# Patient Record
Sex: FEMALE | Race: BLACK OR AFRICAN AMERICAN | Employment: OTHER | URBAN - METROPOLITAN AREA
[De-identification: names, ages, dates, MRNs, and addresses within clinical notes are randomized per-mention and may not be internally consistent; named-entity substitution may affect disease eponyms.]

---

## 2017-01-23 ENCOUNTER — HOSPITAL ENCOUNTER (EMERGENCY)
Age: 82
Discharge: HOME | End: 2017-01-23
Attending: EMERGENCY MEDICINE
Payer: MEDICARE

## 2017-01-23 ENCOUNTER — APPOINTMENT (OUTPATIENT)
Dept: GENERAL RADIOLOGY | Age: 82
End: 2017-01-23
Attending: EMERGENCY MEDICINE
Payer: MEDICARE

## 2017-01-23 VITALS
SYSTOLIC BLOOD PRESSURE: 210 MMHG | WEIGHT: 146 LBS | HEART RATE: 92 BPM | TEMPERATURE: 98.1 F | HEIGHT: 71 IN | RESPIRATION RATE: 16 BRPM | OXYGEN SATURATION: 97 % | DIASTOLIC BLOOD PRESSURE: 93 MMHG | BODY MASS INDEX: 20.44 KG/M2

## 2017-01-23 DIAGNOSIS — I50.9 ACUTE ON CHRONIC CONGESTIVE HEART FAILURE, UNSPECIFIED CONGESTIVE HEART FAILURE TYPE: Primary | ICD-10-CM

## 2017-01-23 LAB
ALBUMIN SERPL BCP-MCNC: 3.3 G/DL (ref 3.4–5)
ALBUMIN/GLOB SERPL: 1 {RATIO} (ref 0.8–1.7)
ALP SERPL-CCNC: 87 U/L (ref 45–117)
ALT SERPL-CCNC: 13 U/L (ref 13–56)
ANION GAP BLD CALC-SCNC: 8 MMOL/L (ref 3–18)
AST SERPL W P-5'-P-CCNC: 9 U/L (ref 15–37)
ATRIAL RATE: 80 BPM
BASOPHILS # BLD AUTO: 0 K/UL (ref 0–0.1)
BASOPHILS # BLD: 1 % (ref 0–2)
BILIRUB SERPL-MCNC: 0.1 MG/DL (ref 0.2–1)
BNP SERPL-MCNC: ABNORMAL PG/ML (ref 0–1800)
BUN SERPL-MCNC: 44 MG/DL (ref 7–18)
BUN/CREAT SERPL: 16 (ref 12–20)
CALCIUM SERPL-MCNC: 9 MG/DL (ref 8.5–10.1)
CALCULATED P AXIS, ECG09: 65 DEGREES
CALCULATED R AXIS, ECG10: -42 DEGREES
CALCULATED T AXIS, ECG11: 116 DEGREES
CHLORIDE SERPL-SCNC: 109 MMOL/L (ref 100–108)
CK MB CFR SERPL CALC: 1.8 % (ref 0–4)
CK MB CFR SERPL CALC: 1.9 % (ref 0–4)
CK MB SERPL-MCNC: 0.8 NG/ML (ref 0.5–3.6)
CK MB SERPL-MCNC: 1 NG/ML (ref 0.5–3.6)
CK SERPL-CCNC: 44 U/L (ref 26–192)
CK SERPL-CCNC: 53 U/L (ref 26–192)
CO2 SERPL-SCNC: 27 MMOL/L (ref 21–32)
CREAT SERPL-MCNC: 2.8 MG/DL (ref 0.6–1.3)
DIAGNOSIS, 93000: NORMAL
DIFFERENTIAL METHOD BLD: ABNORMAL
EOSINOPHIL # BLD: 0.2 K/UL (ref 0–0.4)
EOSINOPHIL NFR BLD: 3 % (ref 0–5)
ERYTHROCYTE [DISTWIDTH] IN BLOOD BY AUTOMATED COUNT: 13.8 % (ref 11.6–14.5)
GLOBULIN SER CALC-MCNC: 3.2 G/DL (ref 2–4)
GLUCOSE SERPL-MCNC: 99 MG/DL (ref 74–99)
HCT VFR BLD AUTO: 31.3 % (ref 35–45)
HGB BLD-MCNC: 10.3 G/DL (ref 12–16)
LYMPHOCYTES # BLD AUTO: 43 % (ref 21–52)
LYMPHOCYTES # BLD: 2.1 K/UL (ref 0.9–3.6)
MCH RBC QN AUTO: 30.4 PG (ref 24–34)
MCHC RBC AUTO-ENTMCNC: 32.9 G/DL (ref 31–37)
MCV RBC AUTO: 92.3 FL (ref 74–97)
MONOCYTES # BLD: 0.4 K/UL (ref 0.05–1.2)
MONOCYTES NFR BLD AUTO: 9 % (ref 3–10)
NEUTS SEG # BLD: 2.2 K/UL (ref 1.8–8)
NEUTS SEG NFR BLD AUTO: 44 % (ref 40–73)
P-R INTERVAL, ECG05: 148 MS
PLATELET # BLD AUTO: 212 K/UL (ref 135–420)
PMV BLD AUTO: 10.1 FL (ref 9.2–11.8)
POTASSIUM SERPL-SCNC: 4.6 MMOL/L (ref 3.5–5.5)
PROT SERPL-MCNC: 6.5 G/DL (ref 6.4–8.2)
Q-T INTERVAL, ECG07: 386 MS
QRS DURATION, ECG06: 112 MS
QTC CALCULATION (BEZET), ECG08: 445 MS
RBC # BLD AUTO: 3.39 M/UL (ref 4.2–5.3)
SODIUM SERPL-SCNC: 144 MMOL/L (ref 136–145)
TROPONIN I SERPL-MCNC: 0.09 NG/ML (ref 0–0.04)
TROPONIN I SERPL-MCNC: 0.1 NG/ML (ref 0–0.04)
VENTRICULAR RATE, ECG03: 80 BPM
WBC # BLD AUTO: 5 K/UL (ref 4.6–13.2)

## 2017-01-23 PROCEDURE — 93005 ELECTROCARDIOGRAM TRACING: CPT

## 2017-01-23 PROCEDURE — 82550 ASSAY OF CK (CPK): CPT | Performed by: EMERGENCY MEDICINE

## 2017-01-23 PROCEDURE — 83880 ASSAY OF NATRIURETIC PEPTIDE: CPT | Performed by: EMERGENCY MEDICINE

## 2017-01-23 PROCEDURE — 99283 EMERGENCY DEPT VISIT LOW MDM: CPT

## 2017-01-23 PROCEDURE — 96374 THER/PROPH/DIAG INJ IV PUSH: CPT

## 2017-01-23 PROCEDURE — 80053 COMPREHEN METABOLIC PANEL: CPT | Performed by: EMERGENCY MEDICINE

## 2017-01-23 PROCEDURE — 85025 COMPLETE CBC W/AUTO DIFF WBC: CPT | Performed by: EMERGENCY MEDICINE

## 2017-01-23 PROCEDURE — 74011250636 HC RX REV CODE- 250/636: Performed by: EMERGENCY MEDICINE

## 2017-01-23 PROCEDURE — 71020 XR CHEST PA LAT: CPT

## 2017-01-23 RX ORDER — FUROSEMIDE 10 MG/ML
40 INJECTION INTRAMUSCULAR; INTRAVENOUS
Status: COMPLETED | OUTPATIENT
Start: 2017-01-23 | End: 2017-01-23

## 2017-01-23 RX ORDER — FUROSEMIDE 40 MG/1
40 TABLET ORAL DAILY
Qty: 20 TAB | Refills: 0 | Status: SHIPPED | OUTPATIENT
Start: 2017-01-23 | End: 2017-02-12

## 2017-01-23 RX ADMIN — FUROSEMIDE 40 MG: 10 INJECTION, SOLUTION INTRAVENOUS at 17:07

## 2017-01-23 NOTE — ED PROVIDER NOTES
HPI Comments: 2:55 PM Irene Winters is a 80 y.o. female who presents to the ED c/o bilateral lower extremity swelling that was noticed yesterday. Other sx include chest pain, SOB, generalized body aches and diarrhea. Patient describes her chest pain as a \"numb pain\". States \"I don't feel sick, just pain\". Denies cough, N/V, and HA. Mentions that she was seen in the ED 6 months ago for similar sx, and at that time states \"they couldn't find out what was wrong with me\". No further complaints at this time. The history is provided by the patient and a relative. Past Medical History:   Diagnosis Date    Breast cancer ([facility] 1/17/14     right breast    Carotid duplex 11/19/2014     Severe 70% or greater bilateral ICA stenosis. LICA dissection suggested.  Chronic renal insufficiency     CVA (cerebral vascular accident) ([facility] 2003     with slight Right sided weakness    Echocardiogram 02/22/2016     EF 55-60%. No WMA. Mild-mod LVH. Gr 2 DDfx. No significant valvular heart disease.  Edema     Glaucoma     Gout flare     Hyperlipidemia     Hypertension     Lower extremity arterial testing 12/19/2014     Mod tibio-peroneal arterial disease bilaterally. R YANCY 1.24.  L YANCY 0.74. Bilateral sm vessel disease.  Lump of right breast     URI (upper respiratory infection)        Past Surgical History:   Procedure Laterality Date    Hx gyn       JUSTIN/BSO    Hx appendectomy      Hx cyst incision and drainage       PT DOES NOT REMEMBER EXACT DATES, TUCKER BREAST DONE MORE THAN 20 YEARS AGO. BENIGN FINDINGS PER PATIENT.     Hx mastectomy  1/17/2014     RIGHT PARTIAL MASTECTOMY performed by Sarah Armendariz MD at [address] Hx hysterectomy           Family History:   Problem Relation Age of Onset    Hypertension Mother     Hypertension Brother     Diabetes Brother        Social History     Social History    Marital status:      Spouse name: N/A    Number of children: N/A    Years of education: N/A     Occupational History    Not on file. Social History Main Topics    Smoking status: Never Smoker    Smokeless tobacco: Never Used    Alcohol use No    Drug use: No    Sexual activity: Not Currently     Other Topics Concern    Not on file     Social History Narrative         ALLERGIES: Review of patient's allergies indicates no known allergies. Review of Systems   Constitutional: Negative for chills and fever. HENT: Negative for congestion and sneezing. Eyes: Negative for visual disturbance. Respiratory: Positive for shortness of breath. Negative for cough. Cardiovascular: Positive for chest pain and leg swelling. Gastrointestinal: Positive for diarrhea. Negative for abdominal pain, nausea and vomiting. Genitourinary: Negative for difficulty urinating and dysuria. Musculoskeletal: Positive for myalgias. Negative for back pain. Skin: Negative for rash. Neurological: Negative for weakness and headaches. Vitals:    01/23/17 1301   BP: (!) 207/91   Pulse: 92   Resp: 16   Temp: 98.1 °F (36.7 °C)   SpO2: 97%   Weight: 66.2 kg (146 lb)   Height: 5' 11\" (1.803 m)            Physical Exam   Constitutional: She is oriented to person, place, and time. She appears well-developed and well-nourished. HENT:   Head: Normocephalic and atraumatic. Neck: Neck supple. No JVD present. Cardiovascular: Normal rate and regular rhythm. Pulmonary/Chest: Effort normal and breath sounds normal. No respiratory distress. Abdominal: Soft. She exhibits no distension. There is no tenderness. There is no rebound and no guarding. Musculoskeletal: She exhibits edema. 2+ pedal edema, no calf tenderness   Neurological: She is alert and oriented to person, place, and time. Skin: Skin is warm and dry. No erythema.    Psychiatric: Judgment normal.        MDM  Number of Diagnoses or Management Options  Acute on chronic congestive heart failure, unspecified congestive heart failure type [facility]):   Elevated BP:   Diagnosis management comments: 81 y/o female presents with chest pressure and LE swelling  Check ekg, trop x2  Suspect chf, will give dose of lasix  Vitals stable, no hypoxia         Amount and/or Complexity of Data Reviewed  Clinical lab tests: ordered and reviewed  Tests in the radiology section of CPT®: ordered and reviewed  Tests in the medicine section of CPT®: reviewed and ordered      ED Course       Procedures    Vitals:  Patient Vitals for the past 12 hrs:   Temp Pulse Resp BP SpO2   01/23/17 1301 98.1 °F (36.7 °C) 92 16 (!) 207/91 97 %     97% on RA, indicating adequate oxygenation.       Medications ordered:   Medications   furosemide (LASIX) injection 40 mg (40 mg IntraVENous Given 1/23/17 1707)         Lab findings:  Recent Results (from the past 12 hour(s))   EKG, 12 LEAD, INITIAL    Collection Time: 01/23/17  1:10 PM   Result Value Ref Range    Ventricular Rate 80 BPM    Atrial Rate 80 BPM    P-R Interval 148 ms    QRS Duration 112 ms    Q-T Interval 386 ms    QTC Calculation (Bezet) 445 ms    Calculated P Axis 65 degrees    Calculated R Axis -42 degrees    Calculated T Axis 116 degrees    Diagnosis       Normal sinus rhythm  Left axis deviation  Incomplete left bundle branch block  Left ventricular hypertrophy with repolarization abnormality  Abnormal ECG  When compared with ECG of 26-OCT-2016 10:05,  T wave inversion less evident in Lateral leads  Confirmed by Juan Caban ([id number]) on 1/23/2017 1:21:22 PM     CBC WITH AUTOMATED DIFF    Collection Time: 01/23/17  1:15 PM   Result Value Ref Range    WBC 5.0 4.6 - 13.2 K/uL    RBC 3.39 (L) 4.20 - 5.30 M/uL    HGB 10.3 (L) 12.0 - 16.0 g/dL    HCT 31.3 (L) 35.0 - 45.0 %    MCV 92.3 74.0 - 97.0 FL    MCH 30.4 24.0 - 34.0 PG    MCHC 32.9 31.0 - 37.0 g/dL    RDW 13.8 11.6 - 14.5 %    PLATELET 286 505 - 502 K/uL    MPV 10.1 9.2 - 11.8 FL    NEUTROPHILS 44 40 - 73 %    LYMPHOCYTES 43 21 - 52 %    MONOCYTES 9 3 - 10 %    EOSINOPHILS 3 0 - 5 %    BASOPHILS 1 0 - 2 %    ABS. NEUTROPHILS 2.2 1.8 - 8.0 K/UL    ABS. LYMPHOCYTES 2.1 0.9 - 3.6 K/UL    ABS. MONOCYTES 0.4 0.05 - 1.2 K/UL    ABS. EOSINOPHILS 0.2 0.0 - 0.4 K/UL    ABS. BASOPHILS 0.0 0.0 - 0.1 K/UL    DF AUTOMATED     METABOLIC PANEL, COMPREHENSIVE    Collection Time: 01/23/17  1:15 PM   Result Value Ref Range    Sodium 144 136 - 145 mmol/L    Potassium 4.6 3.5 - 5.5 mmol/L    Chloride 109 (H) 100 - 108 mmol/L    CO2 27 21 - 32 mmol/L    Anion gap 8 3.0 - 18 mmol/L    Glucose 99 74 - 99 mg/dL    BUN 44 (H) 7.0 - 18 MG/DL    Creatinine 2.80 (H) 0.6 - 1.3 MG/DL    BUN/Creatinine ratio 16 12 - 20      GFR est AA 19 (L) >60 ml/min/1.73m2    GFR est non-AA 16 (L) >60 ml/min/1.73m2    Calcium 9.0 8.5 - 10.1 MG/DL    Bilirubin, total 0.1 (L) 0.2 - 1.0 MG/DL    ALT 13 13 - 56 U/L    AST 9 (L) 15 - 37 U/L    Alk.  phosphatase 87 45 - 117 U/L    Protein, total 6.5 6.4 - 8.2 g/dL    Albumin 3.3 (L) 3.4 - 5.0 g/dL    Globulin 3.2 2.0 - 4.0 g/dL    A-G Ratio 1.0 0.8 - 1.7     CARDIAC PANEL,(CK, CKMB & TROPONIN)    Collection Time: 01/23/17  1:15 PM   Result Value Ref Range    CK 44 26 - 192 U/L    CK - MB 0.8 0.5 - 3.6 ng/ml    CK-MB Index 1.8 0.0 - 4.0 %    Troponin-I, Qt. 0.09 (H) 0.0 - 0.045 NG/ML   PRO-BNP    Collection Time: 01/23/17  1:15 PM   Result Value Ref Range    NT pro-BNP 14368 (H) 0 - 1800 PG/ML   EKG, 12 LEAD, SUBSEQUENT    Collection Time: 01/23/17  4:40 PM   Result Value Ref Range    Ventricular Rate 90 BPM    Atrial Rate 90 BPM    P-R Interval 128 ms    QRS Duration 114 ms    Q-T Interval 384 ms    QTC Calculation (Bezet) 469 ms    Calculated P Axis 46 degrees    Calculated R Axis -46 degrees    Calculated T Axis 114 degrees    Diagnosis       Normal sinus rhythm  Left anterior fascicular block  Left ventricular hypertrophy with repolarization abnormality  Abnormal ECG  When compared with ECG of 23-JAN-2017 13:10,  No significant change was found     CARDIAC PANEL,(CK, CKMB & TROPONIN) Collection Time: 01/23/17  4:50 PM   Result Value Ref Range    CK 53 26 - 192 U/L    CK - MB 1.0 0.5 - 3.6 ng/ml    CK-MB Index 1.9 0.0 - 4.0 %    Troponin-I, Qt. 0.10 (H) 0.0 - 0.045 NG/ML       EKG interpretation by ED Physician:  1310, rate 80, left axis, NSR, LBBB, no scarbossa criteria, no ST changes  1640, rate 90, left axis, NSR, LBBB, no scarbossa criteria, no ST changes    X-Ray, CT or other radiology findings or impressions:  XR CHEST PA LAT   IMPRESSION:     Persistent opacification at the left base consistent with small to moderate left  pleural effusion and likely underlying left basal infiltrate or atelectasis. Stable mild cardiomegaly. Atherosclerosis. Intraossific body left shoulder          Progress notes, Consult notes or additional Procedure notes:   Noted elevated BP. Repeat improved without intervention. Pt feeling better. Stable for dc home,will increase lasix and have pt follow up with PCP and cardiology. Reevaluation of patient:   5:48 PM  I have reassessed the patient. Patient was discharged in stable condition. Patient is to return to emergency department if any new or worsening condition. Disposition:  Diagnosis:   1. Acute on chronic congestive heart failure, unspecified congestive heart failure type ([address]    2. Elevated BP        Disposition: Discharge    Follow-up Information     Follow up With Details Comments [address] Person MD Go in 3 days For ED visit follow up [place] [address]  [phone]      [facility] EMERGENCY DEPT  As needed, If symptoms worsen [address] [facility]  [phone]           Patient's Medications   Start Taking    FUROSEMIDE (LASIX) 40 MG TABLET    Take 1 Tab by mouth daily for 20 days. Continue Taking    ACETAMINOPHEN (TYLENOL) 325 MG TABLET    Take 2 Tabs by mouth every four (4) hours as needed. ALLOPURINOL (ZYLOPRIM) 100 MG TABLET    Take 2 Tabs by mouth daily. ANASTROZOLE (ARIMIDEX) 1 MG TABLET    Take 1 Tab by mouth daily. ASPIRIN DELAYED-RELEASE 81 MG TABLET    Take 1 Tab by mouth daily. CARVEDILOL (COREG) 6.25 MG TABLET    Take 1 Tab by mouth two (2) times daily (with meals). CHLORTHALIDONE (HYGROTEN) 25 MG TABLET    Take 1 Tab by mouth daily. CHOLECALCIFEROL, VITAMIN D3, (VITAMIN D3) 5,000 UNIT TAB TABLET    Take 1 Tab by mouth daily. CYANOCOBALAMIN (VITAMIN B-12) 1,000 MCG TABLET    Take 1,000 mcg by mouth daily. FERROUS SULFATE 325 MG (65 MG IRON) TABLET    Take 325 mg by mouth Daily (before breakfast). FUROSEMIDE (LASIX) 20 MG TABLET    One tablet by mouth each morning    HYDRALAZINE (APRESOLINE) 100 MG TABLET    Take 100 mg by mouth two (2) times a day. These Medications have changed    No medications on file   Stop Taking    No medications on file       SCRIBE ATTESTATION STATEMENT  Documented by: Kamini martining for, and in the presence of, Yuliya Pinedo DO 3:00 PM     Signed by: Rashad Kimball, 1/23/17, 3:00 PM    PROVIDER ATTESTATION STATEMENT  I personally performed the services described in the documentation, reviewed the documentation, as recorded by the scribe in my presence, and it accurately and completely records my words and actions.   Yuliya Pinedo DO

## 2017-01-23 NOTE — DISCHARGE INSTRUCTIONS

## 2017-01-23 NOTE — ED NOTES
I performed a brief evaluation, including history and physical, of the patient here in triage and I have determined that pt will need further treatment and evaluation from the main side ER physician. I have placed initial orders to help in expediting patients care.      January 23, 2017 at 1:27 PM - Andres Condon DO        Visit Vitals    BP (!) 207/91 (BP 1 Location: Left arm, BP Patient Position: At rest)    Pulse 92    Temp 98.1 °F (36.7 °C)    Resp 16    Ht 5' 11\" (1.803 m)    Wt 66.2 kg (146 lb)    SpO2 97%    BMI 20.36 kg/m2

## 2017-01-24 ENCOUNTER — PATIENT OUTREACH (OUTPATIENT)
Dept: INTERNAL MEDICINE CLINIC | Age: 82
End: 2017-01-24

## 2017-01-24 LAB
ATRIAL RATE: 90 BPM
CALCULATED P AXIS, ECG09: 46 DEGREES
CALCULATED R AXIS, ECG10: -46 DEGREES
CALCULATED T AXIS, ECG11: 114 DEGREES
DIAGNOSIS, 93000: NORMAL
P-R INTERVAL, ECG05: 128 MS
Q-T INTERVAL, ECG07: 384 MS
QRS DURATION, ECG06: 114 MS
QTC CALCULATION (BEZET), ECG08: 469 MS
VENTRICULAR RATE, ECG03: 90 BPM

## 2017-01-24 NOTE — PROGRESS NOTES
NNTOCED  Patient admitted to [facility], 1/23/17 to 1/23/17 for elevated BP/acute on chronic CHF. Patient presented to ED with c/o swelling to bilateral legs. As per ED report, patient described chest pain as \"numb\". Other complaints were SOB, chest pain, generalized body aches and diarrhea. No other complaints or s/s noted. New medications/changes to current medications:  Lasix 40 mg daily    ED admissions in the past 6 months: 1    Contacted patient for ED follow up. Home number bust. Called mobile number. No answer. left message introducing self, role and reason for call. Requested return call;contact information provided. Will attempt to contact at a later time.

## 2017-01-25 ENCOUNTER — PATIENT OUTREACH (OUTPATIENT)
Dept: INTERNAL MEDICINE CLINIC | Age: 82
End: 2017-01-25

## 2017-01-25 NOTE — PROGRESS NOTES
NNTOCED  Patient admitted to [facility], 1/23/17 to 1/23/17 for elevated BP/acute on chronic CHF. Hx of CHF, HTN, ACS, HLD, NSTEMI, CVA, breast cancer gout. Patient presented to ED with c/o swelling to bilateral legs. As per ED report, patient described chest pain as \"numb\". Other complaints were SOB, chest pain, generalized body aches and diarrhea. No other complaints or s/s noted.      New medications/changes to current medications:  Lasix 40 mg daily     ED admissions in the past 6 months: 1    Contacted patient for ED follow up. Verified 2 patient identifiers. Introduced self, role and reason for call. Patient reports:  Few episodes of chest pain which have resolved  SOB with minimal exertion  Bilateral foot pain  Right foot swollen  Non productive cough  Nausea X 1    Patient denies:  Dizziness  Abdominal fullness/bloating   Sweating    ADL's:  Feeds self: yes  Ambulates: with assist  Self grooming: yes  Toileting: incontinent; wears incontinent brief    DME:   [employer]    Support:   Gadiel Cam, son  Renato Pennington, daughter-in-law    Has SOB when walking from bedroom to kitchen. Patient reported cough but stated \"nothing came up; feels like it's something in there\". Does not weigh at home. Patient communicated home health came and got the scale after they discharged her and there is no scale in the home. Attempted to reconcile medications however patient stated \"I'm confused\" after reading several bottle of medications. Asked if there was someone else in the home writer could speak with. Spoke with Renato Pennington (listed on [facility]). Reconciled medications with Ms. Sahu. Upon discharge from hospital on 10/28/16; nifedipine and chlorthalidone was d/c'd. Patient no longer taking hydralazine or allopurinol. Instructed to bring all medications or list of medications with them to next appointment. Confirmed appointment with Ms. Sahu for 1/27/17 at 2 PM with Samir Parada PA-C.  Ms. Sherryle Dance with provide transportation. Educated patient and Ms. Sahu to monitor and report the following Red flags: worsening of SOB, worsening of swelling to legs/feet, return of chest pain, sweating, increased N/V, feeling of fullness/bloating or any new or concerning symptoms. Patient and Ms. Sahu verbalized understanding of information discussed and is aware of  when to seek medical attention from PCP, urgent care or ED. Opportunity to ask questions was provided. Contact information was provided for future reference or further questions. Discussed patient with Evangelista Meadows. Contacted Ms. Sahu to ask if she could bring patient in earlier. Ms. Agnes Pascual communicated she could; appt scheduled for 1:30 PM on 1/27/17. Discussed the importance of patient weighing daily to assess for weight gain due to fluid overload. Ms. Agnes Pascual communicated she is a CNA with [employer] and is aware. Asked if a scale can be purchased for patient to monitor and record daily weights. Ms. Agnes Pascual communicated the scale that was provided by [facility] was connected to a system that notified them of when to weigh patient and take her BP and that information was sent to [facility]. She stated was patient was discharged from home health they picked up scale. Ms. Agnes Pascual verbalized patient has memory loss, lies on couch all day long and lacks motivation. Ms. Agnes Pascual indicated she is taking patient to  before appointment to be screened for home health care.

## 2017-01-27 ENCOUNTER — OFFICE VISIT (OUTPATIENT)
Dept: INTERNAL MEDICINE CLINIC | Age: 82
End: 2017-01-27

## 2017-01-27 ENCOUNTER — HOSPITAL ENCOUNTER (OUTPATIENT)
Dept: LAB | Age: 82
Discharge: HOME | End: 2017-01-27
Payer: MEDICARE

## 2017-01-27 VITALS
OXYGEN SATURATION: 98 % | WEIGHT: 146 LBS | BODY MASS INDEX: 20.44 KG/M2 | HEIGHT: 71 IN | SYSTOLIC BLOOD PRESSURE: 160 MMHG | TEMPERATURE: 97.3 F | HEART RATE: 61 BPM | DIASTOLIC BLOOD PRESSURE: 68 MMHG

## 2017-01-27 DIAGNOSIS — D64.9 ANEMIA, UNSPECIFIED TYPE: ICD-10-CM

## 2017-01-27 DIAGNOSIS — N18.9 CKD (CHRONIC KIDNEY DISEASE), UNSPECIFIED STAGE: ICD-10-CM

## 2017-01-27 DIAGNOSIS — J90 PLEURAL EFFUSION, LEFT: ICD-10-CM

## 2017-01-27 DIAGNOSIS — R60.9 PERIPHERAL EDEMA: ICD-10-CM

## 2017-01-27 DIAGNOSIS — I50.9 CONGESTIVE HEART FAILURE, UNSPECIFIED CONGESTIVE HEART FAILURE CHRONICITY, UNSPECIFIED CONGESTIVE HEART FAILURE TYPE: ICD-10-CM

## 2017-01-27 DIAGNOSIS — I10 ESSENTIAL HYPERTENSION: ICD-10-CM

## 2017-01-27 DIAGNOSIS — I50.9 CONGESTIVE HEART FAILURE, UNSPECIFIED CONGESTIVE HEART FAILURE CHRONICITY, UNSPECIFIED CONGESTIVE HEART FAILURE TYPE: Primary | ICD-10-CM

## 2017-01-27 LAB
ANION GAP BLD CALC-SCNC: 11 MMOL/L (ref 3–18)
BASOPHILS # BLD AUTO: 0 K/UL (ref 0–0.06)
BASOPHILS # BLD: 1 % (ref 0–2)
BUN SERPL-MCNC: 53 MG/DL (ref 7–18)
BUN/CREAT SERPL: 18 (ref 12–20)
CALCIUM SERPL-MCNC: 8.5 MG/DL (ref 8.5–10.1)
CHLORIDE SERPL-SCNC: 106 MMOL/L (ref 100–108)
CO2 SERPL-SCNC: 25 MMOL/L (ref 21–32)
CREAT SERPL-MCNC: 3.02 MG/DL (ref 0.6–1.3)
DIFFERENTIAL METHOD BLD: ABNORMAL
EOSINOPHIL # BLD: 0.3 K/UL (ref 0–0.4)
EOSINOPHIL NFR BLD: 5 % (ref 0–5)
ERYTHROCYTE [DISTWIDTH] IN BLOOD BY AUTOMATED COUNT: 14 % (ref 11.6–14.5)
GLUCOSE SERPL-MCNC: 97 MG/DL (ref 74–99)
HCT VFR BLD AUTO: 30.5 % (ref 35–45)
HGB BLD-MCNC: 9.8 G/DL (ref 12–16)
LYMPHOCYTES # BLD AUTO: 30 % (ref 21–52)
LYMPHOCYTES # BLD: 1.6 K/UL (ref 0.9–3.6)
MCH RBC QN AUTO: 30.5 PG (ref 24–34)
MCHC RBC AUTO-ENTMCNC: 32.1 G/DL (ref 31–37)
MCV RBC AUTO: 95 FL (ref 74–97)
MONOCYTES # BLD: 0.2 K/UL (ref 0.05–1.2)
MONOCYTES NFR BLD AUTO: 4 % (ref 3–10)
NEUTS SEG # BLD: 3.3 K/UL (ref 1.8–8)
NEUTS SEG NFR BLD AUTO: 60 % (ref 40–73)
PLATELET # BLD AUTO: 217 K/UL (ref 135–420)
PMV BLD AUTO: 11.3 FL (ref 9.2–11.8)
POTASSIUM SERPL-SCNC: 4.6 MMOL/L (ref 3.5–5.5)
RBC # BLD AUTO: 3.21 M/UL (ref 4.2–5.3)
SODIUM SERPL-SCNC: 142 MMOL/L (ref 136–145)
WBC # BLD AUTO: 5.4 K/UL (ref 4.6–13.2)

## 2017-01-27 PROCEDURE — 36415 COLL VENOUS BLD VENIPUNCTURE: CPT | Performed by: PHYSICIAN ASSISTANT

## 2017-01-27 PROCEDURE — 85025 COMPLETE CBC W/AUTO DIFF WBC: CPT | Performed by: PHYSICIAN ASSISTANT

## 2017-01-27 PROCEDURE — 80048 BASIC METABOLIC PNL TOTAL CA: CPT | Performed by: PHYSICIAN ASSISTANT

## 2017-01-27 RX ORDER — ALBUTEROL SULFATE 90 UG/1
1-2 AEROSOL, METERED RESPIRATORY (INHALATION)
Qty: 1 INHALER | Refills: 0 | Status: ON HOLD | OUTPATIENT
Start: 2017-01-27 | End: 2018-01-01

## 2017-01-27 NOTE — MR AVS SNAPSHOT
Visit Information Date & Time Provider Department Dept. Phone Encounter #  
 1/27/2017  1:30 PM Debo Tejeda [place] [facility] [address] [id number] Your Appointments 2/10/2017  3:30 PM  
Office Visit with Sandria Curling, MD  
Internist of [place] [address]) Appt Note: ov 2wks per bm/rm [address], [address] [place] [address] [address] [place]  
  
   
 [address], [address] Upcoming Health Maintenance Date Due  
 GLAUCOMA SCREENING Q2Y 7/17/1993 MEDICARE YEARLY EXAM 7/17/1993 DTaP/Tdap/Td series (1 - Tdap) 11/9/2002 Allergies as of 1/27/2017  Review Complete On: 1/27/2017 By: TRACIE Pickens No Known Allergies Current Immunizations  Reviewed on 10/28/2016 Name Date Influenza High Dose Vaccine PF 10/28/2016 Influenza Vaccine 10/12/2015, 11/5/2014, 10/22/2013  2:54 PM  
 Influenza Vaccine Whole 11/8/2010 Pneumococcal Vaccine (Unspecified Type) 3/5/2012, 5/10/2007 TD Vaccine 11/8/2002 Not reviewed this visit You Were Diagnosed With   
  
 Codes Comments Congestive heart failure, unspecified congestive heart failure chronicity, unspecified congestive heart failure type ([facility]    -  Primary ICD-10-CM: I50.9 ICD-9-CM: 428.0 Vitals BP Pulse Temp Height(growth percentile) Weight(growth percentile) SpO2  
 160/68 (BP 1 Location: Left arm, BP Patient Position: Sitting) 61 97.3 °F (36.3 °C) (Oral) 5' 11\" (1.803 m) 146 lb (66.2 kg) 98% BMI OB Status Smoking Status 20.36 kg/m2 Hysterectomy Never Smoker BMI and BSA Data Body Mass Index Body Surface Area  
 20.36 kg/m 2 1.82 m 2 Preferred Pharmacy Pharmacy Name Phone [id number] [address] [phone] Your Updated Medication List  
  
   
This list is accurate as of: 1/27/17  2:14 PM.  Always use your most recent med list.  
  
  
  
  
 acetaminophen 325 mg tablet Commonly known as:  TYLENOL Take 2 Tabs by mouth every four (4) hours as needed. albuterol 90 mcg/actuation inhaler Commonly known as:  PROVENTIL HFA, VENTOLIN HFA, PROAIR HFA Take 1-2 Puffs by inhalation every four (4) hours as needed for Wheezing. allopurinol 100 mg tablet Commonly known as:  Luther Needle Take 2 Tabs by mouth daily. anastrozole 1 mg tablet Commonly known as:  ARIMIDEX Take 1 Tab by mouth daily. aspirin delayed-release 81 mg tablet Take 1 Tab by mouth daily. carvedilol 6.25 mg tablet Commonly known as:  Layman Peña Take 1 Tab by mouth two (2) times daily (with meals). chlorthalidone 25 mg tablet Commonly known as:  Johana Stakes Take 1 Tab by mouth daily. cholecalciferol (VITAMIN D3) 5,000 unit Tab tablet Commonly known as:  VITAMIN D3 Take 1 Tab by mouth daily. ferrous sulfate 325 mg (65 mg iron) tablet Take 325 mg by mouth Daily (before breakfast). * furosemide 20 mg tablet Commonly known as:  LASIX One tablet by mouth each morning * furosemide 40 mg tablet Commonly known as:  LASIX Take 1 Tab by mouth daily for 20 days. hydrALAZINE 100 mg tablet Commonly known as:  APRESOLINE Take 100 mg by mouth two (2) times a day. inhalational spacing device 1 Each by Does Not Apply route as needed. VITAMIN B-12 1,000 mcg tablet Generic drug:  cyanocobalamin Take 1,000 mcg by mouth daily. * Notice: This list has 2 medication(s) that are the same as other medications prescribed for you. Read the directions carefully, and ask your doctor or other care provider to review them with you. Prescriptions Sent to Pharmacy Refills  
 albuterol (PROVENTIL HFA, VENTOLIN HFA, PROAIR HFA) 90 mcg/actuation inhaler 0 Sig: Take 1-2 Puffs by inhalation every four (4) hours as needed for Wheezing.   
 Class: Normal  
 Pharmacy: [facility] [address], [address] Ph #: [phone] Route: Inhalation  
 inhalational spacing device 0 Si Each by Does Not Apply route as needed. Class: Normal  
 Pharmacy: [facility] [address], [address] Ph #: [phone] Route: Does Not Apply We Performed the Following REFERRAL TO CARDIOLOGY [OAF06 Custom] Comments:  
 Looks that pt has seen Dr. Bert Vásquez in past. 
CHF. Referral Information Referral ID Referred By Referred To  
  
 [id number] MELANIE, [address] [place] [address], MD   
   [address] [place], [address] Phone: [phone] Fax: [phone] Visits Status Start Date End Date 1 New Request 17 If your referral has a status of pending review or denied, additional information will be sent to support the outcome of this decision. Introducing [facility]! Marcos Bledsoe introduces Not iT patient portal. Now you can access parts of your medical record, email your doctor's office, and request medication refills online. 1. In your internet browser, go to [email or web address] 2. Click on the First Time User? Click Here link in the Sign In box. You will see the New Member Sign Up page. 3. Enter your Not iT Access Code exactly as it appears below. You will not need to use this code after youve completed the sign-up process. If you do not sign up before the expiration date, you must request a new code. · Not iT Access Code: [email or web address] Expires: 2017  2:51 PM 
 
4. Enter the last four digits of your Social Security Number (xxxx) and Date of Birth (mm/dd/yyyy) as indicated and click Submit. You will be taken to the next sign-up page. 5. Create a Not iT ID. This will be your Not iT login ID and cannot be changed, so think of one that is secure and easy to remember. 6. Create a [employer] password. You can change your password at any time. 7. Enter your Password Reset Question and Answer. This can be used at a later time if you forget your password. 8. Enter your e-mail address. You will receive e-mail notification when new information is available in [address] 9. Click Sign Up. You can now view and download portions of your medical record. 10. Click the Download Summary menu link to download a portable copy of your medical information. If you have questions, please visit the Frequently Asked Questions section of the [employer] website. Remember, [employer] is NOT to be used for urgent needs. For medical emergencies, dial 911. Now available from your iPhone and Android! Please provide this summary of care documentation to your next provider. Your primary care clinician is listed as James Muñoz. If you have any questions after today's visit, please call [phone].

## 2017-01-27 NOTE — PROGRESS NOTES
HPI/History  Crissy Warren is a 80 y.o.  female accompanied by health aide and male guest who presents for ED f/u. Pt with hx noted below including extensive CV hx. Reports she is not followed by cardio but looks to have seen Dr. Noemy Saucedo in the past and other cardiologists during hospital visits. She experienced some peripheral edema and SOB and visited ED. Dx with acute on chronic CHF with elevated BP. ProBNP [id number]. Troponin slightly elevated. CXR showed left pleural effusion. Chronic anemia which appears stable; WBC wnl. CRF somewhat stable as well. She was prescribed 40mg lasix to replace 20mg. Currently, pt seems to be doing fairly well with some improvement. LE edema improving but slightly remains, right > left. She has some SOB with walking short distances and other activities; this is staying the same to minimally improved but withOUT any worsening. Admits to mild nonproductive cough at times. She sleeps on 2 pillows at baseline with no changes in this; no PND. Denies any chest pain. No signs of thrombosis. No other cardiopulmonary sxs. Denies any fevers or other cold/flu-like or constitutional sxs. No other complaints. BP elevated today, as somewhat expected, although improved from ED. Reports she is taking coreg, chlorthalidone, hydralazine, and lasix 40mg as below.      Patient Active Problem List   Diagnosis Code    Hyperlipidemia E78.5    Glaucoma H40.9    CVA (cerebral vascular accident) ([facility] I63.9    Cerebral thrombosis with cerebral infarction ([facility] I63.30    Long term (current) use of anticoagulants Z79.01    Gout attack M10.9    Chronic renal failure N18.9    Leg pain M79.606    Lump of right breast N63    Breast mass in female N58    Breast cancer ([facility] C50.919    Cellulitis L03.90    Psoriasis L40.9    CRF (chronic renal failure) N18.9    Carotid stenosis I65.29    Fatigue R53.83    Hypertensive renal sclerosis with hypertension I12.9    Primary osteoarthritis involving multiple joints M15.0    ACS (acute coronary syndrome) ([facility]) I24.9    NSTEMI (non-ST elevated myocardial infarction) ([facility] A81.1    Diastolic CHF, acute on chronic (HCC) I50.33     Past Medical History   Diagnosis Date    [facility]/17[facility]14     right breast    Carotid duplex 11/19/2014     Severe 70% or greater bilateral ICA stenosis. LICA dissection suggested.  Chronic renal insufficiency     CVA (cerebral vascular accident) ([facility] 2003     with slight Right sided weakness    Echocardiogram 02/22/2016     EF 55-60%. No WMA. Mild-mod LVH. Gr 2 DDfx. No significant valvular heart disease.  Edema     Glaucoma     Gout flare     Hyperlipidemia     Hypertension     Lower extremity arterial testing 12/19/2014     Mod tibio-peroneal arterial disease bilaterally. R YANCY 1.24.  L YANCY 0.74. Bilateral sm vessel disease.  Lump of right breast     URI (upper respiratory infection)      Past Surgical History   Procedure Laterality Date    Hx gyn       JUSTIN/BSO    Hx appendectomy      Hx cyst incision and drainage       PT DOES NOT REMEMBER EXACT DATES, TUCKER BREAST DONE MORE THAN 20 YEARS AGO. BENIGN FINDINGS PER PATIENT.  Hx mastectomy  1/17/2014     RIGHT PARTIAL MASTECTOMY performed by Tod Guerrero MD at [address] [place] [address] Hx hysterectomy       Social History     Social History    Marital status:      Spouse name: N/A    Number of children: N/A    Years of education: N/A     Occupational History    Not on file.      Social History Main Topics    Smoking status: Never Smoker    Smokeless tobacco: Never Used    Alcohol use No    Drug use: No    Sexual activity: Not Currently     Other Topics Concern    Not on file     Social History Narrative     Family History   Problem Relation Age of Onset    Hypertension Mother     Hypertension Brother     Diabetes Brother      Current Outpatient Prescriptions   Medication Sig    albuterol (PROVENTIL HFA, VENTOLIN HFA, PROAIR HFA) 90 mcg/actuation inhaler Take 1-2 Puffs by inhalation every four (4) hours as needed for Wheezing.  inhalational spacing device 1 Each by Does Not Apply route as needed.  furosemide (LASIX) 40 mg tablet Take 1 Tab by mouth daily for 20 days.  carvedilol (COREG) 6.25 mg tablet Take 1 Tab by mouth two (2) times daily (with meals).  aspirin delayed-release 81 mg tablet Take 1 Tab by mouth daily.  furosemide (LASIX) 20 mg tablet One tablet by mouth each morning    chlorthalidone (HYGROTEN) 25 mg tablet Take 1 Tab by mouth daily.  ferrous sulfate 325 mg (65 mg iron) tablet Take 325 mg by mouth Daily (before breakfast).  hydrALAZINE (APRESOLINE) 100 mg tablet Take 100 mg by mouth two (2) times a day.  cyanocobalamin (VITAMIN B-12) 1,000 mcg tablet Take 1,000 mcg by mouth daily.  allopurinol (ZYLOPRIM) 100 mg tablet Take 2 Tabs by mouth daily.  acetaminophen (TYLENOL) 325 mg tablet Take 2 Tabs by mouth every four (4) hours as needed.  cholecalciferol, VITAMIN D3, (VITAMIN D3) 5,000 unit tab tablet Take 1 Tab by mouth daily.  anastrozole (ARIMIDEX) 1 mg tablet Take 1 Tab by mouth daily. No current facility-administered medications for this visit. No Known Allergies    Review of Systems  Rest of ROS negative. Significant findings included in HPI.     Physical Examination  Visit Vitals    /68 (BP 1 Location: Left arm, BP Patient Position: Sitting)    Pulse 61    Temp 97.3 °F (36.3 °C) (Oral)    Ht 5' 11\" (1.803 m)    Wt 146 lb (66.2 kg)    SpO2 98%    BMI 20.36 kg/m2       Results for orders placed or performed during the hospital encounter of 01/23/17   CBC WITH AUTOMATED DIFF   Result Value Ref Range    WBC 5.0 4.6 - 13.2 K/uL    RBC 3.39 (L) 4.20 - 5.30 M/uL    HGB 10.3 (L) 12.0 - 16.0 g/dL    HCT 31.3 (L) 35.0 - 45.0 %    MCV 92.3 74.0 - 97.0 FL    MCH 30.4 24.0 - 34.0 PG    MCHC 32.9 31.0 - 37.0 g/dL    RDW 13.8 11.6 - 14.5 %    PLATELET 212 135 - 420 K/uL    MPV 10.1 9.2 - 11.8 FL    NEUTROPHILS 44 40 - 73 %    LYMPHOCYTES 43 21 - 52 %    MONOCYTES 9 3 - 10 %    EOSINOPHILS 3 0 - 5 %    BASOPHILS 1 0 - 2 %    ABS. NEUTROPHILS 2.2 1.8 - 8.0 K/UL    ABS. LYMPHOCYTES 2.1 0.9 - 3.6 K/UL    ABS. MONOCYTES 0.4 0.05 - 1.2 K/UL    ABS. EOSINOPHILS 0.2 0.0 - 0.4 K/UL    ABS. BASOPHILS 0.0 0.0 - 0.1 K/UL    DF AUTOMATED     METABOLIC PANEL, COMPREHENSIVE   Result Value Ref Range    Sodium 144 136 - 145 mmol/L    Potassium 4.6 3.5 - 5.5 mmol/L    Chloride 109 (H) 100 - 108 mmol/L    CO2 27 21 - 32 mmol/L    Anion gap 8 3.0 - 18 mmol/L    Glucose 99 74 - 99 mg/dL    BUN 44 (H) 7.0 - 18 MG/DL    Creatinine 2.80 (H) 0.6 - 1.3 MG/DL    BUN/Creatinine ratio 16 12 - 20      GFR est AA 19 (L) >60 ml/min/1.73m2    GFR est non-AA 16 (L) >60 ml/min/1.73m2    Calcium 9.0 8.5 - 10.1 MG/DL    Bilirubin, total 0.1 (L) 0.2 - 1.0 MG/DL    ALT 13 13 - 56 U/L    AST 9 (L) 15 - 37 U/L    Alk.  phosphatase 87 45 - 117 U/L    Protein, total 6.5 6.4 - 8.2 g/dL    Albumin 3.3 (L) 3.4 - 5.0 g/dL    Globulin 3.2 2.0 - 4.0 g/dL    A-G Ratio 1.0 0.8 - 1.7     CARDIAC PANEL,(CK, CKMB & TROPONIN)   Result Value Ref Range    CK 44 26 - 192 U/L    CK - MB 0.8 0.5 - 3.6 ng/ml    CK-MB Index 1.8 0.0 - 4.0 %    Troponin-I, Qt. 0.09 (H) 0.0 - 0.045 NG/ML   PRO-BNP   Result Value Ref Range    NT pro-BNP 28758 (H) 0 - 1800 PG/ML   CARDIAC PANEL,(CK, CKMB & TROPONIN)   Result Value Ref Range    CK 53 26 - 192 U/L    CK - MB 1.0 0.5 - 3.6 ng/ml    CK-MB Index 1.9 0.0 - 4.0 %    Troponin-I, Qt. 0.10 (H) 0.0 - 0.045 NG/ML   EKG, 12 LEAD, INITIAL   Result Value Ref Range    Ventricular Rate 80 BPM    Atrial Rate 80 BPM    P-R Interval 148 ms    QRS Duration 112 ms    Q-T Interval 386 ms    QTC Calculation (Bezet) 445 ms    Calculated P Axis 65 degrees    Calculated R Axis -42 degrees    Calculated T Axis 116 degrees    Diagnosis       Normal sinus rhythm  Left axis deviation  Incomplete left bundle branch block  Left ventricular hypertrophy with repolarization abnormality  Abnormal ECG  When compared with ECG of 26-OCT-2016 10:05,  T wave inversion less evident in Lateral leads  Confirmed by Juan Garcia ([id number]) on 1/23/2017 1:21:22 PM     EKG, 12 LEAD, SUBSEQUENT   Result Value Ref Range    Ventricular Rate 90 BPM    Atrial Rate 90 BPM    P-R Interval 128 ms    QRS Duration 114 ms    Q-T Interval 384 ms    QTC Calculation (Bezet) 469 ms    Calculated P Axis 46 degrees    Calculated R Axis -46 degrees    Calculated T Axis 114 degrees    Diagnosis       Normal sinus rhythm  Left anterior fascicular block  Left ventricular hypertrophy with repolarization abnormality  Abnormal ECG  When compared with ECG of 23-JAN-2017 13:10,  No significant change was found  Confirmed by Ale Hagan MD, Tere Winkler ([id number]) on 1/24/2017 8:07:36 AM       Notes, labs, EKG, CXR reviewed. General - Alert and in no acute distress. Elderly pt who appears well, comfortable, and in good spirits. Very pleasant and engaging. Nontoxic. Not anxious, non-diaphoretic. Mental status - Appropriate mood, behavior, speech content, dress, and thought processes. Pulm - No cough during visit. Complete sentences. No tachypnea, retractions, or cyanosis. Good respiratory effort. Slightly diminished bilat but mostly equal, however, noticeably diminished at left base. No appreciable wheeze or rhonchi. Possible mild rales at left base. Cardiovascular - Normal rate, regular rhythm. No appreciable murmurs or gallops. Abdomen - Nondistended. Active bowel sounds. Soft, nontender. No guarding, rigidity, or rebound. No appreciable masses. Extremities - Very minimal edema at ankles bilat but more so on right, rest of LE without edema, cyanosis, erythema, warmth, or tenderness. Appears perfused with intact distal pulses. Negative Homans bilat. Assessment and Plan  1. Pt with extensive CV as noted above with recent acute on chronic CHF.  Appears she is on coreg, chlorthalidone, lasix, and hydralazine. LE edema is improving. SOB with activities is the same or slightly improved but not worsening per report. Reports not followed by cardio but looks to have seen Dr. Elvis Morse in past and others at hospital. I will refer to Dr. Elvis Morse to re-establish care and f/u for recent events. 2. Left pleural effusion - probably secondary to above. Will treat as noted but will also add albuterol with spacer. Reference material provided. 3. HTN - Elevated today. Somewhat expected and likely due to overload. Seems to be diuresing and likely to have subsequent decrease in pressure. Continue current for now and monitor. 4. CRF - somewhat stable. Will treat as above but check labs today and follow closely given her regimen. 5. Chronic anemia - appears stable. Will check labs today and follow. Will check CBC and BMP today. Will schedule f/u with Dr. Subha Cole, pt requested 2 wks instead of 1wk. Will return sooner if needed or visit ED with worsening or ominous developments. Further planning as needed. Pt and guests happily agree with plan. More than 40 minutes spent during visit with at least half discussing above, eval/tx, results, plan, med reconciliation/considerations, and questions. PLEASE NOTE:   This document has been produced using voice recognition software. Unrecognized errors in transcription may be present.     Freddie Guerra [employer] of [address] [place] [address]  [phone]  1/27/2017

## 2017-01-30 ENCOUNTER — TELEPHONE (OUTPATIENT)
Dept: INTERNAL MEDICINE CLINIC | Age: 82
End: 2017-01-30

## 2017-01-30 DIAGNOSIS — D64.9 ANEMIA, UNSPECIFIED TYPE: ICD-10-CM

## 2017-01-30 DIAGNOSIS — N18.9 CRF (CHRONIC RENAL FAILURE), UNSPECIFIED STAGE: Primary | ICD-10-CM

## 2017-01-30 DIAGNOSIS — I50.9 CONGESTIVE HEART FAILURE, UNSPECIFIED CONGESTIVE HEART FAILURE CHRONICITY, UNSPECIFIED CONGESTIVE HEART FAILURE TYPE: ICD-10-CM

## 2017-01-30 NOTE — TELEPHONE ENCOUNTER
Daughter in law answered phone. She said they will take her to \[facility]\"" for the labs on 2/8.

## 2017-01-30 NOTE — TELEPHONE ENCOUNTER
Pt with recent CHF and hx of CRF. WBC wnl. Anemia fairly stable. Renal function slightly decreased from a week ago but somewhat stable. I spoke with Dr. Mehnaz Ibarra, who recommends continuing current tx plan, including lasix at 40mg. Keep appt with him on 2/10 but repeat CBC and BMP on 2/8. [facility] office: Please schedule labs on 2/8.

## 2017-01-30 NOTE — PROGRESS NOTES
Patient presented to office for ED follow up. Was admitted to [facility], 1/23/17 to 1/23/17 for elevated BP/acute on chronic CHF.

## 2017-02-08 ENCOUNTER — HOSPITAL ENCOUNTER (OUTPATIENT)
Dept: LAB | Age: 82
Discharge: HOME | End: 2017-02-08
Payer: MEDICARE

## 2017-02-08 DIAGNOSIS — N18.9 CRF (CHRONIC RENAL FAILURE), UNSPECIFIED STAGE: ICD-10-CM

## 2017-02-08 DIAGNOSIS — I50.9 CONGESTIVE HEART FAILURE, UNSPECIFIED CONGESTIVE HEART FAILURE CHRONICITY, UNSPECIFIED CONGESTIVE HEART FAILURE TYPE: ICD-10-CM

## 2017-02-08 DIAGNOSIS — D64.9 ANEMIA, UNSPECIFIED TYPE: ICD-10-CM

## 2017-02-08 LAB
ANION GAP BLD CALC-SCNC: 9 MMOL/L (ref 3–18)
BASOPHILS # BLD AUTO: 0 K/UL (ref 0–0.1)
BASOPHILS # BLD: 0 % (ref 0–2)
BUN SERPL-MCNC: 53 MG/DL (ref 7–18)
BUN/CREAT SERPL: 17 (ref 12–20)
CALCIUM SERPL-MCNC: 9.2 MG/DL (ref 8.5–10.1)
CHLORIDE SERPL-SCNC: 111 MMOL/L (ref 100–108)
CO2 SERPL-SCNC: 24 MMOL/L (ref 21–32)
CREAT SERPL-MCNC: 3.07 MG/DL (ref 0.6–1.3)
DIFFERENTIAL METHOD BLD: ABNORMAL
EOSINOPHIL # BLD: 0.2 K/UL (ref 0–0.4)
EOSINOPHIL NFR BLD: 3 % (ref 0–5)
ERYTHROCYTE [DISTWIDTH] IN BLOOD BY AUTOMATED COUNT: 13.7 % (ref 11.6–14.5)
GLUCOSE SERPL-MCNC: 96 MG/DL (ref 74–99)
HCT VFR BLD AUTO: 30.4 % (ref 35–45)
HGB BLD-MCNC: 9.7 G/DL (ref 12–16)
LYMPHOCYTES # BLD AUTO: 32 % (ref 21–52)
LYMPHOCYTES # BLD: 1.7 K/UL (ref 0.9–3.6)
MCH RBC QN AUTO: 29.8 PG (ref 24–34)
MCHC RBC AUTO-ENTMCNC: 31.9 G/DL (ref 31–37)
MCV RBC AUTO: 93.3 FL (ref 74–97)
MONOCYTES # BLD: 0.6 K/UL (ref 0.05–1.2)
MONOCYTES NFR BLD AUTO: 11 % (ref 3–10)
NEUTS SEG # BLD: 3 K/UL (ref 1.8–8)
NEUTS SEG NFR BLD AUTO: 54 % (ref 40–73)
PLATELET # BLD AUTO: 250 K/UL (ref 135–420)
PMV BLD AUTO: 11.2 FL (ref 9.2–11.8)
POTASSIUM SERPL-SCNC: 5 MMOL/L (ref 3.5–5.5)
RBC # BLD AUTO: 3.26 M/UL (ref 4.2–5.3)
SODIUM SERPL-SCNC: 144 MMOL/L (ref 136–145)
WBC # BLD AUTO: 5.4 K/UL (ref 4.6–13.2)

## 2017-02-08 PROCEDURE — 80048 BASIC METABOLIC PNL TOTAL CA: CPT | Performed by: PHYSICIAN ASSISTANT

## 2017-02-08 PROCEDURE — 85025 COMPLETE CBC W/AUTO DIFF WBC: CPT | Performed by: PHYSICIAN ASSISTANT

## 2017-02-08 PROCEDURE — 36415 COLL VENOUS BLD VENIPUNCTURE: CPT | Performed by: PHYSICIAN ASSISTANT

## 2017-02-09 ENCOUNTER — TELEPHONE (OUTPATIENT)
Dept: INTERNAL MEDICINE CLINIC | Age: 82
End: 2017-02-09

## 2017-02-09 NOTE — TELEPHONE ENCOUNTER
Pt with recent acute on chronic CHF, chronic anemia, and CRF. Had recent increase in lasix for CHF. Pt has been referred to cardio and has appt with Dr. Rich Cat for f/u on 2/10. I have routed labs from 2/8 to Dr. Rich Cat to discuss/address at tomorrow's visit. Renal function somewhat worse than baseline but fairly stable since 1/27. Anemia stable since 1/27. I will leave further planning to Dr. Rich Cat.

## 2017-02-15 ENCOUNTER — APPOINTMENT (OUTPATIENT)
Dept: GENERAL RADIOLOGY | Age: 82
DRG: 291 | End: 2017-02-15
Attending: EMERGENCY MEDICINE
Payer: MEDICARE

## 2017-02-15 ENCOUNTER — HOSPITAL ENCOUNTER (INPATIENT)
Age: 82
LOS: 5 days | Discharge: NURSING, REHAB OR LONG-TERM CARE | DRG: 291 | End: 2017-02-20
Attending: EMERGENCY MEDICINE | Admitting: INTERNAL MEDICINE
Payer: MEDICARE

## 2017-02-15 DIAGNOSIS — I50.33 ACUTE ON CHRONIC DIASTOLIC CONGESTIVE HEART FAILURE (HCC): ICD-10-CM

## 2017-02-15 DIAGNOSIS — R07.9 ACUTE CHEST PAIN: Primary | ICD-10-CM

## 2017-02-15 LAB
ALBUMIN SERPL BCP-MCNC: 3.2 G/DL (ref 3.4–5)
ALBUMIN/GLOB SERPL: 0.9 {RATIO} (ref 0.8–1.7)
ALP SERPL-CCNC: 79 U/L (ref 45–117)
ALT SERPL-CCNC: 14 U/L (ref 13–56)
ANION GAP BLD CALC-SCNC: 9 MMOL/L (ref 3–18)
APTT PPP: 25.2 SEC (ref 23–36.4)
AST SERPL W P-5'-P-CCNC: 15 U/L (ref 15–37)
ATRIAL RATE: 75 BPM
ATRIAL RATE: 75 BPM
ATRIAL RATE: 84 BPM
BASOPHILS # BLD AUTO: 0 K/UL (ref 0–0.06)
BASOPHILS # BLD: 0 % (ref 0–2)
BILIRUB SERPL-MCNC: 0.2 MG/DL (ref 0.2–1)
BNP SERPL-MCNC: ABNORMAL PG/ML (ref 0–1800)
BUN SERPL-MCNC: 45 MG/DL (ref 7–18)
BUN/CREAT SERPL: 16 (ref 12–20)
CALCIUM SERPL-MCNC: 9.3 MG/DL (ref 8.5–10.1)
CALCULATED P AXIS, ECG09: 44 DEGREES
CALCULATED P AXIS, ECG09: 69 DEGREES
CALCULATED P AXIS, ECG09: 76 DEGREES
CALCULATED R AXIS, ECG10: -43 DEGREES
CALCULATED R AXIS, ECG10: -43 DEGREES
CALCULATED R AXIS, ECG10: -46 DEGREES
CALCULATED T AXIS, ECG11: 126 DEGREES
CALCULATED T AXIS, ECG11: 126 DEGREES
CALCULATED T AXIS, ECG11: 177 DEGREES
CHLORIDE SERPL-SCNC: 109 MMOL/L (ref 100–108)
CK MB CFR SERPL CALC: 2.4 % (ref 0–4)
CK MB CFR SERPL CALC: 2.7 % (ref 0–4)
CK MB SERPL-MCNC: 1.3 NG/ML (ref 0.5–3.6)
CK MB SERPL-MCNC: 1.4 NG/ML (ref 0.5–3.6)
CK SERPL-CCNC: 52 U/L (ref 26–192)
CK SERPL-CCNC: 55 U/L (ref 26–192)
CO2 SERPL-SCNC: 24 MMOL/L (ref 21–32)
CREAT SERPL-MCNC: 2.74 MG/DL (ref 0.6–1.3)
DIAGNOSIS, 93000: NORMAL
DIFFERENTIAL METHOD BLD: ABNORMAL
EOSINOPHIL # BLD: 0.1 K/UL (ref 0–0.4)
EOSINOPHIL NFR BLD: 2 % (ref 0–5)
ERYTHROCYTE [DISTWIDTH] IN BLOOD BY AUTOMATED COUNT: 13.5 % (ref 11.6–14.5)
GLOBULIN SER CALC-MCNC: 3.5 G/DL (ref 2–4)
GLUCOSE SERPL-MCNC: 114 MG/DL (ref 74–99)
HCT VFR BLD AUTO: 32.8 % (ref 35–45)
HGB BLD-MCNC: 10.7 G/DL (ref 12–16)
INR PPP: 1 (ref 0.8–1.2)
LYMPHOCYTES # BLD AUTO: 33 % (ref 21–52)
LYMPHOCYTES # BLD: 1.5 K/UL (ref 0.9–3.6)
MCH RBC QN AUTO: 30.1 PG (ref 24–34)
MCHC RBC AUTO-ENTMCNC: 32.6 G/DL (ref 31–37)
MCV RBC AUTO: 92.4 FL (ref 74–97)
MONOCYTES # BLD: 0.3 K/UL (ref 0.05–1.2)
MONOCYTES NFR BLD AUTO: 7 % (ref 3–10)
NEUTS SEG # BLD: 2.5 K/UL (ref 1.8–8)
NEUTS SEG NFR BLD AUTO: 58 % (ref 40–73)
P-R INTERVAL, ECG05: 132 MS
P-R INTERVAL, ECG05: 142 MS
P-R INTERVAL, ECG05: 158 MS
PLATELET # BLD AUTO: 238 K/UL (ref 135–420)
PMV BLD AUTO: 10.5 FL (ref 9.2–11.8)
POTASSIUM SERPL-SCNC: 4.2 MMOL/L (ref 3.5–5.5)
PROT SERPL-MCNC: 6.7 G/DL (ref 6.4–8.2)
PROTHROMBIN TIME: 13 SEC (ref 11.5–15.2)
Q-T INTERVAL, ECG07: 384 MS
Q-T INTERVAL, ECG07: 406 MS
Q-T INTERVAL, ECG07: 428 MS
QRS DURATION, ECG06: 110 MS
QRS DURATION, ECG06: 116 MS
QRS DURATION, ECG06: 118 MS
QTC CALCULATION (BEZET), ECG08: 453 MS
QTC CALCULATION (BEZET), ECG08: 453 MS
QTC CALCULATION (BEZET), ECG08: 477 MS
RBC # BLD AUTO: 3.55 M/UL (ref 4.2–5.3)
SODIUM SERPL-SCNC: 142 MMOL/L (ref 136–145)
TROPONIN I SERPL-MCNC: 0.07 NG/ML (ref 0–0.04)
TROPONIN I SERPL-MCNC: 0.08 NG/ML (ref 0–0.04)
VENTRICULAR RATE, ECG03: 75 BPM
VENTRICULAR RATE, ECG03: 75 BPM
VENTRICULAR RATE, ECG03: 84 BPM
WBC # BLD AUTO: 4.5 K/UL (ref 4.6–13.2)

## 2017-02-15 PROCEDURE — 82550 ASSAY OF CK (CPK): CPT | Performed by: EMERGENCY MEDICINE

## 2017-02-15 PROCEDURE — 96374 THER/PROPH/DIAG INJ IV PUSH: CPT

## 2017-02-15 PROCEDURE — 74011250637 HC RX REV CODE- 250/637: Performed by: PHYSICIAN ASSISTANT

## 2017-02-15 PROCEDURE — 74011250636 HC RX REV CODE- 250/636: Performed by: INTERNAL MEDICINE

## 2017-02-15 PROCEDURE — 65660000000 HC RM CCU STEPDOWN

## 2017-02-15 PROCEDURE — 71010 XR CHEST PORT: CPT

## 2017-02-15 PROCEDURE — 85025 COMPLETE CBC W/AUTO DIFF WBC: CPT | Performed by: EMERGENCY MEDICINE

## 2017-02-15 PROCEDURE — 93005 ELECTROCARDIOGRAM TRACING: CPT

## 2017-02-15 PROCEDURE — 74011250637 HC RX REV CODE- 250/637: Performed by: INTERNAL MEDICINE

## 2017-02-15 PROCEDURE — 85610 PROTHROMBIN TIME: CPT | Performed by: EMERGENCY MEDICINE

## 2017-02-15 PROCEDURE — 74011250636 HC RX REV CODE- 250/636: Performed by: EMERGENCY MEDICINE

## 2017-02-15 PROCEDURE — 96375 TX/PRO/DX INJ NEW DRUG ADDON: CPT

## 2017-02-15 PROCEDURE — 74011250636 HC RX REV CODE- 250/636: Performed by: PHYSICIAN ASSISTANT

## 2017-02-15 PROCEDURE — 85730 THROMBOPLASTIN TIME PARTIAL: CPT | Performed by: EMERGENCY MEDICINE

## 2017-02-15 PROCEDURE — 80053 COMPREHEN METABOLIC PANEL: CPT | Performed by: EMERGENCY MEDICINE

## 2017-02-15 PROCEDURE — 99285 EMERGENCY DEPT VISIT HI MDM: CPT

## 2017-02-15 PROCEDURE — 74011250637 HC RX REV CODE- 250/637: Performed by: EMERGENCY MEDICINE

## 2017-02-15 PROCEDURE — 83880 ASSAY OF NATRIURETIC PEPTIDE: CPT | Performed by: EMERGENCY MEDICINE

## 2017-02-15 PROCEDURE — 74011000250 HC RX REV CODE- 250: Performed by: EMERGENCY MEDICINE

## 2017-02-15 PROCEDURE — 94640 AIRWAY INHALATION TREATMENT: CPT

## 2017-02-15 PROCEDURE — 77030013140 HC MSK NEB VYRM -A

## 2017-02-15 RX ORDER — ASPIRIN 81 MG/1
81 TABLET ORAL DAILY
Status: DISCONTINUED | OUTPATIENT
Start: 2017-02-16 | End: 2017-02-20 | Stop reason: HOSPADM

## 2017-02-15 RX ORDER — FUROSEMIDE 10 MG/ML
40 INJECTION INTRAMUSCULAR; INTRAVENOUS EVERY 12 HOURS
Status: DISCONTINUED | OUTPATIENT
Start: 2017-02-15 | End: 2017-02-16

## 2017-02-15 RX ORDER — HYDRALAZINE HYDROCHLORIDE 50 MG/1
100 TABLET, FILM COATED ORAL 2 TIMES DAILY
Status: DISCONTINUED | OUTPATIENT
Start: 2017-02-15 | End: 2017-02-17

## 2017-02-15 RX ORDER — NITROGLYCERIN 0.4 MG/1
0.4 TABLET SUBLINGUAL
Status: DISCONTINUED | OUTPATIENT
Start: 2017-02-15 | End: 2017-02-15

## 2017-02-15 RX ORDER — FUROSEMIDE 10 MG/ML
40 INJECTION INTRAMUSCULAR; INTRAVENOUS
Status: COMPLETED | OUTPATIENT
Start: 2017-02-15 | End: 2017-02-15

## 2017-02-15 RX ORDER — LANOLIN ALCOHOL/MO/W.PET/CERES
1000 CREAM (GRAM) TOPICAL DAILY
Status: DISCONTINUED | OUTPATIENT
Start: 2017-02-16 | End: 2017-02-20 | Stop reason: HOSPADM

## 2017-02-15 RX ORDER — HYDRALAZINE HYDROCHLORIDE 50 MG/1
100 TABLET, FILM COATED ORAL ONCE
Status: COMPLETED | OUTPATIENT
Start: 2017-02-15 | End: 2017-02-15

## 2017-02-15 RX ORDER — CARVEDILOL 6.25 MG/1
6.25 TABLET ORAL
Status: COMPLETED | OUTPATIENT
Start: 2017-02-15 | End: 2017-02-15

## 2017-02-15 RX ORDER — ALBUTEROL SULFATE 0.83 MG/ML
2.5 SOLUTION RESPIRATORY (INHALATION)
Status: DISCONTINUED | OUTPATIENT
Start: 2017-02-15 | End: 2017-02-20 | Stop reason: HOSPADM

## 2017-02-15 RX ORDER — ALLOPURINOL 100 MG/1
200 TABLET ORAL DAILY
Status: DISCONTINUED | OUTPATIENT
Start: 2017-02-16 | End: 2017-02-20 | Stop reason: HOSPADM

## 2017-02-15 RX ORDER — HEPARIN SODIUM 5000 [USP'U]/ML
5000 INJECTION, SOLUTION INTRAVENOUS; SUBCUTANEOUS EVERY 8 HOURS
Status: DISCONTINUED | OUTPATIENT
Start: 2017-02-16 | End: 2017-02-20 | Stop reason: HOSPADM

## 2017-02-15 RX ORDER — ALBUTEROL SULFATE 90 UG/1
1-2 AEROSOL, METERED RESPIRATORY (INHALATION)
Status: DISCONTINUED | OUTPATIENT
Start: 2017-02-15 | End: 2017-02-15 | Stop reason: CLARIF

## 2017-02-15 RX ORDER — HEPARIN SODIUM 1000 [USP'U]/ML
5000 INJECTION, SOLUTION INTRAVENOUS; SUBCUTANEOUS EVERY 8 HOURS
Status: DISCONTINUED | OUTPATIENT
Start: 2017-02-15 | End: 2017-02-15

## 2017-02-15 RX ORDER — LANOLIN ALCOHOL/MO/W.PET/CERES
325 CREAM (GRAM) TOPICAL
Status: DISCONTINUED | OUTPATIENT
Start: 2017-02-16 | End: 2017-02-20 | Stop reason: HOSPADM

## 2017-02-15 RX ORDER — CARVEDILOL 6.25 MG/1
6.25 TABLET ORAL 2 TIMES DAILY WITH MEALS
Status: DISCONTINUED | OUTPATIENT
Start: 2017-02-15 | End: 2017-02-15

## 2017-02-15 RX ORDER — ONDANSETRON 2 MG/ML
4 INJECTION INTRAMUSCULAR; INTRAVENOUS
Status: COMPLETED | OUTPATIENT
Start: 2017-02-15 | End: 2017-02-15

## 2017-02-15 RX ORDER — IPRATROPIUM BROMIDE AND ALBUTEROL SULFATE 2.5; .5 MG/3ML; MG/3ML
3 SOLUTION RESPIRATORY (INHALATION)
Status: COMPLETED | OUTPATIENT
Start: 2017-02-15 | End: 2017-02-15

## 2017-02-15 RX ORDER — CARVEDILOL 3.12 MG/1
3.12 TABLET ORAL 2 TIMES DAILY WITH MEALS
Status: DISCONTINUED | OUTPATIENT
Start: 2017-02-15 | End: 2017-02-16

## 2017-02-15 RX ORDER — ACETAMINOPHEN 325 MG/1
650 TABLET ORAL
Status: COMPLETED | OUTPATIENT
Start: 2017-02-15 | End: 2017-02-15

## 2017-02-15 RX ORDER — MORPHINE SULFATE 2 MG/ML
2 INJECTION, SOLUTION INTRAMUSCULAR; INTRAVENOUS
Status: COMPLETED | OUTPATIENT
Start: 2017-02-15 | End: 2017-02-15

## 2017-02-15 RX ADMIN — CARVEDILOL 3.12 MG: 6.25 TABLET, FILM COATED ORAL at 17:03

## 2017-02-15 RX ADMIN — CARVEDILOL 6.25 MG: 6.25 TABLET, FILM COATED ORAL at 09:15

## 2017-02-15 RX ADMIN — HYDRALAZINE HYDROCHLORIDE 100 MG: 50 TABLET, FILM COATED ORAL at 09:15

## 2017-02-15 RX ADMIN — ACETAMINOPHEN 650 MG: 325 TABLET, FILM COATED ORAL at 16:59

## 2017-02-15 RX ADMIN — IPRATROPIUM BROMIDE AND ALBUTEROL SULFATE 3 ML: .5; 3 SOLUTION RESPIRATORY (INHALATION) at 10:58

## 2017-02-15 RX ADMIN — FUROSEMIDE 40 MG: 10 INJECTION, SOLUTION INTRAVENOUS at 09:47

## 2017-02-15 RX ADMIN — HYDRALAZINE HYDROCHLORIDE 100 MG: 50 TABLET, FILM COATED ORAL at 17:05

## 2017-02-15 RX ADMIN — Medication 2 MG: at 09:51

## 2017-02-15 RX ADMIN — FUROSEMIDE 40 MG: 10 INJECTION, SOLUTION INTRAVENOUS at 18:29

## 2017-02-15 RX ADMIN — ONDANSETRON HYDROCHLORIDE 4 MG: 2 SOLUTION INTRAMUSCULAR; INTRAVENOUS at 09:51

## 2017-02-15 RX ADMIN — HEPARIN SODIUM 5000 UNITS: 1000 INJECTION, SOLUTION INTRAVENOUS; SUBCUTANEOUS at 18:40

## 2017-02-15 RX ADMIN — METHYLPREDNISOLONE SODIUM SUCCINATE 125 MG: 125 INJECTION, POWDER, FOR SOLUTION INTRAMUSCULAR; INTRAVENOUS at 10:58

## 2017-02-15 NOTE — ED NOTES
Family at [facility], states pt has c/o \"pain and has had pain all day.  And hasnt gotten anything\"   Explained to family that pt received morphine before noon, and that pthas been sleeping most of day

## 2017-02-15 NOTE — CONSULTS
[facility] - Consult Note    Consultation request by No admitting provider for patient encounter. for advice/opinion related to evaluating There are no admission diagnoses documented for this encounter. Date of  Admission: 2/15/2017  7:59 AM   Primary Care Physician:  Modesto Gregg MD     Assessment:     Patient Active Problem List   Diagnosis Code    Hyperlipidemia E78.5    Glaucoma H40.9    CVA (cerebral vascular accident) ([facility] I63.9    Cerebral thrombosis with cerebral infarction ([facility] I63.30    Long term (current) use of anticoagulants Z79.01    Gout attack M10.9    Chronic renal failure N18.9    Leg pain M79.606    Lump of right breast N63    Breast mass in female N58    Breast cancer ([facility] C50.919    Cellulitis L03.90    Psoriasis L40.9    CRF (chronic renal failure) N18.9    Carotid stenosis I65.29    Fatigue R53.83    Hypertensive renal sclerosis with hypertension I12.9    Primary osteoarthritis involving multiple joints M15.0    ACS (acute coronary syndrome) ([facility] I24.9    NSTEMI (non-ST elevated myocardial infarction) ([facility] F01.1    Diastolic CHF, acute on chronic (HCC) I50.33       -Acute on chronic diastolic heart failure in setting on noncompliance. Normal LV function EF 55-60% on echo 2/2016.  -Hypertensive crisis in setting of noncompliance.  -Indeterminate troponin not consistent with ACS, suspect related to heart failure and HTN, chest pain atypical and likely related to heart failure, ECG with lateral T wave changes likely HTN related. -Chronic left pleural effusion of unclear etiology, possibly from heart failure.  -Chronic kidney disease, stable. -Chronic anemia, stable.  -Noncompliance. Plan:     Would diurese with IV lasix as renal function allows watching I/Os and daily weights closely. BP improved with coreg/hydral/lasix given earlier, will resume home antihypertensives.   Will review echocardiogram.  Discussed importance of compliance with medical regimen and follow up. May need to consider evaluation for thoracentesis. Needs continued heart failure teaching. History of Present Illness: This is a 80 y.o. female admitted for There are no admission diagnoses documented for this encounter. .      Patient complains of:  SOB. Patient is an 80year old female who admits to noncompliance with medical regimen for unclear reasons. She said she did not feel well for a few days with SOB and cough. She was developing edema in RLE>DINA. She was orthopneic. She felt her chest was tight and it was hae\rd to take a deep breath. Symptoms significantly worsened last night. She was brought to ER. She was hypertensive with SBP >200. She was treated with IV lasix, coreg hydralazine. Her BP improved, she diuresed and breathing improved. She was also treated with steroids. Cardiac risk factors:  HTN  Diastolic heart failure    Echo 2/2016  IMPRESSIONS:  There was no significant valvular heart disease. SUMMARY:  Procedure information: Image quality was adequate. Left ventricle: Size was normal. Systolic function was normal by EF  (biplane method of disks). Ejection fraction was estimated in the range of  55 % to 60 %. No obvious wall motion abnormalities identified in the views  obtained. Wall thickness was mildly to moderately increased. Features were  consistent with a pseudonormal left ventricular filling pattern, with  concomitant abnormal relaxation and increased filling pressure (grade 2  diastolic dysfunction). INDICATIONS: Congestive heart failure.     Review of Symptoms:   Constitutional: negative for fevers and chills  Eyes: negative for visual disturbance  Ears, nose, mouth, throat, and face: positive for nasal congestion  Respiratory: positive for cough  Cardiovascular: positive for chest pain  Gastrointestinal: negative for vomiting and diarrhea  Genitourinary:negative for dysuria  Hematologic/lymphatic: negative for bleeding  Musculoskeletal:negative for muscle weakness  Neurological: negative for dizziness     Past Medical History:     Past Medical History   Diagnosis Date    Breast cancer ([facility] 1/17/14     right breast    Carotid duplex 11/19/2014     Severe 70% or greater bilateral ICA stenosis. LICA dissection suggested.  Chronic renal insufficiency     CVA (cerebral vascular accident) ([facility] 2003     with slight Right sided weakness    Echocardiogram 02/22/2016     EF 55-60%. No WMA. Mild-mod LVH. Gr 2 DDfx. No significant valvular heart disease.  Edema     Glaucoma     Gout flare     Hyperlipidemia     Hypertension     Lower extremity arterial testing 12/19/2014     Mod tibio-peroneal arterial disease bilaterally. R YANCY 1.24.  L YANCY 0.74. Bilateral sm vessel disease.  Lump of right breast     URI (upper respiratory infection)          Social History:     Social History     Social History    Marital status:      Spouse name: N/A    Number of children: N/A    Years of education: N/A     Social History Main Topics    Smoking status: Never Smoker    Smokeless tobacco: Never Used    Alcohol use No    Drug use: No    Sexual activity: Not Currently     Other Topics Concern    Not on file     Social History Narrative        Family History:     Family History   Problem Relation Age of Onset    Hypertension Mother     Hypertension Brother     Diabetes Brother         Medications:   No Known Allergies     No current facility-administered medications for this encounter. Current Outpatient Prescriptions   Medication Sig    albuterol (PROVENTIL HFA, VENTOLIN HFA, PROAIR HFA) 90 mcg/actuation inhaler Take 1-2 Puffs by inhalation every four (4) hours as needed for Wheezing.  inhalational spacing device 1 Each by Does Not Apply route as needed.  carvedilol (COREG) 6.25 mg tablet Take 1 Tab by mouth two (2) times daily (with meals).     aspirin delayed-release 81 mg tablet Take 1 Tab by mouth daily.  furosemide (LASIX) 20 mg tablet One tablet by mouth each morning    chlorthalidone (HYGROTEN) 25 mg tablet Take 1 Tab by mouth daily.  ferrous sulfate 325 mg (65 mg iron) tablet Take 325 mg by mouth Daily (before breakfast).  hydrALAZINE (APRESOLINE) 100 mg tablet Take 100 mg by mouth two (2) times a day.  cyanocobalamin (VITAMIN B-12) 1,000 mcg tablet Take 1,000 mcg by mouth daily.  allopurinol (ZYLOPRIM) 100 mg tablet Take 2 Tabs by mouth daily.  acetaminophen (TYLENOL) 325 mg tablet Take 2 Tabs by mouth every four (4) hours as needed.  cholecalciferol, VITAMIN D3, (VITAMIN D3) 5,000 unit tab tablet Take 1 Tab by mouth daily.  anastrozole (ARIMIDEX) 1 mg tablet Take 1 Tab by mouth daily. Physical Exam:     Visit Vitals    /42    Pulse 81    Temp 98 °F (36.7 °C)    Resp (!) 42    Wt 67.7 kg (149 lb 4.8 oz)    SpO2 97%    BMI 20.82 kg/m2     BP Readings from Last 3 Encounters:   02/15/17 122/42   01/27/17 160/68   01/23/17 (!) 210/93     Pulse Readings from Last 3 Encounters:   02/15/17 81   01/27/17 61   01/23/17 92     Wt Readings from Last 3 Encounters:   02/15/17 67.7 kg (149 lb 4.8 oz)   01/27/17 66.2 kg (146 lb)   01/23/17 66.2 kg (146 lb)       General:  alert, cooperative, no distress, appears stated age  Neck:  no JVD  Lungs:  diminished breath sounds   Heart:  regular rate and rhythm, systolic murmur LSB Abdomen:  abdomen is soft without significant tenderness, masses, organomegaly or guarding  Extremities:  extremities normal, atraumatic, no cyanosis or edema  Skin: Warm and dry.  no hyperpigmentation, vitiligo, or suspicious lesions  Neuro: alert, oriented x3, affect appropriate  Psych: non focal     Data Review:     Recent Labs      02/15/17   0837   WBC  4.5*   HGB  10.7*   HCT  32.8*   PLT  238     Recent Labs      02/15/17   0837   NA  142   K  4.2   CL  109*   CO2  24   GLU  114*   BUN  45*   CREA  2.74*   CA  9.3   ALB  3.2*   SGOT  15 ALT  14   INR  1.0       Results for orders placed or performed during the hospital encounter of 02/15/17   EKG, 12 LEAD, INITIAL   Result Value Ref Range    Ventricular Rate 84 BPM    Atrial Rate 84 BPM    P-R Interval 142 ms    QRS Duration 118 ms    Q-T Interval 384 ms    QTC Calculation (Bezet) 453 ms    Calculated P Axis 69 degrees    Calculated R Axis -43 degrees    Calculated T Axis 126 degrees    Diagnosis       Sinus rhythm with premature atrial complexes  Possible Left atrial enlargement  Left axis deviation  Left ventricular hypertrophy with QRS widening and repolarization abnormality  Abnormal ECG  When compared with ECG of 23-JAN-2017 16:40,  premature atrial complexes are now present  T wave inversion more evident in Lateral leads         All Cardiac Markers in the last 24 hours:    Lab Results   Component Value Date/Time    CPK 52 02/15/2017 11:45 AM    CPK 55 02/15/2017 08:37 AM    CKMB 1.4 02/15/2017 11:45 AM    CKMB 1.3 02/15/2017 08:37 AM    CKND1 2.7 02/15/2017 11:45 AM    CKND1 2.4 02/15/2017 08:37 AM    TROIQ 0.08 (H) 02/15/2017 11:45 AM    TROIQ 0.07 (H) 02/15/2017 08:37 AM       Last Lipid:    Lab Results   Component Value Date/Time    Cholesterol, total 245 10/23/2016 05:10 AM    HDL Cholesterol 47 10/23/2016 05:10 AM    LDL, calculated 180 10/23/2016 05:10 AM    Triglyceride 90 10/23/2016 05:10 AM    CHOL/HDL Ratio 5.2 10/23/2016 05:10 AM       Signed By: TRACIE Pop     February 15, 2017

## 2017-02-15 NOTE — ED NOTES
Hourly Rounding   Patient is in bed resting with eyes open , patient is alert and oriented. Patient states that she is not experiencing chest pain at this time. Will continue to monitor at this time.

## 2017-02-15 NOTE — ED NOTES
Discussed plan of care at this time. Questions encouraged and addressed. Verbalized understanding. Call light within reach.

## 2017-02-15 NOTE — ED PROVIDER NOTES
HPI Comments: 8:18 AM Samir Looney is a 80 y.o. female with a history of HTN, glaucoma, CVA, breast cancer (2014), and gout who presents to ED via EMS c/o CP onset several hours ago. Pt reports associated symptom of SOB. Pt explains she has had a productive cough and congestion for the past 2-3 weeks. Pt denies hemoptysis, leg swelling, and fever. States pain is central sharp pain and states she feels as if she can not catch her breath when it occurs. Denies any worsening with movement. Does endorse chronic edema and orthopnea, maybe worse over the past few days. Endorses improvement in pain since medications given en route. PCP: Kacy Chu MD    Patient is a 80 y.o. female presenting with chest pain. The history is provided by the patient. Chest Pain (Angina)    Associated symptoms include cough and shortness of breath. Pertinent negatives include no abdominal pain, no fever, no headaches, no nausea and no vomiting. Past Medical History:   Diagnosis Date    Breast cancer ([facility] [address] 1/17/14     right breast    Carotid duplex 11/19/2014     Severe 70% or greater bilateral ICA stenosis. LICA dissection suggested.  Chronic renal insufficiency     CVA (cerebral vascular accident) ([facility] [address] 2003     with slight Right sided weakness    Echocardiogram 02/22/2016     EF 55-60%. No WMA. Mild-mod LVH. Gr 2 DDfx. No significant valvular heart disease.  Edema     Glaucoma     Gout flare     Hyperlipidemia     Hypertension     Lower extremity arterial testing 12/19/2014     Mod tibio-peroneal arterial disease bilaterally. R YANCY 1.24.  L YANCY 0.74. Bilateral sm vessel disease.  Lump of right breast     URI (upper respiratory infection)        Past Surgical History:   Procedure Laterality Date    Hx gyn       JUSTIN/BSO    Hx appendectomy      Hx cyst incision and drainage       PT DOES NOT REMEMBER EXACT DATES, TUCKER BREAST DONE MORE THAN 20 YEARS AGO. BENIGN FINDINGS PER PATIENT.     Hx mastectomy  1/17/2014     RIGHT PARTIAL MASTECTOMY performed by Miri Massey MD at [address] [place] [address] Hx hysterectomy           Family History:   Problem Relation Age of Onset    Hypertension Mother     Hypertension Brother     Diabetes Brother        Social History     Social History    Marital status:      Spouse name: N/A    Number of children: N/A    Years of education: N/A     Occupational History    Not on file. Social History Main Topics    Smoking status: Never Smoker    Smokeless tobacco: Never Used    Alcohol use No    Drug use: No    Sexual activity: Not Currently     Other Topics Concern    Not on file     Social History Narrative         ALLERGIES: Review of patient's allergies indicates no known allergies. Review of Systems   Constitutional: Negative for fever. HENT: Positive for congestion. Eyes: Negative for visual disturbance. Respiratory: Positive for cough and shortness of breath. Cardiovascular: Positive for chest pain and leg swelling. Gastrointestinal: Negative for abdominal pain, nausea and vomiting. Genitourinary: Negative for dysuria. Musculoskeletal: Negative. Neurological: Negative for speech difficulty and headaches. All other systems reviewed and are negative. Vitals:    02/15/17 0830 02/15/17 0900 02/15/17 0930 02/15/17 0959   BP: (!) 217/82 (!) 218/83 122/42    Pulse: 85 81 81    Resp: (!) 34 29 (!) 42    Temp:       SpO2: (!) 85% (!) 84% 97%    Weight:    67.7 kg (149 lb 4.8 oz)            Physical Exam   Constitutional: She is oriented to person, place, and time. HENT:   Head: Atraumatic. Mouth/Throat: Oropharynx is clear and moist.   Eyes: Conjunctivae and EOM are normal. Pupils are equal, round, and reactive to light. Neck: Normal range of motion. Neck supple. JVD present. Cardiovascular: Normal rate, regular rhythm and normal heart sounds. Pulmonary/Chest: She has no wheezes. She has no rales.  She exhibits no tenderness. Diminished in LLL   Abdominal: Soft. Bowel sounds are normal. She exhibits no distension. There is no tenderness. There is no rebound and no guarding. Musculoskeletal: She exhibits edema ( BLE, R>L). She exhibits no tenderness. Neurological: She is alert and oriented to person, place, and time. She has normal strength. No cranial nerve deficit or sensory deficit. Skin: Skin is warm and dry. Psychiatric: She has a normal mood and affect. Nursing note and vitals reviewed. MDM  Number of Diagnoses or Management Options  Acute chest pain:   Acute on chronic diastolic congestive heart failure [facility]):   Elevated blood pressure:   Diagnosis management comments: Duwaine Saint is a 80 y.o. female presenting with cp, sob and recent URI symptoms. Concern for ACS, hypertensive emergency, CHF exacerbation, bronchitis, pna, has chronic LLL effusion. ekg reassuring. Pt with return of pain shortly after arrival and pt sitting up in bed, tripod with increased RR and sats in 80s. Pt nauseated and had episode of vomiting as well. Pt placed on nasal cannula, morphine and zofran given as well as lasix and nebs. Pt moved up front and diuresed and pain resolved. ekg not significantly changed. Will continue to monitor closely and repeat enzymes ordered. ED Course       Procedures    Vitals:  Patient Vitals for the past 12 hrs:   Temp Pulse Resp BP SpO2   02/15/17 0930 - 81 (!) 42 122/42 97 %   02/15/17 0900 - 81 29 (!) 218/83 (!) 84 %   02/15/17 0830 - 85 (!) 34 (!) 217/82 (!) 85 %   02/15/17 0820 98 °F (36.7 °C) 87 23 (!) 223/84 94 %   02/15/17 0800 - 88 (!) 33 (!) 223/84 92 %           EKG interpretation by ED Physician:  8:15AM SR at a rate of 84 bpm. PAC's. LVH no STEMI.    0944 - NSR, rate of 75, incomplete RBBB, LVH, no STEMI    1145 - NSR, rate of 75, LVH, lateral TWI, no STEMI - pt without pain, cardiology updated, second set pending.            X-Ray, CT or other radiology findings or impressions:  XR CHEST PORT   Final Result   IMPRESSION:     Persistent opacification at the left base consistent with small to moderate left  pleural effusion and likely underlying left basal infiltrate or atelectasis. Stable mild cardiomegaly. Atherosclerosis. Intraossific body left shoulder           Progress notes, Consult notes or additional Procedure notes:   I have spoken with TRACIE Guzman for [facility] about patient, agrees to follow along in consult. I have spoken with Dr Mala Bedolla , hospitalist, who agrees with admission. Disposition:  Diagnosis:   1. Acute chest pain    2. Elevated blood pressure    3. Acute on chronic diastolic congestive heart failure ([address]        Disposition: Admitted       Scribe Attestation:     Ryan Sahni, scribing for and in the presence of  Macy Pang MD February 15, 2017 at 1:22 PM     Physician Attestation:   I personally performed the services described in this documentation, reviewed and edited the documentation which was dictated to the scribe in my presence, and it accurately records my words and actions.  John Gross MD  February 15, 2017     Signed by: Rashad Licona, 02/15/17, 8:18 AM

## 2017-02-15 NOTE — ED TRIAGE NOTES
Per EMS , patient complains of chest pain , patient states it started several hours ago.    Patient was given 324 mg of aspirin , 1 nitro sublingual.

## 2017-02-16 LAB
ANION GAP BLD CALC-SCNC: 11 MMOL/L (ref 3–18)
ATRIAL RATE: 85 BPM
BUN SERPL-MCNC: 56 MG/DL (ref 7–18)
BUN/CREAT SERPL: 16 (ref 12–20)
CALCIUM SERPL-MCNC: 8.8 MG/DL (ref 8.5–10.1)
CALCULATED P AXIS, ECG09: 35 DEGREES
CALCULATED R AXIS, ECG10: -50 DEGREES
CALCULATED T AXIS, ECG11: 116 DEGREES
CHLORIDE SERPL-SCNC: 106 MMOL/L (ref 100–108)
CO2 SERPL-SCNC: 23 MMOL/L (ref 21–32)
CREAT SERPL-MCNC: 3.49 MG/DL (ref 0.6–1.3)
DIAGNOSIS, 93000: NORMAL
GLUCOSE SERPL-MCNC: 115 MG/DL (ref 74–99)
P-R INTERVAL, ECG05: 124 MS
POTASSIUM SERPL-SCNC: 4.4 MMOL/L (ref 3.5–5.5)
Q-T INTERVAL, ECG07: 398 MS
QRS DURATION, ECG06: 116 MS
QTC CALCULATION (BEZET), ECG08: 473 MS
SODIUM SERPL-SCNC: 140 MMOL/L (ref 136–145)
VENTRICULAR RATE, ECG03: 85 BPM

## 2017-02-16 PROCEDURE — 74011250636 HC RX REV CODE- 250/636: Performed by: PHYSICIAN ASSISTANT

## 2017-02-16 PROCEDURE — 74011250637 HC RX REV CODE- 250/637: Performed by: INTERNAL MEDICINE

## 2017-02-16 PROCEDURE — 36415 COLL VENOUS BLD VENIPUNCTURE: CPT | Performed by: HOSPITALIST

## 2017-02-16 PROCEDURE — 74011250636 HC RX REV CODE- 250/636: Performed by: INTERNAL MEDICINE

## 2017-02-16 PROCEDURE — 74011250637 HC RX REV CODE- 250/637: Performed by: PHYSICIAN ASSISTANT

## 2017-02-16 PROCEDURE — 92610 EVALUATE SWALLOWING FUNCTION: CPT

## 2017-02-16 PROCEDURE — 74011250637 HC RX REV CODE- 250/637: Performed by: HOSPITALIST

## 2017-02-16 PROCEDURE — 65660000000 HC RM CCU STEPDOWN

## 2017-02-16 PROCEDURE — 80048 BASIC METABOLIC PNL TOTAL CA: CPT | Performed by: HOSPITALIST

## 2017-02-16 RX ORDER — FUROSEMIDE 40 MG/1
40 TABLET ORAL DAILY
Status: DISCONTINUED | OUTPATIENT
Start: 2017-02-16 | End: 2017-02-20 | Stop reason: HOSPADM

## 2017-02-16 RX ORDER — THERA TABS 400 MCG
1 TAB ORAL DAILY
Status: DISCONTINUED | OUTPATIENT
Start: 2017-02-17 | End: 2017-02-20 | Stop reason: HOSPADM

## 2017-02-16 RX ORDER — ACETAMINOPHEN 325 MG/1
650 TABLET ORAL
Status: DISCONTINUED | OUTPATIENT
Start: 2017-02-16 | End: 2017-02-20 | Stop reason: HOSPADM

## 2017-02-16 RX ORDER — CARVEDILOL 6.25 MG/1
6.25 TABLET ORAL 2 TIMES DAILY WITH MEALS
Status: DISCONTINUED | OUTPATIENT
Start: 2017-02-16 | End: 2017-02-17

## 2017-02-16 RX ADMIN — HEPARIN SODIUM 5000 UNITS: 5000 INJECTION, SOLUTION INTRAVENOUS; SUBCUTANEOUS at 10:06

## 2017-02-16 RX ADMIN — ASPIRIN 81 MG: 81 TABLET, COATED ORAL at 10:05

## 2017-02-16 RX ADMIN — ALLOPURINOL 200 MG: 100 TABLET ORAL at 10:04

## 2017-02-16 RX ADMIN — CARVEDILOL 3.12 MG: 6.25 TABLET, FILM COATED ORAL at 10:06

## 2017-02-16 RX ADMIN — Medication 325 MG: at 10:04

## 2017-02-16 RX ADMIN — FUROSEMIDE 40 MG: 40 TABLET ORAL at 17:56

## 2017-02-16 RX ADMIN — HEPARIN SODIUM 5000 UNITS: 5000 INJECTION, SOLUTION INTRAVENOUS; SUBCUTANEOUS at 17:55

## 2017-02-16 RX ADMIN — HYDRALAZINE HYDROCHLORIDE 100 MG: 50 TABLET, FILM COATED ORAL at 10:05

## 2017-02-16 RX ADMIN — HYDRALAZINE HYDROCHLORIDE 100 MG: 50 TABLET, FILM COATED ORAL at 17:55

## 2017-02-16 RX ADMIN — CARVEDILOL 6.25 MG: 6.25 TABLET, FILM COATED ORAL at 17:56

## 2017-02-16 RX ADMIN — CYANOCOBALAMIN TAB 1000 MCG 1000 MCG: 1000 TAB at 10:05

## 2017-02-16 RX ADMIN — FUROSEMIDE 40 MG: 10 INJECTION, SOLUTION INTRAVENOUS at 10:06

## 2017-02-16 NOTE — PROGRESS NOTES
Attempted to get patient for echocardiogram, patient and family wanted patient to eat first. Nurse informed.

## 2017-02-16 NOTE — ED NOTES
TRANSFER - OUT REPORT:    Verbal report given to [address] (name) on Jacob Mack  being transferred to 3N room 360(unit) for routine progression of care       Report consisted of patients Situation, Background, Assessment and   Recommendations(SBAR). Information from the following report(s) SBAR, Kardex, ED Summary, Procedure Summary, Intake/Output, MAR, Med Rec Status and Cardiac Rhythm NSR was reviewed with the receiving nurse. Lines:   Peripheral IV 02/15/17 Right Antecubital (Active)   Site Assessment Clean, dry, & intact 2/15/2017  8:40 AM   Phlebitis Assessment 0 2/15/2017  8:40 AM   Infiltration Assessment 0 2/15/2017  8:40 AM   Dressing Status Clean, dry, & intact 2/15/2017  8:40 AM   Dressing Type Transparent 2/15/2017  8:40 AM   Hub Color/Line Status Patent; Flushed 2/15/2017  8:40 AM       Peripheral IV 02/15/17 Right Hand (Active)   Site Assessment Clean, dry, & intact 2/15/2017  9:55 AM   Phlebitis Assessment 0 2/15/2017  9:55 AM   Infiltration Assessment 0 2/15/2017  9:55 AM   Dressing Status Clean, dry, & intact 2/15/2017  9:55 AM        Opportunity for questions and clarification was provided.       Patient transported with:   Monitor  O2 @ 2 liters

## 2017-02-16 NOTE — H&P
[address] [place] [address]    Name:  Karol Suarez  MR#:  [id number]  :  1928  Account #:  [de-identified]  Date of Adm:  02/15/2017      PRIMARY CARE PHYSICIAN: Dr. Nishi Monsivais: Chest pain. HISTORY OF PRESENT ILLNESS: Please note that this patient is a  very poor historian and it is difficult to get an overall impression of what  specifically brought her in. She does mention chest pain which is  epigastric in nature and seems to have started today and that is why  her son called the ambulance, she said. She also complains of having  pain in her legs and denies having swelling. She does report having  some shortness of breath as well. She reports that the epigastric pain  is sharp and is associated with shortness of breath. She does report  that she has not been taking her medications in the last 2 days, then  started to take them today. She reports she did not take her  medications as she felt they were not effective. In the ER, she was  noted to have very elevated blood pressures, initially and has been  seen by cardiology team with recommendations made for IV diuresis. REVIEW OF SYSTEMS: Please see above, otherwise 10-point review  of systems checked and negative. PAST MEDICAL HISTORY: The patient reports to only have  hypertension; however, according to her electronic chart, she has:    1. A history of diastolic heart failure. Her last echo was from 2016, showed an ejection fraction between 55% and 60% and grade 2  diastolic dysfunction. 2. History of breast cancer. 3. Gout. 4. Chronic kidney disease, stage 4. ALLERGIES: NO KNOWN DRUG ALLERGIES. SOCIAL HISTORY: She denies ever smoking cigarettes. Denies  drinking alcohol. She reports she lives with her son. FAMILY HISTORY: Mother , history of hypertension. Brother  , history of hypertension and diabetes.     MEDICATIONS: She has a bag of pill bottles present at bedside and  reports that those are her medications she is supposed to be on. In the  bag are the followin. Coreg 6.25 mg twice a day. 2. Aspirin 81 mg daily. 3. Lasix 40 mg daily. 4. Allopurinol 100 mg twice a day. 5. Vitamin B12 1000 mcg daily. 6. Iron supplements. PHYSICAL EXAMINATION  VITAL SIGNS: She is afebrile. Initially her blood pressures were high,  highest was 223/84 when she came, has since trended down. Her last  one is 194/63. She was tachypneic and hypoxic initially when she  came in with a respiratory rate as high as 42, pulse oximetry as low as  84%. Currently, her last respiratory rate is 21 with a pulse oximetry of  97%. GENERAL: She is an obese, pleasant female who appears stated age,  resting comfortably, in no acute distress. PSYCHIATRIC: She is alert, awake, oriented. She has appropriate  affect. Recent and remote memory appear intact. HEENT: Head is atraumatic, normocephalic. Sclerae anicteric. Oropharynx without lesions. She has moist mucous membranes. NECK: Supple. LYMPHATICS: No neck or axillary lymphadenopathy. CARDIOVASCULAR: Regular rate and rhythm. No murmurs. No  jugular venous distention. No peripheral edema. PULMONARY: Clear to auscultation bilaterally. GASTROINTESTINAL: Abdomen is soft, nontender, nondistended. SKIN: Warm, dry, without lesions. NEUROLOGICAL: Nonfocal.  MUSCULOSKELETAL: Unremarkable. LABORATORY DATA: Shows white count that is slightly low at 4.5,  her hemoglobin is low but stable at 10.7 today, MCV is 92.4, platelet  count is 564,722. Her creatinine today is 2.74, slightly lower from  previous last readings of around 3.07 and 3.02; normal potassium;  normal bicarbonate. Liver testing shows an albumin of 3.2, otherwise  unremarkable. She has 2 sets of troponin, initially 0.07, repeat 1.08. The rest of her cardiac enzyme sets are within normal limits. Her BNP  is 39,282. Coagulation studies are unremarkable.     IMAGING STUDIES: Include chest x-ray which showed moderate to  large left pleural effusion with underlying atelectasis or infiltrate,  unchanged from  possible tiny right pleural effusion and  cardiomegaly. I have also seen this study independently and agree  with the official read. Her EKG showed T-wave inversions in the lateral leads, which are not  new compared to previous studies. This is per my read. IMPRESSION  1. Hypertensive crisis in this patient with what appears to be  noncompliance with taking her medications. 2. Acute on chronic diastolic heart failure. 3. Some elevation in troponin, and atypical chest pain. Has been seen  by Cardiology with the overall likely not related to acute coronary  syndrome. She has a left pleural effusion which appears to be chronic  and of uncertain cause. 4. Chronic kidney disease, unchanged. 5. History of noncompliance. PLAN  1. At this time is to admit the patient for IV diuresis per Cardiology  recommendations, while closely monitoring her renal function and  electrolytes. Her blood pressure medications have been managed by  the cardiology team and she has been started on a lower dose of  Coreg, per Cardiology notes, given bradycardia. Her blood pressure  seems to have improved now. Echo ordered by cardiology team.  Should above measures not improve her symptoms, she will likely  benefit from diagnostic and therapeutic tap. 2. Deep venous thrombosis prophylaxis, heparin. 3. Advanced directives discussed with the patient. She would like to  have a FULL CODE STATUS.         MD Brit Rivas / Jose Alejandro Wing  D:  02/15/2017   17:56  T:  02/15/2017   19:06  Job #:  [id number]

## 2017-02-16 NOTE — PROGRESS NOTES
Visited with Mrs Tiki Gonzalez who was sitting up in bed. Visited earlier to introduce self and role to her and her niece. HF folder shown and left for family. Discussed importance of med compliance, daily weights, low Na diet, FR. She stated she takes meds when she remembers, and son assists with meals and she doesn't eat salt. She stated she had a scale \"awhile ago but the health dept people took it back. \" She stated she came to hospital because she couldn't walk well. Discussed HF, SOB, \"fluid building up. \" Opportunity given for questions. Encouraged C&DB and sitting up in chair for meals and as tolerated. Will follow.

## 2017-02-16 NOTE — PROGRESS NOTES
[facility]  Progress Note    Patient: Saint Moos Age: 80 y.o. : 1928 MR#: [id number] SSN: [id number]  Date/Time: 2017     Subjective: pt feels ok, no CP or SOB. Says she doesn't take med's everyday   Pt says she lives alone but son comes and help her and stays overnight. Niece says she needs help at home since son cant take care of her. Assessment/Plan:   1. Hypertensive crisis: will increase BB, cont hydralazine. Adjust med's acoording to BP. 2. Acute on chronic diastolic heart failure: cont IV lasix, cardio in put pending, get echo. 3. Some elevation in troponin, and atypical chest pain. ? Demand ischemia, plan per cardio. 4. Left pleural effusion which appears to be chronic, will repeat CXR post diuresis. 5. Chronic kidney disease, stage 4: will monitor crt.  6. History of noncompliance  7. Mild cognitive impairment: supportive care  will get PT/OT, SLP and nutrition eval  D/w son over phone [facility] [phone] and niece at bedside in detail.      I spent 30 minutes with the patient in face-to-face consultation, of which greater than 50% was spent in counseling and coordination of care as described above    Case discussed with:  [x]Patient  [x]Family  [x]Nursing  []Case Management  DVT Prophylaxis:  []Lovenox  [x]Hep SQ  []SCDs  []Coumadin   []On Heparin gtt    Objective:   VS:   Visit Vitals    /67 (BP 1 Location: Left arm, BP Patient Position: At rest)    Pulse 82    Temp 98.3 °F (36.8 °C)    Resp 18    Wt 63.8 kg (140 lb 11.2 oz)    SpO2 99%    BMI 19.62 kg/m2      Tmax/24hrs: Temp (24hrs), Av.5 °F (36.4 °C), Min:96.6 °F (35.9 °C), Max:98.3 °F (36.8 °C)  IOBRIEF  Intake/Output Summary (Last 24 hours) at 17 1146  Last data filed at 17 2170   Gross per 24 hour   Intake              120 ml   Output              200 ml   Net              -80 ml       General:  Alert, cooperative, no acute distress    Pulmonary: decrease BS left base, no Rales. Cardiovascular: Regular rate and Rhythm. GI:  Soft, Non distended, Non tender. + Bowel sounds. Extremities:  No edema, cyanosis, clubbing. Neurologic: Alert and oriented X 2.  Moves all ext  Additional:    Medications:   Current Facility-Administered Medications   Medication Dose Route Frequency    carvedilol (COREG) tablet 6.25 mg  6.25 mg Oral BID WITH MEALS    acetaminophen (TYLENOL) tablet 650 mg  650 mg Oral Q6H PRN    hydrALAZINE (APRESOLINE) tablet 100 mg  100 mg Oral BID    furosemide (LASIX) injection 40 mg  40 mg IntraVENous Q12H    allopurinol (ZYLOPRIM) tablet 200 mg  200 mg Oral DAILY    aspirin delayed-release tablet 81 mg  81 mg Oral DAILY    cyanocobalamin tablet 1,000 mcg  1,000 mcg Oral DAILY    ferrous sulfate tablet 325 mg  325 mg Oral ACB    albuterol (PROVENTIL VENTOLIN) nebulizer solution 2.5 mg  2.5 mg Nebulization Q4H PRN    heparin (porcine) injection 5,000 Units  5,000 Units SubCUTAneous Q8H       Labs:    Recent Results (from the past 24 hour(s))   EKG, 12 LEAD, SUBSEQUENT    Collection Time: 02/15/17  4:20 PM   Result Value Ref Range    Ventricular Rate 85 BPM    Atrial Rate 85 BPM    P-R Interval 124 ms    QRS Duration 116 ms    Q-T Interval 398 ms    QTC Calculation (Bezet) 473 ms    Calculated P Axis 35 degrees    Calculated R Axis -50 degrees    Calculated T Axis 116 degrees    Diagnosis       Sinus rhythm with premature atrial complexes  Possible Left atrial enlargement  Left anterior fascicular block  Left ventricular hypertrophy with QRS widening and repolarization abnormality  Cannot rule out Septal infarct , age undetermined  Abnormal ECG  When compared with ECG of 15-FEB-2017 11:40,  premature atrial complexes are now present  Minimal criteria for Septal infarct are now present         Signed By: Varinder Stiles MD     February 16, 2017

## 2017-02-16 NOTE — ROUTINE PROCESS
Bedside shift change report given to Madina Fowler RN (oncoming nurse) by [facility], RN (offgoing nurse). Report included the following information SBAR, Kardex and MAR.     5334 - Patient refusing to go to test.  Dr. Angus Walton aware, patient would like the test completed tomorrow. 2000 - Bedside shift change report given to Fredy Freed RN (oncoming nurse) by Renny Montes RN (offgoing nurse). Report included the following information SBAR, Kardex and MAR.

## 2017-02-16 NOTE — PROGRESS NOTES
NUTRITION    BPA/MST/Pressure Ulcer Referral    Nutrition Consult: General Nutrition Management & Supplements     RECOMMENDATIONS / PLAN:     - Add supplements: Ensure Enlive TID and Uli BID. - Add daily MVI.  - Change to soft solid diet with chopped meats.   - Continue RD inpatient monitoring and evaluation. NUTRITION INTERVENTIONS & DIAGNOSIS:     [x] Meals/Snacks: modify  [x] Medical food supplementation: add    [x] Vitamin/mineral supplementation: cyanocobalamin, ferrous sulfate, add     Nutrition Diagnosis: Unintentional weight loss related to inadequate energy intake as evidenced by 70 lb, 33% weight loss x several years. ASSESSMENT:     Subjective/Objective:  Agreeable to supplements. Current Appetite:   [] Good     [] Fair     [x] Poor     [] Other:  Appetite/meal intake prior to admission:   [] Good     [] Fair     [x] Poor     [] Other:    Diet: DIET CARDIAC Regular      Food Allergies: NKFA   Feeding Limitations:  [] Swallowing difficulty    [x] Chewing difficulty: missing teeth    [] Other:  Current Meal Intake: No data found. Average po intake adequate to meet patients estimated nutritional needs:   [] Yes     [x] No   [] Unable to determine at this time    BM: PTA   Skin Integrity: sacral stage II pressure ulcer   Edema: none   Pertinent Medications: Reviewed    Recent Labs      02/15/17   0837   NA  142   K  4.2   CL  109*   CO2  24   GLU  114*   BUN  45*   CREA  2.74*   CA  9.3   ALB  3.2*   SGOT  15   ALT  14       Intake/Output Summary (Last 24 hours) at 02/16/17 1307  Last data filed at 02/16/17 5310   Gross per 24 hour   Intake              120 ml   Output              200 ml   Net              -80 ml       Anthropometrics:  Ht Readings from Last 1 Encounters:   01/27/17 5' 11\" (1.803 m)     Last 3 Recorded Weights in this Encounter    02/15/17 0959 02/16/17 0452   Weight: 67.7 kg (149 lb 4.8 oz) 63.8 kg (140 lb 11.2 oz)     Body mass index is 19.62 kg/(m^2).       Borderline Underweight     Weight History: previous weight of 210 lb several years ago and gradual weight loss down to 140 lb (70 lb, 33% weight loss x several years)     Weight Metrics 2/16/2017 2/15/2017 1/27/2017 1/23/2017 11/25/2016 11/1/2016 10/28/2016   Weight 140 lb 11.2 oz - 146 lb 146 lb 139 lb 147 lb 152 lb 3.2 oz   BMI - 19.62 kg/m2 20.36 kg/m2 20.36 kg/m2 18.85 kg/m2 19.94 kg/m2 20.64 kg/m2        Admitting Diagnosis: Chest pain  Past Medical History   Diagnosis Date    Breast cancer ([facility] [address] 1/17/14     right breast    Carotid duplex 11/19/2014     Severe 70% or greater bilateral ICA stenosis. LICA dissection suggested.  Chronic renal insufficiency     CVA (cerebral vascular accident) ([facility] [address] 2003     with slight Right sided weakness    Echocardiogram 02/22/2016     EF 55-60%. No WMA. Mild-mod LVH. Gr 2 DDfx. No significant valvular heart disease.  Edema     Glaucoma     Gout flare     Hyperlipidemia     Hypertension     Lower extremity arterial testing 12/19/2014     Mod tibio-peroneal arterial disease bilaterally. R YANCY 1.24.  L YANCY 0.74. Bilateral sm vessel disease.  Lump of right breast     URI (upper respiratory infection)        Education Needs:        [x] None identified  [] Identified - Not appropriate at this time  []  Identified and addressed - refer to education log  Learning Limitations:   [x] None identified  [] Identified    Cultural, Voodoo & ethnic food preferences:  [x] None identified    [] Identified and addressed     ESTIMATED NUTRITION NEEDS:     Calories: 3449-8774 kcal (MSJx1.2-1.3) based on  [x] Actual BW: 81 kg     [] IBW:   Protein: 65-81 gm (0.8-1 gm/kg) based on  [x] Actual BW: 81 kg     [] IBW:   Fluid: 1 mL/kcal     MONITORING & EVALUATION:     Nutrition Goal(s):   1. Po intake of meals will meet >75% of patient estimated nutritional needs within the next 7 days.   Outcome:  [] Met/Ongoing    []  Not Met    [x] New/Initial Goal     Monitoring:   [x] Diet tolerance   [x] Meal intake   [] Supplement intake   [] GI symptoms/ability to tolerate po diet   [] Respiratory status   [] Plan of care      Previous Recommendations (for follow-up assessments only):     []   Implemented       []   Not Implemented (RD to address)     [] No Recommendation Made     Discharge Planning: cardiac diet, consistency as tolerated   [x] Participated in care planning, discharge planning, & interdisciplinary rounds as appropriate      Joya Weeks, [address] [place] [address]    Pager: [phone]

## 2017-02-16 NOTE — PROGRESS NOTES
[facility] - Progress Note  Admit Date: 2/15/2017    Assessment:     Hospital Problems  Date Reviewed: 2/15/2017          Codes Class Noted POA    Chest pain ICD-10-CM: R07.9  ICD-9-CM: 786.50  2/15/2017 Unknown              -Acute on chronic diastolic heart failure in setting on noncompliance. Normal LV function EF 55-60% on echo 2/2016.  -Hypertensive crisis in setting of noncompliance.  -Indeterminate troponin not consistent with ACS, suspect related to heart failure and HTN, chest pain atypical and likely related to heart failure, ECG with lateral T wave changes likely HTN related. -Chronic left pleural effusion of unclear etiology, possibly from heart failure.  -Chronic kidney disease, stable. -Chronic anemia, stable.  -Noncompliance.       Plan:     Hemodynamics improved and Breathing easier with IV lasix and resumption of antihypertensives. Reports good diurese but unfortunately I/Os do not appear reliable. Volume status appear stable, follow up renal function pending, transitioning to PO later today. Will review echocardiogram when complete. Continue coreg and hydralazine. Needs PT/OT and dispo evaluation. Subjective:     Feeling better today.     Objective:      Patient Vitals for the past 8 hrs:   Temp Pulse Resp BP SpO2   02/16/17 1146 97.7 °F (36.5 °C) 65 18 146/55 99 %   02/16/17 0748 98.3 °F (36.8 °C) 82 18 176/67 99 %         Patient Vitals for the past 96 hrs:   Weight   02/16/17 0452 63.8 kg (140 lb 11.2 oz)   02/15/17 0959 67.7 kg (149 lb 4.8 oz)                    Intake/Output Summary (Last 24 hours) at 02/16/17 1207  Last data filed at 02/16/17 8378   Gross per 24 hour   Intake              120 ml   Output              200 ml   Net              -80 ml       Physical Exam:  General:  cooperative, no distress, appears stated age  Neck:  no JVD  Lungs:  clear to auscultation bilaterally  Heart:  regular rate and rhythm  Abdomen:  abdomen is soft without significant tenderness, masses, organomegaly or guarding  Extremities:  extremities normal, atraumatic, no cyanosis or edema    Data Review:     Labs: Results:       Chemistry Recent Labs      02/15/17   0837   GLU  114*   NA  142   K  4.2   CL  109*   CO2  24   BUN  45*   CREA  2.74*   CA  9.3   AGAP  9   BUCR  16   AP  79   TP  6.7   ALB  3.2*   GLOB  3.5   AGRAT  0.9      CBC w/Diff Recent Labs      02/15/17   0837   WBC  4.5*   RBC  3.55*   HGB  10.7*   HCT  32.8*   PLT  238   GRANS  58   LYMPH  33   EOS  2      Cardiac Enzymes No results found for: CPK, CKMMB, CKMB, RCK3, CKMBT, CKNDX, CKND1, VIRGILIO, TROPT, TROIQ, ELIZABETH, TROPT, TNIPOC, BNP, BNPP   Coagulation Recent Labs      02/15/17   0837   PTP  13.0   INR  1.0   APTT  25.2       Lipid Panel Lab Results   Component Value Date/Time    Cholesterol, total 245 10/23/2016 05:10 AM    HDL Cholesterol 47 10/23/2016 05:10 AM    LDL, calculated 180 10/23/2016 05:10 AM    VLDL, calculated 18 10/23/2016 05:10 AM    Triglyceride 90 10/23/2016 05:10 AM    CHOL/HDL Ratio 5.2 10/23/2016 05:10 AM      BNP No results found for: BNP, BNPP, XBNPT   Liver Enzymes Recent Labs      02/15/17   0837   TP  6.7   ALB  3.2*   AP  79   SGOT  15      Digoxin    Thyroid Studies Lab Results   Component Value Date/Time    TSH 2.22 10/23/2016 05:10 AM          Signed By: TRACIE Dang     February 16, 2017

## 2017-02-16 NOTE — PROGRESS NOTES
Care Management Interventions  PCP Verified by CM: Yes  Palliative Care Consult (Criteria: CHF and RRAT>21): No  Reason for No Palliative Care Consult: Other (see comment)  Mode of Transport at Discharge: Self  Transition of Care Consult (CM Consult): Discharge Planning  MyChart Signup: No  Discharge Durable Medical Equipment: No  Health Maintenance Reviewed: Yes  Physical Therapy Consult: Yes  Occupational Therapy Consult: Yes  Speech Therapy Consult: No  Current Support Network: Other  Confirm Follow Up Transport: Family  Plan discussed with Pt/Family/Caregiver: Yes  Discharge Location  Discharge Placement:  (tbd)     Patient 80years old female admitted to [facility] with chest pain. Saw the patient and her niece-Anastasiya ([phone]) in room. Patient AAOx 4, open to conversation in presence of her niece. Patient says she lives alone, has at home rw, cane, two wc big and small. Patient says she uses wc when she goes out, in the house she uses rw. Patient says even though she lives alone her son comes to stay with her at night, he \"fixes meals\" for his mother. Patient has good support from her family, children, nieces, nephews. Patient attended [facility] year\", she is open to snf option if such need will arise. Patient was provided with snf list to review. CM will continue to follow.  Wilfred Cedeno rn, cm

## 2017-02-17 ENCOUNTER — APPOINTMENT (OUTPATIENT)
Dept: GENERAL RADIOLOGY | Age: 82
DRG: 291 | End: 2017-02-17
Attending: HOSPITALIST
Payer: MEDICARE

## 2017-02-17 LAB
ANION GAP BLD CALC-SCNC: 10 MMOL/L (ref 3–18)
BASOPHILS # BLD AUTO: 0 K/UL (ref 0–0.06)
BASOPHILS # BLD: 0 % (ref 0–2)
BUN SERPL-MCNC: 57 MG/DL (ref 7–18)
BUN/CREAT SERPL: 17 (ref 12–20)
CALCIUM SERPL-MCNC: 9 MG/DL (ref 8.5–10.1)
CHLORIDE SERPL-SCNC: 102 MMOL/L (ref 100–108)
CHOLEST SERPL-MCNC: 201 MG/DL
CO2 SERPL-SCNC: 25 MMOL/L (ref 21–32)
CREAT SERPL-MCNC: 3.3 MG/DL (ref 0.6–1.3)
DIFFERENTIAL METHOD BLD: ABNORMAL
EOSINOPHIL # BLD: 0 K/UL (ref 0–0.4)
EOSINOPHIL NFR BLD: 0 % (ref 0–5)
ERYTHROCYTE [DISTWIDTH] IN BLOOD BY AUTOMATED COUNT: 14 % (ref 11.6–14.5)
GLUCOSE BLD STRIP.AUTO-MCNC: 106 MG/DL (ref 70–110)
GLUCOSE SERPL-MCNC: 93 MG/DL (ref 74–99)
HCT VFR BLD AUTO: 31.4 % (ref 35–45)
HDLC SERPL-MCNC: 46 MG/DL (ref 40–60)
HDLC SERPL: 4.4 {RATIO} (ref 0–5)
HGB BLD-MCNC: 9.8 G/DL (ref 12–16)
LDLC SERPL CALC-MCNC: 125.4 MG/DL (ref 0–100)
LIPID PROFILE,FLP: ABNORMAL
LYMPHOCYTES # BLD AUTO: 30 % (ref 21–52)
LYMPHOCYTES # BLD: 1.9 K/UL (ref 0.9–3.6)
MCH RBC QN AUTO: 29.7 PG (ref 24–34)
MCHC RBC AUTO-ENTMCNC: 31.2 G/DL (ref 31–37)
MCV RBC AUTO: 95.2 FL (ref 74–97)
MONOCYTES # BLD: 0.5 K/UL (ref 0.05–1.2)
MONOCYTES NFR BLD AUTO: 8 % (ref 3–10)
NEUTS SEG # BLD: 3.9 K/UL (ref 1.8–8)
NEUTS SEG NFR BLD AUTO: 62 % (ref 40–73)
PLATELET # BLD AUTO: 249 K/UL (ref 135–420)
PMV BLD AUTO: 10.8 FL (ref 9.2–11.8)
POTASSIUM SERPL-SCNC: 4.6 MMOL/L (ref 3.5–5.5)
RBC # BLD AUTO: 3.3 M/UL (ref 4.2–5.3)
SODIUM SERPL-SCNC: 137 MMOL/L (ref 136–145)
TRIGL SERPL-MCNC: 148 MG/DL (ref ?–150)
VLDLC SERPL CALC-MCNC: 29.6 MG/DL
WBC # BLD AUTO: 6.3 K/UL (ref 4.6–13.2)

## 2017-02-17 PROCEDURE — 97162 PT EVAL MOD COMPLEX 30 MIN: CPT

## 2017-02-17 PROCEDURE — 74011250637 HC RX REV CODE- 250/637: Performed by: PHYSICIAN ASSISTANT

## 2017-02-17 PROCEDURE — 36415 COLL VENOUS BLD VENIPUNCTURE: CPT | Performed by: HOSPITALIST

## 2017-02-17 PROCEDURE — 85025 COMPLETE CBC W/AUTO DIFF WBC: CPT | Performed by: HOSPITALIST

## 2017-02-17 PROCEDURE — 74011250637 HC RX REV CODE- 250/637: Performed by: INTERNAL MEDICINE

## 2017-02-17 PROCEDURE — 93306 TTE W/DOPPLER COMPLETE: CPT

## 2017-02-17 PROCEDURE — 74230 X-RAY XM SWLNG FUNCJ C+: CPT

## 2017-02-17 PROCEDURE — 92526 ORAL FUNCTION THERAPY: CPT

## 2017-02-17 PROCEDURE — 74011000255 HC RX REV CODE- 255: Performed by: INTERNAL MEDICINE

## 2017-02-17 PROCEDURE — 74011250637 HC RX REV CODE- 250/637: Performed by: HOSPITALIST

## 2017-02-17 PROCEDURE — 97530 THERAPEUTIC ACTIVITIES: CPT

## 2017-02-17 PROCEDURE — 80061 LIPID PANEL: CPT | Performed by: HOSPITALIST

## 2017-02-17 PROCEDURE — 71020 XR CHEST PA LAT: CPT

## 2017-02-17 PROCEDURE — 82962 GLUCOSE BLOOD TEST: CPT

## 2017-02-17 PROCEDURE — 65660000000 HC RM CCU STEPDOWN

## 2017-02-17 PROCEDURE — 80048 BASIC METABOLIC PNL TOTAL CA: CPT | Performed by: HOSPITALIST

## 2017-02-17 PROCEDURE — 92611 MOTION FLUOROSCOPY/SWALLOW: CPT

## 2017-02-17 PROCEDURE — 74011250636 HC RX REV CODE- 250/636: Performed by: INTERNAL MEDICINE

## 2017-02-17 RX ORDER — CARVEDILOL 12.5 MG/1
12.5 TABLET ORAL 2 TIMES DAILY WITH MEALS
Status: DISCONTINUED | OUTPATIENT
Start: 2017-02-17 | End: 2017-02-20 | Stop reason: HOSPADM

## 2017-02-17 RX ORDER — HYDRALAZINE HYDROCHLORIDE 50 MG/1
100 TABLET, FILM COATED ORAL 3 TIMES DAILY
Status: DISCONTINUED | OUTPATIENT
Start: 2017-02-17 | End: 2017-02-17

## 2017-02-17 RX ORDER — ISOSORBIDE DINITRATE 20 MG/1
20 TABLET ORAL 3 TIMES DAILY
Status: DISCONTINUED | OUTPATIENT
Start: 2017-02-17 | End: 2017-02-20 | Stop reason: HOSPADM

## 2017-02-17 RX ORDER — HYDRALAZINE HYDROCHLORIDE 50 MG/1
100 TABLET, FILM COATED ORAL EVERY 8 HOURS
Status: DISCONTINUED | OUTPATIENT
Start: 2017-02-17 | End: 2017-02-20 | Stop reason: HOSPADM

## 2017-02-17 RX ORDER — FACIAL-BODY WIPES
10 EACH TOPICAL DAILY PRN
Status: DISCONTINUED | OUTPATIENT
Start: 2017-02-17 | End: 2017-02-20 | Stop reason: HOSPADM

## 2017-02-17 RX ORDER — POLYETHYLENE GLYCOL 3350 17 G/17G
17 POWDER, FOR SOLUTION ORAL
Status: DISCONTINUED | OUTPATIENT
Start: 2017-02-17 | End: 2017-02-18

## 2017-02-17 RX ADMIN — ASPIRIN 81 MG: 81 TABLET, COATED ORAL at 11:14

## 2017-02-17 RX ADMIN — BARIUM SULFATE 700 MG: 700 TABLET ORAL at 10:00

## 2017-02-17 RX ADMIN — HYDRALAZINE HYDROCHLORIDE 100 MG: 50 TABLET, FILM COATED ORAL at 14:55

## 2017-02-17 RX ADMIN — CYANOCOBALAMIN TAB 1000 MCG 1000 MCG: 1000 TAB at 11:14

## 2017-02-17 RX ADMIN — CARVEDILOL 6.25 MG: 6.25 TABLET, FILM COATED ORAL at 11:14

## 2017-02-17 RX ADMIN — CARVEDILOL 12.5 MG: 12.5 TABLET, FILM COATED ORAL at 17:15

## 2017-02-17 RX ADMIN — HEPARIN SODIUM 5000 UNITS: 5000 INJECTION, SOLUTION INTRAVENOUS; SUBCUTANEOUS at 17:15

## 2017-02-17 RX ADMIN — BARIUM SULFATE 30 ML: 400 SUSPENSION ORAL at 10:00

## 2017-02-17 RX ADMIN — BISACODYL 10 MG: 10 SUPPOSITORY RECTAL at 15:49

## 2017-02-17 RX ADMIN — HYDRALAZINE HYDROCHLORIDE 100 MG: 50 TABLET, FILM COATED ORAL at 22:40

## 2017-02-17 RX ADMIN — BARIUM SULFATE 15 ML: 400 SUSPENSION ORAL at 10:00

## 2017-02-17 RX ADMIN — HEPARIN SODIUM 5000 UNITS: 5000 INJECTION, SOLUTION INTRAVENOUS; SUBCUTANEOUS at 01:41

## 2017-02-17 RX ADMIN — ALLOPURINOL 200 MG: 100 TABLET ORAL at 11:14

## 2017-02-17 RX ADMIN — HYDRALAZINE HYDROCHLORIDE 100 MG: 50 TABLET, FILM COATED ORAL at 11:14

## 2017-02-17 RX ADMIN — Medication 325 MG: at 11:14

## 2017-02-17 RX ADMIN — FUROSEMIDE 40 MG: 40 TABLET ORAL at 11:14

## 2017-02-17 RX ADMIN — BARIUM SULFATE 15 G: 960 POWDER, FOR SUSPENSION ORAL at 10:00

## 2017-02-17 RX ADMIN — ACETAMINOPHEN 650 MG: 325 TABLET, FILM COATED ORAL at 14:55

## 2017-02-17 RX ADMIN — ISOSORBIDE DINITRATE 20 MG: 20 TABLET ORAL at 22:41

## 2017-02-17 RX ADMIN — BARIUM SULFATE 45 ML: 400 PASTE ORAL at 10:00

## 2017-02-17 RX ADMIN — THERA TABS 1 TABLET: TAB at 11:14

## 2017-02-17 RX ADMIN — ISOSORBIDE DINITRATE 20 MG: 20 TABLET ORAL at 15:50

## 2017-02-17 NOTE — PROGRESS NOTES
Problem: Mobility Impaired (Adult and Pediatric)  Goal: *Acute Goals and Plan of Care (Insert Text)  STGs to be addressed within 3 days:  1. Bed mobility: Supine to sit to supine S with HR for meals. 2. Activity tolerance: Tolerate up in chair 1-2 hrs for ADLs. 3. Transfers: Sit to stand to chair S with LRAD for ADLs. LTGs to be addressed within 7 days:  1. Standing/Ambulation Balance: Increase to Good with LRAD for safe transfers and gait. 2. Ambulation: Ambulate > 200 ft. S with LRAD for home mobility. 3. Patient Education: Independent with HEP for home safety. 4. Stairs: Up/Down 2 steps CGA with HR for home entry. Outcome: Progressing Towards Goal  PHYSICAL THERAPY EVALUATION     Patient: Stefany Steen (80 y.o. female)  Date: 2/17/2017  Primary Diagnosis: Chest pain  Precautions:   Fall      ASSESSMENT :  Based on the objective data described below, the patient presents to PT with decreased functional mobility with regard to bed mobility, transfers, gait, and overall tolerance for activity. Patient, upon arrival, in notable discomfort. Patient reports to PT that she is having significant abdominal discomfort secondary to ongoing constipation. Patient required min A for transitioning to sitting EOB, patient unable to tolerate sitting secondary to impacted stool. Patient transitioned into standing min/CGA with HHA, able to transfer to [facility]. Patient attempted BM, but unable to evacuate stool. Patient was assisted back to bed, positioned on left side for comfort, notified nurse, JOAQUIM Rodríguez. Patient would benefit from PT to address above impairments and assist with discharge planning. Patient will benefit from skilled intervention to address the above impairments.   Patients rehabilitation potential is considered to be Good  Factors which may influence rehabilitation potential include:   [X]         None noted  [ ]         Mental ability/status  [ ]         Medical condition  [ ]         Home/family situation and support systems  [ ]         Safety awareness  [ ]         Pain tolerance/management  [ ]         Other:        PLAN :  Recommendations and Planned Interventions:  [X]           Bed Mobility Training             [X]    Neuromuscular Re-Education  [X]           Transfer Training                   [ ]    Orthotic/Prosthetic Training  [X]           Gait Training                          [ ]    Modalities  [X]           Therapeutic Exercises          [ ]    Edema Management/Control  [X]           Therapeutic Activities            [X]    Patient and Family Training/Education  [ ]           Other (comment):     Frequency/Duration: Patient will be followed by physical therapy daily x 4-7 x week to address goals. Discharge Recommendations: Skilled Nursing Facility  Further Equipment Recommendations for Discharge: rolling walker       SUBJECTIVE:   Patient stated I need to go to the bathroom.       OBJECTIVE DATA SUMMARY:       Past Medical History   Diagnosis Date    Breast cancer ([facility] [address] 1/17/14       right breast    Carotid duplex 11/19/2014       Severe 70% or greater bilateral ICA stenosis. LICA dissection suggested.  Chronic renal insufficiency      CVA (cerebral vascular accident) ([facility] [address] 2003       with slight Right sided weakness    Echocardiogram 02/22/2016       EF 55-60%. No WMA. Mild-mod LVH. Gr 2 DDfx. No significant valvular heart disease.  Edema      Glaucoma      Gout flare      Hyperlipidemia      Hypertension      Lower extremity arterial testing 12/19/2014       Mod tibio-peroneal arterial disease bilaterally. R YANCY 1.24.  L YANCY 0.74. Bilateral sm vessel disease.  Lump of right breast      URI (upper respiratory infection)       Past Surgical History   Procedure Laterality Date    Hx gyn           JUSTIN/BSO    Hx appendectomy        Hx cyst incision and drainage           PT DOES NOT REMEMBER EXACT DATES, TUCKER BREAST DONE MORE THAN 20 YEARS AGO.  BENIGN FINDINGS PER PATIENT.  Hx mastectomy   1/17/2014       RIGHT PARTIAL MASTECTOMY performed by Juan Ham MD at [address] [place] [address] Hx hysterectomy         Barriers to Learning/Limitations: None  Compensate with: N/A  Prior Level of Function/Home Situation:   Home Situation  Home Environment: Private residence  One/Two Story Residence: One story  Living Alone: Yes  Support Systems: Family member(s), Friends \ neighbors  Patient Expects to be Discharged to[de-identified] Private residence  Current DME Used/Available at Home: Walker, rolling  Critical Behavior:  Neurologic State: Alert  Psychosocial  Patient Behaviors: Cooperative; Restless  Family  Behaviors: Appropriate for situation;Calm; Cooperative;Supportive  Purposeful Interaction: Yes  Pt Identified Daily Priority: Clinical issues (comment)  Caritas Process: Nurture loving kindness;Establish trust;Teaching/learning; Attend basic human needs; Open life/death unknowns; Supportive expression  Caring Interventions: Reassure; Therapeutic modalities  Reassure: Informing; Sit with patient;Caring rounds  Therapeutic Modalities: Humor;Massage  Strength:    Strength: Generally decreased, functional (B LE 4/5)  Tone & Sensation:   Tone: Normal  Sensation: Intact   Range Of Motion:  AROM: Within functional limits (B LE)  Functional Mobility:  Bed Mobility:  Rolling: Supervision  Supine to Sit: Minimum assistance  Sit to Supine: Supervision  Scooting: Supervision  Transfers:  Sit to Stand: Contact guard assistance (with HHA)  Stand to Sit: Contact guard assistance (with HHA)  Balance:   Sitting: Intact  Standing: Impaired;Pull to stand; With support  Standing - Static: Fair;Constant support  Standing - Dynamic : Fair  Ambulation/Gait Training:  Distance (ft): 3 Feet (ft)  Assistive Device: Other (comment) (HHA)  Ambulation - Level of Assistance: Minimal assistance;Contact guard assistance  Base of Support: Narrowed  Speed/Kait: Pace decreased (<100 feet/min)  Interventions: Visual/Demos; Verbal cues;Tactile cues; Safety awareness training;Manual cues  Pain:  Pain Scale 1: Adult Nonverbal Pain Scale  Pain Intensity 1: 0  Activity Tolerance:   Good  Please refer to the flowsheet for vital signs taken during this treatment. After treatment:   [ ] Patient left in no apparent distress sitting up in chair  [ ] Patient left sitting on EOB  [X] Patient left in no apparent distress in bed  [ ] Patient declined to be OOB at this time due to   [X] Call bell left within reach  [X] Nursing notified(Anne RN)  [X] Caregiver present  [ ] Bed alarm activated      COMMUNICATION/EDUCATION:   [X]         Fall prevention education was provided and the patient/caregiver indicated understanding. [X]         Patient/family have participated as able in goal setting and plan of care. [X]         Patient/family agree to work toward stated goals and plan of care. [ ]         Patient understands intent and goals of therapy, but is neutral about his/her participation. [ ]         Patient is unable to participate in goal setting and plan of care. Thank you for this referral.  Maylin Lauren, PT   Time Calculation: 29 mins      G-codes:  Mobility  Current  CJ= 20-39%   Goal  CI= 1-19%. The severity rating is based on the Level of Assistance required for Functional Mobility and ADLs.      Eval Complexity: History: MEDIUM  Complexity : 1-2 comorbidities / personal factors will impact the outcome/ POC Exam:MEDIUM Complexity : 3 Standardized tests and measures addressing body structure, function, activity limitation and / or participation in recreation  Presentation: MEDIUM Complexity : Evolving with changing characteristics  Overall Complexity:MEDIUM

## 2017-02-17 NOTE — PROGRESS NOTES
[facility]  Progress Note    Patient: Alena Rooney Age: 80 y.o. : 1928 MR#: [id number] SSN: [id number]  Date/Time: 2017     Subjective: pt c/o constipation and pain in belly. Per RN she had small BM. Pt passing flatus. Assessment/Plan:   1. Hypertensive crisis: will increase BB and hydralazine. Adjust med's acoording to BP. 2. Acute on chronic diastolic heart failure: cont lasix, cardio in put and echo noted. 3. Some elevation in troponin, and atypical chest pain. ? Demand ischemia, plan per cardio. 4. Left pleural effusion which appears to be chronic, repeat CXR post diuresis slight better. 5. Chronic kidney disease, stage 4: will monitor crt.  6. History of noncompliance  7. Mild cognitive impairment: supportive care  8. Constipation: miralax, suppository as needed. Digital evacuation by RN  cont PT/OT, SLP and nutrition eval  D/w son Kianna Chun [phone] and niece at bedside in detail. Case discussed with:  [x]Patient  [x]Family  [x]Nursing  []Case Management  DVT Prophylaxis:  []Lovenox  [x]Hep SQ  []SCDs  []Coumadin   []On Heparin gtt    Objective:   VS:   Visit Vitals    /60 (BP 1 Location: Right arm, BP Patient Position: At rest)    Pulse 67    Temp 97.2 °F (36.2 °C)    Resp 18    Wt 65.6 kg (144 lb 10 oz)    SpO2 100%    BMI 20.17 kg/m2      Tmax/24hrs: Temp (24hrs), Av.8 °F (36.6 °C), Min:97.2 °F (36.2 °C), Max:98.2 °F (36.8 °C)  IOBRIEF    Intake/Output Summary (Last 24 hours) at 17 1206  Last data filed at 17 1806   Gross per 24 hour   Intake              440 ml   Output                0 ml   Net              440 ml       General:  Alert, cooperative, no acute distress    Pulmonary: decrease BS left base, no Rales. Cardiovascular: Regular rate and Rhythm. GI:  Soft, Non distended, Non tender. + Bowel sounds. Extremities:  No edema, cyanosis, clubbing. Neurologic: Alert and oriented X 2.  Moves all ext  Additional:    Medications:   Current Facility-Administered Medications   Medication Dose Route Frequency    carvedilol (COREG) tablet 6.25 mg  6.25 mg Oral BID WITH MEALS    acetaminophen (TYLENOL) tablet 650 mg  650 mg Oral Q6H PRN    furosemide (LASIX) tablet 40 mg  40 mg Oral DAILY    therapeutic multivitamin (THERAGRAN) tablet 1 Tab  1 Tab Oral DAILY    hydrALAZINE (APRESOLINE) tablet 100 mg  100 mg Oral BID    allopurinol (ZYLOPRIM) tablet 200 mg  200 mg Oral DAILY    aspirin delayed-release tablet 81 mg  81 mg Oral DAILY    cyanocobalamin tablet 1,000 mcg  1,000 mcg Oral DAILY    ferrous sulfate tablet 325 mg  325 mg Oral ACB    albuterol (PROVENTIL VENTOLIN) nebulizer solution 2.5 mg  2.5 mg Nebulization Q4H PRN    heparin (porcine) injection 5,000 Units  5,000 Units SubCUTAneous Q8H       Labs:    Recent Results (from the past 24 hour(s))   METABOLIC PANEL, BASIC    Collection Time: 02/16/17  1:18 PM   Result Value Ref Range    Sodium 140 136 - 145 mmol/L    Potassium 4.4 3.5 - 5.5 mmol/L    Chloride 106 100 - 108 mmol/L    CO2 23 21 - 32 mmol/L    Anion gap 11 3.0 - 18 mmol/L    Glucose 115 (H) 74 - 99 mg/dL    BUN 56 (H) 7.0 - 18 MG/DL    Creatinine 3.49 (H) 0.6 - 1.3 MG/DL    BUN/Creatinine ratio 16 12 - 20      GFR est AA 15 (L) >60 ml/min/1.73m2    GFR est non-AA 12 (L) >60 ml/min/1.73m2    Calcium 8.8 8.5 - 10.1 MG/DL   GLUCOSE, POC    Collection Time: 02/17/17 12:57 AM   Result Value Ref Range    Glucose (POC) 106 70 - 109 mg/dL   METABOLIC PANEL, BASIC    Collection Time: 02/17/17  5:35 AM   Result Value Ref Range    Sodium 137 136 - 145 mmol/L    Potassium 4.6 3.5 - 5.5 mmol/L    Chloride 102 100 - 108 mmol/L    CO2 25 21 - 32 mmol/L    Anion gap 10 3.0 - 18 mmol/L    Glucose 93 74 - 99 mg/dL    BUN 57 (H) 7.0 - 18 MG/DL    Creatinine 3.30 (H) 0.6 - 1.3 MG/DL    BUN/Creatinine ratio 17 12 - 20      GFR est AA 16 (L) >60 ml/min/1.73m2    GFR est non-AA 13 (L) >60 ml/min/1.73m2 Calcium 9.0 8.5 - 10.1 MG/DL   CBC WITH AUTOMATED DIFF    Collection Time: 02/17/17  5:35 AM   Result Value Ref Range    WBC 6.3 4.6 - 13.2 K/uL    RBC 3.30 (L) 4.20 - 5.30 M/uL    HGB 9.8 (L) 12.0 - 16.0 g/dL    HCT 31.4 (L) 35.0 - 45.0 %    MCV 95.2 74.0 - 97.0 FL    MCH 29.7 24.0 - 34.0 PG    MCHC 31.2 31.0 - 37.0 g/dL    RDW 14.0 11.6 - 14.5 %    PLATELET 746 211 - 184 K/uL    MPV 10.8 9.2 - 11.8 FL    NEUTROPHILS 62 40 - 73 %    LYMPHOCYTES 30 21 - 52 %    MONOCYTES 8 3 - 10 %    EOSINOPHILS 0 0 - 5 %    BASOPHILS 0 0 - 2 %    ABS. NEUTROPHILS 3.9 1.8 - 8.0 K/UL    ABS. LYMPHOCYTES 1.9 0.9 - 3.6 K/UL    ABS. MONOCYTES 0.5 0.05 - 1.2 K/UL    ABS. EOSINOPHILS 0.0 0.0 - 0.4 K/UL    ABS.  BASOPHILS 0.0 0.0 - 0.06 K/UL    DF AUTOMATED     LIPID PANEL    Collection Time: 02/17/17  5:35 AM   Result Value Ref Range    LIPID PROFILE          Cholesterol, total 201 (H) <200 MG/DL    Triglyceride 148 <150 MG/DL    HDL Cholesterol 46 40 - 60 MG/DL    LDL, calculated 125.4 (H) 0 - 100 MG/DL    VLDL, calculated 29.6 MG/DL    CHOL/HDL Ratio 4.4 0 - 5.0         Signed By: Eden Wadsworth MD     February 17, 2017

## 2017-02-17 NOTE — PROGRESS NOTES
MBS completed with recs of soft solid diet and honey-thick liquids, meds as tolerated. Full report to follow.      Thank you for this referral.    Gulshan Giraldo M.S. CCC-SLP/L  Speech-Language Pathologist

## 2017-02-17 NOTE — PROGRESS NOTES
[facility] - Progress Note  Admit Date: 2/15/2017    Assessment:     Hospital Problems  Date Reviewed: 2/15/2017          Codes Class Noted POA    Chest pain ICD-10-CM: R07.9  ICD-9-CM: 786.50  2/15/2017 Unknown              -Acute on chronic diastolic heart failure in setting on noncompliance. Normal LV function EF 55-60% on echo 2/2016.  -Hypertensive crisis in setting of noncompliance.  -Indeterminate troponin not consistent with ACS, suspect related to heart failure and HTN, chest pain atypical and likely related to heart failure, ECG with lateral T wave changes likely HTN related. -Chronic left pleural effusion of unclear etiology, possibly from heart failure.  -Chronic kidney disease, stable. -Chronic anemia, stable.  -Noncompliance. Plan:     Continues to improve. Volume status appears stable on PO lasix. If BP remains elevated after this AMs medications, can consider increasing coreg. Will get Echo this AM.  Hopefully discharge soon, dispo planning per primary team.    Subjective:     No new complaints.      Objective:      Patient Vitals for the past 8 hrs:   Temp Pulse Resp BP SpO2   02/17/17 0359 98.2 °F (36.8 °C) 77 16 153/76 100 %         Patient Vitals for the past 96 hrs:   Weight   02/17/17 0530 65.6 kg (144 lb 10 oz)   02/16/17 0452 63.8 kg (140 lb 11.2 oz)   02/15/17 0959 67.7 kg (149 lb 4.8 oz)                    Intake/Output Summary (Last 24 hours) at 02/17/17 9089  Last data filed at 02/16/17 1806   Gross per 24 hour   Intake              440 ml   Output                0 ml   Net              440 ml       Physical Exam:  General:  alert, cooperative  Neck:  no JVD  Lungs:  clear to auscultation bilaterally  Heart:  regular rate and rhythm  Abdomen:  abdomen is soft   Extremities:  no edema    Data Review:     Labs: Results:       Chemistry Recent Labs      02/17/17   0535  02/16/17   1318  02/15/17   0837   GLU  93  115*  114*   NA  137  140  142   K  4.6  4.4  4.2   CL 102  106  109*   CO2  25  23  24   BUN  57*  56*  45*   CREA  3.30*  3.49*  2.74*   CA  9.0  8.8  9.3   AGAP  10  11  9   BUCR  17  16  16   AP   --    --   79   TP   --    --   6.7   ALB   --    --   3.2*   GLOB   --    --   3.5   AGRAT   --    --   0.9      CBC w/Diff Recent Labs      02/17/17   0535  02/15/17   0837   WBC  6.3  4.5*   RBC  3.30*  3.55*   HGB  9.8*  10.7*   HCT  31.4*  32.8*   PLT  249  238   GRANS  62  58   LYMPH  30  33   EOS  0  2      Cardiac Enzymes No results found for: CPK, CKMMB, CKMB, RCK3, CKMBT, CKNDX, CKND1, VIRGILIO, TROPT, TROIQ, ELIZABETH, TROPT, TNIPOC, BNP, BNPP   Coagulation Recent Labs      02/15/17   0837   PTP  13.0   INR  1.0   APTT  25.2       Lipid Panel Lab Results   Component Value Date/Time    Cholesterol, total PENDING 02/17/2017 05:35 AM    HDL Cholesterol PENDING 02/17/2017 05:35 AM    LDL, calculated PENDING 02/17/2017 05:35 AM    VLDL, calculated PENDING 02/17/2017 05:35 AM    Triglyceride PENDING 02/17/2017 05:35 AM    CHOL/HDL Ratio PENDING 02/17/2017 05:35 AM      BNP No results found for: BNP, BNPP, XBNPT   Liver Enzymes Recent Labs      02/15/17   0837   TP  6.7   ALB  3.2*   AP  79   SGOT  15      Digoxin    Thyroid Studies Lab Results   Component Value Date/Time    TSH 2.22 10/23/2016 05:10 AM          Signed By: TRACIE Pop     February 17, 2017

## 2017-02-17 NOTE — PROGRESS NOTES
Problem: Dysphagia (Adult)  Goal: *Acute Goals and Plan of Care (Insert Text)  Patient will:  1. Tolerate PO trials with 0 s/s overt distress in 4/5 trials  2. Utilize compensatory swallow strategies/maneuvers (decrease bite/sip, size/rate, alt. liq/sol) with min cues in 4/5 trials  3. Perform oral-motor/laryngeal exercises to increase oropharyngeal swallow function with min cues  4. Complete an objective swallow study (i.e., MBSS) to assess swallow integrity, r/o aspiration, and determine of safest LRD, min A -- met 2/17/17    Rec: Soft solid diet with honey-thick liquids  Aspiration precautions  HOB >45 during po intake, remain >30 for 30-45 minutes after po   Small bites/sips; alternate liquid/solid with slow feeding rate   Oral care TID  Meds per pt preference   Outcome: Progressing Towards Goal  SPEECH PATHOLOGY MODIFIED BARIUM SWALLOW STUDY     Patient: Ngoc Cartwright (80 y.o. female)  Date: 2/17/2017  Primary Diagnosis: Chest pain        Precautions: aspiration         ASSESSMENT :  Based on the objective data described below, the patient presents with mod-sev pharyngeal dysphagia c/b silent penetration to laryngeal vestibule during swallow with both thin and nectar-thick liquids. Chin tuck and small sips/bites ineffective at airway protection. Pt tolerating reg solid, puree, honey-thick, and 13 mm Ba pill trial with honey-thick wash without aspiration/penetration events. Pt with mild vallecular residuals post swallow across all consistencies which was cleared by second volitional swallow. Overall deficits include decreased pharyngeal motility/sensation and poor laryngeal elevation. Rec soft solid diet with honey-thick liquids, aspiration precautions, oral care TID, and meds as tolerated. ST will continue to follow. Patient will benefit from skilled intervention to address the above impairments.   Patients rehabilitation potential is considered to be Good  Factors which may influence rehabilitation potential include:   [X]              Mental ability/status       PLAN :  Recommendations and Planned Interventions: See above  Frequency/Duration: Patient will be followed by speech-language pathology 1-2 times per day/4-7 days per week to address goals. Discharge Recommendations: [address], [facility] and To Be Determined       SUBJECTIVE:   Patient stated So that is what is going on. OBJECTIVE:       Past Medical History   Diagnosis Date    [facility]14       right breast    Carotid duplex 11/19/2014       Severe 70% or greater bilateral ICA stenosis. LICA dissection suggested.  Chronic renal insufficiency      CVA [facility]cerebral vascular accident) ([facility] 2003       with slight Right sided weakness    Echocardiogram 02/22/2016       EF 55-60%. No WMA. Mild-mod LVH. Gr 2 DDfx. No significant valvular heart disease.  Edema      Glaucoma      Gout flare      Hyperlipidemia      Hypertension      Lower extremity arterial testing 12/19/2014       Mod tibio-peroneal arterial disease bilaterally. R YANCY 1.24.  L YANCY 0.74. Bilateral sm vessel disease.  Lump of right breast      URI (upper respiratory infection)       Past Surgical History   Procedure Laterality Date    Hx gyn           JUSTIN/BSO    Hx appendectomy        Hx cyst incision and drainage           PT DOES NOT REMEMBER EXACT DATES, TUCKER BREAST DONE MORE THAN 20 YEARS AGO. BENIGN FINDINGS PER PATIENT.     Hx mastectomy   1/17/2014       RIGHT PARTIAL MASTECTOMY performed by Johana Singh MD at [address] Hx hysterectomy         Prior Level of Function/Home Situation: private residence  [address]: Private residence  [address] [place] [address] St: One story  Living Alone: Yes  Support Systems: Family member(s), Friends \ neighbors  Patient Expects to be Discharged to[de-identified] Private residence  Current DME Used/Available at Home: None  Diet prior to admission: reg with thin  Current Diet:  Soft solid with honey-thick   Radiologist:    Film Views: Fluoro;Lateral  Patient Position: 90 in fluoro chair      Trial 1: Trial 2:   Consistency Presented: Thin liquid; Nectar thick liquid Consistency Presented: Honey thick liquid;Puree; Solid;Pill/Tablet   How Presented: Self-fed/presented;Cup/sip How Presented: Self-fed/presented;Cup/sip;Spoon;Straw         Bolus Acceptance: No impairment Bolus Acceptance: No impairment   Bolus Formation/Control: No impairment:   Bolus Formation/Control: No impairment:     Propulsion: No impairment Propulsion: No impairment   Oral Residue: None Oral Residue: None   Initiation of Swallow: Triggered at base of tongue Initiation of Swallow: Triggered at base of tongue   Timing: No impairment Timing: No impairment   Penetration: During swallow; To laryngeal vestibule Penetration: None   Aspiration/Timing: No evidence of aspiration Aspiration/Timing: No evidence of aspiration   Pharyngeal Clearance: Vallecular residue; Less than 10% Pharyngeal Clearance: Vallecular residue; Less than 10%   Attempted Modifications: Double swallow Attempted Modifications: Double swallow   Effective Modifications: Double swallow Effective Modifications: Double swallow   Cues for Modifications: Minimal Cues for Modifications: Minimal         Decreased Tongue Base Retraction?: No  Laryngeal Elevation: Minimal movement of larynx/epiglottis  Aspiration/Penetration Score: 3 (Penetration/Visible residue-Contrast remains above the folds/cords, but is not cleared)  Pharyngeal Symmetry: Not assessed  Pharyngeal-Esophageal Segment: No impairment  Pharyngeal Dysfunction: Decreased strength;Decreased elevation/closure     Oral Phase Severity: No impairment  Pharyngeal Phase Severity: Moderately severe     GCODESwallowing:  Swallow Current Status CL= 60-79%   Swallow Goal Status CK= 40-59%     The severity rating is based on the following outcomes:             8-point Penetration-Aspiration Scale: Score 3  Professional Judgment     PAIN:  Pt reports 0/10 pain or discomfort prior to MBS. Pt reports 0/10 pain or discomfort post MBS. COMMUNICATION/EDUCATION:   [X]  Patient educated regarding MBS results and diet recommendations. [X]  Patient/family have participated as able in goal setting and plan of care.      Thank you for this referral.     Karel Sparrow M.S. CCC-SLP/L  Speech-Language Pathologist

## 2017-02-17 NOTE — ROUTINE PROCESS
Bedside shift change report given to [address] Rodriguez, RN (oncoming nurse) by Elma Cade RN (offgoing nurse). Report included the following information SBAR, Kardex and MAR.     1240 - Bladder scanned patient, 190 total, patient voided and had a large bm after bladder scan. 26 - Paged Dr. Marge Medina, telephoned back, patient's family wants nurse to treat her for constipation, now, threatening if patient has a heart attack over this constipation family will chio. Nurse explained to patient and family, patient has had a large loose bowel movement after bladder scan. Orders given, hold today, if needed tomorrow, can give. 1440 - Orders given from Dr. Marge Medina to help clear bowel impact. Dipika Farley Cera, nurse manager helped current writer with patient and bowel impact. Patient felt a little better afterwards. Relative came back in room screaming and patient could not rest.        1650 - Paged Dr. Marge Medina. Patient nausea. Family screaming in room, screaming at staff, agitated. [phone] - Patient's relative agitated, screaming, cussing at nurse, refusing to let patient have medications, screaming about how family needs a , how the staff and Dr. Marge Medina was not helping the patient. Current writer tried to explain how heart medications work differently on the heart, relative would not listen. At this time, nurse excused herself from room, went to nurses station, and discussed with charge nurse what course should take. Patient has orders from MD and was wanting to take the medication, however, would not because of relative. Security called. 1800 - Family does not want the niece to visit the patient. Family (son) stated:  \"the last time we had this problem with her. \"  They would not give the name. Patient resting quietly at this time. 1950 - Bedside shift change report given to Luanne Cummins RN (oncoming nurse) by Khari Ramirez RN (offgoing nurse).  Report included the following information SBAR, Michelle and MARCharisse

## 2017-02-18 LAB
ANION GAP BLD CALC-SCNC: 9 MMOL/L (ref 3–18)
BASOPHILS # BLD AUTO: 0 K/UL (ref 0–0.06)
BASOPHILS # BLD: 0 % (ref 0–2)
BUN SERPL-MCNC: 59 MG/DL (ref 7–18)
BUN/CREAT SERPL: 18 (ref 12–20)
CALCIUM SERPL-MCNC: 8.4 MG/DL (ref 8.5–10.1)
CHLORIDE SERPL-SCNC: 102 MMOL/L (ref 100–108)
CO2 SERPL-SCNC: 28 MMOL/L (ref 21–32)
CREAT SERPL-MCNC: 3.22 MG/DL (ref 0.6–1.3)
DIFFERENTIAL METHOD BLD: ABNORMAL
EOSINOPHIL # BLD: 0 K/UL (ref 0–0.4)
EOSINOPHIL NFR BLD: 0 % (ref 0–5)
ERYTHROCYTE [DISTWIDTH] IN BLOOD BY AUTOMATED COUNT: 13.8 % (ref 11.6–14.5)
GLUCOSE BLD STRIP.AUTO-MCNC: 125 MG/DL (ref 70–110)
GLUCOSE SERPL-MCNC: 95 MG/DL (ref 74–99)
HCT VFR BLD AUTO: 25.1 % (ref 35–45)
HGB BLD-MCNC: 8 G/DL (ref 12–16)
LYMPHOCYTES # BLD AUTO: 19 % (ref 21–52)
LYMPHOCYTES # BLD: 1.8 K/UL (ref 0.9–3.6)
MCH RBC QN AUTO: 29.9 PG (ref 24–34)
MCHC RBC AUTO-ENTMCNC: 31.9 G/DL (ref 31–37)
MCV RBC AUTO: 93.7 FL (ref 74–97)
MONOCYTES # BLD: 0.8 K/UL (ref 0.05–1.2)
MONOCYTES NFR BLD AUTO: 8 % (ref 3–10)
NEUTS SEG # BLD: 6.8 K/UL (ref 1.8–8)
NEUTS SEG NFR BLD AUTO: 73 % (ref 40–73)
PLATELET # BLD AUTO: 213 K/UL (ref 135–420)
PMV BLD AUTO: 10.4 FL (ref 9.2–11.8)
POTASSIUM SERPL-SCNC: 4.3 MMOL/L (ref 3.5–5.5)
RBC # BLD AUTO: 2.68 M/UL (ref 4.2–5.3)
SODIUM SERPL-SCNC: 139 MMOL/L (ref 136–145)
WBC # BLD AUTO: 9.4 K/UL (ref 4.6–13.2)

## 2017-02-18 PROCEDURE — 36415 COLL VENOUS BLD VENIPUNCTURE: CPT | Performed by: HOSPITALIST

## 2017-02-18 PROCEDURE — 74011250636 HC RX REV CODE- 250/636: Performed by: INTERNAL MEDICINE

## 2017-02-18 PROCEDURE — 74011250637 HC RX REV CODE- 250/637: Performed by: PHYSICIAN ASSISTANT

## 2017-02-18 PROCEDURE — 74011250637 HC RX REV CODE- 250/637: Performed by: INTERNAL MEDICINE

## 2017-02-18 PROCEDURE — 85025 COMPLETE CBC W/AUTO DIFF WBC: CPT | Performed by: HOSPITALIST

## 2017-02-18 PROCEDURE — 74011250637 HC RX REV CODE- 250/637: Performed by: HOSPITALIST

## 2017-02-18 PROCEDURE — 80048 BASIC METABOLIC PNL TOTAL CA: CPT | Performed by: HOSPITALIST

## 2017-02-18 PROCEDURE — 65660000000 HC RM CCU STEPDOWN

## 2017-02-18 PROCEDURE — 82962 GLUCOSE BLOOD TEST: CPT

## 2017-02-18 RX ORDER — MAGNESIUM CITRATE
148 SOLUTION, ORAL ORAL
Status: COMPLETED | OUTPATIENT
Start: 2017-02-18 | End: 2017-02-18

## 2017-02-18 RX ORDER — POLYETHYLENE GLYCOL 3350 17 G/17G
17 POWDER, FOR SOLUTION ORAL DAILY
Status: DISCONTINUED | OUTPATIENT
Start: 2017-02-19 | End: 2017-02-20 | Stop reason: HOSPADM

## 2017-02-18 RX ADMIN — ISOSORBIDE DINITRATE 20 MG: 20 TABLET ORAL at 18:05

## 2017-02-18 RX ADMIN — CARVEDILOL 12.5 MG: 12.5 TABLET, FILM COATED ORAL at 18:05

## 2017-02-18 RX ADMIN — ISOSORBIDE DINITRATE 20 MG: 20 TABLET ORAL at 11:35

## 2017-02-18 RX ADMIN — HYDRALAZINE HYDROCHLORIDE 100 MG: 50 TABLET, FILM COATED ORAL at 13:52

## 2017-02-18 RX ADMIN — ACETAMINOPHEN 650 MG: 325 TABLET, FILM COATED ORAL at 13:51

## 2017-02-18 RX ADMIN — THERA TABS 1 TABLET: TAB at 11:36

## 2017-02-18 RX ADMIN — MAGNESIUM CITRATE 148 ML: 1.75 LIQUID ORAL at 13:53

## 2017-02-18 RX ADMIN — CARVEDILOL 12.5 MG: 12.5 TABLET, FILM COATED ORAL at 11:36

## 2017-02-18 RX ADMIN — ISOSORBIDE DINITRATE 20 MG: 20 TABLET ORAL at 22:12

## 2017-02-18 RX ADMIN — FUROSEMIDE 40 MG: 40 TABLET ORAL at 11:35

## 2017-02-18 RX ADMIN — ALLOPURINOL 200 MG: 100 TABLET ORAL at 11:35

## 2017-02-18 RX ADMIN — HEPARIN SODIUM 5000 UNITS: 5000 INJECTION, SOLUTION INTRAVENOUS; SUBCUTANEOUS at 01:46

## 2017-02-18 RX ADMIN — ASPIRIN 81 MG: 81 TABLET, COATED ORAL at 11:36

## 2017-02-18 RX ADMIN — HYDRALAZINE HYDROCHLORIDE 100 MG: 50 TABLET, FILM COATED ORAL at 22:12

## 2017-02-18 RX ADMIN — Medication 325 MG: at 11:35

## 2017-02-18 RX ADMIN — HYDRALAZINE HYDROCHLORIDE 100 MG: 50 TABLET, FILM COATED ORAL at 06:02

## 2017-02-18 RX ADMIN — CYANOCOBALAMIN TAB 1000 MCG 1000 MCG: 1000 TAB at 11:35

## 2017-02-18 RX ADMIN — HEPARIN SODIUM 5000 UNITS: 5000 INJECTION, SOLUTION INTRAVENOUS; SUBCUTANEOUS at 11:42

## 2017-02-18 RX ADMIN — HEPARIN SODIUM 5000 UNITS: 5000 INJECTION, SOLUTION INTRAVENOUS; SUBCUTANEOUS at 18:07

## 2017-02-18 NOTE — ROUTINE PROCESS
Bedside and Verbal shift change report given to [address] [place] [address] (oncoming nurse) by Sherrie Beard RN (offgoing nurse). Report included the following information SBAR, Kardex and MAR.

## 2017-02-18 NOTE — PROGRESS NOTES
[facility]  -  Progress Note      Patient: Essence Nunez MRN: [id number]  SSN: [id number]    YOB: 1928  Age: 80 y.o. Sex: female      Admit Date: 2/15/2017     The patient was seen, examined, and independently evaluated and I agree with the below assessment and plan by [facility] LUIS Rondon PA-C with the following comments. This patient appears to be compensated on the current medical regimen. She has had Coreg and Lasix dosages increased since admission, but with stage 4 CKD may be difficult to keep from recurrent admissions unless followed very closely. I would recommend daily weights and calling our office if weight goes up or down by 3 pounds from her baseline weight which should be established on her bathroom scale the day after discharge. Hospital Problem List:     Hospital Problems  Date Reviewed: 2/15/2017          Codes Class Noted POA    Chest pain ICD-10-CM: R07.9  ICD-9-CM: 786.50  2/15/2017 Unknown            -Acute on chronic diastolic heart failure in setting on noncompliance. Normal LV function EF 55-60% on echo 2/2016.   -Hypertensive crisis in setting of noncompliance.  -Indeterminate troponin not consistent with ACS, suspect related to heart failure and HTN, chest pain atypical and likely related to heart failure, ECG with lateral T wave changes likely HTN related. -Chronic left pleural effusion of unclear etiology, possibly from heart failure Noncompliance.  -Chronic kidney disease, stable. -Chronic anemia, stable. -.    Plan:     -Hemodynamics stable on current regimen. Continue with hydral/nitrates, BB, and lasix at current doses.  -Dispo planning in progress, most likely to acute rehab. Subjective:     Legs are sore. Skin is dry. No sob or chest pain.     Objective:      Patient Vitals for the past 8 hrs:   Temp Pulse Resp BP SpO2   02/18/17 0736 98.2 °F (36.8 °C) 74 18 138/53 96 %   02/18/17 0400 98 °F (36.7 °C) 73 20 136/50 99 %         Patient Vitals for the past 96 hrs:   Weight   02/18/17 0400 64 kg (141 lb)   02/17/17 0530 65.6 kg (144 lb 10 oz)   02/16/17 0452 63.8 kg (140 lb 11.2 oz)   02/15/17 0959 67.7 kg (149 lb 4.8 oz)         Intake/Output Summary (Last 24 hours) at 02/18/17 1134  Last data filed at 02/18/17 1117   Gross per 24 hour   Intake              410 ml   Output              400 ml   Net               10 ml       Physical Exam:  General:  Awake, alert, oriented x 3  Neck:  Supple, no jvd  Lungs:  Clear to auscultation bilaterally   Heart:  Reg rate and rhythm  Abdomen: non-tender, soft  Extremities:  Trace ankle edema bilat    Data Review:     Labs: Results:       Chemistry Recent Labs      02/18/17   0741  02/17/17   0535  02/16/17   1318   GLU  95  93  115*   NA  139  137  140   K  4.3  4.6  4.4   CL  102  102  106   CO2  28  25  23   BUN  59*  57*  56*   CREA  3.22*  3.30*  3.49*   CA  8.4*  9.0  8.8   AGAP  9  10  11   BUCR  18  17  16      CBC w/Diff Recent Labs      02/18/17   0741  02/17/17   0535   WBC  9.4  6.3   RBC  2.68*  3.30*   HGB  8.0*  9.8*   HCT  25.1*  31.4*   PLT  213  249   GRANS  73  62   LYMPH  19*  30   EOS  0  0      Cardiac Enzymes No results found for: CPK, CKMMB, CKMB, RCK3, CKMBT, CKNDX, CKND1, VIRGILIO, TROPT, TROIQ, ELIZABETH, TROPT, TNIPOC, BNP, BNPP   Coagulation No results for input(s): PTP, INR, APTT in the last 72 hours. No lab exists for component: INREXT    Lipid Panel Lab Results   Component Value Date/Time    Cholesterol, total 201 02/17/2017 05:35 AM    HDL Cholesterol 46 02/17/2017 05:35 AM    LDL, calculated 125.4 02/17/2017 05:35 AM    VLDL, calculated 29.6 02/17/2017 05:35 AM    Triglyceride 148 02/17/2017 05:35 AM    CHOL/HDL Ratio 4.4 02/17/2017 05:35 AM      BNP No results found for: BNP, BNPP, XBNPT   Liver Enzymes No results for input(s): TP, ALB, TBIL, AP, SGOT, GPT in the last 72 hours.     No lab exists for component: DBIL   Digoxin    Thyroid Studies Lab Results   Component Value Date/Time TSH 2.22 10/23/2016 05:10 AM            Signed By: TRACIE Woods     February 18, 2017

## 2017-02-18 NOTE — PROGRESS NOTES
Received shift report from Rad Booker RN. Pt asleep. No signs of distress/discomfort. Call bell within reach.

## 2017-02-18 NOTE — CDMP QUERY
Please clarify if this patient is being treated/managed for:    =>Demand Ischemia  =>Demand Ischemia ruled out  =>Other Explanation of clinical findings  =>Unable to Determine (no explanation of clinical findings)      IM progress notes- Some elevation in troponin, and atypical chest pain. ? Demand ischemia, plan per cardio. Cardiology notes- - Indeterminate troponin not consistent with ACS, suspect related to heart failure and HTN, chest pain atypical and likely related to heart failure, ECG with lateral T wave changes likely HTN related. Tx: IV diuresis, lab,  closely monitoring her renal function and electrolytes. Echo      Please clarify and document your clinical opinion in the progress notes and discharge summary.     Thanks,    Maciel Burrows   RN,  [address] RN  [phone]

## 2017-02-18 NOTE — PROGRESS NOTES
[facility] Hospitalist Group  Progress Note    Patient: Fredy Lopez Age: 80 y.o. : 1928 MR#: [id number] SSN: [id number]  Date/Time: 2017     Subjective: pt feels ok, c/o chronic leg pain, had small BM with hard stool and passing flatus. Per family constipated for few weeks, cant recall last BM at home. Per RN, family is very agitated specifically niece. screams and yells at staff. Son asked RN that they want to transfer pt to different hospital.   Family says pt lot of times doesn't take med.s  Lot of family dynamics between son and niece. Niece claims son doesn't take care of pt well at home and she has called social service. Son says he takes care of her and pt aggress too.  on case. Assessment/Plan:   1. Hypertensive crisis: BP better, will cont BB and hydralazine. 2. Acute on chronic diastolic heart failure: cont lasix, cardio in put and echo noted. 3. Some elevation in troponin, and atypical chest pain. ? Demand ischemia, plan per cardio. 4. Left pleural effusion which appears to be chronic, repeat CXR post diuresis slight better. 5. Chronic kidney disease, stage 4: will monitor crt.  6. History of noncompliance  7. Mild cognitive impairment: supportive care  8. Constipation: miralax, mag citrate and enema as needed. S/p Digital evacuation by RN  Will get podiatry for toe nail trimming. cont PT/OT, SLP and nutrition eval  D/w son Deyanira [facility] [phone] and multiple family at at bedside in detail. Explained in detail work up and management done here. They understood and agree with plan. They decline transfer to other hospital but interested in SNF at d/c.     I spent 30 minutes with the patient in face-to-face consultation, of which greater than 50% was spent in counseling and coordination of care as described above      Case discussed with:  [x]Patient  [x]Family  [x]Nursing  []Case Management  DVT Prophylaxis:  []Lovenox  [x]Hep SQ  []SCDs []Coumadin   []On Heparin gtt    Objective:   VS:   Visit Vitals    /53 (BP 1 Location: Left arm, BP Patient Position: At rest)    Pulse 74    Temp 98.2 °F (36.8 °C)    Resp 18    Wt 64 kg (141 lb)    SpO2 96%    BMI 19.67 kg/m2      Tmax/24hrs: Temp (24hrs), Av.6 °F (36.4 °C), Min:97 °F (36.1 °C), Max:98.2 °F (36.8 °C)  IOBRIEF    Intake/Output Summary (Last 24 hours) at 17 1121  Last data filed at 17 1117   Gross per 24 hour   Intake              410 ml   Output              400 ml   Net               10 ml       General:  Alert, cooperative, no acute distress    Pulmonary: decrease BS left base, no Rales. Cardiovascular: Regular rate and Rhythm. GI:  Soft, Non distended, Non tender. + Bowel sounds. Extremities:  No edema, cyanosis, clubbing. Neurologic: Alert and oriented X 2.  Moves all ext  Toe nail big     Medications:   Current Facility-Administered Medications   Medication Dose Route Frequency    [START ON 2017] polyethylene glycol (MIRALAX) packet 17 g  17 g Oral DAILY    magnesium citrate solution 148 mL  148 mL Oral NOW    carvedilol (COREG) tablet 12.5 mg  12.5 mg Oral BID WITH MEALS    hydrALAZINE (APRESOLINE) tablet 100 mg  100 mg Oral Q8H    isosorbide dinitrate (ISORDIL) tablet 20 mg  20 mg Oral TID    bisacodyl (DULCOLAX) suppository 10 mg  10 mg Rectal DAILY PRN    acetaminophen (TYLENOL) tablet 650 mg  650 mg Oral Q6H PRN    furosemide (LASIX) tablet 40 mg  40 mg Oral DAILY    therapeutic multivitamin (THERAGRAN) tablet 1 Tab  1 Tab Oral DAILY    allopurinol (ZYLOPRIM) tablet 200 mg  200 mg Oral DAILY    aspirin delayed-release tablet 81 mg  81 mg Oral DAILY    cyanocobalamin tablet 1,000 mcg  1,000 mcg Oral DAILY    ferrous sulfate tablet 325 mg  325 mg Oral ACB    albuterol (PROVENTIL VENTOLIN) nebulizer solution 2.5 mg  2.5 mg Nebulization Q4H PRN    heparin (porcine) injection 5,000 Units  5,000 Units SubCUTAneous Q8H       Labs:    Recent Results (from the past 24 hour(s))   METABOLIC PANEL, BASIC    Collection Time: 02/18/17  7:41 AM   Result Value Ref Range    Sodium 139 136 - 145 mmol/L    Potassium 4.3 3.5 - 5.5 mmol/L    Chloride 102 100 - 108 mmol/L    CO2 28 21 - 32 mmol/L    Anion gap 9 3.0 - 18 mmol/L    Glucose 95 74 - 99 mg/dL    BUN 59 (H) 7.0 - 18 MG/DL    Creatinine 3.22 (H) 0.6 - 1.3 MG/DL    BUN/Creatinine ratio 18 12 - 20      GFR est AA 16 (L) >60 ml/min/1.73m2    GFR est non-AA 14 (L) >60 ml/min/1.73m2    Calcium 8.4 (L) 8.5 - 10.1 MG/DL   CBC WITH AUTOMATED DIFF    Collection Time: 02/18/17  7:41 AM   Result Value Ref Range    WBC 9.4 4.6 - 13.2 K/uL    RBC 2.68 (L) 4.20 - 5.30 M/uL    HGB 8.0 (L) 12.0 - 16.0 g/dL    HCT 25.1 (L) 35.0 - 45.0 %    MCV 93.7 74.0 - 97.0 FL    MCH 29.9 24.0 - 34.0 PG    MCHC 31.9 31.0 - 37.0 g/dL    RDW 13.8 11.6 - 14.5 %    PLATELET 959 591 - 018 K/uL    MPV 10.4 9.2 - 11.8 FL    NEUTROPHILS 73 40 - 73 %    LYMPHOCYTES 19 (L) 21 - 52 %    MONOCYTES 8 3 - 10 %    EOSINOPHILS 0 0 - 5 %    BASOPHILS 0 0 - 2 %    ABS. NEUTROPHILS 6.8 1.8 - 8.0 K/UL    ABS. LYMPHOCYTES 1.8 0.9 - 3.6 K/UL    ABS. MONOCYTES 0.8 0.05 - 1.2 K/UL    ABS. EOSINOPHILS 0.0 0.0 - 0.4 K/UL    ABS.  BASOPHILS 0.0 0.0 - 0.06 K/UL    DF AUTOMATED     GLUCOSE, POC    Collection Time: 02/18/17  8:29 AM   Result Value Ref Range    Glucose (POC) 125 (H) 70 - 110 mg/dL       Signed By: Nisha Fowler MD     February 18, 2017

## 2017-02-19 LAB
ANION GAP BLD CALC-SCNC: 6 MMOL/L (ref 3–18)
BASOPHILS # BLD AUTO: 0 K/UL (ref 0–0.06)
BASOPHILS # BLD: 0 % (ref 0–2)
BUN SERPL-MCNC: 64 MG/DL (ref 7–18)
BUN/CREAT SERPL: 18 (ref 12–20)
CALCIUM SERPL-MCNC: 8.3 MG/DL (ref 8.5–10.1)
CHLORIDE SERPL-SCNC: 102 MMOL/L (ref 100–108)
CO2 SERPL-SCNC: 29 MMOL/L (ref 21–32)
CREAT SERPL-MCNC: 3.52 MG/DL (ref 0.6–1.3)
DIFFERENTIAL METHOD BLD: ABNORMAL
EOSINOPHIL # BLD: 0 K/UL (ref 0–0.4)
EOSINOPHIL NFR BLD: 1 % (ref 0–5)
ERYTHROCYTE [DISTWIDTH] IN BLOOD BY AUTOMATED COUNT: 13.8 % (ref 11.6–14.5)
FERRITIN SERPL-MCNC: 62 NG/ML (ref 8–388)
FOLATE SERPL-MCNC: 12.8 NG/ML (ref 3.1–17.5)
GLUCOSE SERPL-MCNC: 99 MG/DL (ref 74–99)
HCT VFR BLD AUTO: 24.2 % (ref 35–45)
HGB BLD-MCNC: 7.8 G/DL (ref 12–16)
IRON SATN MFR SERPL: 11 %
IRON SERPL-MCNC: 24 UG/DL (ref 50–175)
LYMPHOCYTES # BLD AUTO: 26 % (ref 21–52)
LYMPHOCYTES # BLD: 1.7 K/UL (ref 0.9–3.6)
MCH RBC QN AUTO: 30.2 PG (ref 24–34)
MCHC RBC AUTO-ENTMCNC: 32.2 G/DL (ref 31–37)
MCV RBC AUTO: 93.8 FL (ref 74–97)
MONOCYTES # BLD: 0.7 K/UL (ref 0.05–1.2)
MONOCYTES NFR BLD AUTO: 11 % (ref 3–10)
NEUTS SEG # BLD: 4.2 K/UL (ref 1.8–8)
NEUTS SEG NFR BLD AUTO: 62 % (ref 40–73)
PLATELET # BLD AUTO: 194 K/UL (ref 135–420)
PMV BLD AUTO: 11 FL (ref 9.2–11.8)
POTASSIUM SERPL-SCNC: 4.6 MMOL/L (ref 3.5–5.5)
RBC # BLD AUTO: 2.58 M/UL (ref 4.2–5.3)
SODIUM SERPL-SCNC: 137 MMOL/L (ref 136–145)
TIBC SERPL-MCNC: 213 UG/DL (ref 250–450)
VIT B12 SERPL-MCNC: 666 PG/ML (ref 211–911)
WBC # BLD AUTO: 6.7 K/UL (ref 4.6–13.2)

## 2017-02-19 PROCEDURE — 74011250637 HC RX REV CODE- 250/637: Performed by: PHYSICIAN ASSISTANT

## 2017-02-19 PROCEDURE — 82607 VITAMIN B-12: CPT | Performed by: HOSPITALIST

## 2017-02-19 PROCEDURE — 82728 ASSAY OF FERRITIN: CPT | Performed by: HOSPITALIST

## 2017-02-19 PROCEDURE — 83540 ASSAY OF IRON: CPT | Performed by: HOSPITALIST

## 2017-02-19 PROCEDURE — 74011250637 HC RX REV CODE- 250/637: Performed by: INTERNAL MEDICINE

## 2017-02-19 PROCEDURE — 85025 COMPLETE CBC W/AUTO DIFF WBC: CPT | Performed by: HOSPITALIST

## 2017-02-19 PROCEDURE — 80048 BASIC METABOLIC PNL TOTAL CA: CPT | Performed by: HOSPITALIST

## 2017-02-19 PROCEDURE — 74011250637 HC RX REV CODE- 250/637: Performed by: HOSPITALIST

## 2017-02-19 PROCEDURE — 74011250636 HC RX REV CODE- 250/636: Performed by: INTERNAL MEDICINE

## 2017-02-19 PROCEDURE — 36415 COLL VENOUS BLD VENIPUNCTURE: CPT | Performed by: HOSPITALIST

## 2017-02-19 PROCEDURE — 65660000000 HC RM CCU STEPDOWN

## 2017-02-19 PROCEDURE — 77010033678 HC OXYGEN DAILY

## 2017-02-19 RX ORDER — TRAMADOL HYDROCHLORIDE 50 MG/1
50 TABLET ORAL
Status: DISCONTINUED | OUTPATIENT
Start: 2017-02-19 | End: 2017-02-20 | Stop reason: HOSPADM

## 2017-02-19 RX ADMIN — ISOSORBIDE DINITRATE 20 MG: 20 TABLET ORAL at 09:49

## 2017-02-19 RX ADMIN — HEPARIN SODIUM 5000 UNITS: 5000 INJECTION, SOLUTION INTRAVENOUS; SUBCUTANEOUS at 09:58

## 2017-02-19 RX ADMIN — HYDRALAZINE HYDROCHLORIDE 100 MG: 50 TABLET, FILM COATED ORAL at 06:57

## 2017-02-19 RX ADMIN — CARVEDILOL 12.5 MG: 12.5 TABLET, FILM COATED ORAL at 09:49

## 2017-02-19 RX ADMIN — ACETAMINOPHEN 650 MG: 325 TABLET, FILM COATED ORAL at 01:18

## 2017-02-19 RX ADMIN — ISOSORBIDE DINITRATE 20 MG: 20 TABLET ORAL at 22:23

## 2017-02-19 RX ADMIN — HYDRALAZINE HYDROCHLORIDE 100 MG: 50 TABLET, FILM COATED ORAL at 22:23

## 2017-02-19 RX ADMIN — HEPARIN SODIUM 5000 UNITS: 5000 INJECTION, SOLUTION INTRAVENOUS; SUBCUTANEOUS at 01:22

## 2017-02-19 RX ADMIN — TRAMADOL HYDROCHLORIDE 50 MG: 50 TABLET, FILM COATED ORAL at 14:03

## 2017-02-19 NOTE — PROGRESS NOTES
[facility] Hospitalist Group  Progress Note    Patient: Samir Looney Age: 80 y.o. : 1928 MR#: [id number] SSN: [id number]  Date/Time: 2017     Subjective: pt feels ok, cont to c/o pain in legs, per family member she has leg pains at home too. Had good BM yesterday and today. Assessment/Plan:   1. Hypertensive crisis: BP better, will cont BB and hydralazine. 2. Acute on chronic diastolic heart failure: cont lasix, cardio in put and echo noted. 3. Some elevation in troponin, and atypical chest pain. Possible Demand ischemia. 4. Left pleural effusion which appears to be chronic, repeat CXR post diuresis slight better. 5. Chronic kidney disease, stage 4: will monitor crt.  6. History of noncompliance  7. Mild cognitive impairment: supportive care  8. Constipation: better, cont miralax. S/p Digital evacuation by RN  awaiting podiatry for toe nail trimming. cont PT/OT, SLP and nutrition eval  son Natalia Gonzales [phone]  D/w family member at bedside, convinced pt to take her med's. Case discussed with:  [x]Patient  [x]Family  [x]Nursing  []Case Management  DVT Prophylaxis:  []Lovenox  [x]Hep SQ  []SCDs  []Coumadin   []On Heparin gtt    Objective:   VS:   Visit Vitals    /50 (BP 1 Location: Right arm, BP Patient Position: At rest)    Pulse 65    Temp 98.5 °F (36.9 °C)    Resp 18    Ht 5' 11\" (1.803 m)    Wt 64.8 kg (142 lb 13.7 oz)    SpO2 92%    BMI 19.92 kg/m2      Tmax/24hrs: Temp (24hrs), Av °F (36.7 °C), Min:97.1 °F (36.2 °C), Max:98.6 °F (37 °C)  IOBRIEF    Intake/Output Summary (Last 24 hours) at 17 1145  Last data filed at 17 0939   Gross per 24 hour   Intake              600 ml   Output                0 ml   Net              600 ml       General:  Alert, cooperative, no acute distress    Pulmonary: decrease BS left base, no Rales. Cardiovascular: Regular rate and Rhythm.   GI:  Soft, Non distended, Non tender. + Bowel sounds. Extremities:  No edema, cyanosis, clubbing. Neurologic: Alert and oriented X 2.  Moves all ext  Toe nail big     Medications:   Current Facility-Administered Medications   Medication Dose Route Frequency    traMADol (ULTRAM) tablet 50 mg  50 mg Oral Q6H PRN    polyethylene glycol (MIRALAX) packet 17 g  17 g Oral DAILY    carvedilol (COREG) tablet 12.5 mg  12.5 mg Oral BID WITH MEALS    hydrALAZINE (APRESOLINE) tablet 100 mg  100 mg Oral Q8H    isosorbide dinitrate (ISORDIL) tablet 20 mg  20 mg Oral TID    bisacodyl (DULCOLAX) suppository 10 mg  10 mg Rectal DAILY PRN    acetaminophen (TYLENOL) tablet 650 mg  650 mg Oral Q6H PRN    furosemide (LASIX) tablet 40 mg  40 mg Oral DAILY    therapeutic multivitamin (THERAGRAN) tablet 1 Tab  1 Tab Oral DAILY    allopurinol (ZYLOPRIM) tablet 200 mg  200 mg Oral DAILY    aspirin delayed-release tablet 81 mg  81 mg Oral DAILY    cyanocobalamin tablet 1,000 mcg  1,000 mcg Oral DAILY    ferrous sulfate tablet 325 mg  325 mg Oral ACB    albuterol (PROVENTIL VENTOLIN) nebulizer solution 2.5 mg  2.5 mg Nebulization Q4H PRN    heparin (porcine) injection 5,000 Units  5,000 Units SubCUTAneous Q8H       Labs:    Recent Results (from the past 24 hour(s))   FERRITIN    Collection Time: 02/19/17 12:50 AM   Result Value Ref Range    Ferritin 62 8 - 388 NG/ML   VITAMIN B12 & FOLATE    Collection Time: 02/19/17 12:50 AM   Result Value Ref Range    Vitamin B12 666 211 - 911 pg/mL    Folate 12.8 3.10 - 17.50 ng/mL   IRON PROFILE    Collection Time: 02/19/17 12:50 AM   Result Value Ref Range    Iron 24 (L) 50 - 175 ug/dL    TIBC 213 (L) 250 - 450 ug/dL    Iron % saturation 11 %   CBC WITH AUTOMATED DIFF    Collection Time: 02/19/17 12:50 AM   Result Value Ref Range    WBC 6.7 4.6 - 13.2 K/uL    RBC 2.58 (L) 4.20 - 5.30 M/uL    HGB 7.8 (L) 12.0 - 16.0 g/dL    HCT 24.2 (L) 35.0 - 45.0 %    MCV 93.8 74.0 - 97.0 FL    MCH 30.2 24.0 - 34.0 PG    MCHC 32.2 31.0 - 37.0 g/dL RDW 13.8 11.6 - 14.5 %    PLATELET 833 636 - 783 K/uL    MPV 11.0 9.2 - 11.8 FL    NEUTROPHILS 62 40 - 73 %    LYMPHOCYTES 26 21 - 52 %    MONOCYTES 11 (H) 3 - 10 %    EOSINOPHILS 1 0 - 5 %    BASOPHILS 0 0 - 2 %    ABS. NEUTROPHILS 4.2 1.8 - 8.0 K/UL    ABS. LYMPHOCYTES 1.7 0.9 - 3.6 K/UL    ABS. MONOCYTES 0.7 0.05 - 1.2 K/UL    ABS. EOSINOPHILS 0.0 0.0 - 0.4 K/UL    ABS.  BASOPHILS 0.0 0.0 - 0.06 K/UL    DF AUTOMATED     METABOLIC PANEL, BASIC    Collection Time: 02/19/17 12:50 AM   Result Value Ref Range    Sodium 137 136 - 145 mmol/L    Potassium 4.6 3.5 - 5.5 mmol/L    Chloride 102 100 - 108 mmol/L    CO2 29 21 - 32 mmol/L    Anion gap 6 3.0 - 18 mmol/L    Glucose 99 74 - 99 mg/dL    BUN 64 (H) 7.0 - 18 MG/DL    Creatinine 3.52 (H) 0.6 - 1.3 MG/DL    BUN/Creatinine ratio 18 12 - 20      GFR est AA 15 (L) >60 ml/min/1.73m2    GFR est non-AA 12 (L) >60 ml/min/1.73m2    Calcium 8.3 (L) 8.5 - 10.1 MG/DL       Signed By: Katy Yo MD     February 19, 2017

## 2017-02-19 NOTE — ROUTINE PROCESS
Bedside and Verbal shift change report given to [address] [place] [address] (oncoming nurse) by Bear Gutiérrez RN (offgoing nurse). Report included the following information SBAR, Kardex and MAR.

## 2017-02-19 NOTE — PROGRESS NOTES
Received shift report from Candance Hun, RN. Pt awake, resting in bed. No distress/discomfort. Call bell within reach. 0465:  Pt is confused; oriented to self only. Refused most of her morning meds, other than BP meds and SQ Heparin. She repeatedly said, \"I hope my son comes today and gets me out of here. These medicines aren't doing anything for me. You people aren't doing anything for me. \"

## 2017-02-19 NOTE — ROUTINE PROCESS
Bedside and Verbal shift change report given to Paul Pichardo RN (oncoming nurse) by Sofia Leyva RN (offgoing nurse). Report included the following information SBAR, Kardex, MAR and Recent Results. SITUATION:    Code Status: Full Code   Reason for Admission: Chest pain    [facility] day: 3   Problem List:       Hospital Problems  Date Reviewed: 2/15/2017          Codes Class Noted POA    Chest pain ICD-10-CM: R07.9  ICD-9-CM: 786.50  2/15/2017 Unknown              BACKGROUND:    Past Medical History:   Past Medical History   Diagnosis Date    Breast cancer ([facility] [address] 1/17/14     right breast    Carotid duplex 11/19/2014     Severe 70% or greater bilateral ICA stenosis. LICA dissection suggested.  Chronic renal insufficiency     CVA (cerebral vascular accident) ([facility] [address] 2003     with slight Right sided weakness    Echocardiogram 02/22/2016     EF 55-60%. No WMA. Mild-mod LVH. Gr 2 DDfx. No significant valvular heart disease.  Edema     Glaucoma     Gout flare     Hyperlipidemia     Hypertension     Lower extremity arterial testing 12/19/2014     Mod tibio-peroneal arterial disease bilaterally. R YANCY 1.24.  L YANCY 0.74. Bilateral sm vessel disease.     Lump of right breast     URI (upper respiratory infection)          Patient taking anticoagulants yes     ASSESSMENT:    Changes in Assessment Throughout Shift:      Patient has Central Line: no Reasons if yes:    Patient has Jang Cath: no Reasons if yes:       Last Vitals:     Vitals:    02/18/17 0400 02/18/17 0736 02/18/17 1146 02/18/17 1507   BP: 136/50 138/53 159/48 130/53   Pulse: 73 74 78 68   Resp: 20 18 18 18   Temp: 98 °F (36.7 °C) 98.2 °F (36.8 °C) 97.4 °F (36.3 °C) 98.5 °F (36.9 °C)   SpO2: 99% 96% 98% 99%   Weight: 64 kg (141 lb)           IV and DRAINS (will only show if present)   Peripheral IV 02/15/17 Right Antecubital-Site Assessment: Clean, dry, & intact  Peripheral IV 02/15/17 Right Hand-Site Assessment: Clean, dry, & intact     WOUND (if present)   Wound Type:  none   Dressing present Dressing Present : No   Wound Concerns/Notes:  none     PAIN    Pain Assessment    Pain Intensity 1: 0 (02/18/17 1724)              Patient Stated Pain Goal: 0  o Interventions for Pain:  none  o Intervention effective:   o Time of last intervention:    o Reassessment Completed:       Last 3 Weights:  Last 3 Recorded Weights in this Encounter    02/16/17 0452 02/17/17 0530 02/18/17 0400   Weight: 63.8 kg (140 lb 11.2 oz) 65.6 kg (144 lb 10 oz) 64 kg (141 lb)     Weight change: -1.643 kg (-3 lb 10 oz)     INTAKE/OUPUT    Current Shift:      Last three shifts: 02/17 0701 - 02/18 1900  In: 410 [P.O.:410]  Out: 400 [Urine:400]     LAB RESULTS     Recent Labs      02/18/17   0741  02/17/17   0535   WBC  9.4  6.3   HGB  8.0*  9.8*   HCT  25.1*  31.4*   PLT  213  249        Recent Labs      02/18/17   0741  02/17/17   0535  02/16/17   1318   NA  139  137  140   K  4.3  4.6  4.4   GLU  95  93  115*   BUN  59*  57*  56*   CREA  3.22*  3.30*  3.49*   CA  8.4*  9.0  8.8       RECOMMENDATIONS AND DISCHARGE PLANNING     1. Pending tests/procedures/ Plan of Care or Other Needs:      2. Discharge plan for patient and Needs/Barriers:     3. Estimated Discharge Date: 2/20/17 Posted on Whiteboard in [address] [place] [address] Room: yes      4. The patient's care plan was reviewed with the oncoming nurse. \"HEALS\" SAFETY CHECK      Fall Risk    Total Score: 2    Safety Measures: Safety Measures: Bed/Chair alarm on, Bed/Chair-Wheels locked, Bed in low position, Call light within reach, Gripper socks, Side rails X 3    A safety check occurred in the patient's room between off going nurse and oncoming nurse listed above.     The safety check included the below items  Area Items   H  High Alert Medications - Verify all high alert medication drips (heparin, PCA, etc.)   E  Equipment - Suction is set up for ALL patients (with eric)  - Red plugs utilized for all equipment (IV pumps, etc.)  - WOWs wiped down at end of shift.  - Room stocked with oxygen, suction, and other unit-specific supplies   A  Alarms - Bed alarm is set for fall risk patients  - Ensure chair alarm is in place and activated if patient is up in a chair   L  Lines - Check IV for any infiltration  - Jang bag is empty if patient has a Jang   - Tubing and IV bags are labeled   S  Safety   - Room is clean, patient is clean, and equipment is clean. - Hallways are clear from equipment besides carts. - Fall bracelet on for fall risk patients  - Ensure room is clear and free of clutter  - Suction is set up for ALL patients (with yanker)  - Hallways are clear from equipment besides carts.    - Isolation precautions followed, supplies available outside room, sign posted     Isa Shepherd RN

## 2017-02-20 VITALS
HEART RATE: 65 BPM | DIASTOLIC BLOOD PRESSURE: 60 MMHG | OXYGEN SATURATION: 99 % | BODY MASS INDEX: 19.91 KG/M2 | HEIGHT: 71 IN | SYSTOLIC BLOOD PRESSURE: 154 MMHG | TEMPERATURE: 98.3 F | WEIGHT: 142.2 LBS | RESPIRATION RATE: 18 BRPM

## 2017-02-20 PROCEDURE — 74011250637 HC RX REV CODE- 250/637: Performed by: INTERNAL MEDICINE

## 2017-02-20 PROCEDURE — 92526 ORAL FUNCTION THERAPY: CPT

## 2017-02-20 PROCEDURE — 74011250636 HC RX REV CODE- 250/636: Performed by: INTERNAL MEDICINE

## 2017-02-20 PROCEDURE — 74011250637 HC RX REV CODE- 250/637: Performed by: PHYSICIAN ASSISTANT

## 2017-02-20 PROCEDURE — 74011250637 HC RX REV CODE- 250/637: Performed by: HOSPITALIST

## 2017-02-20 RX ORDER — CARVEDILOL 12.5 MG/1
12.5 TABLET ORAL 2 TIMES DAILY WITH MEALS
Qty: 60 TAB | Refills: 0 | Status: SHIPPED
Start: 2017-02-20 | End: 2017-03-22

## 2017-02-20 RX ORDER — TRAMADOL HYDROCHLORIDE 50 MG/1
50 TABLET ORAL
Qty: 20 TAB | Refills: 0 | Status: SHIPPED | OUTPATIENT
Start: 2017-02-20 | End: 2017-05-17

## 2017-02-20 RX ORDER — HYDRALAZINE HYDROCHLORIDE 100 MG/1
100 TABLET, FILM COATED ORAL EVERY 8 HOURS
Qty: 90 TAB | Refills: 0 | Status: SHIPPED | OUTPATIENT
Start: 2017-02-20 | End: 2017-03-22

## 2017-02-20 RX ORDER — FUROSEMIDE 40 MG/1
40 TABLET ORAL DAILY
Qty: 30 TAB | Refills: 0 | Status: SHIPPED
Start: 2017-02-20

## 2017-02-20 RX ORDER — POLYETHYLENE GLYCOL 3350 17 G/17G
17 POWDER, FOR SOLUTION ORAL DAILY
Qty: 30 PACKET | Refills: 0 | Status: ON HOLD
Start: 2017-02-20 | End: 2018-01-01

## 2017-02-20 RX ORDER — ALLOPURINOL 100 MG/1
100 TABLET ORAL DAILY
Qty: 30 TAB | Refills: 0 | Status: ON HOLD
Start: 2017-02-20 | End: 2017-05-17

## 2017-02-20 RX ORDER — FACIAL-BODY WIPES
10 EACH TOPICAL AS NEEDED
Qty: 30 SUPPOSITORY | Refills: 0 | Status: SHIPPED | OUTPATIENT
Start: 2017-02-20

## 2017-02-20 RX ORDER — THERA TABS 400 MCG
1 TAB ORAL DAILY
Qty: 30 TAB | Refills: 0 | Status: ON HOLD
Start: 2017-02-20 | End: 2018-01-01

## 2017-02-20 RX ORDER — SIMVASTATIN 20 MG/1
20 TABLET, FILM COATED ORAL
Qty: 30 TAB | Refills: 0 | Status: SHIPPED
Start: 2017-02-20 | End: 2019-01-01

## 2017-02-20 RX ORDER — ISOSORBIDE DINITRATE 20 MG/1
20 TABLET ORAL 3 TIMES DAILY
Qty: 90 TAB | Refills: 0 | Status: SHIPPED
Start: 2017-02-20 | End: 2017-03-22

## 2017-02-20 RX ADMIN — HEPARIN SODIUM 5000 UNITS: 5000 INJECTION, SOLUTION INTRAVENOUS; SUBCUTANEOUS at 02:19

## 2017-02-20 RX ADMIN — ASPIRIN 81 MG: 81 TABLET, COATED ORAL at 11:18

## 2017-02-20 RX ADMIN — HEPARIN SODIUM 5000 UNITS: 5000 INJECTION, SOLUTION INTRAVENOUS; SUBCUTANEOUS at 11:22

## 2017-02-20 RX ADMIN — HYDRALAZINE HYDROCHLORIDE 100 MG: 50 TABLET, FILM COATED ORAL at 06:14

## 2017-02-20 RX ADMIN — ACETAMINOPHEN 650 MG: 325 TABLET, FILM COATED ORAL at 06:18

## 2017-02-20 RX ADMIN — FUROSEMIDE 40 MG: 40 TABLET ORAL at 11:18

## 2017-02-20 RX ADMIN — ALLOPURINOL 100 MG: 100 TABLET ORAL at 11:18

## 2017-02-20 RX ADMIN — Medication 325 MG: at 11:18

## 2017-02-20 RX ADMIN — HYDRALAZINE HYDROCHLORIDE 100 MG: 50 TABLET, FILM COATED ORAL at 13:50

## 2017-02-20 RX ADMIN — CARVEDILOL 12.5 MG: 12.5 TABLET, FILM COATED ORAL at 11:19

## 2017-02-20 RX ADMIN — CYANOCOBALAMIN TAB 1000 MCG 1000 MCG: 1000 TAB at 11:19

## 2017-02-20 RX ADMIN — ISOSORBIDE DINITRATE 20 MG: 20 TABLET ORAL at 11:18

## 2017-02-20 NOTE — ROUTINE PROCESS
Bedside and Verbal shift change report given to Belkis Mckeon RN (oncoming nurse) by Gomez Agrwaal RN (offgoing nurse). Report included the following information SBAR, Kardex, MAR and Recent Results. SITUATION:    Code Status: Full Code   Reason for Admission: Chest pain    [facility] day: 4   Problem List:       Hospital Problems  Date Reviewed: 2/15/2017          Codes Class Noted POA    Chest pain ICD-10-CM: R07.9  ICD-9-CM: 786.50  2/15/2017 Unknown              BACKGROUND:    Past Medical History:   Past Medical History   Diagnosis Date    Breast cancer ([facility] [address] 1/17/14     right breast    Carotid duplex 11/19/2014     Severe 70% or greater bilateral ICA stenosis. LICA dissection suggested.  Chronic renal insufficiency     CVA (cerebral vascular accident) ([facility] [address] 2003     with slight Right sided weakness    Echocardiogram 02/22/2016     EF 55-60%. No WMA. Mild-mod LVH. Gr 2 DDfx. No significant valvular heart disease.  Edema     Glaucoma     Gout flare     Hyperlipidemia     Hypertension     Lower extremity arterial testing 12/19/2014     Mod tibio-peroneal arterial disease bilaterally. R YANCY 1.24.  L YANCY 0.74. Bilateral sm vessel disease.     Lump of right breast     URI (upper respiratory infection)          Patient taking anticoagulants yes     ASSESSMENT:    Changes in Assessment Throughout Shift:      Patient has Central Line: no Reasons if yes:    Patient has Jang Cath: no Reasons if yes:       Last Vitals:     Vitals:    02/19/17 0737 02/19/17 1109 02/19/17 1600 02/19/17 1605   BP: 138/50 122/49 123/58 123/58   Pulse: 65 67  73   Resp:  18  16   Temp: 98.5 °F (36.9 °C) 97.5 °F (36.4 °C)  97.8 °F (36.6 °C)   SpO2: 92% 97%  98%   Weight:       Height:            IV and DRAINS (will only show if present)   Peripheral IV 02/15/17 Right Antecubital-Site Assessment: Clean, dry, & intact  Peripheral IV 02/15/17 Right Hand-Site Assessment: Clean, dry, & intact     WOUND (if present)   Wound Type:  Stage 2 on sacrum   Dressing present Dressing Present : No   Wound Concerns/Notes:  none     PAIN    Pain Assessment    Pain Intensity 1: 0 (02/19/17 1951)    Pain Location 1: Back    Pain Intervention(s) 1: Medication (see MAR)    Patient Stated Pain Goal: 0  o Interventions for Pain:  none  o Intervention effective:   o Time of last intervention:    o Reassessment Completed:       Last 3 Weights:  Last 3 Recorded Weights in this Encounter    02/17/17 0530 02/18/17 0400 02/19/17 0654   Weight: 65.6 kg (144 lb 10 oz) 64 kg (141 lb) 64.8 kg (142 lb 13.7 oz)     Weight change: 0.843 kg (1 lb 13.7 oz)     INTAKE/OUPUT    Current Shift:      Last three shifts: 02/18 0701 - 02/19 1900  In: 960 [P.O.:960]  Out: -      LAB RESULTS     Recent Labs      02/19/17   0050  02/18/17   0741  02/17/17   0535   WBC  6.7  9.4  6.3   HGB  7.8*  8.0*  9.8*   HCT  24.2*  25.1*  31.4*   PLT  194  213  249        Recent Labs      02/19/17   0050  02/18/17   0741  02/17/17   0535   NA  137  139  137   K  4.6  4.3  4.6   GLU  99  95  93   BUN  64*  59*  57*   CREA  3.52*  3.22*  3.30*   CA  8.3*  8.4*  9.0       RECOMMENDATIONS AND DISCHARGE PLANNING     1. Pending tests/procedures/ Plan of Care or Other Needs:      Discharge plan for patient and Needs/Barriers: Rehab  2. Estimated Discharge Date: 2/20/17 Posted on Whiteboard in [facility] Room: yes      4. The patient's care plan was reviewed with the oncoming nurse. \"HEALS\" SAFETY CHECK      Fall Risk    Total Score: 3    Safety Measures: Safety Measures: Bed/Chair-Wheels locked, Bed in low position, Call light within reach, Gripper socks, Side rails X 3    A safety check occurred in the patient's room between off going nurse and oncoming nurse listed above.     The safety check included the below items  Area Items   H  High Alert Medications - Verify all high alert medication drips (heparin, PCA, etc.)   E  Equipment - Suction is set up for ALL patients (with eric)  - Red plugs utilized for all equipment (IV pumps, etc.)  - WOWs wiped down at end of shift.  - Room stocked with oxygen, suction, and other unit-specific supplies   A  Alarms - Bed alarm is set for fall risk patients  - Ensure chair alarm is in place and activated if patient is up in a chair   L  Lines - Check IV for any infiltration  - Jang bag is empty if patient has a Jang   - Tubing and IV bags are labeled   S  Safety   - Room is clean, patient is clean, and equipment is clean. - Hallways are clear from equipment besides carts. - Fall bracelet on for fall risk patients  - Ensure room is clear and free of clutter  - Suction is set up for ALL patients (with eric)  - Hallways are clear from equipment besides carts.    - Isolation precautions followed, supplies available outside room, sign posted     Gomez Agrawal RN

## 2017-02-20 NOTE — DISCHARGE SUMMARY
[address] SUMMARY    Name:  Cirilo Bynum  MR#:  [id number]  :  1928  Account #:  [de-identified]  Date of Adm:  02/15/2017  Date of Transfer:      PRIMARY CARE PHYSICIAN: Laura Calderon MD    DISPOSITION: Transfer to skilled nursing home. TRANSFER DIAGNOSES  1. Hypertensive crisis, improved now. 2. Acute on chronic diastolic heart failure, improved now. 3. Mild elevation in troponin with some atypical chest pain, possibly  demand ischemia. 4. Left pleural effusion, chronic, improved with diuresis but  needs followup chest x-rays as an outpatient. 5. Chronic kidney disease, stage IV. Needs outpatient followups. 6. Mild cognitive impairment. 7. Severe constipation, improved now. 8. Noncompliance with medication. 9. Anemia of chronic disease, possibly related to chronic kidney  disease, heme-negative stools. 10. Dyslipidemia. DISCHARGE MEDICATIONS  1. Dulcolax suppository as needed. 2. Isordil 20 mg 3 times daily. 3. MiraLax 17 g daily p.r.n.  4. Multivitamin 1 tablet daily. 5. Ultram 50 mg every 6 hours p.r.n.  6. Coreg 12.5 mg b.i.d.  7. Lasix 40 mg daily. 8. Hydralazine 100 mg t.i.d.  9. Tylenol 650 mg every 4 hours p.r.n. 10. Albuterol HFA 2 puffs q.6h. p.r.n. 11. Allopurinol 100 mg daily. 12. Aspirin 81 mg daily. 13. Vitamin D3, 5000 units daily. 14. Ferrous sulfate 325 mg daily. 15. Vitamin B12, 1000 mcg daily. 16. Zocor 20 mg p.o. at bedtime. MEDICATIONS HELD IN THE HOSPITAL: Chlorthalidone has been  held. CONSULTATIONS IN THE HOSPITAL: Dr. Allyson Leon, Cardiology. MAJOR INVESTIGATIONS DURING THE HOSPITAL STAY: The  patient had an echocardiogram, which showed ejection fraction of 50%  with moderate concentric hypertrophy. HOSPITAL COURSE: This is an 49-year-old [facility] American female  who comes to the emergency room complaining of chest pain.  In the  emergency room, the patient was noted to have a hypertensive crisis  with a blood pressure around 200s. The patient was admitted to the  hospital, was started on antihypertensive medications. She  was also noted to have acute on chronic diastolic heart failure, IV Lasix  was given, and was consulted. The patient underwent serial EKGs and  cardiac enzymes. Cardiac enzymes were slightly elevated with a max  of 0.08. She had elevated BNP of 39,000. With IV Lasix, she had a  good diuresis and symptoms improved. Repeat chest x-ray was  obtained, which showed slight improvement in the left pleural effusion,  which was noted to be chronic. The patient's symptoms improved  and blood pressure was also slowly controlled over the hospital. On  discussing with the family the patient, the patient is noted to be  noncompliant with her medications and that probably caused  the hypertensive crisis. The patient was also noted to be severely  constipated with stool impaction. The patient had digital disimpaction  and also had MiraLax and magnesium citrate. After that, she had good  bowel movements. The patient's hemoglobin trended down in the  hospital for which a rectal exam was done, which was negative for  stool occult blood. She had  a brown stool. It is thought that the patient  most likely has chronic kidney disease causing her chronic anemia. The patient had chronic leg pain for which she was started on Ultram  and recommended not to use NSAIDs because of CKD. In view of the  CKD, I have discussed with Dr. Angus Mcgrath, nephrologist, who will be  following the patient as an outpatient. The medication doses have  been adjusted according to her renal function. The patient has been  cleared by Cardiology for discharge. The patient was seen and  evaluated by Physical Therapy with recommended SNF placement. The patient is O x2 to 3. I have discussed with the patient's son and  also with multiple other family members about her going to SNF over  the weekend and they all agreed with the plan of disposition. I tried to  call the patient's son today about the discharge plan for going to SNF  today, but I could not reach him, but  is going to discuss  with the patient's family and if they agree, the patient will be transferred  to skilled nursing home today. The patient and family have already  agreed with me that they are okay with the SNF, but the   will discuss which specific SNF the patient is going to go. For discharge instructions, followup appointments and physical exam,  please refer to the electronic medical records.         Andree Curtis MD BT / LINDSAY  D:  02/20/2017   11:03  T:  02/20/2017   11:50  Job #:  [id number]

## 2017-02-20 NOTE — PROGRESS NOTES
Saw the patient alone in room, she is awake and alert, oriented x 3. Patient aware about upcoming dc today, and stated understanding for skilled care need at SNF. She stated her choice for [facility] ([address]) and signed FOC form, original placed to blue paper chart. Pt's info to e-dc. CM also made attempt to contact pt's son-[place], no answer. 1:37 PM cm met pt's son-[place] where he was with other multiple family members. Mr. Fredy Aezvedo asked or his mother can be placed to ARU. He was informed that ARU was not recommended by pt,ot, slt, but snf services was recommended. He was informed that client has to meet criteria to be able to go to ARU. He agreed with it, however, other family members were demending and asked who made decision for the patient to transition to [facility] ([address]). They were informed the patient in capable to make her own decisions and it was her own decision for PH[facility]. Pt's son and family members asked or it will be possible for the patient to transition to [facility]. Pt's son signed [facility] form-[facility], original placed to blue paper chart. CM saw the patient spoke to her in regards to [facility] as alternative facility that her son chosen for her. She was very hesitant about it, because she wanted to to [facility]. However, she agreed to it because this where her family will be visiting her. CM contacted [facility], spoke to [place], she will review pt's profile and will let me know. CHARLOTTE Vieira, rn,      2:20 cm noted podiatry-Dr. Boyd's note, where he is planning for skin/nail debridement at bedside. CM spoke to Dr. Shakira Tejeda who, after speaking to Dr. Eddy Omalley, stated all planned procedures have been at bedside already. Patient can be dc to snf.      CM arranged med transport with [employer],  time at 1530, 3:30 pm. DC summary uploaded/

## 2017-02-20 NOTE — PROGRESS NOTES
Occupational Therapy Note:  Orders received, chart reviewed and this patient is scheduled to be discharged to SNF setting with transportation at 3:30 this afternoon. Will defer OT eval to the SNF level of care and will sign off of acite care OT.  Kalpesh Downs, OTR/L

## 2017-02-20 NOTE — ROUTINE PROCESS
Received pt AAOx3, NAD, breathing non labored, on room air, HOB up. Bed at the lowest level on lock position, call bell w/i reach. Bedside and Verbal shift change report given to DR JLUIS CIFUENTES [facility] (oncoming nurse) by me (offgoing nurse).  Report included the following information SBAR, Kardex, Intake/Output, MAR, Recent Results and Cardiac Rhythm SR.

## 2017-02-20 NOTE — CONSULTS
Consult    Patient: Fredy Lopez MRN: [id number]  SSN: [id number]    YOB: 1928  Age: 80 y.o. Sex: female      Subjective: Fredy Lopez is a 80 y.o. female who is being seen for asked to evaluate and treat infected toes both feet. .    Past Medical History   Diagnosis Date    Breast cancer ([facility] 1/17/14     right breast    Carotid duplex 11/19/2014     Severe 70% or greater bilateral ICA stenosis. LICA dissection suggested.  Chronic renal insufficiency     CVA (cerebral vascular accident) ([facility] [address] 2003     with slight Right sided weakness    Echocardiogram 02/22/2016     EF 55-60%. No WMA. Mild-mod LVH. Gr 2 DDfx. No significant valvular heart disease.  Edema     Glaucoma     Gout flare     Hyperlipidemia     Hypertension     Lower extremity arterial testing 12/19/2014     Mod tibio-peroneal arterial disease bilaterally. R YANCY 1.24.  L YANCY 0.74. Bilateral sm vessel disease.  Lump of right breast     URI (upper respiratory infection)      Past Surgical History   Procedure Laterality Date    Hx gyn       JUSTIN/BSO    Hx appendectomy      Hx cyst incision and drainage       PT DOES NOT REMEMBER EXACT DATES, TUCKRE BREAST DONE MORE THAN 20 YEARS AGO. BENIGN FINDINGS PER PATIENT.     Hx mastectomy  1/17/2014     RIGHT PARTIAL MASTECTOMY performed by Juan José Washington MD at [address] [place] [address] Hx hysterectomy        Family History   Problem Relation Age of Onset    Hypertension Mother     Hypertension Brother     Diabetes Brother      Social History   Substance Use Topics    Smoking status: Never Smoker    Smokeless tobacco: Never Used    Alcohol use No      Current Facility-Administered Medications   Medication Dose Route Frequency Provider Last Rate Last Dose    traMADol (ULTRAM) tablet 50 mg  50 mg Oral Q6H PRN Varinder Stiles MD   50 mg at 02/19/17 1403    polyethylene glycol (MIRALAX) packet 17 g  17 g Oral DAILY Varinder Stiles MD   17 g at 02/19/17 0954    carvedilol (COREG) tablet 12.5 mg  12.5 mg Oral BID WITH MEALS Brionna Jackson MD   12.5 mg at 02/20/17 1119    hydrALAZINE (APRESOLINE) tablet 100 mg  100 mg Oral Q8H Brionna Jackson MD   100 mg at 02/20/17 9177    isosorbide dinitrate (ISORDIL) tablet 20 mg  20 mg Oral TID Bard Regnia MD   20 mg at 02/20/17 1118    bisacodyl (DULCOLAX) suppository 10 mg  10 mg Rectal DAILY PRN Brionna Jackson MD   10 mg at 02/17/17 1549    acetaminophen (TYLENOL) tablet 650 mg  650 mg Oral Q6H PRN Brionna Jackson MD   650 mg at 02/20/17 0618    furosemide (LASIX) tablet 40 mg  40 mg Oral DAILY TRACIE Santana   40 mg at 02/20/17 1118    therapeutic multivitamin (THERAGRAN) tablet 1 Tab  1 Tab Oral DAILY Brionna Jacskon MD   1 Tab at 02/18/17 1136    allopurinol (ZYLOPRIM) tablet 200 mg  200 mg Oral DAILY Sofía Paez MD   100 mg at 02/20/17 1118    aspirin delayed-release tablet 81 mg  81 mg Oral DAILY Sofía Paez MD   81 mg at 02/20/17 1118    cyanocobalamin tablet 1,000 mcg  1,000 mcg Oral DAILY Sofía Paez MD   1,000 mcg at 02/20/17 1119    ferrous sulfate tablet 325 mg  325 mg Oral ACB Sofía Paez MD   325 mg at 02/20/17 1118    albuterol (PROVENTIL VENTOLIN) nebulizer solution 2.5 mg  2.5 mg Nebulization Q4H PRN Sofía Paez MD        heparin (porcine) injection 5,000 Units  5,000 Units SubCUTAneous Q8H Radha Greene MD   5,000 Units at 02/20/17 1122        No Known Allergies    Review of Systems:  A comprehensive review of systems was negative except for that written in the History of Present Illness.     Objective:     Vitals:    02/20/17 0000 02/20/17 0400 02/20/17 0857 02/20/17 1240   BP: 150/52 137/55 138/54 127/53   Pulse: 63 71 68 (!) 59   Resp: 18 18 18 18   Temp: 98.1 °F (36.7 °C) 98.4 °F (36.9 °C) 97.5 °F (36.4 °C) 98.4 °F (36.9 °C)   SpO2: 99%  96% 96%   Weight:  64.5 kg (142 lb 3.2 oz) Height:            Physical Exam:  Seen at bedside awake and alert. Feet are warm, no necrosis. Absent pedal pulses. Nails are hypertrophic,ingrown,inflamed. Tender. No ulcers. No necrosis or cellulitis. Assessment:     Hospital Problems  Date Reviewed: 2/20/2017          Codes Class Noted POA    Chest pain ICD-10-CM: R07.9  ICD-9-CM: 786.50  2/15/2017 Unknown              Plan:     Infected ingrown nails , excisional debridement of skin/nails at bedside all 10 toes.     Signed By: Jennifer Bourne DPM     February 20, 2017

## 2017-02-20 NOTE — DISCHARGE SUMMARY
Transfer Summary    Patient: Tiffany Olmos MRN: [id number]  CSN: [id number]    YOB: 1928  Age: 80 y.o. Sex: female    DOA: 2/15/2017 LOS:  LOS: 5 days   Discharge Date:      Admission Diagnoses: Chest pain    Discharge Diagnoses:  Please see Dictation. Discharge Condition: Stable    PHYSICAL EXAM  Visit Vitals    /54 (BP 1 Location: Left arm, BP Patient Position: At rest)    Pulse 68    Temp 97.5 °F (36.4 °C)    Resp 18    Ht 5' 11\" (1.803 m)    Wt 64.5 kg (142 lb 3.2 oz)    SpO2 96%    BMI 19.83 kg/m2       General: Alert, cooperative, no acute distress    Pulmonary: decrease BS left base, no Rales. Cardiovascular: Regular rate and Rhythm. GI: Soft, Non distended, Non tender. + Bowel sounds. Extremities: No edema, cyanosis, clubbing. Neurologic: Alert and oriented X 2-3. Moves all ext   Rectal exam: brown stool and guaiac negative                                 Hospital Course: Please see dictation. code # [id number]. Discharge Medications:   Current Discharge Medication List      START taking these medications    Details   isosorbide dinitrate (ISORDIL) 20 mg tablet Take 1 Tab by mouth three (3) times daily for 30 days. Qty: 90 Tab, Refills: 0      polyethylene glycol (MIRALAX) 17 gram packet Take 1 Packet by mouth daily. Qty: 30 Packet, Refills: 0      traMADol (ULTRAM) 50 mg tablet Take 1 Tab by mouth every six (6) hours as needed. Max Daily Amount: 200 mg. Qty: 20 Tab, Refills: 0      therapeutic multivitamin (THERAGRAN) tablet Take 1 Tab by mouth daily. Qty: 30 Tab, Refills: 0      bisacodyl (DULCOLAX) 10 mg suppository Insert 10 mg into rectum as needed. Qty: 30 Suppository, Refills: 0      simvastatin (ZOCOR) 20 mg tablet Take 1 Tab by mouth nightly. Qty: 30 Tab, Refills: 0         CONTINUE these medications which have CHANGED    Details   furosemide (LASIX) 40 mg tablet Take 1 Tab by mouth daily.   Qty: 30 Tab, Refills: 0      hydrALAZINE (APRESOLINE) 100 mg tablet Take 1 Tab by mouth every eight (8) hours for 30 days. Qty: 90 Tab, Refills: 0      carvedilol (COREG) 12.5 mg tablet Take 1 Tab by mouth two (2) times daily (with meals) for 30 days. Qty: 60 Tab, Refills: 0      allopurinol (ZYLOPRIM) 100 mg tablet Take 1 Tab by mouth daily. Qty: 30 Tab, Refills: 0         CONTINUE these medications which have NOT CHANGED    Details   albuterol (PROVENTIL HFA, VENTOLIN HFA, PROAIR HFA) 90 mcg/actuation inhaler Take 1-2 Puffs by inhalation every four (4) hours as needed for Wheezing. Qty: 1 Inhaler, Refills: 0      inhalational spacing device 1 Each by Does Not Apply route as needed. Qty: 1 Device, Refills: 0      aspirin delayed-release 81 mg tablet Take 1 Tab by mouth daily. Qty: 100 Tab, Refills: prn      ferrous sulfate 325 mg (65 mg iron) tablet Take 325 mg by mouth Daily (before breakfast). cyanocobalamin (VITAMIN B-12) 1,000 mcg tablet Take 1,000 mcg by mouth daily. acetaminophen (TYLENOL) 325 mg tablet Take 2 Tabs by mouth every four (4) hours as needed. Qty: 30 Tab, Refills: 0      cholecalciferol, VITAMIN D3, (VITAMIN D3) 5,000 unit tab tablet Take 1 Tab by mouth daily. Qty: 100 Tab, Refills: 0         STOP taking these medications       chlorthalidone (HYGROTEN) 25 mg tablet Comments:   Reason for Stopping:         anastrozole (ARIMIDEX) 1 mg tablet Comments:   Reason for Stopping:               DIET:  Cardiac Diet    ACTIVITY: Activity as tolerated  Patient needs to be on Fall, aspiration, decubitus precaution. ·    PT/OT consult    ADDITIONAL INFORMATION: If you experience any of the following symptoms but not limited to Fever, chills, nausea, vomiting, diarrhea, change in mentation, falling, bleeding, shortness of breath, chest pain, please call your primary care physician or return to the emergency room if you cannot get hold of your doctor:     FOLLOW UP CARE:  Follow-up with 1.  Physician at [facility] in 1-2 days with Cbc with diff, bmp, mg. 2.  Dr. Meagan Kim, nephrology in 2 week                           3. Dr. Oscar Vieira, cardiology in 2 week    Pt's PCP: Ambrosio Brooke MD.    Minutes spent on discharge: >40 minutes spent coordinating this discharge (review instructions/follow-up, prescriptions, preparing report for sign off)    Richi Huerta MD  2/20/2017 10:52 AM

## 2017-02-20 NOTE — ROUTINE PROCESS
Patient discharged to [facility] rehab report given to Sim Dennis verbalized understanding.

## 2017-02-20 NOTE — PROGRESS NOTES
Problem: Dysphagia (Adult)  Goal: *Acute Goals and Plan of Care (Insert Text)  Patient will:  1. Tolerate PO trials with 0 s/s overt distress in 4/5 trials  2. Utilize compensatory swallow strategies/maneuvers (decrease bite/sip, size/rate, alt. liq/sol) with min cues in 4/5 trials  3. Perform oral-motor/laryngeal exercises to increase oropharyngeal swallow function with min cues  4. Complete an objective swallow study (i.e., MBSS) to assess swallow integrity, r/o aspiration, and determine of safest LRD, min A -- met 2/17/17    Rec: Soft solid diet with honey-thick liquids  Aspiration precautions  HOB >45 during PO intake, remain >30 for 30-45 minutes after PO   Small bites/sips; alternate liquid/solid; eat slowly  Meds per pt preference   Outcome: Progressing Towards Goal  SPEECH LANGUAGE PATHOLOGY DYSPHAGIA TREATMENT     Patient: Madeline Cushing (80 y.o. female)  Date: 2/20/2017  Diagnosis: Chest pain <principal problem not specified>       Precautions: aspiration Fall      ASSESSMENT:  Moderate oropharyngeal dysphagia     Dysphagia f/u completed this PM with pt A&Ox3. Preparing to discharge to SNF this PM. Educated pt re: MBS results/recommendations including \"silent penetration to laryngeal vestibule during swallow with both thin and nectar-thick liquids\" placing pt at risk for aspiration, recommendation for honey-thick liquids. Pt demo tolerance of honey-thick liquids x2oz via self-fed single cups sips without overt s/s distress noted. Further note fully-consumed lunch meal tray at bedside, pt reports no difficulty with solid intake. Attempted to reach multiple family members via phone to educate re: MBS results/recommendations however no answer. Recommend continue honey-thick liquids with ST to f/u for laryngeal/pharyngeal ex and ongoing education while in house, as well as continued dysphagia tx upon discharge. Recs posted at bedside. Addendum: After note placed pt's son arrived at bedside.  Educated re: MBS results/recommendations, thickened liquids and aspiration risk. Educated re: recommendation for dysphagia tx upon discharge (planned to SNF this date) to continue to address airway safety and pharyngeal strength; agreeable to POC and voiced understanding. Progression toward goals:  [ ]         Improving appropriately and progressing toward goals  [X]         Improving slowly and progressing toward goals  [ ]         Not making progress toward goals and plan of care will be adjusted       PLAN: honey-thick liquids  Recommendations and Planned Interventions:  ST to f/u for laryngeal/pharyngeal ex, education  Patient continues to benefit from skilled intervention to address the above impairments. Continue treatment per established plan of care. Discharge Recommendations:  Skilled Nursing Facility       SUBJECTIVE:   Patient stated Cozette Brito are really slimy (re: thick liquids). OBJECTIVE:   Cognitive and Communication Status:  Neurologic State: Alert  Orientation Level: Oriented to person, Oriented to place, Oriented to situation  Cognition: Follows commands, Appropriate for age attention/concentration  Perception: Appears intact  Perseveration: No perseveration noted  Safety/Judgement: Fall prevention, Decreased insight into deficits, Decreased awareness of need for assistance  Dysphagia Treatment:  Oral Assessment:  Oral Assessment  Labial: No impairment  Dentition: Intact  Oral Hygiene: Adequate  Lingual: No impairment  Velum: No impairment  Mandible: No impairment  P.O.  Trials:              Patient Position: HOB 45              Vocal quality prior to P.O.: No impairment              Consistency Presented: Honey thick liquid              How Presented: Self-fed/presented, Cup/sip              How Much:  (x2oz)              Bolus Acceptance: No impairment              Bolus Formation/Control: No impairment                             Propulsion: No impairment              Oral Residue: None Initiation of Swallow: No impairment              Laryngeal Elevation: Decreased              Aspiration Signs/Symptoms: None              Pharyngeal Phase Characteristics: No impairment, issues, or problems               Effective Modifications: Small sips and bites, Cup/sip              Cues for Modifications: None                                Oral Phase Severity: No impairment              Pharyngeal Phase Severity : Moderate                                                                                                                                                                                                                                                                                                                                                                                                                                                                                                                                                                                                                                                                                                                                                                                                                                                                                                                                                               PAIN:  Start of Tx: 0  End of Tx: 0      After treatment:   [ ]              Patient left in no apparent distress sitting up in chair  [X]              Patient left in no apparent distress in bed  [X]              Call bell left within reach  [ ]              Nursing notified  [ ]              Family present  [ ]              Caregiver present  [ ]              Bed alarm activated         COMMUNICATION/EDUCATION:   [X]        Aspiration precautions; swallow safety; compensatory techniques  [ ]        Patient unable to participate in education; education ongoing with staff  [X] Posted safety precautions in patient's room.   [ ]         Oral-motor/laryngeal strengthening exercises        Amado Sierra, SLP  Time Calculation: 8 mins

## 2017-02-21 ENCOUNTER — PATIENT OUTREACH (OUTPATIENT)
Dept: INTERNAL MEDICINE CLINIC | Age: 82
End: 2017-02-21

## 2017-02-21 NOTE — PROGRESS NOTES
Hospital to SNF:  Patient admitted to [facility] on 2/15/17 to 2/20/17 for chest pain. Course of hospitalization:  Admitted in hypertensive crisis; SBP in 200's  Acute on chronic HF; diuresed with IV Lasix   BP stabilized and started on hypertensive meds  Cardiac enzymes slightly elevated  Chest x-ray indicated left pleural effusion  Severe constipation; digitally disimpacted  Had Miralax and mag citrate; resolved  HBG trended down; negative for occult blood         Pertinent labs/imaging:   Component      Latest Ref Rng & Units 2/19/2017          12:50 AM   RBC      4.20 - 5.30 M/uL 2.58 (L)   HGB      12.0 - 16.0 g/dL 7.8 (L)   HCT      35.0 - 45.0 % 24.2 (L)     Component      Latest Ref Rng & Units 2/19/2017          12:50 AM   BUN      7.0 - 18 MG/DL 64 (H)   Creatinine      0.6 - 1.3 MG/DL 3.52 (H)   BUN/Creatinine ratio      12 - 20   18   GFR est AA      >60 ml/min/1.73m2 15 (L)   GFR est non-AA      >60 ml/min/1.73m2 12 (L)   Calcium      8.5 - 10.1 MG/DL 8.3 (L)     Inpatient Consults:  Dr. Rajiv Tapia, cardiology  Dr. Carey Allen, podiatry      Transfer diagnoses:   1. Hypertensive crisis, improved now. 2. Acute on chronic diastolic heart failure, improved now. 3. Mild elevation in troponin with some atypical chest pain, possibly  demand ischemia. 4. Left pleural effusion, chronic, improved with diuresis but  needs followup chest x-rays as an outpatient. 5. Chronic kidney disease, stage IV. Needs outpatient followups. 6. Mild cognitive impairment. 7. Severe constipation, improved now. 8. Noncompliance with medication. 9. Anemia of chronic disease, possibly related to chronic kidney  disease, heme-negative stools. 10. Dyslipidemia. Contacted [address] to verify admission, review discharge instructions and reconcile medications. Spoke to [place], LPN. Introduced self and reason for call. Reconciled transfer medications. Reviewed discharge instructions and speciality follow up appointments. Patient diet has been changed to include honey thickened liquids; ordered by speech therapist. Nurse Shahla Gallegos aware of CBC with diff, BMP and Mag; scheduled to be drawn 2/22/17. Notified of cardiology appt with Dr. Marisela Botello on 3/6/17 at 11 AM.  Appointment with Dr. Navneet Betancur (pulmonology) needs to be scheduled. Nurse Shahla Gallegos made note of appts. Opportunity to ask questions was provided. Contact information was provided for future reference or further questions. Will continue to monitor.

## 2017-02-22 ENCOUNTER — HOSPITAL ENCOUNTER (OUTPATIENT)
Dept: LAB | Age: 82
Discharge: HOME | End: 2017-02-22

## 2017-02-22 LAB
ANION GAP BLD CALC-SCNC: 8 MMOL/L (ref 3–18)
BASOPHILS # BLD AUTO: 0 K/UL (ref 0–0.06)
BASOPHILS # BLD: 0 % (ref 0–2)
BUN SERPL-MCNC: 74 MG/DL (ref 7–18)
BUN/CREAT SERPL: 20 (ref 12–20)
CALCIUM SERPL-MCNC: 8.7 MG/DL (ref 8.5–10.1)
CHLORIDE SERPL-SCNC: 100 MMOL/L (ref 100–108)
CO2 SERPL-SCNC: 29 MMOL/L (ref 21–32)
CREAT SERPL-MCNC: 3.66 MG/DL (ref 0.6–1.3)
DIFFERENTIAL METHOD BLD: ABNORMAL
EOSINOPHIL # BLD: 0.1 K/UL (ref 0–0.4)
EOSINOPHIL NFR BLD: 2 % (ref 0–5)
ERYTHROCYTE [DISTWIDTH] IN BLOOD BY AUTOMATED COUNT: 13.4 % (ref 11.6–14.5)
GLUCOSE SERPL-MCNC: 98 MG/DL (ref 74–99)
HCT VFR BLD AUTO: 24.3 % (ref 35–45)
HGB BLD-MCNC: 7.6 G/DL (ref 12–16)
LYMPHOCYTES # BLD AUTO: 28 % (ref 21–52)
LYMPHOCYTES # BLD: 1.3 K/UL (ref 0.9–3.6)
MAGNESIUM SERPL-MCNC: 2.9 MG/DL (ref 1.8–2.4)
MCH RBC QN AUTO: 29.1 PG (ref 24–34)
MCHC RBC AUTO-ENTMCNC: 31.3 G/DL (ref 31–37)
MCV RBC AUTO: 93.1 FL (ref 74–97)
MONOCYTES # BLD: 0.7 K/UL (ref 0.05–1.2)
MONOCYTES NFR BLD AUTO: 14 % (ref 3–10)
NEUTS SEG # BLD: 2.6 K/UL (ref 1.8–8)
NEUTS SEG NFR BLD AUTO: 56 % (ref 40–73)
PLATELET # BLD AUTO: 228 K/UL (ref 135–420)
PMV BLD AUTO: 10.8 FL (ref 9.2–11.8)
POTASSIUM SERPL-SCNC: 4.3 MMOL/L (ref 3.5–5.5)
RBC # BLD AUTO: 2.61 M/UL (ref 4.2–5.3)
SODIUM SERPL-SCNC: 137 MMOL/L (ref 136–145)
WBC # BLD AUTO: 4.6 K/UL (ref 4.6–13.2)

## 2017-02-22 PROCEDURE — 85025 COMPLETE CBC W/AUTO DIFF WBC: CPT | Performed by: FAMILY MEDICINE

## 2017-02-22 PROCEDURE — 83735 ASSAY OF MAGNESIUM: CPT | Performed by: FAMILY MEDICINE

## 2017-02-22 PROCEDURE — 80048 BASIC METABOLIC PNL TOTAL CA: CPT | Performed by: FAMILY MEDICINE

## 2017-02-23 ENCOUNTER — APPOINTMENT (OUTPATIENT)
Dept: CT IMAGING | Age: 82
DRG: 871 | End: 2017-02-23
Attending: EMERGENCY MEDICINE
Payer: MEDICARE

## 2017-02-23 ENCOUNTER — APPOINTMENT (OUTPATIENT)
Dept: GENERAL RADIOLOGY | Age: 82
DRG: 871 | End: 2017-02-23
Attending: EMERGENCY MEDICINE
Payer: MEDICARE

## 2017-02-23 ENCOUNTER — HOSPITAL ENCOUNTER (INPATIENT)
Age: 82
LOS: 5 days | Discharge: NURSING, REHAB OR LONG-TERM CARE | DRG: 871 | End: 2017-02-28
Attending: EMERGENCY MEDICINE | Admitting: HOSPITALIST
Payer: MEDICARE

## 2017-02-23 DIAGNOSIS — I24.9 ACS (ACUTE CORONARY SYNDROME) (HCC): ICD-10-CM

## 2017-02-23 DIAGNOSIS — I50.33 DIASTOLIC CHF, ACUTE ON CHRONIC (HCC): ICD-10-CM

## 2017-02-23 DIAGNOSIS — N18.30 CRF (CHRONIC RENAL FAILURE), STAGE 3 (MODERATE) (HCC): ICD-10-CM

## 2017-02-23 DIAGNOSIS — R06.02 SOB (SHORTNESS OF BREATH): Primary | ICD-10-CM

## 2017-02-23 DIAGNOSIS — I21.4 NSTEMI (NON-ST ELEVATED MYOCARDIAL INFARCTION) (HCC): ICD-10-CM

## 2017-02-23 PROBLEM — R41.82 ALTERED MENTAL STATE: Status: ACTIVE | Noted: 2017-02-23

## 2017-02-23 LAB
ALBUMIN SERPL BCP-MCNC: 3 G/DL (ref 3.4–5)
ALBUMIN/GLOB SERPL: 0.9 {RATIO} (ref 0.8–1.7)
ALP SERPL-CCNC: 64 U/L (ref 45–117)
ALT SERPL-CCNC: 20 U/L (ref 13–56)
ANION GAP BLD CALC-SCNC: 10 MMOL/L (ref 3–18)
APPEARANCE UR: CLEAR
APTT PPP: 27.1 SEC (ref 23–36.4)
ARTERIAL PATENCY WRIST A: YES
AST SERPL W P-5'-P-CCNC: 20 U/L (ref 15–37)
BASE EXCESS BLD CALC-SCNC: 2 MMOL/L
BASOPHILS # BLD AUTO: 0 K/UL (ref 0–0.06)
BASOPHILS # BLD: 0 % (ref 0–2)
BDY SITE: ABNORMAL
BILIRUB SERPL-MCNC: 0.3 MG/DL (ref 0.2–1)
BILIRUB UR QL: NEGATIVE
BNP SERPL-MCNC: ABNORMAL PG/ML (ref 0–1800)
BODY TEMPERATURE: 100.1
BUN SERPL-MCNC: 76 MG/DL (ref 7–18)
BUN/CREAT SERPL: 19 (ref 12–20)
CALCIUM SERPL-MCNC: 8.7 MG/DL (ref 8.5–10.1)
CHLORIDE SERPL-SCNC: 103 MMOL/L (ref 100–108)
CK MB CFR SERPL CALC: ABNORMAL % (ref 0–4)
CK MB SERPL-MCNC: <1 NG/ML (ref 5–25)
CK SERPL-CCNC: 39 U/L (ref 26–192)
CO2 SERPL-SCNC: 27 MMOL/L (ref 21–32)
COLOR UR: YELLOW
CREAT SERPL-MCNC: 3.91 MG/DL (ref 0.6–1.3)
DIFFERENTIAL METHOD BLD: ABNORMAL
EOSINOPHIL # BLD: 0 K/UL (ref 0–0.4)
EOSINOPHIL NFR BLD: 0 % (ref 0–5)
EPITH CASTS URNS QL MICRO: ABNORMAL /LPF (ref 0–5)
ERYTHROCYTE [DISTWIDTH] IN BLOOD BY AUTOMATED COUNT: 13.4 % (ref 11.6–14.5)
FLUAV AG NPH QL IA: POSITIVE
FLUBV AG NOSE QL IA: NEGATIVE
GAS FLOW.O2 O2 DELIVERY SYS: ABNORMAL L/MIN
GAS FLOW.O2 SETTING OXYMISER: 2 L/M
GLOBULIN SER CALC-MCNC: 3.2 G/DL (ref 2–4)
GLUCOSE SERPL-MCNC: 157 MG/DL (ref 74–99)
GLUCOSE UR STRIP.AUTO-MCNC: NEGATIVE MG/DL
HCO3 BLD-SCNC: 25.2 MMOL/L (ref 22–26)
HCT VFR BLD AUTO: 26.1 % (ref 35–45)
HEMOCCULT STL QL: NEGATIVE
HGB BLD-MCNC: 8 G/DL (ref 12–16)
HGB UR QL STRIP: NEGATIVE
INR PPP: 1.1 (ref 0.8–1.2)
KETONES UR QL STRIP.AUTO: NEGATIVE MG/DL
LACTATE SERPL-SCNC: 1.1 MMOL/L (ref 0.4–2)
LEUKOCYTE ESTERASE UR QL STRIP.AUTO: NEGATIVE
LYMPHOCYTES # BLD AUTO: 14 % (ref 21–52)
LYMPHOCYTES # BLD: 1.2 K/UL (ref 0.9–3.6)
MCH RBC QN AUTO: 29.1 PG (ref 24–34)
MCHC RBC AUTO-ENTMCNC: 30.7 G/DL (ref 31–37)
MCV RBC AUTO: 94.9 FL (ref 74–97)
MONOCYTES # BLD: 1.2 K/UL (ref 0.05–1.2)
MONOCYTES NFR BLD AUTO: 13 % (ref 3–10)
MUCOUS THREADS URNS QL MICRO: ABNORMAL /LPF
NEUTS SEG # BLD: 6.4 K/UL (ref 1.8–8)
NEUTS SEG NFR BLD AUTO: 73 % (ref 40–73)
NITRITE UR QL STRIP.AUTO: NEGATIVE
O2/TOTAL GAS SETTING VFR VENT: 24 %
PCO2 BLD: 34.9 MMHG (ref 35–45)
PH BLD: 7.47 [PH] (ref 7.35–7.45)
PH UR STRIP: 7.5 [PH] (ref 5–8)
PLATELET # BLD AUTO: 257 K/UL (ref 135–420)
PMV BLD AUTO: 10.7 FL (ref 9.2–11.8)
PO2 BLD: 117 MMHG (ref 80–100)
POTASSIUM SERPL-SCNC: 4.6 MMOL/L (ref 3.5–5.5)
PROT SERPL-MCNC: 6.2 G/DL (ref 6.4–8.2)
PROT UR STRIP-MCNC: 300 MG/DL
PROTHROMBIN TIME: 14 SEC (ref 11.5–15.2)
RBC # BLD AUTO: 2.75 M/UL (ref 4.2–5.3)
SAO2 % BLD: 99 % (ref 92–97)
SERVICE CMNT-IMP: ABNORMAL
SODIUM SERPL-SCNC: 140 MMOL/L (ref 136–145)
SP GR UR REFRACTOMETRY: 1.01 (ref 1–1.03)
SPECIMEN TYPE: ABNORMAL
TOTAL RESP. RATE, ITRR: 28
TROPONIN I SERPL-MCNC: 0.42 NG/ML (ref 0–0.06)
UROBILINOGEN UR QL STRIP.AUTO: 1 EU/DL (ref 0.2–1)
WBC # BLD AUTO: 8.8 K/UL (ref 4.6–13.2)
WBC URNS QL MICRO: ABNORMAL /HPF (ref 0–4)

## 2017-02-23 PROCEDURE — 74011250636 HC RX REV CODE- 250/636: Performed by: EMERGENCY MEDICINE

## 2017-02-23 PROCEDURE — 71010 XR CHEST SNGL V: CPT

## 2017-02-23 PROCEDURE — 96367 TX/PROPH/DG ADDL SEQ IV INF: CPT

## 2017-02-23 PROCEDURE — 99285 EMERGENCY DEPT VISIT HI MDM: CPT

## 2017-02-23 PROCEDURE — 96366 THER/PROPH/DIAG IV INF ADDON: CPT

## 2017-02-23 PROCEDURE — 85730 THROMBOPLASTIN TIME PARTIAL: CPT | Performed by: EMERGENCY MEDICINE

## 2017-02-23 PROCEDURE — 82550 ASSAY OF CK (CPK): CPT | Performed by: EMERGENCY MEDICINE

## 2017-02-23 PROCEDURE — 36600 WITHDRAWAL OF ARTERIAL BLOOD: CPT

## 2017-02-23 PROCEDURE — 77030005563 HC CATH URETH INT MMGH -A

## 2017-02-23 PROCEDURE — 87086 URINE CULTURE/COLONY COUNT: CPT | Performed by: EMERGENCY MEDICINE

## 2017-02-23 PROCEDURE — 74176 CT ABD & PELVIS W/O CONTRAST: CPT

## 2017-02-23 PROCEDURE — 51701 INSERT BLADDER CATHETER: CPT

## 2017-02-23 PROCEDURE — 83605 ASSAY OF LACTIC ACID: CPT | Performed by: EMERGENCY MEDICINE

## 2017-02-23 PROCEDURE — 70450 CT HEAD/BRAIN W/O DYE: CPT

## 2017-02-23 PROCEDURE — 85610 PROTHROMBIN TIME: CPT | Performed by: EMERGENCY MEDICINE

## 2017-02-23 PROCEDURE — 65660000000 HC RM CCU STEPDOWN

## 2017-02-23 PROCEDURE — 96361 HYDRATE IV INFUSION ADD-ON: CPT

## 2017-02-23 PROCEDURE — 96365 THER/PROPH/DIAG IV INF INIT: CPT

## 2017-02-23 PROCEDURE — 82270 OCCULT BLOOD FECES: CPT

## 2017-02-23 PROCEDURE — 87040 BLOOD CULTURE FOR BACTERIA: CPT | Performed by: EMERGENCY MEDICINE

## 2017-02-23 PROCEDURE — 83880 ASSAY OF NATRIURETIC PEPTIDE: CPT | Performed by: EMERGENCY MEDICINE

## 2017-02-23 PROCEDURE — 87804 INFLUENZA ASSAY W/OPTIC: CPT | Performed by: EMERGENCY MEDICINE

## 2017-02-23 PROCEDURE — 85025 COMPLETE CBC W/AUTO DIFF WBC: CPT | Performed by: EMERGENCY MEDICINE

## 2017-02-23 PROCEDURE — 93005 ELECTROCARDIOGRAM TRACING: CPT

## 2017-02-23 PROCEDURE — 82803 BLOOD GASES ANY COMBINATION: CPT

## 2017-02-23 PROCEDURE — 81001 URINALYSIS AUTO W/SCOPE: CPT | Performed by: EMERGENCY MEDICINE

## 2017-02-23 PROCEDURE — 74011250637 HC RX REV CODE- 250/637: Performed by: EMERGENCY MEDICINE

## 2017-02-23 PROCEDURE — 80053 COMPREHEN METABOLIC PANEL: CPT | Performed by: EMERGENCY MEDICINE

## 2017-02-23 RX ORDER — FUROSEMIDE 10 MG/ML
40 INJECTION INTRAMUSCULAR; INTRAVENOUS
Status: COMPLETED | OUTPATIENT
Start: 2017-02-23 | End: 2017-02-23

## 2017-02-23 RX ORDER — GUAIFENESIN 100 MG/5ML
324 LIQUID (ML) ORAL
Status: DISCONTINUED | OUTPATIENT
Start: 2017-02-23 | End: 2017-02-23

## 2017-02-23 RX ORDER — OSELTAMIVIR PHOSPHATE 75 MG/1
75 CAPSULE ORAL 2 TIMES DAILY
Status: DISCONTINUED | OUTPATIENT
Start: 2017-02-23 | End: 2017-02-23 | Stop reason: SDUPTHER

## 2017-02-23 RX ORDER — ASPIRIN 325 MG
325 TABLET, DELAYED RELEASE (ENTERIC COATED) ORAL DAILY
Status: DISCONTINUED | OUTPATIENT
Start: 2017-02-24 | End: 2017-02-23

## 2017-02-23 RX ORDER — ASPIRIN 600 MG/1
300 SUPPOSITORY RECTAL
Status: COMPLETED | OUTPATIENT
Start: 2017-02-23 | End: 2017-02-23

## 2017-02-23 RX ORDER — SODIUM CHLORIDE 0.9 % (FLUSH) 0.9 %
5-10 SYRINGE (ML) INJECTION AS NEEDED
Status: DISCONTINUED | OUTPATIENT
Start: 2017-02-23 | End: 2017-02-24

## 2017-02-23 RX ORDER — OSELTAMIVIR PHOSPHATE 30 MG/1
30 CAPSULE ORAL DAILY
Status: DISCONTINUED | OUTPATIENT
Start: 2017-02-24 | End: 2017-02-27

## 2017-02-23 RX ORDER — HEPARIN SODIUM 10000 [USP'U]/100ML
12-25 INJECTION, SOLUTION INTRAVENOUS
Status: DISCONTINUED | OUTPATIENT
Start: 2017-02-23 | End: 2017-02-25

## 2017-02-23 RX ORDER — LEVOFLOXACIN 5 MG/ML
750 INJECTION, SOLUTION INTRAVENOUS EVERY 24 HOURS
Status: DISCONTINUED | OUTPATIENT
Start: 2017-02-23 | End: 2017-02-24

## 2017-02-23 RX ORDER — HEPARIN SODIUM 1000 [USP'U]/ML
60 INJECTION, SOLUTION INTRAVENOUS; SUBCUTANEOUS ONCE
Status: COMPLETED | OUTPATIENT
Start: 2017-02-23 | End: 2017-02-24

## 2017-02-23 RX ADMIN — ACETAMINOPHEN 975 MG: 650 SUPPOSITORY RECTAL at 18:15

## 2017-02-23 RX ADMIN — ASPIRIN 300 MG: 600 SUPPOSITORY RECTAL at 20:01

## 2017-02-23 RX ADMIN — SODIUM CHLORIDE 1000 ML: 900 INJECTION, SOLUTION INTRAVENOUS at 18:55

## 2017-02-23 RX ADMIN — FUROSEMIDE 40 MG: 10 INJECTION, SOLUTION INTRAVENOUS at 20:20

## 2017-02-23 RX ADMIN — LEVOFLOXACIN 750 MG: 5 INJECTION, SOLUTION INTRAVENOUS at 23:03

## 2017-02-23 RX ADMIN — OSELTAMIVIR PHOSPHATE 75 MG: 75 CAPSULE ORAL at 20:20

## 2017-02-23 RX ADMIN — NITROGLYCERIN 1 INCH: 20 OINTMENT TOPICAL at 20:21

## 2017-02-23 RX ADMIN — SODIUM CHLORIDE 1000 MG: 900 INJECTION, SOLUTION INTRAVENOUS at 20:01

## 2017-02-23 NOTE — ED TRIAGE NOTES
EMS states that they were called to nursing home for pt c/o abdominal pain and CP. Pt denies CP or abdominal pain at this time however does c/o back pain. Pt is confused. Unsure of baseline mental status. Pt coughing and has fever. EMS states that pt was recently hospitalized for pneumonia.

## 2017-02-23 NOTE — ED PROVIDER NOTES
HPI Comments: 6:04 PM. Jacob Mack is a 80 y.o. female with a history of HTN, chronic renal insufficiency, CVA, and right breast cancer who presents to the ED from [address] of breath, which worsened today. She notes that she has been seen for these symptoms before. She also reports SOB and cough. Per EMS, the patient is in [facility] for pneumonia. The patient is not oriented to time; she stated that she was 45years old. She denies chest pain, abdominal pain, headache, fever, or vomiting. The patient is a full code. There are no other concerns at this time. PCP: Yuniel Wu MD    From chart review, the patient was seen on 2/15/17 with persistent opacification at the left base with pleural effusion that appears chronic. The patient was admitted for HTN and heart failure at that time. The history is provided by the patient, the EMS personnel and medical records. Past Medical History:   Diagnosis Date    Breast cancer ([facility] [address] 1/17/14    right breast    Carotid duplex 11/19/2014    Severe 70% or greater bilateral ICA stenosis. LICA dissection suggested.  Chronic renal insufficiency     CVA (cerebral vascular accident) ([facility] [address] 2003    with slight Right sided weakness    Echocardiogram 02/22/2016    EF 55-60%. No WMA. Mild-mod LVH. Gr 2 DDfx. No significant valvular heart disease.  Edema     Glaucoma     Gout flare     Hyperlipidemia     Hypertension     Lower extremity arterial testing 12/19/2014    Mod tibio-peroneal arterial disease bilaterally. R YANCY 1.24.  L YANCY 0.74. Bilateral sm vessel disease.  Lump of right breast     URI (upper respiratory infection)        Past Surgical History:   Procedure Laterality Date    HX APPENDECTOMY      HX CYST INCISION AND DRAINAGE      PT DOES NOT REMEMBER EXACT DATES, TUCKER BREAST DONE MORE THAN 20 YEARS AGO. BENIGN FINDINGS PER PATIENT.     HX GYN      JUSTIN/BSO    HX HYSTERECTOMY      HX MASTECTOMY 1/17/2014    RIGHT PARTIAL MASTECTOMY performed by Kiko Luis MD at [facility] MAIN OR         Family History:   Problem Relation Age of Onset    Hypertension Mother     Hypertension Brother     Diabetes Brother        Social History     Social History    Marital status:      Spouse name: N/A    Number of children: N/A    Years of education: N/A     Occupational History    Not on file. Social History Main Topics    Smoking status: Never Smoker    Smokeless tobacco: Never Used    Alcohol use No    Drug use: No    Sexual activity: Not Currently     Other Topics Concern    Not on file     Social History Narrative         ALLERGIES: Review of patient's allergies indicates no known allergies. Review of Systems   Unable to perform ROS: Acuity of condition       Vitals:    02/23/17 1816 02/23/17 1818 02/23/17 1827 02/23/17 1930   BP: (!) 170/97  (!) 170/97 153/79   Pulse: 88 88 88 88   Resp: 29 (!) 36 (!) 36 24   Temp:   (!) 101.5 °F (38.6 °C) 100 °F (37.8 °C)   SpO2: 98% 98% 98% 98%            Physical Exam   Constitutional: She appears well-developed and well-nourished. No distress. HENT:   Head: Normocephalic and atraumatic. Right Ear: External ear normal.   Left Ear: External ear normal.   Nose: Nose normal.   Mouth/Throat: Oropharynx is clear and moist.   Eyes: Conjunctivae and EOM are normal. Pupils are equal, round, and reactive to light. No scleral icterus. Neck: Normal range of motion. Neck supple. No JVD present. No tracheal deviation present. No thyromegaly present. Cardiovascular: Normal rate, regular rhythm, normal heart sounds and intact distal pulses. Exam reveals no gallop and no friction rub. No murmur heard. Pulmonary/Chest: Effort normal. She exhibits no tenderness. Lungs congested bilaterally with crackles   Abdominal: Soft. Bowel sounds are normal. She exhibits no distension. There is no tenderness. There is no rebound and no guarding.    Musculoskeletal: Normal range of motion. She exhibits no edema or tenderness. No midline back tenderness, no rash  Stage 1 decubitus sacrum   Lymphadenopathy:     She has no cervical adenopathy. Neurological: She is alert. No cranial nerve deficit. Coordination normal.   No sensory loss, Gait normal, Motor 5/5  Moves all extremities  No focal or sensory deficit  Awake and alert, but not oriented to time  Pt non meningismus  Very alert when asked granddaughter's name and where she is staying. Skin: Skin is warm and dry. Warm to touch   Psychiatric: She has a normal mood and affect. Her behavior is normal. Judgment and thought content normal.   Nursing note and vitals reviewed. MDM  Number of Diagnoses or Management Options  ACS (acute coronary syndrome) ([facility] [address]):   CRF (chronic renal failure), stage 3 (moderate):   Diastolic CHF, acute on chronic [facility]):   NSTEMI (non-ST elevated myocardial infarction) [facility]):   SOB (shortness of breath):   Diagnosis management comments: Unable to do CT Chest concerning SOB and hypoxia due to Creatinine of 3.66. Pt with fever recent hospitalization. Check chest xray for infection/check urine for UTI  Hydrate patient  Treat temp  No decubitus that appears infected. Per daughter, shortness of breath that started and worsening, pt was given O2 at [facility]. ED Course       Procedures      Vitals:  Patient Vitals for the past 12 hrs:   Temp Pulse Resp BP SpO2   02/23/17 1930 100 °F (37.8 °C) 88 24 153/79 98 %   02/23/17 1827 (!) 101.5 °F (38.6 °C) 88 (!) 36 (!) 170/97 98 %   02/23/17 1818 - 88 (!) 36 - 98 %   02/23/17 1816 - 88 29 (!) 170/97 98 %     98% on RA, indicating adequate oxygenation.     Medications ordered:   Medications   sodium chloride (NS) flush 5-10 mL (not administered)   vancomycin (VANCOCIN) 1,000 mg in 0.9% sodium chloride (MBP/ADV) 250 mL adv (1,000 mg IntraVENous New Bag 2/23/17 2001)   piperacillin-tazobactam (ZOSYN) 4.5 g in 0.9% sodium chloride (MBP/ADV) 100 mL MBP (not administered)   levoFLOXacin (LEVAQUIN) 750 mg in D5W IVPB (not administered)   oseltamivir (TAMIFLU) capsule 75 mg (not administered)   furosemide (LASIX) injection 40 mg (not administered)   nitroglycerin (NITROBID) 2 % ointment 1 Inch (not administered)   sodium chloride 0.9 % bolus infusion 1,000 mL (0 mL IntraVENous IV Completed 2/23/17 1932)   acetaminophen (TYLENOL) suppository 975 mg (975 mg Rectal Given 2/23/17 1815)   aspirin (ASA) 600 mg suppository 300 mg (300 mg Rectal Given 2/23/17 2001)         Lab findings:  Recent Results (from the past 12 hour(s))   EKG, 12 LEAD, INITIAL    Collection Time: 02/23/17  6:02 PM   Result Value Ref Range    Ventricular Rate 87 BPM    Atrial Rate 87 BPM    P-R Interval 150 ms    QRS Duration 114 ms    Q-T Interval 372 ms    QTC Calculation (Bezet) 447 ms    Calculated P Axis 72 degrees    Calculated R Axis -43 degrees    Calculated T Axis 122 degrees    Diagnosis       Normal sinus rhythm  Possible Left atrial enlargement  Left axis deviation  Left ventricular hypertrophy with repolarization abnormality  Abnormal ECG  When compared with ECG of 15-FEB-2017 16:20,  premature atrial complexes are no longer present  T wave inversion no longer evident in Anterior leads     CBC WITH AUTOMATED DIFF    Collection Time: 02/23/17  6:30 PM   Result Value Ref Range    WBC 8.8 4.6 - 13.2 K/uL    RBC 2.75 (L) 4.20 - 5.30 M/uL    HGB 8.0 (L) 12.0 - 16.0 g/dL    HCT 26.1 (L) 35.0 - 45.0 %    MCV 94.9 74.0 - 97.0 FL    MCH 29.1 24.0 - 34.0 PG    MCHC 30.7 (L) 31.0 - 37.0 g/dL    RDW 13.4 11.6 - 14.5 %    PLATELET 375 344 - 996 K/uL    MPV 10.7 9.2 - 11.8 FL    NEUTROPHILS 73 40 - 73 %    LYMPHOCYTES 14 (L) 21 - 52 %    MONOCYTES 13 (H) 3 - 10 %    EOSINOPHILS 0 0 - 5 %    BASOPHILS 0 0 - 2 %    ABS. NEUTROPHILS 6.4 1.8 - 8.0 K/UL    ABS. LYMPHOCYTES 1.2 0.9 - 3.6 K/UL    ABS. MONOCYTES 1.2 0.05 - 1.2 K/UL    ABS. EOSINOPHILS 0.0 0.0 - 0.4 K/UL    ABS.  BASOPHILS 0.0 0.0 - 0.06 K/UL    DF AUTOMATED     METABOLIC PANEL, COMPREHENSIVE    Collection Time: 02/23/17  6:30 PM   Result Value Ref Range    Sodium 140 136 - 145 mmol/L    Potassium 4.6 3.5 - 5.5 mmol/L    Chloride 103 100 - 108 mmol/L    CO2 27 21 - 32 mmol/L    Anion gap 10 3.0 - 18 mmol/L    Glucose 157 (H) 74 - 99 mg/dL    BUN 76 (H) 7.0 - 18 MG/DL    Creatinine 3.91 (H) 0.6 - 1.3 MG/DL    BUN/Creatinine ratio 19 12 - 20      GFR est AA 13 (L) >60 ml/min/1.73m2    GFR est non-AA 11 (L) >60 ml/min/1.73m2    Calcium 8.7 8.5 - 10.1 MG/DL    Bilirubin, total 0.3 0.2 - 1.0 MG/DL    ALT (SGPT) 20 13 - 56 U/L    AST (SGOT) 20 15 - 37 U/L    Alk.  phosphatase 64 45 - 117 U/L    Protein, total 6.2 (L) 6.4 - 8.2 g/dL    Albumin 3.0 (L) 3.4 - 5.0 g/dL    Globulin 3.2 2.0 - 4.0 g/dL    A-G Ratio 0.9 0.8 - 1.7     CARDIAC PANEL,(CK, CKMB & TROPONIN)    Collection Time: 02/23/17  6:30 PM   Result Value Ref Range    CK 39 26 - 192 U/L    CK - MB <1.0 <3.6 ng/ml    CK-MB Index Cannot be calulated 0.0 - 4.0 %    Troponin-I, Qt. 0.42 (H) 0.00 - 0.06 NG/ML   PRO-BNP    Collection Time: 02/23/17  6:30 PM   Result Value Ref Range    NT pro-BNP 08930 (H) 0 - 1800 PG/ML   PTT    Collection Time: 02/23/17  6:30 PM   Result Value Ref Range    aPTT 27.1 23.0 - 36.4 SEC   PROTHROMBIN TIME + INR    Collection Time: 02/23/17  6:30 PM   Result Value Ref Range    Prothrombin time 14.0 11.5 - 15.2 sec    INR 1.1 0.8 - 1.2     LACTIC ACID, PLASMA    Collection Time: 02/23/17  6:30 PM   Result Value Ref Range    Lactic acid 1.1 0.4 - 2.0 MMOL/L   URINALYSIS W/ RFLX MICROSCOPIC    Collection Time: 02/23/17  6:40 PM   Result Value Ref Range    Color YELLOW      Appearance CLEAR      Specific gravity 1.012 1.005 - 1.030      pH (UA) 7.5 5.0 - 8.0      Protein 300 (A) NEG mg/dL    Glucose NEGATIVE  NEG mg/dL    Ketone NEGATIVE  NEG mg/dL    Bilirubin NEGATIVE  NEG      Blood NEGATIVE  NEG      Urobilinogen 1.0 0.2 - 1.0 EU/dL    Nitrites NEGATIVE  NEG      Leukocyte Esterase NEGATIVE  NEG     URINE MICROSCOPIC ONLY    Collection Time: 02/23/17  6:40 PM   Result Value Ref Range    WBC 1 to 3 0 - 4 /hpf    Epithelial cells 2+ 0 - 5 /lpf    Mucus 1+ (A) NEG /lpf   POC G3    Collection Time: 02/23/17  7:45 PM   Result Value Ref Range    Device: NASAL CANNULA      Flow rate (POC) 2 L/M    FIO2 (POC) 24 %    pH (POC) 7.470 (H) 7.35 - 7.45      pCO2 (POC) 34.9 (L) 35.0 - 45.0 MMHG    pO2 (POC) 117 (H) 80 - 100 MMHG    HCO3 (POC) 25.2 22 - 26 MMOL/L    sO2 (POC) 99 (H) 92 - 97 %    Base excess (POC) 2 mmol/L    Allens test (POC) YES      Total resp. rate 28      Site RIGHT RADIAL      Patient temp. 100.1      Specimen type (POC) ARTERIAL      Performed by Dale Keller        EKG interpretation by ED Physician:  6:15 PM. Sinus rhythm with a rate of 85 bpm. LVH. Unchanged in morphology from 2/15/17 EKG. X-Ray, CT or other radiology findings or impressions:  XR CHEST SNGL V   Final Result   IMPRESSION:       1. Stable to slightly decreased size of left pleural effusion. Trace right  pleural effusion.  -Overlying atelectasis present, but superimposed infiltrate not excluded     2. Stable mild enlargement of the cardiac silhouette   CT HEAD WO CONT   Final Result   IMPRESSION:    No definite evidence for acute intracranial process. -CT is known to be relatively insensitive for acute ischemia and first 24-48  hours, and MRI may be helpful in clinically indicated.     2. Moderate white matter disease, presumed chronic ischemic. Chronic lacunar  infarcts at bilateral basal ganglia, interval new on the right since 2/2016. NM LUNG PERFUSION W VENT    (Results Pending)       Progress notes, Consult notes or additional Procedure notes:   6:50 PM. The patient's granddaughter arrived and provided a different history from the patient. She states that the patient became SOB in front of her with O2 of 92%. She also reports that the patient's confusion started today.  Per the granddaughter, the nurse heard crackles in her lungs and a doctor told her to get lab work next week. The granddaughter states that the patient's O2 dropped to 84% when she was being taken to the bathroom. She mentions that the patient went to [facility] on 2/15 originally for SOB. She denies a history of back pain. 7:36 PM. The patient is in the room with family. Patient with dyspnea. BNP and Troponin are elevated. EKG remains the same as documented earlier with no ST elevations. Patient considered sepsis as she is febrile with tachypnea. Patient's source is unclear as her Chest X-ray remains the same and urine is not with infection. Patient's lactic is pending. Patient is not given 30 ml/kg bolus as she is in moderate respiratory distress and has a history of significant heart failure. BNP and Chest X-ray show that she is fluid overloaded. Patient started on antiviral as she has a high risk of exposure to influenza. The patient's lung exam is improved and her BP is maintaining. Discussed the case in detail with the granddaughter, who is the closest next of kin, concerning the patient's condition. She is aware of the elevated Troponin and concern for pneumonia and possible viral infection. Disposition:  Diagnosis:   1. SOB (shortness of breath)    2. Diastolic CHF, acute on chronic (HCC)    3. NSTEMI (non-ST elevated myocardial infarction) ([facility] [address]    4. ACS (acute coronary syndrome) ([facility] [address]    5. CRF (chronic renal failure), stage 3 (moderate)        Disposition: AdM    Follow-up Information     None            Patient's Medications   Start Taking    No medications on file   Continue Taking    ACETAMINOPHEN (TYLENOL) 325 MG TABLET    Take 2 Tabs by mouth every four (4) hours as needed. ALBUTEROL (PROVENTIL HFA, VENTOLIN HFA, PROAIR HFA) 90 MCG/ACTUATION INHALER    Take 1-2 Puffs by inhalation every four (4) hours as needed for Wheezing. ALLOPURINOL (ZYLOPRIM) 100 MG TABLET    Take 1 Tab by mouth daily.     ASPIRIN DELAYED-RELEASE 81 MG TABLET    Take 1 Tab by mouth daily. BISACODYL (DULCOLAX) 10 MG SUPPOSITORY    Insert 10 mg into rectum as needed. CARVEDILOL (COREG) 12.5 MG TABLET    Take 1 Tab by mouth two (2) times daily (with meals) for 30 days. CHOLECALCIFEROL, VITAMIN D3, (VITAMIN D3) 5,000 UNIT TAB TABLET    Take 1 Tab by mouth daily. CYANOCOBALAMIN (VITAMIN B-12) 1,000 MCG TABLET    Take 1,000 mcg by mouth daily. FERROUS SULFATE 325 MG (65 MG IRON) TABLET    Take 325 mg by mouth Daily (before breakfast). FUROSEMIDE (LASIX) 40 MG TABLET    Take 1 Tab by mouth daily. HYDRALAZINE (APRESOLINE) 100 MG TABLET    Take 1 Tab by mouth every eight (8) hours for 30 days. INHALATIONAL SPACING DEVICE    1 Each by Does Not Apply route as needed. ISOSORBIDE DINITRATE (ISORDIL) 20 MG TABLET    Take 1 Tab by mouth three (3) times daily for 30 days. POLYETHYLENE GLYCOL (MIRALAX) 17 GRAM PACKET    Take 1 Packet by mouth daily. SIMVASTATIN (ZOCOR) 20 MG TABLET    Take 1 Tab by mouth nightly. THERAPEUTIC MULTIVITAMIN (THERAGRAN) TABLET    Take 1 Tab by mouth daily. TRAMADOL (ULTRAM) 50 MG TABLET    Take 1 Tab by mouth every six (6) hours as needed. Max Daily Amount: 200 mg. These Medications have changed    No medications on file   Stop Taking    No medications on file       Scribe Attestation:   I, Silvia Hanson, am scribing for and in the presence of Crosby Gowers, MD February 23, 2017 at 6:13 PM     Signed by: Rashad Dunne, 02/23/17, 6:13 PM     Physician Attestation:   I personally performed the services described in this documentation, reviewed and edited the documentation which was dictated to the scribe in my presence, and it accurately records my words and actions. Crosby Gowers, MD    Patient's care is discussed and questions answered. Oncoming physician introduced to the patient.   Patient signed off to the oncoming ED physicianKaron 7:57 PM  Patient is awaiting Labs/Influenza test/Coagulation profile/and Admission discussion with hospitalist.  Please reevaluate prior to disposition.

## 2017-02-24 PROBLEM — I50.31 ACUTE DIASTOLIC HEART FAILURE (HCC): Status: ACTIVE | Noted: 2017-02-24

## 2017-02-24 LAB
APTT PPP: 116.2 SEC (ref 23–36.4)
APTT PPP: 78.5 SEC (ref 23–36.4)
ATRIAL RATE: 87 BPM
BASOPHILS # BLD AUTO: 0 K/UL (ref 0–0.06)
BASOPHILS # BLD: 1 % (ref 0–2)
CALCULATED P AXIS, ECG09: 72 DEGREES
CALCULATED R AXIS, ECG10: -43 DEGREES
CALCULATED T AXIS, ECG11: 122 DEGREES
CK MB CFR SERPL CALC: ABNORMAL % (ref 0–4)
CK MB SERPL-MCNC: <1 NG/ML (ref 5–25)
CK SERPL-CCNC: 41 U/L (ref 26–192)
DIAGNOSIS, 93000: NORMAL
DIFFERENTIAL METHOD BLD: ABNORMAL
EOSINOPHIL # BLD: 0 K/UL (ref 0–0.4)
EOSINOPHIL NFR BLD: 0 % (ref 0–5)
ERYTHROCYTE [DISTWIDTH] IN BLOOD BY AUTOMATED COUNT: 13.6 % (ref 11.6–14.5)
HCT VFR BLD AUTO: 24.4 % (ref 35–45)
HGB BLD-MCNC: 7.4 G/DL (ref 12–16)
LYMPHOCYTES # BLD AUTO: 22 % (ref 21–52)
LYMPHOCYTES # BLD: 0.9 K/UL (ref 0.9–3.6)
MCH RBC QN AUTO: 29.2 PG (ref 24–34)
MCHC RBC AUTO-ENTMCNC: 30.3 G/DL (ref 31–37)
MCV RBC AUTO: 96.4 FL (ref 74–97)
MONOCYTES # BLD: 0.8 K/UL (ref 0.05–1.2)
MONOCYTES NFR BLD AUTO: 18 % (ref 3–10)
NEUTS SEG # BLD: 2.6 K/UL (ref 1.8–8)
NEUTS SEG NFR BLD AUTO: 59 % (ref 40–73)
P-R INTERVAL, ECG05: 150 MS
PLATELET # BLD AUTO: 205 K/UL (ref 135–420)
PMV BLD AUTO: 10 FL (ref 9.2–11.8)
Q-T INTERVAL, ECG07: 372 MS
QRS DURATION, ECG06: 114 MS
QTC CALCULATION (BEZET), ECG08: 447 MS
RBC # BLD AUTO: 2.53 M/UL (ref 4.2–5.3)
TROPONIN I SERPL-MCNC: 0.37 NG/ML (ref 0–0.06)
VENTRICULAR RATE, ECG03: 87 BPM
WBC # BLD AUTO: 4.3 K/UL (ref 4.6–13.2)

## 2017-02-24 PROCEDURE — 80048 BASIC METABOLIC PNL TOTAL CA: CPT | Performed by: HOSPITALIST

## 2017-02-24 PROCEDURE — 85610 PROTHROMBIN TIME: CPT | Performed by: HOSPITALIST

## 2017-02-24 PROCEDURE — 85730 THROMBOPLASTIN TIME PARTIAL: CPT | Performed by: EMERGENCY MEDICINE

## 2017-02-24 PROCEDURE — 80076 HEPATIC FUNCTION PANEL: CPT | Performed by: HOSPITALIST

## 2017-02-24 PROCEDURE — 74011250637 HC RX REV CODE- 250/637: Performed by: EMERGENCY MEDICINE

## 2017-02-24 PROCEDURE — 85025 COMPLETE CBC W/AUTO DIFF WBC: CPT | Performed by: HOSPITALIST

## 2017-02-24 PROCEDURE — 74011250636 HC RX REV CODE- 250/636: Performed by: HOSPITALIST

## 2017-02-24 PROCEDURE — 74011000258 HC RX REV CODE- 258: Performed by: HOSPITALIST

## 2017-02-24 PROCEDURE — 36415 COLL VENOUS BLD VENIPUNCTURE: CPT | Performed by: HOSPITALIST

## 2017-02-24 PROCEDURE — 84484 ASSAY OF TROPONIN QUANT: CPT | Performed by: HOSPITALIST

## 2017-02-24 PROCEDURE — 74011000258 HC RX REV CODE- 258: Performed by: EMERGENCY MEDICINE

## 2017-02-24 PROCEDURE — 84443 ASSAY THYROID STIM HORMONE: CPT | Performed by: HOSPITALIST

## 2017-02-24 PROCEDURE — 65660000000 HC RM CCU STEPDOWN

## 2017-02-24 PROCEDURE — 74011250636 HC RX REV CODE- 250/636: Performed by: EMERGENCY MEDICINE

## 2017-02-24 PROCEDURE — 74011250637 HC RX REV CODE- 250/637: Performed by: HOSPITALIST

## 2017-02-24 PROCEDURE — 85025 COMPLETE CBC W/AUTO DIFF WBC: CPT | Performed by: EMERGENCY MEDICINE

## 2017-02-24 PROCEDURE — 85730 THROMBOPLASTIN TIME PARTIAL: CPT | Performed by: HOSPITALIST

## 2017-02-24 RX ORDER — HEPARIN SODIUM 5000 [USP'U]/ML
5000 INJECTION, SOLUTION INTRAVENOUS; SUBCUTANEOUS EVERY 12 HOURS
Status: DISCONTINUED | OUTPATIENT
Start: 2017-02-24 | End: 2017-02-24

## 2017-02-24 RX ORDER — SODIUM CHLORIDE 0.9 % (FLUSH) 0.9 %
5-10 SYRINGE (ML) INJECTION AS NEEDED
Status: DISCONTINUED | OUTPATIENT
Start: 2017-02-24 | End: 2017-02-28 | Stop reason: HOSPADM

## 2017-02-24 RX ORDER — POLYETHYLENE GLYCOL 3350 17 G/17G
17 POWDER, FOR SOLUTION ORAL
Status: DISCONTINUED | OUTPATIENT
Start: 2017-02-24 | End: 2017-02-25

## 2017-02-24 RX ORDER — LANOLIN ALCOHOL/MO/W.PET/CERES
325 CREAM (GRAM) TOPICAL
Status: DISCONTINUED | OUTPATIENT
Start: 2017-02-25 | End: 2017-02-28 | Stop reason: HOSPADM

## 2017-02-24 RX ORDER — FUROSEMIDE 10 MG/ML
80 INJECTION INTRAMUSCULAR; INTRAVENOUS EVERY 12 HOURS
Status: DISCONTINUED | OUTPATIENT
Start: 2017-02-24 | End: 2017-02-25

## 2017-02-24 RX ORDER — IPRATROPIUM BROMIDE AND ALBUTEROL SULFATE 2.5; .5 MG/3ML; MG/3ML
3 SOLUTION RESPIRATORY (INHALATION)
Status: DISCONTINUED | OUTPATIENT
Start: 2017-02-24 | End: 2017-02-28 | Stop reason: HOSPADM

## 2017-02-24 RX ORDER — SODIUM CHLORIDE 900 MG/100ML
INJECTION INTRAVENOUS
Status: DISPENSED
Start: 2017-02-24 | End: 2017-02-24

## 2017-02-24 RX ORDER — OSELTAMIVIR PHOSPHATE 30 MG/1
30 CAPSULE ORAL DAILY
Status: DISCONTINUED | OUTPATIENT
Start: 2017-02-25 | End: 2017-02-24

## 2017-02-24 RX ORDER — SIMVASTATIN 20 MG/1
20 TABLET, FILM COATED ORAL
Status: DISCONTINUED | OUTPATIENT
Start: 2017-02-24 | End: 2017-02-28 | Stop reason: HOSPADM

## 2017-02-24 RX ORDER — MELATONIN
5000 DAILY
Status: DISCONTINUED | OUTPATIENT
Start: 2017-02-25 | End: 2017-02-28 | Stop reason: HOSPADM

## 2017-02-24 RX ORDER — SODIUM CHLORIDE 0.9 % (FLUSH) 0.9 %
5-10 SYRINGE (ML) INJECTION AS NEEDED
Status: DISCONTINUED | OUTPATIENT
Start: 2017-02-24 | End: 2017-02-24

## 2017-02-24 RX ORDER — LISINOPRIL 5 MG/1
5 TABLET ORAL DAILY
Status: DISCONTINUED | OUTPATIENT
Start: 2017-02-25 | End: 2017-02-25

## 2017-02-24 RX ORDER — TRAMADOL HYDROCHLORIDE 50 MG/1
50 TABLET ORAL
Status: DISCONTINUED | OUTPATIENT
Start: 2017-02-24 | End: 2017-02-28 | Stop reason: HOSPADM

## 2017-02-24 RX ORDER — CARVEDILOL 12.5 MG/1
12.5 TABLET ORAL 2 TIMES DAILY WITH MEALS
Status: DISCONTINUED | OUTPATIENT
Start: 2017-02-25 | End: 2017-02-28 | Stop reason: HOSPADM

## 2017-02-24 RX ORDER — SODIUM CHLORIDE 0.9 % (FLUSH) 0.9 %
5-10 SYRINGE (ML) INJECTION EVERY 8 HOURS
Status: DISCONTINUED | OUTPATIENT
Start: 2017-02-24 | End: 2017-02-24

## 2017-02-24 RX ORDER — ASPIRIN 81 MG/1
81 TABLET ORAL DAILY
Status: DISCONTINUED | OUTPATIENT
Start: 2017-02-25 | End: 2017-02-28 | Stop reason: HOSPADM

## 2017-02-24 RX ORDER — SIMVASTATIN 20 MG/1
20 TABLET, FILM COATED ORAL
Status: DISCONTINUED | OUTPATIENT
Start: 2017-02-24 | End: 2017-02-24

## 2017-02-24 RX ORDER — ONDANSETRON 2 MG/ML
4 INJECTION INTRAMUSCULAR; INTRAVENOUS
Status: DISCONTINUED | OUTPATIENT
Start: 2017-02-24 | End: 2017-02-28 | Stop reason: HOSPADM

## 2017-02-24 RX ORDER — CLOPIDOGREL BISULFATE 75 MG/1
75 TABLET ORAL DAILY
Status: DISCONTINUED | OUTPATIENT
Start: 2017-02-25 | End: 2017-02-25

## 2017-02-24 RX ORDER — ACETAMINOPHEN 325 MG/1
650 TABLET ORAL
Status: DISCONTINUED | OUTPATIENT
Start: 2017-02-24 | End: 2017-02-25

## 2017-02-24 RX ORDER — DOCUSATE SODIUM 100 MG/1
100 CAPSULE, LIQUID FILLED ORAL 2 TIMES DAILY
Status: DISCONTINUED | OUTPATIENT
Start: 2017-02-24 | End: 2017-02-28 | Stop reason: HOSPADM

## 2017-02-24 RX ORDER — NALOXONE HYDROCHLORIDE 0.4 MG/ML
0.4 INJECTION, SOLUTION INTRAMUSCULAR; INTRAVENOUS; SUBCUTANEOUS AS NEEDED
Status: DISCONTINUED | OUTPATIENT
Start: 2017-02-24 | End: 2017-02-28 | Stop reason: HOSPADM

## 2017-02-24 RX ORDER — ALLOPURINOL 100 MG/1
100 TABLET ORAL DAILY
Status: DISCONTINUED | OUTPATIENT
Start: 2017-02-25 | End: 2017-02-25

## 2017-02-24 RX ORDER — FACIAL-BODY WIPES
10 EACH TOPICAL DAILY PRN
Status: DISCONTINUED | OUTPATIENT
Start: 2017-02-24 | End: 2017-02-28 | Stop reason: HOSPADM

## 2017-02-24 RX ORDER — LANOLIN ALCOHOL/MO/W.PET/CERES
1000 CREAM (GRAM) TOPICAL DAILY
Status: DISCONTINUED | OUTPATIENT
Start: 2017-02-25 | End: 2017-02-28 | Stop reason: HOSPADM

## 2017-02-24 RX ORDER — THERA TABS 400 MCG
1 TAB ORAL DAILY
Status: DISCONTINUED | OUTPATIENT
Start: 2017-02-25 | End: 2017-02-28 | Stop reason: HOSPADM

## 2017-02-24 RX ORDER — ADHESIVE BANDAGE
30 BANDAGE TOPICAL DAILY PRN
Status: DISCONTINUED | OUTPATIENT
Start: 2017-02-24 | End: 2017-02-25

## 2017-02-24 RX ORDER — DIPHENHYDRAMINE HYDROCHLORIDE 50 MG/ML
12.5 INJECTION, SOLUTION INTRAMUSCULAR; INTRAVENOUS
Status: DISCONTINUED | OUTPATIENT
Start: 2017-02-24 | End: 2017-02-28 | Stop reason: HOSPADM

## 2017-02-24 RX ORDER — ISOSORBIDE DINITRATE 20 MG/1
20 TABLET ORAL 3 TIMES DAILY
Status: DISCONTINUED | OUTPATIENT
Start: 2017-02-24 | End: 2017-02-28 | Stop reason: HOSPADM

## 2017-02-24 RX ORDER — NITROGLYCERIN 0.4 MG/1
0.4 TABLET SUBLINGUAL
Status: DISCONTINUED | OUTPATIENT
Start: 2017-02-24 | End: 2017-02-28 | Stop reason: HOSPADM

## 2017-02-24 RX ORDER — HYDRALAZINE HYDROCHLORIDE 50 MG/1
100 TABLET, FILM COATED ORAL EVERY 8 HOURS
Status: DISCONTINUED | OUTPATIENT
Start: 2017-02-24 | End: 2017-02-28 | Stop reason: HOSPADM

## 2017-02-24 RX ORDER — MORPHINE SULFATE 2 MG/ML
2 INJECTION, SOLUTION INTRAMUSCULAR; INTRAVENOUS
Status: DISCONTINUED | OUTPATIENT
Start: 2017-02-24 | End: 2017-02-28 | Stop reason: HOSPADM

## 2017-02-24 RX ORDER — LEVOFLOXACIN 5 MG/ML
500 INJECTION, SOLUTION INTRAVENOUS
Status: DISCONTINUED | OUTPATIENT
Start: 2017-02-25 | End: 2017-02-28 | Stop reason: HOSPADM

## 2017-02-24 RX ORDER — SODIUM CHLORIDE 0.9 % (FLUSH) 0.9 %
5-10 SYRINGE (ML) INJECTION EVERY 8 HOURS
Status: DISCONTINUED | OUTPATIENT
Start: 2017-02-24 | End: 2017-02-28 | Stop reason: HOSPADM

## 2017-02-24 RX ADMIN — FUROSEMIDE 80 MG: 10 INJECTION, SOLUTION INTRAVENOUS at 23:38

## 2017-02-24 RX ADMIN — CEFTRIAXONE 1 G: 1 INJECTION, POWDER, FOR SOLUTION INTRAMUSCULAR; INTRAVENOUS at 23:23

## 2017-02-24 RX ADMIN — SIMVASTATIN 20 MG: 20 TABLET, FILM COATED ORAL at 23:27

## 2017-02-24 RX ADMIN — PIPERACILLIN SODIUM,TAZOBACTAM SODIUM 4.5 G: 4; .5 INJECTION, POWDER, FOR SOLUTION INTRAVENOUS at 00:45

## 2017-02-24 RX ADMIN — PIPERACILLIN SODIUM,TAZOBACTAM SODIUM 4.5 G: 4; .5 INJECTION, POWDER, FOR SOLUTION INTRAVENOUS at 09:47

## 2017-02-24 RX ADMIN — HEPARIN SODIUM 3890 UNITS: 1000 INJECTION, SOLUTION INTRAVENOUS; SUBCUTANEOUS at 00:51

## 2017-02-24 RX ADMIN — HYDRALAZINE HYDROCHLORIDE 100 MG: 50 TABLET, FILM COATED ORAL at 23:23

## 2017-02-24 RX ADMIN — OSELTAMIVIR PHOSPHATE 30 MG: 30 CAPSULE ORAL at 15:04

## 2017-02-24 RX ADMIN — ISOSORBIDE DINITRATE 20 MG: 20 TABLET ORAL at 23:24

## 2017-02-24 RX ADMIN — Medication 10 ML: at 23:27

## 2017-02-24 RX ADMIN — TRAMADOL HYDROCHLORIDE 50 MG: 50 TABLET, FILM COATED ORAL at 23:33

## 2017-02-24 RX ADMIN — HEPARIN SODIUM AND DEXTROSE 12.02 UNITS/KG/HR: 10000; 5 INJECTION INTRAVENOUS at 00:52

## 2017-02-24 NOTE — CONSULTS
[facility] - Consult Note    Consultation request by Nadya Bunn MD for advice/opinion related to evaluating abnormal troponin    Date of  Admission: 2/23/2017  5:55 PM   Primary Care Physician:  Melba Collins MD     Assessment:     Patient Active Problem List   Diagnosis Code    Hyperlipidemia E78.5    Glaucoma H40.9    CVA (cerebral vascular accident) ([facility] I63.9    Cerebral thrombosis with cerebral infarction ([facility] I63.30    Long term (current) use of anticoagulants Z79.01    Gout attack M10.9    Chronic renal failure N18.9    Leg pain M79.606    Lump of right breast N63    Breast mass in female N58    Breast cancer ([facility] C50.919    Cellulitis L03.90    Psoriasis L40.9    CRF (chronic renal failure) N18.9    Carotid stenosis I65.29    Fatigue R53.83    Hypertensive renal sclerosis with hypertension I12.9    Primary osteoarthritis involving multiple joints M15.0    ACS (acute coronary syndrome) (HCC) I24.9    NSTEMI (non-ST elevated myocardial infarction) ([facility] U95.3    Diastolic CHF, acute on chronic (HCC) I50.33    Chest pain R07.9    Altered mental state R41.82    Acute coronary syndromes (HCC) I24.9           -Indeterminate troponin in setting of acute infectious process and renal failure, suspect demand ischemia, not primary ACS. Normal CKMB. EKG w/LVH. - Influenza A confirmed rapid antigen swab  -Acute on chronic respiratory failure  -Acute on chronic renal failure, stage IV  -Acute on chronic encephalopathy  -Fever with acute infectious process/SIRS with recent toe infection  -Chronic diastolic heart failure, Echo 2/17/17 with EF 50%  -h/o remote stroke  -h/o right sided breast cancer  -HTN  -Poor functional status, SNF resident     Plan:     - Indeterminate Troponin trending in a manner inconsistent with ACS. Will stop Heparin drip   - Would avoid aggressive diuresis with Stage IV renal failure.  Does not appear acutely decompensated on exam. Will adjust Lasix dosing. No indication to repeat echo this admission.   - Suspect dyspnea is multifactorial with influenza, diastolic CHF and deconditioning.   - Continue with present cardiac Rx: Coreg, Simvastatin, Isordil and Hydralazine       History of Present Illness: This is a 80 y.o. female admitted for Altered mental state  Acute coronary syndromes ([facility] Patient complains of:    Dyspnea and confusion yesterday. Residing in SNF. Recent discharge from [facility] from 2/15-2/20 2017 for heart failure. Poor historian but denies chest pain. HPI is taken from medical record as patient is a poor informant. 80year old with a history of diastolic CHF, hypertension, history of CVA and chronic renal failure presented to [facility] from nursing home with concerns for dyspnea and hypoxia. She had a recent admission here for CHF. On current admission she was found to have mildly elevated Troponin and BNP, which is why cardiology is participating in her care. At present she denies any chest pain. She has chronic dyspnea. Primary cardiologist Dr. Gillian Bravo. Review of Symptoms:  Except as stated above include:  Constitutional:  negative  Respiratory:  dyspnea  Cardiovascular:  negative  Gastrointestinal: negative  Genitourinary:  negative  Musculoskeletal:  Negative  Neurological:  Confused  Dermatological:  Negative  Endocrinological: Negative  Psychological:  Negative     Past Medical History:     Past Medical History:   Diagnosis Date    Breast cancer ([facility] 1/17/14    right breast    Carotid duplex 11/19/2014    Severe 70% or greater bilateral ICA stenosis. LICA dissection suggested.  Chronic renal insufficiency     CVA (cerebral vascular accident) ([facility] 2003    with slight Right sided weakness    Echocardiogram 02/22/2016    EF 55-60%. No WMA. Mild-mod LVH. Gr 2 DDfx. No significant valvular heart disease.     Edema     Glaucoma     Gout flare     Hyperlipidemia     Hypertension     Lower extremity arterial testing 12/19/2014    Mod tibio-peroneal arterial disease bilaterally. R YANCY 1.24.  L YANCY 0.74. Bilateral sm vessel disease.  Lump of right breast     URI (upper respiratory infection)          Social History:     Social History     Social History    Marital status:      Spouse name: N/A    Number of children: N/A    Years of education: N/A     Social History Main Topics    Smoking status: Never Smoker    Smokeless tobacco: Never Used    Alcohol use No    Drug use: No    Sexual activity: Not Currently     Other Topics Concern    None     Social History Narrative        Family History:     Family History   Problem Relation Age of Onset    Hypertension Mother     Hypertension Brother     Diabetes Brother         Medications:   No Known Allergies     Current Facility-Administered Medications   Medication Dose Route Frequency    0.9% sodium chloride (MBP/ADV) 0.9 % infusion        piperacillin-tazobactam (ZOSYN) 2.25 g in 0.9% sodium chloride (MBP/ADV) 50 mL MBP  2.25 g IntraVENous Q8H    [START ON 2/25/2017] levoFLOXacin (LEVAQUIN) 500 mg in D5W IVPB  500 mg IntraVENous Q48H    VANCOMYCIN INFORMATION NOTE   Other Rx Dosing/Monitoring    sodium chloride (NS) flush 5-10 mL  5-10 mL IntraVENous PRN    oseltamivir (TAMIFLU) capsule 30 mg  30 mg Oral DAILY    heparin 25,000 units in D5W 250 ml infusion  12-25 Units/kg/hr IntraVENous TITRATE     Current Outpatient Prescriptions   Medication Sig    furosemide (LASIX) 40 mg tablet Take 1 Tab by mouth daily.  hydrALAZINE (APRESOLINE) 100 mg tablet Take 1 Tab by mouth every eight (8) hours for 30 days.  isosorbide dinitrate (ISORDIL) 20 mg tablet Take 1 Tab by mouth three (3) times daily for 30 days.  polyethylene glycol (MIRALAX) 17 gram packet Take 1 Packet by mouth daily.  traMADol (ULTRAM) 50 mg tablet Take 1 Tab by mouth every six (6) hours as needed. Max Daily Amount: 200 mg.     therapeutic multivitamin (THERAGRAN) tablet Take 1 Tab by mouth daily.  carvedilol (COREG) 12.5 mg tablet Take 1 Tab by mouth two (2) times daily (with meals) for 30 days.  bisacodyl (DULCOLAX) 10 mg suppository Insert 10 mg into rectum as needed.  allopurinol (ZYLOPRIM) 100 mg tablet Take 1 Tab by mouth daily.  simvastatin (ZOCOR) 20 mg tablet Take 1 Tab by mouth nightly.  albuterol (PROVENTIL HFA, VENTOLIN HFA, PROAIR HFA) 90 mcg/actuation inhaler Take 1-2 Puffs by inhalation every four (4) hours as needed for Wheezing.  inhalational spacing device 1 Each by Does Not Apply route as needed.  aspirin delayed-release 81 mg tablet Take 1 Tab by mouth daily.  ferrous sulfate 325 mg (65 mg iron) tablet Take 325 mg by mouth Daily (before breakfast).  cyanocobalamin (VITAMIN B-12) 1,000 mcg tablet Take 1,000 mcg by mouth daily.  acetaminophen (TYLENOL) 325 mg tablet Take 2 Tabs by mouth every four (4) hours as needed.  cholecalciferol, VITAMIN D3, (VITAMIN D3) 5,000 unit tab tablet Take 1 Tab by mouth daily. Physical Exam:     Visit Vitals    /78    Pulse 75    Temp 99.2 °F (37.3 °C)    Resp 29    Wt 64.9 kg (143 lb)    SpO2 100%    BMI 19.94 kg/m2     BP Readings from Last 3 Encounters:   02/24/17 167/78   02/20/17 154/60   01/27/17 160/68     Pulse Readings from Last 3 Encounters:   02/24/17 75   02/20/17 65   01/27/17 61     Wt Readings from Last 3 Encounters:   02/23/17 64.9 kg (143 lb)   02/20/17 64.5 kg (142 lb 3.2 oz)   01/27/17 66.2 kg (146 lb)       General:  Confused but cooperative  Neck:  nontender  Lungs:  Decreased bases  Heart:  regular rate and rhythm, S1, S2 normal, no murmur, click, rub or gallop  Abdomen:  abdomen is soft without significant tenderness, masses, organomegaly or guarding  Extremities:  extremities normal, atraumatic, no cyanosis or edema  Skin: Warm and dry.  no hyperpigmentation, vitiligo, or suspicious lesions  Neuro: confused but follows commands  Psych: non focal     Data Review: Recent Labs      02/24/17   0650  02/23/17   1830  02/22/17   0600   WBC  4.3*  8.8  4.6   HGB  7.4*  8.0*  7.6*   HCT  24.4*  26.1*  24.3*   PLT  205  257  228     Recent Labs      02/23/17   1830  02/22/17   0600   NA  140  137   K  4.6  4.3   CL  103  100   CO2  27  29   GLU  157*  98   BUN  76*  74*   CREA  3.91*  3.66*   CA  8.7  8.7   MG   --   2.9*   ALB  3.0*   --    SGOT  20   --    ALT  20   --    INR  1.1   --        Results for orders placed or performed during the hospital encounter of 02/23/17   EKG, 12 LEAD, INITIAL   Result Value Ref Range    Ventricular Rate 87 BPM    Atrial Rate 87 BPM    P-R Interval 150 ms    QRS Duration 114 ms    Q-T Interval 372 ms    QTC Calculation (Bezet) 447 ms    Calculated P Axis 72 degrees    Calculated R Axis -43 degrees    Calculated T Axis 122 degrees    Diagnosis       Normal sinus rhythm  Possible Left atrial enlargement  Left axis deviation  Left ventricular hypertrophy with repolarization abnormality  Abnormal ECG  When compared with ECG of 15-FEB-2017 16:20,  premature atrial complexes are no longer present  T wave inversion no longer evident in Anterior leads         All Cardiac Markers in the last 24 hours:    Lab Results   Component Value Date/Time    CPK 41 02/23/2017 11:55 PM    CPK 39 02/23/2017 06:30 PM    CKMB <1.0 02/23/2017 11:55 PM    CKMB <1.0 02/23/2017 06:30 PM    CKND1 Cannot be calulated 02/23/2017 11:55 PM    CKND1 Cannot be calulated 02/23/2017 06:30 PM    TROIQ 0.37 (H) 02/23/2017 11:55 PM    TROIQ 0.42 (H) 02/23/2017 06:30 PM       Last Lipid:    Lab Results   Component Value Date/Time    Cholesterol, total 201 02/17/2017 05:35 AM    HDL Cholesterol 46 02/17/2017 05:35 AM    LDL, calculated 125.4 02/17/2017 05:35 AM    Triglyceride 148 02/17/2017 05:35 AM    CHOL/HDL Ratio 4.4 02/17/2017 05:35 AM       Signed By: Yosi Pino MD     February 24, 2017

## 2017-02-24 NOTE — ED NOTES
Pt resting in bed, escalante catheter to gravity drainage. IV heparin infusing per pump. Pt on cardiac monitor.

## 2017-02-24 NOTE — ED NOTES
7:00 PM. Care of patient transferred to Dr. Hermann Cortez. 7:46 PM. Consult:  Discussed care with Mary Pereira, cardiology PA. Standard discussion; including history of patients chief complaint, available diagnostic results, and treatment course. 11:23 PM. Consult:  Discussed care with Dr. Michele Fay, hospitalist. Standard discussion; including history of patients chief complaint, available diagnostic results, and treatment course. He accepts the patient for admission. Scribe Attestation:   I, Laury Barron, am scribing for and in the presence of Nicolas Rico MD February 23, 2017 at 8:23 PM     Signed by: Rashad Lopez, 02/23/17, 8:23 PM     Physician Attestation:   I personally performed the services described in this documentation, reviewed and edited the documentation which was dictated to the scribe in my presence, and it accurately records my words and actions.    Nicolas Rico MD

## 2017-02-25 ENCOUNTER — APPOINTMENT (OUTPATIENT)
Dept: GENERAL RADIOLOGY | Age: 82
DRG: 871 | End: 2017-02-25
Attending: HOSPITALIST
Payer: MEDICARE

## 2017-02-25 ENCOUNTER — APPOINTMENT (OUTPATIENT)
Dept: NUCLEAR MEDICINE | Age: 82
DRG: 871 | End: 2017-02-25
Attending: EMERGENCY MEDICINE
Payer: MEDICARE

## 2017-02-25 LAB
ALBUMIN SERPL BCP-MCNC: 2.5 G/DL (ref 3.4–5)
ALBUMIN/GLOB SERPL: 0.8 {RATIO} (ref 0.8–1.7)
ALP SERPL-CCNC: 68 U/L (ref 45–117)
ALT SERPL-CCNC: 55 U/L (ref 13–56)
ANION GAP BLD CALC-SCNC: 11 MMOL/L (ref 3–18)
APTT PPP: 122.4 SEC (ref 23–36.4)
APTT PPP: 61.5 SEC (ref 23–36.4)
AST SERPL W P-5'-P-CCNC: 54 U/L (ref 15–37)
BACTERIA SPEC CULT: NORMAL
BASOPHILS # BLD AUTO: 0 K/UL (ref 0–0.1)
BASOPHILS # BLD AUTO: 0 K/UL (ref 0–0.1)
BASOPHILS # BLD: 0 % (ref 0–2)
BASOPHILS # BLD: 0 % (ref 0–2)
BILIRUB DIRECT SERPL-MCNC: 0.1 MG/DL (ref 0–0.2)
BILIRUB SERPL-MCNC: 0.2 MG/DL (ref 0.2–1)
BUN SERPL-MCNC: 76 MG/DL (ref 7–18)
BUN/CREAT SERPL: 18 (ref 12–20)
CALCIUM SERPL-MCNC: 8.2 MG/DL (ref 8.5–10.1)
CALCIUM SERPL-MCNC: 8.4 MG/DL (ref 8.5–10.1)
CHLORIDE SERPL-SCNC: 104 MMOL/L (ref 100–108)
CHOLEST SERPL-MCNC: 148 MG/DL
CO2 SERPL-SCNC: 27 MMOL/L (ref 21–32)
CREAT SERPL-MCNC: 4.23 MG/DL (ref 0.6–1.3)
DIFFERENTIAL METHOD BLD: ABNORMAL
DIFFERENTIAL METHOD BLD: ABNORMAL
EOSINOPHIL # BLD: 0 K/UL (ref 0–0.4)
EOSINOPHIL # BLD: 0 K/UL (ref 0–0.4)
EOSINOPHIL NFR BLD: 0 % (ref 0–5)
EOSINOPHIL NFR BLD: 0 % (ref 0–5)
ERYTHROCYTE [DISTWIDTH] IN BLOOD BY AUTOMATED COUNT: 13.7 % (ref 11.6–14.5)
ERYTHROCYTE [DISTWIDTH] IN BLOOD BY AUTOMATED COUNT: 13.8 % (ref 11.6–14.5)
FERRITIN SERPL-MCNC: 220 NG/ML (ref 8–388)
GLOBULIN SER CALC-MCNC: 3.2 G/DL (ref 2–4)
GLUCOSE BLD STRIP.AUTO-MCNC: 104 MG/DL (ref 70–110)
GLUCOSE BLD STRIP.AUTO-MCNC: 120 MG/DL (ref 70–110)
GLUCOSE BLD STRIP.AUTO-MCNC: 193 MG/DL (ref 70–110)
GLUCOSE SERPL-MCNC: 121 MG/DL (ref 74–99)
HCT VFR BLD AUTO: 23.4 % (ref 35–45)
HCT VFR BLD AUTO: 24.9 % (ref 35–45)
HDLC SERPL-MCNC: 52 MG/DL (ref 40–60)
HDLC SERPL: 2.8 {RATIO} (ref 0–5)
HGB BLD-MCNC: 7.3 G/DL (ref 12–16)
HGB BLD-MCNC: 7.7 G/DL (ref 12–16)
INR PPP: 1.3 (ref 0.8–1.2)
IRON SATN MFR SERPL: 11 %
IRON SERPL-MCNC: 25 UG/DL (ref 50–175)
LDLC SERPL CALC-MCNC: 82.2 MG/DL (ref 0–100)
LIPID PROFILE,FLP: NORMAL
LYMPHOCYTES # BLD AUTO: 21 % (ref 21–52)
LYMPHOCYTES # BLD AUTO: 27 % (ref 21–52)
LYMPHOCYTES # BLD: 1.1 K/UL (ref 0.9–3.6)
LYMPHOCYTES # BLD: 1.2 K/UL (ref 0.9–3.6)
MCH RBC QN AUTO: 29.6 PG (ref 24–34)
MCH RBC QN AUTO: 29.7 PG (ref 24–34)
MCHC RBC AUTO-ENTMCNC: 30.9 G/DL (ref 31–37)
MCHC RBC AUTO-ENTMCNC: 31.2 G/DL (ref 31–37)
MCV RBC AUTO: 95.1 FL (ref 74–97)
MCV RBC AUTO: 95.8 FL (ref 74–97)
MONOCYTES # BLD: 0.4 K/UL (ref 0.05–1.2)
MONOCYTES # BLD: 0.7 K/UL (ref 0.05–1.2)
MONOCYTES NFR BLD AUTO: 15 % (ref 3–10)
MONOCYTES NFR BLD AUTO: 9 % (ref 3–10)
NEUTS SEG # BLD: 2.9 K/UL (ref 1.8–8)
NEUTS SEG # BLD: 3.3 K/UL (ref 1.8–8)
NEUTS SEG NFR BLD AUTO: 64 % (ref 40–73)
NEUTS SEG NFR BLD AUTO: 64 % (ref 40–73)
PHOSPHATE SERPL-MCNC: 4.8 MG/DL (ref 2.5–4.9)
PLATELET # BLD AUTO: 203 K/UL (ref 135–420)
PLATELET # BLD AUTO: 203 K/UL (ref 135–420)
PMV BLD AUTO: 10.5 FL (ref 9.2–11.8)
PMV BLD AUTO: 10.5 FL (ref 9.2–11.8)
POTASSIUM SERPL-SCNC: 3.9 MMOL/L (ref 3.5–5.5)
PROT SERPL-MCNC: 5.7 G/DL (ref 6.4–8.2)
PROTHROMBIN TIME: 15.8 SEC (ref 11.5–15.2)
PTH-INTACT SERPL-MCNC: 183.7 PG/ML (ref 14–72)
RBC # BLD AUTO: 2.46 M/UL (ref 4.2–5.3)
RBC # BLD AUTO: 2.6 M/UL (ref 4.2–5.3)
SERVICE CMNT-IMP: NORMAL
SODIUM SERPL-SCNC: 142 MMOL/L (ref 136–145)
TIBC SERPL-MCNC: 228 UG/DL (ref 250–450)
TRIGL SERPL-MCNC: 69 MG/DL (ref ?–150)
TROPONIN I SERPL-MCNC: 0.22 NG/ML (ref 0–0.04)
TROPONIN I SERPL-MCNC: 0.29 NG/ML (ref 0–0.04)
TSH SERPL DL<=0.05 MIU/L-ACNC: 3.09 UIU/ML (ref 0.36–3.74)
VLDLC SERPL CALC-MCNC: 13.8 MG/DL
WBC # BLD AUTO: 4.6 K/UL (ref 4.6–13.2)
WBC # BLD AUTO: 5.1 K/UL (ref 4.6–13.2)

## 2017-02-25 PROCEDURE — 82728 ASSAY OF FERRITIN: CPT | Performed by: INTERNAL MEDICINE

## 2017-02-25 PROCEDURE — 74011250636 HC RX REV CODE- 250/636: Performed by: EMERGENCY MEDICINE

## 2017-02-25 PROCEDURE — 74011250636 HC RX REV CODE- 250/636: Performed by: HOSPITALIST

## 2017-02-25 PROCEDURE — 65660000000 HC RM CCU STEPDOWN

## 2017-02-25 PROCEDURE — 82784 ASSAY IGA/IGD/IGG/IGM EACH: CPT | Performed by: INTERNAL MEDICINE

## 2017-02-25 PROCEDURE — A9567 TECHNETIUM TC-99M AEROSOL: HCPCS

## 2017-02-25 PROCEDURE — 86335 IMMUNFIX E-PHORSIS/URINE/CSF: CPT | Performed by: INTERNAL MEDICINE

## 2017-02-25 PROCEDURE — 74011250637 HC RX REV CODE- 250/637: Performed by: HOSPITALIST

## 2017-02-25 PROCEDURE — 71010 XR CHEST PORT: CPT

## 2017-02-25 PROCEDURE — 83540 ASSAY OF IRON: CPT | Performed by: INTERNAL MEDICINE

## 2017-02-25 PROCEDURE — 76450000000

## 2017-02-25 PROCEDURE — 83970 ASSAY OF PARATHORMONE: CPT | Performed by: INTERNAL MEDICINE

## 2017-02-25 PROCEDURE — 74011250637 HC RX REV CODE- 250/637: Performed by: EMERGENCY MEDICINE

## 2017-02-25 PROCEDURE — 85730 THROMBOPLASTIN TIME PARTIAL: CPT | Performed by: EMERGENCY MEDICINE

## 2017-02-25 PROCEDURE — 74011250636 HC RX REV CODE- 250/636: Performed by: INTERNAL MEDICINE

## 2017-02-25 PROCEDURE — 80061 LIPID PANEL: CPT | Performed by: EMERGENCY MEDICINE

## 2017-02-25 PROCEDURE — 84100 ASSAY OF PHOSPHORUS: CPT | Performed by: INTERNAL MEDICINE

## 2017-02-25 PROCEDURE — 83883 ASSAY NEPHELOMETRY NOT SPEC: CPT | Performed by: INTERNAL MEDICINE

## 2017-02-25 PROCEDURE — 82962 GLUCOSE BLOOD TEST: CPT

## 2017-02-25 PROCEDURE — 74011000258 HC RX REV CODE- 258: Performed by: HOSPITALIST

## 2017-02-25 RX ORDER — POLYETHYLENE GLYCOL 3350 17 G/17G
17 POWDER, FOR SOLUTION ORAL DAILY
Status: DISCONTINUED | OUTPATIENT
Start: 2017-02-26 | End: 2017-02-28 | Stop reason: HOSPADM

## 2017-02-25 RX ORDER — HEPARIN SODIUM 1000 [USP'U]/ML
INJECTION, SOLUTION INTRAVENOUS; SUBCUTANEOUS
Status: DISPENSED
Start: 2017-02-25 | End: 2017-02-25

## 2017-02-25 RX ORDER — ACETAMINOPHEN 500 MG
500 TABLET ORAL
Status: DISCONTINUED | OUTPATIENT
Start: 2017-02-25 | End: 2017-02-28 | Stop reason: HOSPADM

## 2017-02-25 RX ORDER — INSULIN LISPRO 100 [IU]/ML
INJECTION, SOLUTION INTRAVENOUS; SUBCUTANEOUS
Status: DISCONTINUED | OUTPATIENT
Start: 2017-02-25 | End: 2017-02-28 | Stop reason: HOSPADM

## 2017-02-25 RX ORDER — FUROSEMIDE 40 MG/1
40 TABLET ORAL DAILY
Status: DISCONTINUED | OUTPATIENT
Start: 2017-02-26 | End: 2017-02-26

## 2017-02-25 RX ORDER — ALLOPURINOL 100 MG/1
100 TABLET ORAL
Status: DISCONTINUED | OUTPATIENT
Start: 2017-02-27 | End: 2017-02-28 | Stop reason: HOSPADM

## 2017-02-25 RX ORDER — DEXTROSE 50 % IN WATER (D50W) INTRAVENOUS SYRINGE
25-50 AS NEEDED
Status: DISCONTINUED | OUTPATIENT
Start: 2017-02-25 | End: 2017-02-28 | Stop reason: HOSPADM

## 2017-02-25 RX ORDER — MAGNESIUM SULFATE 100 %
16 CRYSTALS MISCELLANEOUS AS NEEDED
Status: DISCONTINUED | OUTPATIENT
Start: 2017-02-25 | End: 2017-02-28 | Stop reason: HOSPADM

## 2017-02-25 RX ORDER — HEPARIN SODIUM 5000 [USP'U]/ML
5000 INJECTION, SOLUTION INTRAVENOUS; SUBCUTANEOUS EVERY 8 HOURS
Status: DISCONTINUED | OUTPATIENT
Start: 2017-02-25 | End: 2017-02-28 | Stop reason: HOSPADM

## 2017-02-25 RX ORDER — HEPARIN SODIUM 1000 [USP'U]/ML
3000 INJECTION, SOLUTION INTRAVENOUS; SUBCUTANEOUS ONCE
Status: COMPLETED | OUTPATIENT
Start: 2017-02-25 | End: 2017-02-25

## 2017-02-25 RX ADMIN — Medication 2 MG: at 05:11

## 2017-02-25 RX ADMIN — HEPARIN SODIUM 5000 UNITS: 5000 INJECTION, SOLUTION INTRAVENOUS; SUBCUTANEOUS at 18:21

## 2017-02-25 RX ADMIN — FUROSEMIDE 80 MG: 10 INJECTION, SOLUTION INTRAVENOUS at 09:05

## 2017-02-25 RX ADMIN — CLOPIDOGREL BISULFATE 75 MG: 75 TABLET ORAL at 09:03

## 2017-02-25 RX ADMIN — HYDRALAZINE HYDROCHLORIDE 100 MG: 50 TABLET, FILM COATED ORAL at 18:21

## 2017-02-25 RX ADMIN — TRAMADOL HYDROCHLORIDE 50 MG: 50 TABLET, FILM COATED ORAL at 09:08

## 2017-02-25 RX ADMIN — Medication 10 ML: at 23:07

## 2017-02-25 RX ADMIN — ISOSORBIDE DINITRATE 20 MG: 20 TABLET ORAL at 18:19

## 2017-02-25 RX ADMIN — HEPARIN SODIUM AND DEXTROSE 880 UNITS/HR: 10000; 5 INJECTION INTRAVENOUS at 04:36

## 2017-02-25 RX ADMIN — SODIUM CHLORIDE 500 MG: 900 INJECTION, SOLUTION INTRAVENOUS at 01:22

## 2017-02-25 RX ADMIN — ALLOPURINOL 100 MG: 100 TABLET ORAL at 09:01

## 2017-02-25 RX ADMIN — VITAMIN D, TAB 1000IU (100/BT) 5000 UNITS: 25 TAB at 09:00

## 2017-02-25 RX ADMIN — SODIUM CHLORIDE 500 MG: 900 INJECTION, SOLUTION INTRAVENOUS at 23:24

## 2017-02-25 RX ADMIN — LISINOPRIL 5 MG: 5 TABLET ORAL at 09:01

## 2017-02-25 RX ADMIN — Medication 10 ML: at 05:10

## 2017-02-25 RX ADMIN — HYDRALAZINE HYDROCHLORIDE 100 MG: 50 TABLET, FILM COATED ORAL at 05:10

## 2017-02-25 RX ADMIN — THERA TABS 1 TABLET: TAB at 09:03

## 2017-02-25 RX ADMIN — HYDRALAZINE HYDROCHLORIDE 100 MG: 50 TABLET, FILM COATED ORAL at 22:57

## 2017-02-25 RX ADMIN — ISOSORBIDE DINITRATE 20 MG: 20 TABLET ORAL at 23:06

## 2017-02-25 RX ADMIN — ASPIRIN 81 MG: 81 TABLET, COATED ORAL at 09:04

## 2017-02-25 RX ADMIN — CARVEDILOL 12.5 MG: 12.5 TABLET, FILM COATED ORAL at 09:01

## 2017-02-25 RX ADMIN — CARVEDILOL 12.5 MG: 12.5 TABLET, FILM COATED ORAL at 18:19

## 2017-02-25 RX ADMIN — DOCUSATE SODIUM 100 MG: 100 CAPSULE, LIQUID FILLED ORAL at 09:00

## 2017-02-25 RX ADMIN — ISOSORBIDE DINITRATE 20 MG: 20 TABLET ORAL at 09:03

## 2017-02-25 RX ADMIN — OSELTAMIVIR PHOSPHATE 30 MG: 30 CAPSULE ORAL at 09:08

## 2017-02-25 RX ADMIN — FERROUS SULFATE TAB 325 MG (65 MG ELEMENTAL FE) 325 MG: 325 (65 FE) TAB at 09:01

## 2017-02-25 RX ADMIN — CEFTRIAXONE 1 G: 1 INJECTION, POWDER, FOR SOLUTION INTRAMUSCULAR; INTRAVENOUS at 22:57

## 2017-02-25 RX ADMIN — SIMVASTATIN 20 MG: 20 TABLET, FILM COATED ORAL at 23:07

## 2017-02-25 RX ADMIN — LEVOFLOXACIN 500 MG: 5 INJECTION, SOLUTION INTRAVENOUS at 23:25

## 2017-02-25 RX ADMIN — CYANOCOBALAMIN TAB 1000 MCG 1000 MCG: 1000 TAB at 09:04

## 2017-02-25 RX ADMIN — HEPARIN SODIUM 3000 UNITS/HR: 1000 INJECTION, SOLUTION INTRAVENOUS; SUBCUTANEOUS at 01:30

## 2017-02-25 RX ADMIN — DOCUSATE SODIUM 100 MG: 100 CAPSULE, LIQUID FILLED ORAL at 18:19

## 2017-02-25 NOTE — PROGRESS NOTES
Hospitalist Progress Note    Patient: Essence Nunez MRN: [id number]  CSN: [id number]    YOB: 1928  Age: 80 y.o. Sex: female    DOA: 2/23/2017 LOS:  LOS: 2 days          Per csi, lasix changed to maintenance due to rising Cr. Heparin/plavix stopped per cardiology. Patient is unable to provide meaningful history. children at bedside report she is doing better today. Minimal cough. They also report she has chronic constipation. Assessment/Plan     1. Worsening shortness of breath with left lower lobe pneumonia and acute on chronic diastolic chf. Follow VQ scan. 2. Acute on chronic diastolic heart failure. Resolving s/p lasix, now on home dose. 3. Influenza A - droplet isolation. tamiflu  4. 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 5. History of breast cancer, status post partial right mastectomy, no clinical evidence of any recurrence of cancer at this time. 6. Hypertensive urgency - improving control. 7. Chronic lacunar infarcts at bilateral basal ganglia, interval new on the right since 2/2016. On asa, statin  8. Echo from a couple weeks ago with EF 50%  9. Advanced age - ST consult. 10. LLL pna - blood cx (2/25) ngtd. On ABX. ST consult  11. Constipation - bowel regimen. 12. Hypertension on home meds. 13. Sepsis poa (fever, tachypnea) - sources include influenza, pna. Urine cx negative. 14. ckd 4 - does not seem to have nephrologist in the community - Dr. Shelby Carolina to consult   13. Elevated blood glucose - check A1c. ssi ADA diet. 16. dvt prophylaxis  17. Full code per documentation from [facility]. Palliative care consult. pcp at [facility] is [employer]. Additional Notes:      Case discussed with:  [x]Patient  [x]Family  [x]Nursing  []Case Management  DVT Prophylaxis:  []Lovenox  [x]Hep SQ  []SCDs  []Coumadin   []On Heparin gtt    Vital signs/Intake and Output:  Visit Vitals    /61 (BP 1 Location: Right arm, BP Patient Position:  At rest)    Pulse 71    Temp 98.1 °F (36.7 °C)    Resp 18    Wt 63.6 kg (140 lb 3.4 oz)    SpO2 97%    Breastfeeding No    BMI 19.56 kg/m2     Current Shift:  02/25 0701 - 02/25 1900  In: 240 [P.O.:240]  Out: -   Last three shifts:  02/23 1901 - 02/25 0700  In: 600 [P.O.:600]  Out: 2200 [Urine:2200]    Awake alert. 2 family members at bedside. Ncat. Perrl. RRR  Decreased bs left base  Soft nt nd  No edema  Moving all 4s. No rash    Medications Reviewed      Labs: Results:       Chemistry Recent Labs      02/24/17   2354 02/23/17   1830   GLU  121*  157*   NA  142  140   K  3.9  4.6   CL  104  103   CO2  27  27   BUN  76*  76*   CREA  4.23*  3.91*   CA  8.2*  8.7   AGAP  11  10   BUCR  18  19   AP  68  64   TP  5.7*  6.2*   ALB  2.5*  3.0*   GLOB  3.2  3.2   AGRAT  0.8  0.9      CBC w/Diff Recent Labs      02/25/17   0940  02/24/17   2354 02/24/17   0650   WBC  4.6  5.1  4.3*   RBC  2.60*  2.46*  2.53*   HGB  7.7*  7.3*  7.4*   HCT  24.9*  23.4*  24.4*   PLT  203  203  205   GRANS  64  64  59   LYMPH  27  21  22   EOS  0  0  0      Cardiac Enzymes Recent Labs      02/23/17   2355  02/23/17   1830   CPK  41  39   CKND1  Cannot be calulated  Cannot be calulated      Coagulation Recent Labs      02/25/17   0940  02/24/17   2354 02/23/17   1830   PTP   --   15.8*   --   14.0   INR   --   1.3*   --   1.1   APTT  122.4*  61.5*   < >  27.1    < > = values in this interval not displayed. Lipid Panel Lab Results   Component Value Date/Time    Cholesterol, total 148 02/25/2017 09:40 AM    HDL Cholesterol 52 02/25/2017 09:40 AM    LDL, calculated 82.2 02/25/2017 09:40 AM    VLDL, calculated 13.8 02/25/2017 09:40 AM    Triglyceride 69 02/25/2017 09:40 AM    CHOL/HDL Ratio 2.8 02/25/2017 09:40 AM      BNP No results for input(s): BNPP in the last 72 hours.    Liver Enzymes Recent Labs      02/24/17   2354   TP  5.7*   ALB  2.5*   AP  68   SGOT  54*      Thyroid Studies Lab Results   Component Value Date/Time TSH 3.09 02/24/2017 11:54 PM        Procedures/imaging: see electronic medical records for all procedures/Xrays and details which were not copied into this note but were reviewed prior to creation of Plan.

## 2017-02-25 NOTE — PROGRESS NOTES
Patient is not available at this time. Chaplain April BUSTILLO  [address] [facility]  [phone]

## 2017-02-25 NOTE — H&P
[address] [place] [address] PS    Name:  Norman Olivo  MR#:  [id number]  :  1928  Account #:  [de-identified]  Date of Adm:  2017      CHIEF COMPLAINT: Shortness of breath. HISTORY OF PRESENT ILLNESS: The patient is an 19-year-old black  female, who has a history of diastolic heart failure, history of breast  cancer and stage IV chronic kidney disease, was directly admitted from  [facility] after she presented there with worsening shortness of  breath. She is a poor historian; however, she states that she has had  shortness of breath for several days and [facility] She was noted with an elevated  troponin at [facility], and had symptoms of influenza. She also  had an infiltrate in the left lower lung, concerning for pneumonia. She  was subsequently directly admitted to [facility] for  further care. PAST MEDICAL HISTORY: Significant for chronic diastolic congestive  heart failure. She has had some elevated troponins in the past, felt  secondary to demand ischemia. She has previously had a left pleural  effusion and she has stage IV chronic kidney disease, and some mild  cognitive impairment. She has had problems with constipation. She  has been noncompliant with medications in the past. She has anemia  of chronic disease, secondary to chronic kidney disease, and she has  dyslipidemia. She has a history of breast cancer. She is status post  partial right mastectomy. She is status post appendectomy. She has  had a cyst incision and drainage procedure in the past, total abdominal  hysterectomy/bilateral salpingo-oophorectomy. ALLERGIES: HAS NO KNOWN DRUG ALLERGIES. MEDICATIONS: She was taking prior to admission include:  1. Lasix 40 mg daily. 2. Hydralazine 1 tab by mouth every 8 hours. 3. Isordil 20 mg by mouth 3 times daily. 4. MiraLax 17 g by mouth daily.   5. Tramadol 50 mg by mouth every 6 hours as needed. 6. Multivitamin p.o. daily. 7. Coreg 12.5 mg p.o. twice daily. 8. Dulcolax 10 mg suppository p.r.n. constipation. 9. Allopurinol 100 mg by mouth daily. 10. Simvastatin (Zocor) 20 mg by mouth nightly. 11. Albuterol 90 mcg 1 to 2 puffs inhaled every 4 hours as needed for  wheezing. 12. Aspirin 81 mg p.o. daily. 13. Ferrous sulfate 325 mg by mouth daily before breakfast.  14. Vitamin B12 1000 mcg by mouth daily. 15. Tylenol 325 mg 2 tabs by mouth every 4 hours as needed for pain. 16. Vitamin D3 5000 unit tablet daily. FAMILY HISTORY: Significant for mother had hypertension. Brother  had hypertension, diabetes mellitus. SOCIAL HISTORY: She has no history of tobacco abuse. No alcohol  abuse. She lives with her son, who is present during evaluation. REVIEW OF SYSTEMS: She is a poor historian. She provides very  little review of systems, but she admits to having some shortness of  breath. Occasional cough. She has had some subjective fevers. She  has had some chest pain, but she gives vague description of the pain. When I asked her why she presented to [facility], she stated that  she did not know and she was overall a poor historian. Her remaining  12-point review of systems was limited secondary to her poor ability to  provide a history, and her family was not very helpful as well, except  they did agree that she appeared short of breath. PHYSICAL EXAMINATION  GENERAL: She is an elderly black female. She appears her stated  age. She is in no distress. VITAL SIGNS: Her maximum temperature at [facility] was 101.5  degrees Fahrenheit, her pulse was 88, respirations ranged from 24-36  and her blood pressure was 170/97. Her O2 saturations were 98%. SKIN: Without jaundice or pallor. HEENT: She is normocephalic, atraumatic. Pupils equal, round, react  to light and accommodation. Extraocular movements intact. Hearing  was stable.  Her oral cavity was without any gross oropharyngeal  abnormalities. NECK: Supple. No JVD, carotid bruits, or goiter. No cervical  lymphadenopathy. Her trachea was midline. LUNGS: Some diminished breath sounds in the left lung greater than  right. There were some scattered rhonchi in left lung greater than right,  and no wheezing. She did have some basilar crackles. HEART: Regular rate and rhythm with an S3 gallop present. ABDOMEN: Soft, nondistended, had normoactive bowel sounds. There  were no palpable masses. No hepatosplenomegaly. No ascites. BACK: Midline spine. No CVA or point tenderness. EXTREMITIES: No clubbing, cyanosis, or edema. No palpable cords. She moved all extremities. She had dorsalis pedis pulses palpable  bilaterally. Capillary refill time was within normal limits. NEUROLOGIC: She was alert. She answered some questions, but  was a poor historian. She did cooperate with exam, followed my  commands. Her speech had a regular rate and rhythm. Her mood and  affect were unremarkable. LABORATORY DATA: Her white count was 4.3, hemoglobin was 7.4,  hematocrit was 24.4, MCV was 96.4, her platelets were 831. She had  59 segs, 22 lymphs, 18 monocytes. had a total CK of 41, MB was less  than 1. The troponin was 0.37. Her EKG showed no acute ST elevations or depressions of  significance, was in sinus rhythm. There was some LVH with  repolarization abnormality. IMPRESSION AND PLAN  1. Worsening shortness of breath with left lower lobe pneumonia by  chest x-ray and acute on chronic diastolic heart failure, clinically. The  patient will be treated for pneumonia with Rocephin and Zithromax IV  antibiotics. Will try to collect sputum for Gram stain, C and S..  2. Acute on chronic diastolic heart failure. Will diurese the patient with  Lasix. Will give 80 mg IV every 8 hours x3 doses, given her stage IV  chronic kidney disease. Cardiology has been consulted.  Dr. Soren Craven has  seen the patient in the past. Will get an echocardiogram to see if there  has been any change in her systolic function. 3. Influenza symptoms. The patient will be given Tamiflu renally  dosed, and will place the patient on appropriate isolation. 4. Late effects of cerebrovascular accident with weakness and some  cognitive impairment. Will continue with supportive care. 5. History of breast cancer, status post partial right mastectomy, no  clinical evidence of any recurrence of cancer at this time. 6. Uncontrolled hypertension, will resume all of her antihypertensive  medicines and will watch her blood pressure trends and make  adjustments in medication doses as needed.         MD Sofy Caba / Deepthi Wu  D:  02/24/2017   22:26  T:  02/24/2017   23:13  Job #:  [id number]

## 2017-02-25 NOTE — PROGRESS NOTES
0111 Per tele, patient had a burst of PAT heart rate 150, pt resting in bed, in no acute distress, will continue to monitor patient    Primary Nurse Mara Siegel RN and Soto Hogan RN performed a dual skin assessment on this patient No impairment noted  King score is 17

## 2017-02-25 NOTE — ROUTINE PROCESS
Patient: Helen Campbell Age: 80 y.o. Sex: female     Bedside and Verbal shift change report given to Dontae RN (oncoming nurse) by Ruy Morse RN (offgoing nurse). Report included the following information SBAR, Kardex, MAR and Recent Results. PATIENT GOALS FOR TODAY PATIENT PRIORITIES FOR TODAY   Goals are: diurese, heparin gtt  Updated on Whiteboard in Patients Room: no   Patient states his/her priorities are: no pain in legs  Updated on Whiteboard in Patients Room: no     SITUATION:   Code Status: Full Code Reason for Admission: Altered mental state  Acute coronary syndromes ([facility]  Acute diastolic heart failure [facility])   Hospital day: 2 Problem List: Active Problems:    Altered mental state (2/23/2017)      Acute coronary syndromes ([facility] (2/23/2017)      Acute diastolic heart failure ([facility] (2/24/2017)       Attending Provider:   Keyanna Collazo MD Allergies: No Known Allergies   BACKGROUND:   Past Medical History:   Past Medical History:   Diagnosis Date    Breast cancer ([facility] 1/17/14    right breast    Carotid duplex 11/19/2014    Severe 70% or greater bilateral ICA stenosis. LICA dissection suggested.  Chronic renal insufficiency     CVA (cerebral vascular accident) ([facility] 2003    with slight Right sided weakness    Echocardiogram 02/22/2016    EF 55-60%. No WMA. Mild-mod LVH. Gr 2 DDfx. No significant valvular heart disease.  Edema     Glaucoma     Gout flare     Hyperlipidemia     Hypertension     Lower extremity arterial testing 12/19/2014    Mod tibio-peroneal arterial disease bilaterally. R YANCY 1.24.  L YANCY 0.74. Bilateral sm vessel disease.     Lump of right breast     URI (upper respiratory infection)      ASSESSMENT:   Neuro:  Neurologic State: Alert, Confused, Orientation Level: Oriented to person, Oriented to place, Oriented to situation CV:  Patient on Telemetry: Cardiac/Telemetry Monitor On: Yes    Box Number: [id number]   Cardiac Rhythm: Normal sinus rhythm  Patient has a pace maker:   Endocrine   Recent Glucose Results:   Lab Results   Component Value Date/Time     (H) 02/24/2017 11:54 PM        Respiratory:  O2 Device: Nasal cannula       Supplemental O2 O2 Flow Rate (L/min): 2 l/min       Incentive Spirometery          GI  Current diet: DIET CARDIAC Regular                                Urinary Catheter 02/24/17 [place]-Criteria for Appropriate Use: Medically/surgically unstable, Strict I/Os       Reasons if patient has a escalante: strict I&O   Patient Safety  Restraints:    Family notified:    Other Alternatives:     Fall Risk   Total Score: 3   Safety Measures: Bed/Chair-Wheels locked, Bed in low position, Caregiver at bedside, Emergency bedside equipment, Fall prevention (comment), Side rails X 3, Gripper socks VTE Prophylaxis     Sequential Compression Device: Bilateral     Patient Refused VTE Prophylaxis: Yes    WOUND (if present)  Wound Type:  none  Dressing present Dressing Present : No  Wound Concerns/Notes: none IV ACCESS     Reasons if patient has a central line: none     PAIN  Pain Assessment  Pain Intensity 1: 0 (02/25/17 0626)  Pain Location 1: Leg, Back  Pain Intervention(s) 1: Medication (see MAR)  Patient Stated Pain Goal: 0  Time of last intervention: please see MAR   Reassessment Completed: no Last Vitals:  Vitals:    02/24/17 2109 02/25/17 0059 02/25/17 0115 02/25/17 0442   BP: 181/73 152/58  164/61   Pulse: 79 68  71   Resp: 25 20  18   Temp: 99.3 °F (37.4 °C) 98.6 °F (37 °C)  98.1 °F (36.7 °C)   SpO2: 97% 94%  97%   Weight:   63.6 kg (140 lb 3.4 oz)       Last 3 Weights:  Last 3 Recorded Weights in this Encounter    02/23/17 2331 02/25/17 0115   Weight: 64.9 kg (143 lb) 63.6 kg (140 lb 3.4 oz)     Weight change: -1.264 kg (-2 lb 12.6 oz)  LAB RESULTS  Recent Labs      02/24/17   2354  02/24/17   0650  02/23/17   1830   WBC  5.1  4.3*  8.8   HGB  7.3*  7.4*  8.0*   HCT  23.4*  24.4*  26.1*   PLT  203  205  257     Recent Labs      02/24/17   2354 02/23/17   1830   NA  142  140   K  3.9  4.6   GLU  121*  157*   BUN  76*  76*   CREA  4.23*  3.91*   CA  8.2*  8.7   INR  1.3*  1.1      RECOMMENDATIONS AND DISCHARGE PLANNING   1. Discharge plan for patient // Needs or barriers to disharge: antibiotics, heparin gtt . 2. Estimated Discharge Date: unknown Posted on Whiteboard in Patients Room: no    \"HEALS\" SAFETY CHECK   A safety check occurred in the patient's room between off going nurse and oncoming nurse listed above. The safety check included the below items  Area Items   H  High Alert Meds  - Verify all high alert medication drips (heparin, PCA, etc.)   E  Equipment - Suction is set up for ALL patients (with eric)  - Red plugs utilized for all equipment (IV pumps, etc.)  - WOWs wiped down at end of shift.  - Room stocked with oxygen, suction, and other unit-specific supplies   A  Alarms - Bed alarm is set for fall risk patients  - Ensure chair alarm is in place and activated if patient is up in a chair   L  Lines - Check IV for any infiltration  - Jang bag is empty if patient has a Jang   - Tubing and IV bags are labeled   S  Safety   - Room is clean, patient is clean, and equipment is clean. - Ensure room is clear and free of clutter  - Hallways are clear from equipment besides carts.    - Fall bracelet on for fall risk patients  - Suction is set up for ALL patients (with eric)  - Isolation precautions followed, supplies available outside room, sign posted   Sukh Archer RN

## 2017-02-25 NOTE — CONSULTS
Consult Note  Consult requested by: dr Gonzalez Tom is a 80 y.o. female [address] who is being seen on consult for renal failure  Chief Complaint   Patient presents with    Back Pain    Cough    Fever     Admission diagnosis: <principal problem not specified>     HPI: 80 y o  [place] female with hx of crf stage 4,admitted with flu,pneumonia. she was treated with antibiotics and diuretics for diastolic heart failure. was asked to see today as renal function worsen. pt has hx of breast cancer,cva,dementia  Past Medical History:   Diagnosis Date    Breast cancer ([facility] [address] 1/17/14    right breast    Carotid duplex 11/19/2014    Severe 70% or greater bilateral ICA stenosis. LICA dissection suggested.  Chronic renal insufficiency     CVA (cerebral vascular accident) ([facility] [address] 2003    with slight Right sided weakness    Echocardiogram 02/22/2016    EF 55-60%. No WMA. Mild-mod LVH. Gr 2 DDfx. No significant valvular heart disease.  Edema     Glaucoma     Gout flare     Hyperlipidemia     Hypertension     Lower extremity arterial testing 12/19/2014    Mod tibio-peroneal arterial disease bilaterally. R YANCY 1.24.  L YANCY 0.74. Bilateral sm vessel disease.  Lump of right breast     URI (upper respiratory infection)       Past Surgical History:   Procedure Laterality Date    HX APPENDECTOMY      HX CYST INCISION AND DRAINAGE      PT DOES NOT REMEMBER EXACT DATES, TUCKER BREAST DONE MORE THAN 20 YEARS AGO. BENIGN FINDINGS PER PATIENT.  HX GYN      JUSTIN/BSO    HX HYSTERECTOMY      HX MASTECTOMY  1/17/2014    RIGHT PARTIAL MASTECTOMY performed by Cristofer Sherman MD at [facility] Marital status:      Spouse name: N/A    Number of children: N/A    Years of education: N/A     Occupational History    Not on file.      Social History Main Topics    Smoking status: Never Smoker    Smokeless tobacco: Never Used    Alcohol use No  Drug use: No    Sexual activity: Not Currently     Other Topics Concern    Not on file     Social History Narrative       Family History   Problem Relation Age of Onset    Hypertension Mother     Hypertension Brother     Diabetes Brother      No Known Allergies     Home Medications:     Prior to Admission Medications   Prescriptions Last Dose Informant Patient Reported? Taking?   acetaminophen (TYLENOL) 325 mg tablet Unknown at Unknown time  No No   Sig: Take 2 Tabs by mouth every four (4) hours as needed. albuterol (PROVENTIL HFA, VENTOLIN HFA, PROAIR HFA) 90 mcg/actuation inhaler Unknown at Unknown time  No No   Sig: Take 1-2 Puffs by inhalation every four (4) hours as needed for Wheezing. allopurinol (ZYLOPRIM) 100 mg tablet Unknown at Unknown time  No No   Sig: Take 1 Tab by mouth daily. aspirin delayed-release 81 mg tablet Unknown at Unknown time  No No   Sig: Take 1 Tab by mouth daily. bisacodyl (DULCOLAX) 10 mg suppository Unknown at Unknown time  No No   Sig: Insert 10 mg into rectum as needed. carvedilol (COREG) 12.5 mg tablet Unknown at Unknown time  No No   Sig: Take 1 Tab by mouth two (2) times daily (with meals) for 30 days. cholecalciferol, VITAMIN D3, (VITAMIN D3) 5,000 unit tab tablet Unknown at Unknown time  No No   Sig: Take 1 Tab by mouth daily. cyanocobalamin (VITAMIN B-12) 1,000 mcg tablet Unknown at Unknown time  Yes No   Sig: Take 1,000 mcg by mouth daily. ferrous sulfate 325 mg (65 mg iron) tablet Unknown at Unknown time  Yes No   Sig: Take 325 mg by mouth Daily (before breakfast). furosemide (LASIX) 40 mg tablet Unknown at Unknown time  No No   Sig: Take 1 Tab by mouth daily. hydrALAZINE (APRESOLINE) 100 mg tablet Unknown at Unknown time  No No   Sig: Take 1 Tab by mouth every eight (8) hours for 30 days. inhalational spacing device Unknown at Unknown time  No No   Si Each by Does Not Apply route as needed.    isosorbide dinitrate (ISORDIL) 20 mg tablet Unknown at Unknown time  No No   Sig: Take 1 Tab by mouth three (3) times daily for 30 days. polyethylene glycol (MIRALAX) 17 gram packet Unknown at Unknown time  No No   Sig: Take 1 Packet by mouth daily. simvastatin (ZOCOR) 20 mg tablet Unknown at Unknown time  No No   Sig: Take 1 Tab by mouth nightly. therapeutic multivitamin (THERAGRAN) tablet Unknown at Unknown time  No No   Sig: Take 1 Tab by mouth daily. traMADol (ULTRAM) 50 mg tablet Unknown at Unknown time  No No   Sig: Take 1 Tab by mouth every six (6) hours as needed. Max Daily Amount: 200 mg.       Facility-Administered Medications: None       Current Facility-Administered Medications   Medication Dose Route Frequency    [START ON 2/26/2017] furosemide (LASIX) tablet 40 mg  40 mg Oral DAILY    [START ON 2/26/2017] polyethylene glycol (MIRALAX) packet 17 g  17 g Oral DAILY    heparin (porcine) injection 5,000 Units  5,000 Units SubCUTAneous Q8H    insulin lispro (HUMALOG) injection   SubCUTAneous AC&HS    glucose chewable tablet 16 g  16 g Oral PRN    glucagon (GLUCAGEN) injection 1 mg  1 mg IntraMUSCular PRN    dextrose (D50W) injection syrg 12.5-25 g  25-50 mL IntraVENous PRN    acetaminophen (TYLENOL) tablet 500 mg  500 mg Oral Q6H PRN    [START ON 2/27/2017] allopurinol (ZYLOPRIM) tablet 100 mg  100 mg Oral Q MON, WED & FRI    levoFLOXacin (LEVAQUIN) 500 mg in D5W IVPB  500 mg IntraVENous Q48H    aspirin delayed-release tablet 81 mg  81 mg Oral DAILY    bisacodyl (DULCOLAX) suppository 10 mg  10 mg Rectal DAILY PRN    carvedilol (COREG) tablet 12.5 mg  12.5 mg Oral BID WITH MEALS    cholecalciferol (VITAMIN D3) tablet 5,000 Units  5,000 Units Oral DAILY    cyanocobalamin tablet 1,000 mcg  1,000 mcg Oral DAILY    ferrous sulfate tablet 325 mg  325 mg Oral ACB    hydrALAZINE (APRESOLINE) tablet 100 mg  100 mg Oral Q8H    isosorbide dinitrate (ISORDIL) tablet 20 mg  20 mg Oral TID    simvastatin (ZOCOR) tablet 20 mg  20 mg Oral QHS    therapeutic multivitamin (THERAGRAN) tablet 1 Tab  1 Tab Oral DAILY    traMADol (ULTRAM) tablet 50 mg  50 mg Oral Q8H PRN    docusate sodium (COLACE) capsule 100 mg  100 mg Oral BID    morphine injection 2 mg  2 mg IntraVENous Q4H PRN    nitroglycerin (NITROSTAT) tablet 0.4 mg  0.4 mg SubLINGual Q5MIN PRN    naloxone (NARCAN) injection 0.4 mg  0.4 mg IntraVENous PRN    diphenhydrAMINE (BENADRYL) injection 12.5 mg  12.5 mg IntraVENous Q4H PRN    sodium chloride (NS) flush 5-10 mL  5-10 mL IntraVENous Q8H    sodium chloride (NS) flush 5-10 mL  5-10 mL IntraVENous PRN    ondansetron (ZOFRAN) injection 4 mg  4 mg IntraVENous Q6H PRN    albuterol-ipratropium (DUO-NEB) 2.5 MG-0.5 MG/3 ML  3 mL Nebulization Q4H PRN    azithromycin (ZITHROMAX) 500 mg in 0.9% sodium chloride 250 mL IVPB  500 mg IntraVENous Q24H    cefTRIAXone (ROCEPHIN) 1 g in 0.9% sodium chloride (MBP/ADV) 50 mL MBP  1 g IntraVENous Q24H    oseltamivir (TAMIFLU) capsule 30 mg  30 mg Oral DAILY       Review of Systems:   Review of systems not obtained due to patient factors.   Data Review:    Labs: Results:       Chemistry Recent Labs      02/24/17   2354  02/23/17   1830   GLU  121*  157*   NA  142  140   K  3.9  4.6   CL  104  103   CO2  27  27   BUN  76*  76*   CREA  4.23*  3.91*   CA  8.2*  8.7   AGAP  11  10   BUCR  18  19   AP  68  64   TP  5.7*  6.2*   ALB  2.5*  3.0*   GLOB  3.2  3.2   AGRAT  0.8  0.9      PTH  Lab Results   Component Value Date/Time    Calcium 8.2 02/24/2017 11:54 PM      CBC w/Diff Recent Labs      02/25/17   0940  02/24/17   2354  02/24/17   0650   WBC  4.6  5.1  4.3*   RBC  2.60*  2.46*  2.53*   HGB  7.7*  7.3*  7.4*   HCT  24.9*  23.4*  24.4*   PLT  203  203  205   GRANS  64  64  59   LYMPH  27  21  22   EOS  0  0  0      Coagulation Recent Labs      02/25/17   0940  02/24/17   2354   02/23/17   1830   PTP   --   15.8*   --   14.0   INR   --   1.3*   --   1.1   APTT  122.4*  61.5*   < >  27.1    < > = values in this interval not displayed. Iron/Ferritin No results for input(s): IRON in the last 72 hours. No lab exists for component: TIBCCALC   BNP No results for input(s): BNPP in the last 72 hours. Cardiac Enzymes Recent Labs      02/23/17   2355  02/23/17   1830   CPK  41  39   CKND1  Cannot be calulated  Cannot be calulated      Liver Enzymes Recent Labs      02/24/17   2354   TP  5.7*   ALB  2.5*   AP  68   SGOT  54*      Thyroid Studies Lab Results   Component Value Date/Time    TSH 3.09 02/24/2017 11:54 PM        Urinalysis Lab Results   Component Value Date/Time    Color YELLOW 02/23/2017 06:40 PM    Appearance CLEAR 02/23/2017 06:40 PM    Specific gravity 1.012 02/23/2017 06:40 PM    pH (UA) 7.5 02/23/2017 06:40 PM    Protein 300 02/23/2017 06:40 PM    Glucose NEGATIVE  02/23/2017 06:40 PM    Ketone NEGATIVE  02/23/2017 06:40 PM    Bilirubin NEGATIVE  02/23/2017 06:40 PM    Urobilinogen 1.0 02/23/2017 06:40 PM    Nitrites NEGATIVE  02/23/2017 06:40 PM    Leukocyte Esterase NEGATIVE  02/23/2017 06:40 PM    Epithelial cells 2+ 02/23/2017 06:40 PM    Bacteria See additional order 04/27/2015 08:51 AM    WBC 1 to 3 02/23/2017 06:40 PM    RBC 0 to 2 10/26/2016 10:59 AM         IMAGES:   XR Results (maximum last 3): Results from [place] encounter on 02/23/17   XR CHEST SNGL V   Narrative AP CHEST, PORTABLE    INDICATION: Sepsis    COMPARISON: Prior chest x-rays, most recent 2/17/2017    FINDINGS:  EKG leads overlie the patient. Cardiac silhouette is stable, mildly enlarged. Atherosclerosis noted. The  pulmonary vasculature is unremarkable. Slight interval decrease in size of a  small left pleural effusion. Probable trace right pleural effusion. Persistent  haziness at the retrocardiac left lung base. No convincing evidence for  pneumothorax. Skinfold artifact noted at the peripheral left hemithorax. No  acute osseous abnormalities are identified.  Spondylosis and degenerative changes  of the shoulders are again noted. Impression Impression:     1. Stable to slightly decreased size of left pleural effusion. Trace right  pleural effusion.  -Overlying atelectasis present, but superimposed infiltrate not excluded    2. Stable mild enlargement of the cardiac silhouette                  Results from Hospital Encounter encounter on 02/15/17   XR SWALLOW [facility] VIDEO   Narrative Modified barium swallow. CPT CODE: 59016    HISTORY: Dysphagia, left lower lobe infiltrate, dry cough intermittently  throughout oral trials, mild pharyngeal dysphagia. COMPARISON: None. FLUORO TIME: 1 minutes 6 seconds    NUMBER OF FLUOROSCOPIC IMAGES: 0    FINDINGS:    Study was performed in conjunction with the speech therapist.  The video is  available in the department for review. Patient swallowed multiple consistencies  of barium mixtures including thin liquids, nectar, honey, puree, solids. There is moderate penetration with thin liquids and nectar consistency only. There is residual in the valleculae with all consistencies. There is no aspiration. Impression IMPRESSION:    Moderate penetration with thin liquids and nectar consistencies. XR CHEST PA LAT   Narrative CHEST PA AND LATERAL     CPT CODE: 85103    HISTORY: Left pleural effusion, Chest pain, associated with shortness of breath,  productive cough and congestion x2-3 weeks,   previous history of right breast  carcinoma. .    COMPARISON: Chest x-ray February 15, January 23, 2017, October 26, 2016. FINDINGS:    PA and lateral views obtained. The cardiac silhouette is mildly enlarged. There  is tortuosity of the aorta with calcified plaque. Pulmonary vascularity is  normal.  There is persistent opacification at the left base with obscuration of  the lower left cardiac border and the left hemidiaphragm. There is left greater  than right blunting of the costophrenic angles.  There is bilateral glenohumeral  and AC joint space narrowing with spurring. 1.3 cm ossific density at the left  medial humeral neck consistent with a intra-articular body or periarticular  ossific density. Impression IMPRESSION:    Left greater than right pleural effusions. Right is considered quite small. Left  is small to moderate. Possible mild underlying left basal atelectasis or infiltrate. Mild stable cardiomegaly. Bilateral shoulder arthritis. CT Results (maximum last 3): Results from [place] encounter on 02/23/17   CT ABD PELV WO CONT   Narrative CT Of The Abdomen And Pelvis Without Contrast    CPT CODE 32526,19146    CLINICAL HISTORY: Chronic renal insufficiency, CVA and hypertension. Breast  cancer. Presenting with shortness of breath, and left flank pain. TECHNIQUE: 5 mm helical MDCT scan was obtained of the abdomen and pelvis. Sagittal and coronal images created from original data set. All CT scans at  this facility are performed using dose optimization techniques as appropriate to  a performed exam, to include automated exposure control, adjustment of the mA  and/or kV according to patient's size (including appropriate matching for site  specific examinations), or use of iterative reconstruction technique. COMPARISON: Portable chest x-ray today. FINDINGS:  view demonstrates normal bowel gas pattern. Dense barium in the  rectum. CT Of The Abdomen: Lung bases: Dense opacity basilar segments left lower lobe  medially. Hazy densities right lung base posteriorly. Marked calcifications of  the tracheobronchial tree. Small effusions layering posteriorly. Larger  subpulmonic component on the left than the right. Heart and pericardium: Cardiomegaly. Left ventricular dilatation. Mild coronary  artery calcifications. CHEST WALL: Coarse calcifications inferiorly at the right breast. No mass is  identified. Liver: No focal lesions.  Artifact traversing the liver because the patient was  scanned with her arms at her sides.    Spleen: Normal.    Gallbladder: Nondistended. No calcified stones. Pancreas: Normal.    Adrenal glands: Mild thickening bilaterally. Kidneys: No hydronephrosis or nephrolithiasis. There is a hypodense lesion at  the interpolar region right kidney anteriorly measuring 22 mm and water  attenuation (39). Retroperitoneum: Moderate burden calcified plaque at aortoiliac vessels. No  lymphadenopathy. The bowel: Stomach and duodenum and small bowel are normal. Appendix not  identified but there is no inflammatory stranding around the base of the cecum. .    CT Of The Pelvis: Peritoneal space: No free fluid. GYN: Prior hysterectomy. No adnexal masses. Urinary bladder: Normal.    Bony skeleton: Multiple levels severe disc space narrowing at the lumbar spine. Multiple levels marked spinal stenosis due to ligamentous and facet hypertrophy,  disc disease. No acute bony abnormalities. .         Impression IMPRESSION:    No kidney stones or secondary signs of recently passed stone. Small effusions. Dependent densities at the lung bases, likely passive  atelectasis. Cannot exclude infection. Right renal cyst.    Significant degenerative changes of the lumbar spine. CT HEAD WO CONT   Narrative CT HEAD WITHOUT IV CONTRAST    INDICATION: Confusion. COMPARISON: CT head 2/21/2016. TECHNIQUE: Serial axial CT images were obtained from the skull vertex to foramen  magnum without IV contrast. All CT scans at this facility are performed using  dose optimization technique as appropriate to a performed exam, to include  automated exposure control, adjustment of the mA and/or kV according to  patient's size (including appropriate matching for site-specific examinations),  or use of iterative reconstruction technique. FINDINGS:  Evaluation mildly limited due to motion artifact. Visualized paranasal sinuses  are free from significant mucosal disease. . Moderate periventricular/cerebral  white matter hypoattenuation. Chronic lacunar infarct at the left basal ganglia. Chronic-appearing lacunar infarct in the right basal ganglia, but possibly new  from prior exam.No evidence for acute intracranial hemorrhage, acute infarct, or  mass lesion. No evidence for hydrocephalus. Cerebral atrophy noted. The  calvarium appears intact. Impression IMPRESSION:  No definite evidence for acute intracranial process. -CT is known to be relatively insensitive for acute ischemia and first 24-48  hours, and MRI may be helpful in clinically indicated. 2. Moderate white matter disease, presumed chronic ischemic. Chronic lacunar  infarcts at bilateral basal ganglia, interval new on the right since 2/2016. Results from [place] encounter on 02/21/16   CT HEAD WO CONT   Addendum Addendum: Addendum: Additional images were reformatted from the original  images to see the hyperdensity better. With reformatting, the hyperdensity is  likely the result of streak artifact. Follow up as clinically indicated. Los Verdugo MD 2/21/2016  9:41 AM             Narrative EXAM: CT of the Head without contrast    INDICATION: Increasing unsteadiness and weakness especially on the left. TECHNIQUE: CT of the head from the vertex to the skull base performed. No IV  contrast administered. COMPARISON: None. FINDINGS: There is a rounded hyperdensity in the medulla which is seen on only  one slice. It measures 1.8 cm. Ventricles and sulci are symmetric but moderately  enlarged. There is a left caudate lacune. Mild to moderate periventricular and  subcortical white matter hypodensities are noted. No midline shift, mass effect  or mass lesion appreciated. The gray-white junction is preserved. No evidence  of an acute infarct identified. The mastoid air cells are well aerated. The  paranasal sinuses are unremarkable. The orbits are normal. The scalp and skull  are unremarkable. Impression IMPRESSION:  1.   Rounded hyperdensity in the medulla. Correlation with MRI versus follow up  CT with and without IV contrast may be considered. It may be artifactual.           MRI Results (maximum last 3): Results from [place] encounter on 02/21/16   MRI BRAIN WO CONT   Narrative MR BRAIN WITHOUT CONTRAST    CPT CODE: 91590    HISTORY: Dizziness. COMPARISON: Head CT 2/21/2016. TECHNIQUE: Brain scanned with sagittal T1W scans, axial T2W scans, axial  diffusion weighted images. FINDINGS:      Diffusion-weighted sequences markedly limited by motion artifact. No convincing  areas of restricted diffusion. No foci of susceptibility change to suggest areas  of remote microhemorrhage or pathologic mineralization. Scattered foci of  increased T2/flair signal hyperintensity along the periventricular and  subcortical white matter is a nonspecific finding which is most commonly  encountered in the setting of chronic microvascular disease. Remote infarct left  lentiform nucleus to corona radiata. No mass effect. No evidence of extra axial  fluid collection. Flow voids are maintained in the major vessels at the base of  the skull and dural venous sinuses, suggesting patency. Midline structures to  include the skull base, pituitary, pineal region, corpus callosum and contents  of the posterior fossa are unremarkable. There is diffuse prominence of the  ventricular system, associated with proportional widening of the cortical  cerebral sulci, compatible with mild to moderate generalized volume loss. Impression IMPRESSION:    Within limitations of motion artifact on the diffusion-weighted images, no  evidence of an acute intracranial process. Mild to moderate generalized volume loss and moderate burden chronic  microvascular disease including remote lacunar infarct left basal ganglia to  corona radiata. Nuclear Medicine Results (maximum last 3): No results found for this or any previous visit.     US Results (maximum last 3): Results from [place] encounter on 08/06/12   US BREAST RT   Narrative Bilateral DIAGNOSTIC MAMMOGRAM  Right BREAST ULTRASOUND    HISTORY: Palpable lump right breast. Prior benign breast biopsies. COMPARISON: None. FINDINGS:    Digital mammograms were performed. There are scattered bilateral fibroglandular densities. A marker was placed on  the patient's skin within the medial right breast near the 3:00 position in the  area of the palpable abnormality. Adjacent to the marker there is an  ill-defined 2.1 cm x 1.3 cm x 1.7 cm mass corresponding with the palpable  abnormality. There are bilateral benign-appearing coarse calcifications. No  suspicious masses or calcifications are present in the left breast.    Subsequently sonography was performed of the right breast 3:00 position 4 cm  from the nipple in the area of the mammographic mass. This was performed by  both myself and the ultrasound technologist. This demonstrates a 1.7 x 1.4 x  1.5 cm rounded solid micro lobulated ill-defined mass. Sonography was also  performed by both myself and the technologist of the right axilla demonstrating  no adenopathy. BIRADS: 5: Highly Suspicious Of Malignancy: Appropriate Action Should Be Taken. Impression IMPRESSION:    Recommend referral to surgeon and ultrasound-guided biopsy of the highly  suspicious mass within the medial right breast 3:00 position. This was  discussed with the patient and the patient met with nurse navigator, Desmond Dhaliwal, prior to leaving the department. She will contact the patient's physician  and coordinate followup appointment. DEXA Results (maximum last 3): No results found for this or any previous visit. ENRRIQUE Results (maximum last 3):     Results from [place] encounter on 02/12/15   ENRRIQUE 3D SELAM W MAMMO RT DX DIGTL   Addendum Technique descriptions are in error (I used a template and forgot to edit  out the necessary technique descriptions). The following is the correct  technique descriptions. There are no implants and the images are not \"implant  displaced. \" - Technique:  2D/3D combined mammographic evaluation is performed with spot magnification views. CAD (R2 alia. 9.4) analysis is performed. Any area  marked are correlated with the mammogram.       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 Comparison:  8/6/12, 8/12/14. Technique:  2D digital mammograms are performed with CC and MLO views obtained  of the right breasts along with digital tomosynthesis (with breast implant  displacement only - no native tomosynthesis images with implants) and additional  spot compression views. CAD analysis  performed with the computer-aided  detection system. Any areas marked are correlated with the mammogram.    Breast Composition:  Dense heterogeneously. Findings: The right breast medial aspect focal area of architectural distortion  is again noted, related to prior surgery. No new suspicious focal area of  architectural distortion is appreciated. No dominant mass or suspicious  microcalcification cluster is detected. There are stable coarse calcifications  in the right breast as well. No significant overall interval changes are  appreciated. The digital breast tomosynthesis images demonstrate no suspicious  findings. Impression Impression:    Probably benign mammogram.  Recommend bilateral mammograms in 6 months to return  to screening schedule. BI-RADS Assessment Category 3. Probably benign.         Results from [place] encounter on 08/12/14   ENRRIQUE MAMMO BI DX DIGTL   Narrative DIGITAL BILATERAL DIAGNOSTIC MAMMOGRAM with CAD     CPT CODE:  and 24797    HISTORY: History of right breast carcinoma treated with partial mastectomy  2012., Previous benign left breast biopsy    COMPARISON: Mammogram August 6, 2012. TECHNIQUE: Computer assisted detection, iCAD Second Look 7. 2- H was utilized. CC  and MLO views obtained of both breasts. Additional true lateral views were  obtained of both breasts. Raised skin surface lesions are marked with a  circular marker. Linear marker(s) placed on raised skin surface scar(s). BBs  are placed on the nipples. FINDINGS:     There are scattered areas of fibroglandular density. The right breast is slightly smaller than the left breast. There is mild  anterior skin thickening. There is distortion with mild skin retraction along  the medial inferior border of the nipple at the site of previous partial  mastectomy   No new focal mass is seen. There are no suspicious calcifications. Impression IMPRESSION:    Expected mild postsurgical changes right breast. No suspicious finding. Annual mammography recommended. BIRADS 2: Benign finding(s)        Results from Hospital Encounter encounter on 08/06/12   ENRRIQUE MAMMO BI DX DIGTL   Narrative Bilateral DIAGNOSTIC MAMMOGRAM  Right BREAST ULTRASOUND    HISTORY: Palpable lump right breast. Prior benign breast biopsies. COMPARISON: None. FINDINGS:    Digital mammograms were performed. There are scattered bilateral fibroglandular densities. A marker was placed on  the patient's skin within the medial right breast near the 3:00 position in the  area of the palpable abnormality. Adjacent to the marker there is an  ill-defined 2.1 cm x 1.3 cm x 1.7 cm mass corresponding with the palpable  abnormality. There are bilateral benign-appearing coarse calcifications. No  suspicious masses or calcifications are present in the left breast.    Subsequently sonography was performed of the right breast 3:00 position 4 cm  from the nipple in the area of the mammographic mass.  This was performed by  both myself and the ultrasound technologist. This demonstrates a 1.7 x 1.4 x  1.5 cm rounded solid micro lobulated ill-defined mass. Sonography was also  performed by both myself and the technologist of the right axilla demonstrating  no adenopathy. BIRADS: 5: Highly Suspicious Of Malignancy: Appropriate Action Should Be Taken. Impression IMPRESSION:    Recommend referral to surgeon and ultrasound-guided biopsy of the highly  suspicious mass within the medial right breast 3:00 position. This was  discussed with the patient and the patient met with nurse navigator, Wilmar Wesley, prior to leaving the department. She will contact the patient's physician  and coordinate followup appointment. IR Results (maximum last 3): No results found for this or any previous visit. VAS/US Results (maximum last 3): Results from Hospital Encounter encounter on 12/19/14   LOWER EXT ART PVR MULT LEVEL SEG PRESSURES  Results from Hospital Encounter encounter on 11/19/14   DUPLEX CAROTID BILATERAL    PET Results (maximum last 3): No results found for this or any previous visit. No results found for this or any previous visit.   @[email or web address])    CULTURE:   )  Recent Labs      02/23/17   1840  02/23/17   1830  02/23/17   1810   CULT  NO GROWTH 2 DAYS  NO GROWTH 2 DAYS  NO GROWTH 2 DAYS     Recent Labs      02/23/17   1840  02/23/17   1830  02/23/17   1810   CULT  NO GROWTH 2 DAYS  NO GROWTH 2 DAYS  NO GROWTH 2 DAYS       Physical Assessment:     Visit Vitals    /61 (BP 1 Location: Right arm, BP Patient Position: At rest)    Pulse 71    Temp 98.1 °F (36.7 °C)    Resp 18    Wt 63.6 kg (140 lb 3.4 oz)    SpO2 97%    Breastfeeding No    BMI 19.56 kg/m2     Last 3 Recorded Weights in this Encounter    02/23/17 2331 02/25/17 0115   Weight: 64.9 kg (143 lb) 63.6 kg (140 lb 3.4 oz)       Intake/Output Summary (Last 24 hours) at 02/25/17 1502  Last data filed at 02/25/17 0900   Gross per 24 hour   Intake 840 ml   Output             2200 ml   Net            -1360 ml       Physial Exam:  General appearance: no distress, appears stated age  Skin: normal coloration and turgor, no rashes, no suspicious skin lesions noted. HEENT: Head; normocephalic, atraumatic. ALIDA. ENT- ENT exam normal, no neck nodes or sinus tenderness. Lungs: diminished breath sounds bases  Heart: regular rate and rhythm, S1, S2 normal, no murmur, click, rub or gallop  Abdomen: soft, non-tender. Bowel sounds normal. No masses,  no organomegaly  Extremities: edema +    PLAN / RECOMMENDATION:    Acute/crf stage 4,reviewed medical records and labs. discussed with her niece. worsening renal failure is probably related to infection,diuretics. she is poor candidate for dialysis. I will discuss further with family  crf stage 4,proteinuria,anemia,check for multiple myeloma  Check phos,pth  Adjust meds for renal failure     Thank you for the consultation to participate in patient's care. I have personally discussed my plan with the referring physician.      Dallas Lewis MD  February 25, 2017

## 2017-02-26 LAB
ANION GAP BLD CALC-SCNC: 8 MMOL/L (ref 3–18)
BASOPHILS # BLD AUTO: 0 K/UL (ref 0–0.1)
BASOPHILS # BLD: 0 % (ref 0–2)
BUN SERPL-MCNC: 77 MG/DL (ref 7–18)
BUN/CREAT SERPL: 19 (ref 12–20)
CALCIUM SERPL-MCNC: 8.8 MG/DL (ref 8.5–10.1)
CHLORIDE SERPL-SCNC: 100 MMOL/L (ref 100–108)
CO2 SERPL-SCNC: 29 MMOL/L (ref 21–32)
CREAT SERPL-MCNC: 4.01 MG/DL (ref 0.6–1.3)
DIFFERENTIAL METHOD BLD: ABNORMAL
EOSINOPHIL # BLD: 0 K/UL (ref 0–0.4)
EOSINOPHIL NFR BLD: 1 % (ref 0–5)
ERYTHROCYTE [DISTWIDTH] IN BLOOD BY AUTOMATED COUNT: 13.7 % (ref 11.6–14.5)
EST. AVERAGE GLUCOSE BLD GHB EST-MCNC: 100 MG/DL
GLUCOSE BLD STRIP.AUTO-MCNC: 110 MG/DL (ref 70–110)
GLUCOSE BLD STRIP.AUTO-MCNC: 112 MG/DL (ref 70–110)
GLUCOSE BLD STRIP.AUTO-MCNC: 130 MG/DL (ref 70–110)
GLUCOSE BLD STRIP.AUTO-MCNC: 97 MG/DL (ref 70–110)
GLUCOSE SERPL-MCNC: 95 MG/DL (ref 74–99)
HBA1C MFR BLD: 5.1 % (ref 4.2–5.6)
HCT VFR BLD AUTO: 23.8 % (ref 35–45)
HGB BLD-MCNC: 7.4 G/DL (ref 12–16)
LYMPHOCYTES # BLD AUTO: 31 % (ref 21–52)
LYMPHOCYTES # BLD: 1.3 K/UL (ref 0.9–3.6)
MCH RBC QN AUTO: 29.6 PG (ref 24–34)
MCHC RBC AUTO-ENTMCNC: 31.1 G/DL (ref 31–37)
MCV RBC AUTO: 95.2 FL (ref 74–97)
MONOCYTES # BLD: 0.6 K/UL (ref 0.05–1.2)
MONOCYTES NFR BLD AUTO: 14 % (ref 3–10)
NEUTS SEG # BLD: 2.3 K/UL (ref 1.8–8)
NEUTS SEG NFR BLD AUTO: 54 % (ref 40–73)
PLATELET # BLD AUTO: 204 K/UL (ref 135–420)
PMV BLD AUTO: 10.8 FL (ref 9.2–11.8)
POTASSIUM SERPL-SCNC: 3.7 MMOL/L (ref 3.5–5.5)
RBC # BLD AUTO: 2.5 M/UL (ref 4.2–5.3)
SODIUM SERPL-SCNC: 137 MMOL/L (ref 136–145)
WBC # BLD AUTO: 4.3 K/UL (ref 4.6–13.2)

## 2017-02-26 PROCEDURE — 36415 COLL VENOUS BLD VENIPUNCTURE: CPT | Performed by: HOSPITALIST

## 2017-02-26 PROCEDURE — 74011000258 HC RX REV CODE- 258: Performed by: HOSPITALIST

## 2017-02-26 PROCEDURE — 74011250637 HC RX REV CODE- 250/637: Performed by: PHYSICIAN ASSISTANT

## 2017-02-26 PROCEDURE — 74011250637 HC RX REV CODE- 250/637: Performed by: EMERGENCY MEDICINE

## 2017-02-26 PROCEDURE — 65660000000 HC RM CCU STEPDOWN

## 2017-02-26 PROCEDURE — 74011250637 HC RX REV CODE- 250/637: Performed by: HOSPITALIST

## 2017-02-26 PROCEDURE — 85025 COMPLETE CBC W/AUTO DIFF WBC: CPT | Performed by: HOSPITALIST

## 2017-02-26 PROCEDURE — 74011250636 HC RX REV CODE- 250/636: Performed by: HOSPITALIST

## 2017-02-26 PROCEDURE — 92610 EVALUATE SWALLOWING FUNCTION: CPT

## 2017-02-26 PROCEDURE — 83036 HEMOGLOBIN GLYCOSYLATED A1C: CPT | Performed by: HOSPITALIST

## 2017-02-26 PROCEDURE — 82962 GLUCOSE BLOOD TEST: CPT

## 2017-02-26 PROCEDURE — 80048 BASIC METABOLIC PNL TOTAL CA: CPT | Performed by: HOSPITALIST

## 2017-02-26 RX ADMIN — OSELTAMIVIR PHOSPHATE 30 MG: 30 CAPSULE ORAL at 12:40

## 2017-02-26 RX ADMIN — HYDRALAZINE HYDROCHLORIDE 100 MG: 50 TABLET, FILM COATED ORAL at 06:01

## 2017-02-26 RX ADMIN — HEPARIN SODIUM 5000 UNITS: 5000 INJECTION, SOLUTION INTRAVENOUS; SUBCUTANEOUS at 13:30

## 2017-02-26 RX ADMIN — CEFTRIAXONE 1 G: 1 INJECTION, POWDER, FOR SOLUTION INTRAMUSCULAR; INTRAVENOUS at 22:01

## 2017-02-26 RX ADMIN — VITAMIN D, TAB 1000IU (100/BT) 5000 UNITS: 25 TAB at 08:57

## 2017-02-26 RX ADMIN — CARVEDILOL 12.5 MG: 12.5 TABLET, FILM COATED ORAL at 18:15

## 2017-02-26 RX ADMIN — Medication 10 ML: at 16:49

## 2017-02-26 RX ADMIN — Medication 10 ML: at 22:03

## 2017-02-26 RX ADMIN — FERROUS SULFATE TAB 325 MG (65 MG ELEMENTAL FE) 325 MG: 325 (65 FE) TAB at 08:57

## 2017-02-26 RX ADMIN — GUAIFENESIN AND DEXTROMETHORPHAN HYDROBROMIDE 1 TABLET: 600; 30 TABLET, EXTENDED RELEASE ORAL at 13:30

## 2017-02-26 RX ADMIN — THERA TABS 1 TABLET: TAB at 08:57

## 2017-02-26 RX ADMIN — DOCUSATE SODIUM 100 MG: 100 CAPSULE, LIQUID FILLED ORAL at 08:57

## 2017-02-26 RX ADMIN — GUAIFENESIN AND DEXTROMETHORPHAN HYDROBROMIDE 1 TABLET: 600; 30 TABLET, EXTENDED RELEASE ORAL at 18:15

## 2017-02-26 RX ADMIN — HYDRALAZINE HYDROCHLORIDE 100 MG: 50 TABLET, FILM COATED ORAL at 16:49

## 2017-02-26 RX ADMIN — SIMVASTATIN 20 MG: 20 TABLET, FILM COATED ORAL at 21:45

## 2017-02-26 RX ADMIN — ISOSORBIDE DINITRATE 20 MG: 20 TABLET ORAL at 08:57

## 2017-02-26 RX ADMIN — SODIUM CHLORIDE 500 MG: 900 INJECTION, SOLUTION INTRAVENOUS at 22:02

## 2017-02-26 RX ADMIN — HEPARIN SODIUM 5000 UNITS: 5000 INJECTION, SOLUTION INTRAVENOUS; SUBCUTANEOUS at 05:55

## 2017-02-26 RX ADMIN — CYANOCOBALAMIN TAB 1000 MCG 1000 MCG: 1000 TAB at 08:57

## 2017-02-26 RX ADMIN — FUROSEMIDE 40 MG: 40 TABLET ORAL at 08:57

## 2017-02-26 RX ADMIN — ASPIRIN 81 MG: 81 TABLET, COATED ORAL at 08:57

## 2017-02-26 RX ADMIN — CARVEDILOL 12.5 MG: 12.5 TABLET, FILM COATED ORAL at 08:57

## 2017-02-26 RX ADMIN — ISOSORBIDE DINITRATE 20 MG: 20 TABLET ORAL at 18:16

## 2017-02-26 RX ADMIN — HEPARIN SODIUM 5000 UNITS: 5000 INJECTION, SOLUTION INTRAVENOUS; SUBCUTANEOUS at 21:45

## 2017-02-26 RX ADMIN — Medication 10 ML: at 05:56

## 2017-02-26 RX ADMIN — HYDRALAZINE HYDROCHLORIDE 100 MG: 50 TABLET, FILM COATED ORAL at 21:45

## 2017-02-26 RX ADMIN — ISOSORBIDE DINITRATE 20 MG: 20 TABLET ORAL at 21:45

## 2017-02-26 NOTE — PROGRESS NOTES
Problem: Dysphagia (Adult)  Goal: *Acute Goals and Plan of Care (Insert Text)  Dysphagia Present: Yes     Ratings of Dysphagia: Moderate - Severe    Aspiration: Yes At HIGH Risk     Recommendations:  Diet: soft solids, honey thick liquids  Meds: in puree or with honey thick liquid wash  Aspiration Precautions  Oral Care TID    Goals: Patient will:  1. Tolerate PO trials with 0 s/s overt distress in 4/5 trials  2. Utilize compensatory swallow strategies/maneuvers (decrease bite/sip, size/rate, alt. liq/sol) with min cues in 4/5 trials  3. Perform oral-motor/laryngeal exercises to increase oropharyngeal swallow function with min cues  4. Complete an objective swallow study (i.e., MBSS) to assess swallow integrity, r/o aspiration, and determine of safest LRD, min A  Outcome: Progressing Towards Goal  SPEECH LANGUAGE PATHOLOGY BEDSIDE SWALLOW EVALUATION     Patient: Shanel Urban (80 y.o. female)  Date: 2/26/2017  Primary Diagnosis: Altered mental state  Acute coronary syndromes (HCC)  Acute diastolic heart failure (HCC)        Precautions: aspiration         ASSESSMENT :  Based on the objective data described below, the patient presents with moderate-severe pharyngeal dysphagia. Patient completed MBS 2/17/17 with silent penetration to the laryngeal vestibule with thin and nectar thick liquids; no asipration/penetration events with honey thick liquids. This day patietn assessed with thin, puree, and solids with inconsistent coughing (?baseline 2/2 pna?). Patient's dtr reported patient was utilizing honey thick liquids when discharged to nursing home. At this time, recommend initiate dental soft, honey thick liquids. Patient educated on importance of safe swallowing guidelines (small bites/sips, decreased rate, alt solids/liquids, HOB >60 for all PO intake); verbalized and demonstrated comprehension. Discussed with RNB[address]bi St to cont to follow up.         Patient will benefit from skilled intervention to address the above impairments. Patients rehabilitation potential is considered to be Fair  Factors which may influence rehabilitation potential include:   [ ]            None noted  [ ]            Mental ability/status  [X]            Medical condition  [ ]            Home/family situation and support systems  [ ]            Safety awareness  [ ]            Pain tolerance/management  [ ]            Other:        PLAN :  Recommendations and Planned Interventions:  Dental soft, honey thick liquids  Frequency/Duration: Patient will be followed by speech-language pathology 1-2 times per day/4-7 days per week to address goals. Discharge Recommendations: Skilled Nursing Facility       SUBJECTIVE:   Patient stated Yeah thick stuff. OBJECTIVE:       Past Medical History:   Diagnosis Date    Breast cancer ([facility] [address] 1/17/14     right breast    Carotid duplex 11/19/2014     Severe 70% or greater bilateral ICA stenosis. LICA dissection suggested.  Chronic renal insufficiency      CVA (cerebral vascular accident) ([facility] [address] 2003     with slight Right sided weakness    Echocardiogram 02/22/2016     EF 55-60%. No WMA. Mild-mod LVH. Gr 2 DDfx. No significant valvular heart disease.  Edema      Glaucoma      Gout flare      Hyperlipidemia      Hypertension      Lower extremity arterial testing 12/19/2014     Mod tibio-peroneal arterial disease bilaterally. R YANCY 1.24.  L YANCY 0.74. Bilateral sm vessel disease.  Lump of right breast      URI (upper respiratory infection)       Past Surgical History:   Procedure Laterality Date    HX APPENDECTOMY        HX CYST INCISION AND DRAINAGE         PT DOES NOT REMEMBER EXACT DATES, TUCKER BREAST DONE MORE THAN 20 YEARS AGO. BENIGN FINDINGS PER PATIENT.     HX GYN         JUSTIN/BSO    HX HYSTERECTOMY        HX MASTECTOMY   1/17/2014     RIGHT PARTIAL MASTECTOMY performed by Cyrus Morin MD at [address]     Prior Level of Function/Home Situation: independent  Home Situation  Home Environment: Private residence  One/Two Story Residence: One story  Living Alone: No  Support Systems: Family member(s)  Patient Expects to be Discharged to[de-identified] Private residence  Current DME Used/Available at Home: Dorn Olszewski, rolling, [phone] [facility] [address] chair  Diet prior to admission: regular, honey thick liquids  Current Diet:  Soft, honey thick liquids   Cognitive and Communication Status:  Neurologic State: Alert  Orientation Level: Oriented X4  Cognition: Appropriate decision making  Perception: Appears intact  Perseveration: No perseveration noted  Safety/Judgement: Awareness of environment  Oral Assessment:  Oral Assessment  Labial: No impairment  Dentition: Intact  Oral Hygiene: fair  Lingual: No impairment  Velum: No impairment  Mandible: No impairment  P.O. Trials:  Patient Position: HOB60  Vocal quality prior to P.O.: No impairment  Consistency Presented:  Thin liquid;Mechanical soft;Pudding  How Presented: SLP-fed/presented  How Much: 10  Bolus Acceptance: No impairment  Bolus Formation/Control: No impairment     Propulsion: No impairment  Oral Residue: None  Initiation of Swallow: Delayed (# of seconds)  Laryngeal Elevation: Weak  Aspiration Signs/Symptoms: Weak cough  Pharyngeal Phase Characteristics: Suspected pharyngeal residue  Effective Modifications: None           Oral Phase Severity: No impairment  Pharyngeal Phase Severity : Moderate-severe     GCODESwallowing:  Swallow Current Status CL= 60-79%   Swallow Goal Status CK= 40-59%     The severity rating is based on the following outcomes:  JUAN FRANCISCO Noms Swallow               Clinical Judgement     PAIN:  Start of Eval: 0  End of Eval: 0      After treatment:   [ ]            Patient left in no apparent distress sitting up in chair  [X]            Patient left in no apparent distress in bed  [X]            Call bell left within reach  [X]            Nursing notified  [ ]            Family present  [ ]            Caregiver present  [ ] Bed alarm activated      COMMUNICATION/EDUCATION:   [X]            Aspiration precautions; swallow safety; compensatory techniques. [ ]            Patient/family have participated as able in goal setting and plan of care. [X]            Patient/family agree to work toward stated goals and plan of care. [ ]            Patient understands intent and goals of therapy; neutral about participation. [ ]            Patient unable to participate in goal setting/plan of care; educ ongoing with interdisciplinary staff  [ ]             Posted safety precautions in patient's room.      Thank you for this referral.  George Brunner MS [facility] SLP  Time Calculation: 45 mins

## 2017-02-26 NOTE — ROUTINE PROCESS
Patient: Artis Ledesma Age: 80 y.o. Sex: female     Bedside and Verbal shift change report given to Dontae RN (oncoming nurse) by Ibis Kruse RN (offgoing nurse). Report included the following information SBAR, Kardex, MAR and Recent Results. PATIENT GOALS FOR TODAY PATIENT PRIORITIES FOR TODAY   Goals are:strict intake and output, monitor labs, aspiration precaustions  Updated on Whiteboard in Patients Room: yes  Patient states his/her priorities are: unable to state  Updated on Whiteboard in Patients Room: yes     SITUATION:   Code Status: Full Code Reason for Admission: Altered mental state  Acute coronary syndromes ([facility] [address]  Acute diastolic heart failure [facility])   Hospital day: 3 Problem List: Active Problems:    Altered mental state (2/23/2017)      Acute coronary syndromes ([facility] [address] (2/23/2017)      Acute diastolic heart failure ([facility] [address] (2/24/2017)       Attending Provider:   Bakari Nogueira MD Allergies: No Known Allergies   BACKGROUND:   Past Medical History:   Past Medical History:   Diagnosis Date    Breast cancer ([facility] [address] 1/17/14    right breast    Carotid duplex 11/19/2014    Severe 70% or greater bilateral ICA stenosis. LICA dissection suggested.  Chronic renal insufficiency     CVA (cerebral vascular accident) ([facility] [address] 2003    with slight Right sided weakness    Echocardiogram 02/22/2016    EF 55-60%. No WMA. Mild-mod LVH. Gr 2 DDfx. No significant valvular heart disease.  Edema     Glaucoma     Gout flare     Hyperlipidemia     Hypertension     Lower extremity arterial testing 12/19/2014    Mod tibio-peroneal arterial disease bilaterally. R YANCY 1.24.  L YANCY 0.74. Bilateral sm vessel disease.     Lump of right breast     URI (upper respiratory infection)      ASSESSMENT:   Neuro:  Neurologic State: Alert, Eyes open spontaneously, Orientation Level: Oriented to person, Disoriented to place, Disoriented to situation, Disoriented to time CV:  Patient on Telemetry: Cardiac/Telemetry Monitor On: Yes    Box Number: 99   Cardiac Rhythm: Normal sinus rhythm  Patient has a pace maker:   Endocrine   Recent Glucose Results:   Lab Results   Component Value Date/Time    GLU 95 02/26/2017 04:00 AM    GLUCPOC 97 02/26/2017 06:31 AM    GLUCPOC 104 02/25/2017 09:51 PM    GLUCPOC 193 (H) 02/25/2017 01:01 PM        Respiratory:  O2 Device: Nasal cannula       Supplemental O2 O2 Flow Rate (L/min): 2 l/min       Incentive Spirometery          GI  Current diet: DIET DIABETIC WITH OPTIONS Consistent Carb 1800kcal; Regular; No Conc. Sweets                                Urinary Catheter 02/24/17 [place]-Criteria for Appropriate Use: Strict I/Os       Reasons if patient has a escalante: yes, strict intake and output   Patient Safety  Restraints:    Family notified:    Other Alternatives:     Fall Risk   Total Score: 3   Safety Measures: Bed/Chair alarm on, Bed/Chair-Wheels locked, Bed in low position, Call light within reach, Emergency bedside equipment, Fall prevention (comment), Gripper socks, Side rails X2, Side rails X 3 VTE Prophylaxis     Sequential Compression Device: Bilateral     Patient Refused VTE Prophylaxis: Yes    WOUND (if present)  Wound Type:  none  Dressing present Dressing Present : No  Wound Concerns/Notes: none IV ACCESS     Reasons if patient has a central line: none     PAIN  Pain Assessment  Pain Intensity 1: 0 (02/26/17 0432)  Pain Location 1: Leg, Back  Pain Intervention(s) 1: Medication (see MAR)  Patient Stated Pain Goal: 0  Time of last intervention: 0400   Reassessment Completed: no Last Vitals:  Vitals:    02/25/17 2000 02/25/17 2304 02/26/17 0400 02/26/17 0514   BP: 98/57 137/48 145/65    Pulse: (!) 58 63 73    Resp: 16 18 18    Temp: 97.8 °F (36.6 °C) 98.2 °F (36.8 °C) 98.1 °F (36.7 °C)    SpO2: 96% 95% 96%    Weight:    64 kg (141 lb 3.2 oz)      Last 3 Weights:  Last 3 Recorded Weights in this Encounter    02/23/17 2331 02/25/17 0115 02/26/17 0514   Weight: 64.9 kg (143 lb) 63.6 kg (140 lb 3.4 oz) 64 kg (141 lb 3.2 oz)     Weight change: 0.448 kg (15.8 oz)  LAB RESULTS  Recent Labs      02/26/17   0400  02/25/17   0940  02/24/17   2354   WBC  4.3*  4.6  5.1   HGB  7.4*  7.7*  7.3*   HCT  23.8*  24.9*  23.4*   PLT  204  203  203     Recent Labs      02/26/17   0400  02/25/17   1757  02/24/17   2354  02/23/17   1830   NA  137   --   142  140   K  3.7   --   3.9  4.6   GLU  95   --   121*  157*   BUN  77*   --   76*  76*   CREA  4.01*   --   4.23*  3.91*   CA  8.8  8.4*  8.2*  8.7   INR   --    --   1.3*  1.1      RECOMMENDATIONS AND DISCHARGE PLANNING   1. Discharge plan for patient // Needs or barriers to disharge: tbd .  2. Estimated Discharge Date: tbd Posted on Whiteboard in Patients Room: yes   \"HEALS\" SAFETY CHECK   A safety check occurred in the patient's room between off going nurse and oncoming nurse listed above. The safety check included the below items  Area Items   H  High Alert Meds  - Verify all high alert medication drips (heparin, PCA, etc.)   E  Equipment - Suction is set up for ALL patients (with eric)  - Red plugs utilized for all equipment (IV pumps, etc.)  - WOWs wiped down at end of shift.  - Room stocked with oxygen, suction, and other unit-specific supplies   A  Alarms - Bed alarm is set for fall risk patients  - Ensure chair alarm is in place and activated if patient is up in a chair   L  Lines - Check IV for any infiltration  - Jang bag is empty if patient has a Jang   - Tubing and IV bags are labeled   S  Safety   - Room is clean, patient is clean, and equipment is clean. - Ensure room is clear and free of clutter  - Hallways are clear from equipment besides carts.    - Fall bracelet on for fall risk patients  - Suction is set up for ALL patients (with eric)  - Isolation precautions followed, supplies available outside room, sign posted   Sirisha Smiley RN

## 2017-02-26 NOTE — PROGRESS NOTES
Respiratory Care Assessment for Bronchial hygiene or Lung Expansion Therapy  Patient  Hector Simpson     80 y.o.   female     2/26/2017  2:44 PM  Patient Active Problem List   Diagnosis Code    Hyperlipidemia E78.5    Glaucoma H40.9    CVA (cerebral vascular accident) ([facility] [address] I63.9    Cerebral thrombosis with cerebral infarction ([facility] [address] I63.30    Long term (current) use of anticoagulants Z79.01    Gout attack M10.9    Chronic renal failure N18.9    Leg pain M79.606    Lump of right breast N63    Breast mass in female N58    Breast cancer ([facility] [address] C50.919    Cellulitis L03.90    Psoriasis L40.9    CRF (chronic renal failure) N18.9    Carotid stenosis I65.29    Fatigue R53.83    Hypertensive renal sclerosis with hypertension I12.9    Primary osteoarthritis involving multiple joints M15.0    ACS [facility]acute coronary syndrome) ([facility] I24.9    NSTEMI (non-ST elevated myocardial infarction) ([facility] [address] T93.3    Diastolic CHF, acute on chronic (HCC) I50.33    Chest pain R07.9    Altered mental state R41.82    Acute coronary syndromes ([facility] [address] I24.9    Acute diastolic heart failure (HCC) I50.31       ABG:  Date:2/26/2017  No results found for: PH, PHI, PCO2, PCO2I, PO2, PO2I, HCO3, HCO3I, FIO2, FIO2I    Chest X-ray:  Date:2/26/2017    Results from Hospital Encounter encounter on 02/23/17   XR CHEST PORT   Narrative Portable Chest 1546 hours    CPT CODE:72379    HISTORY:  Productive cough, chest pain, recent pneumonia,   rule out PE.    COMPARISON: Chest x-ray February 23 of February 15, 2017, January 23, 2017, lung  scan same day 2/25/2017. FINDINGS:     There is persistent opacity in the retrocardiac region on the left with minimal  lateral blunting of the costophrenic angle. The left costophrenic angle is  sharp. Pulmonary vascularity is normal. The heart is mildly enlarged. Bilateral  glenohumeral and AC joint space osteoarthritic change demonstrated. Cassette  artifact projects over the patient's lower neck. Singh Lopez Impression IMPRESSION:    Persistent abnormality at the left base consistent with left pleural effusion  with underlying infiltrate or atelectasis.         Lab Test:  Date:2/26/2017  WBC:   Lab Results   Component Value Date/Time    WBC 4.3 02/26/2017 04:00 AM   HGB: Lab Results   Component Value Date/Time    HGB 7.4 02/26/2017 04:00 AM    PLTS: Lab Results   Component Value Date/Time    PLATELET 390 67/36/2494 04:00 AM       SaO2%/flow:   SpO2 Readings from Last 1 Encounters:   02/26/17 92%       Vital Signs:   Patient Vitals for the past 8 hrs:   Temp Pulse Resp BP SpO2   02/26/17 1158 97.8 °F (36.6 °C) (!) 56 18 146/52 92 %   02/26/17 0800 97.8 °F (36.6 °C) 61 18 137/48 95 %         RA/O2 flow/device:NC 2LPM          First Inital Assesment:     [x]Yes []No   Reevaluation/Reassessment:    []Yes [x]No     CHART REVIEW   Points 0 X 1 X 2 X 3 X 4 X Points   Pulmonary History Smoking History (1) none  Recent Smoking History <1 PPD  Recent Smoking History >1 PPD  Pulmonary Disease or Impairment  Severe Pulmonary Disease  3   Surgical History No Surgery  General Surgery  Lower Abdominal  Thoracic or Upper Abdominal  Thoracic & Pulmonary Disease  0   CXR Clear or not indicated  Chronic changes or CXR Pending  Infiltrate, atelectasis or pleural effusions  Infiltrates in more than 1lobe  Infiltrates +atelectasis  +/or pleural effusions  2     PATIENT ASSESSMENT    0 X 1 X 2 X 3 X 4 X Points   Respiratory Pattern Regular pattern RR 12-20  Increased RR 21-27   Mild Dyspnea at rest, irregular pattern RR 28-32  Moderate Dyspnea at rest, Use of accessory muscles, RR 33-36  Severe Dyspnea, Use of accessory muscles RR >36  0   Mental Status Alert Oriented cooperative  Confused, Follows commands  Lethargic, Does not follow commands  Obtunded  Unresponsive  0   Breath Sounds Clear  Decreased Unilaterally  Decreased Bilaterally  Mild Scattered wheezing or Crackles in bases  Severe Wheezing or rhonchi  1   Cough Strong dry NPC Strong Productive  Weak NPC  Weak productive or weak with rhonchi  No cough or may require suctioning  2   Level of Activity Ambulatory  Ambulatory with assistance  Temporarily Non-ambulatory  Non-Ambulatory, able to position self  Unable to position self, confined to bed  1   Total Points/Score:   9     Specific Intervention Chart([facility])    Bronchial Hygiene/Secretion Clearance:    []EZPAP []Rotation bed with vibration    []CPT with percussor []CPT via vest   [x]Oscillastiang positive pressure expiratory device      Lung Expansion:    []Incentive Spirometer w/RT visits []Incentive Spirometer w/nursing    []EZPAP      *Suctioning:    []Nasal Tracheal []Tracheal     *suctioning will be ordered and done PRN with an associated frequency such as QID/PRN based on score       Other:    Care Plan   Level # Score Modality Frequency Comment   Level 1 >17 - -    Level 2 14-17 - -    Level 3 10-13 - -    [facility] 1-9 [facility] and self administered      BRONCHIAL HYGIENE SCORING AND FREQUENCY GUIDELINES   Frequency Indications/Findings Level #   Q4 ATC Copious secretions, SOB, unable to sleep 1   QID & PRN at night Moderate amounts of secretions 2   TID or Q6 wa Small amounts of secretions and poor cough: recent history of secretions 3   BID or Q8 wa Unable to deep breathe and cough effectively 4        Comments:      Respiratory Therapist: Kirk Jimenez, RT

## 2017-02-26 NOTE — PROGRESS NOTES
RENAL DAILY PROGRESS NOTE    Patient: Madeline Cushing               Sex: female          DOA: 2/23/2017  5:55 PM        YOB: 1928      Age:  80 y.o.        LOS:  LOS: 3 days     Subjective: Madeline Cushing is a 80 y.o.  who presents with Altered mental state  Acute coronary syndromes (HCC)  Acute diastolic heart failure ([facility] [address] Asked to evaluate for worsening renal failure. hx of crf stage 4,admitted with sob,flu,diastolic heart failure,treated with lasix  Chief complains: none  - Reviewed last 24 hrs events     Current Facility-Administered Medications   Medication Dose Route Frequency    guaiFENesin-dextromethorphan SR (HUMIBID DM) 600-30 mg tablet 1 Tab  1 Tab Oral BID    polyethylene glycol (MIRALAX) packet 17 g  17 g Oral DAILY    heparin (porcine) injection 5,000 Units  5,000 Units SubCUTAneous Q8H    insulin lispro (HUMALOG) injection   SubCUTAneous AC&HS    glucose chewable tablet 16 g  16 g Oral PRN    glucagon (GLUCAGEN) injection 1 mg  1 mg IntraMUSCular PRN    dextrose (D50W) injection syrg 12.5-25 g  25-50 mL IntraVENous PRN    acetaminophen (TYLENOL) tablet 500 mg  500 mg Oral Q6H PRN    [START ON 2/27/2017] allopurinol (ZYLOPRIM) tablet 100 mg  100 mg Oral Q MON, WED & FRI    levoFLOXacin (LEVAQUIN) 500 mg in D5W IVPB  500 mg IntraVENous Q48H    aspirin delayed-release tablet 81 mg  81 mg Oral DAILY    bisacodyl (DULCOLAX) suppository 10 mg  10 mg Rectal DAILY PRN    carvedilol (COREG) tablet 12.5 mg  12.5 mg Oral BID WITH MEALS    cholecalciferol (VITAMIN D3) tablet 5,000 Units  5,000 Units Oral DAILY    cyanocobalamin tablet 1,000 mcg  1,000 mcg Oral DAILY    ferrous sulfate tablet 325 mg  325 mg Oral ACB    hydrALAZINE (APRESOLINE) tablet 100 mg  100 mg Oral Q8H    isosorbide dinitrate (ISORDIL) tablet 20 mg  20 mg Oral TID    simvastatin (ZOCOR) tablet 20 mg  20 mg Oral QHS    therapeutic multivitamin (THERAGRAN) tablet 1 Tab  1 Tab Oral DAILY  traMADol (ULTRAM) tablet 50 mg  50 mg Oral Q8H PRN    docusate sodium (COLACE) capsule 100 mg  100 mg Oral BID    morphine injection 2 mg  2 mg IntraVENous Q4H PRN    nitroglycerin (NITROSTAT) tablet 0.4 mg  0.4 mg SubLINGual Q5MIN PRN    naloxone (NARCAN) injection 0.4 mg  0.4 mg IntraVENous PRN    diphenhydrAMINE (BENADRYL) injection 12.5 mg  12.5 mg IntraVENous Q4H PRN    sodium chloride (NS) flush 5-10 mL  5-10 mL IntraVENous Q8H    sodium chloride (NS) flush 5-10 mL  5-10 mL IntraVENous PRN    ondansetron (ZOFRAN) injection 4 mg  4 mg IntraVENous Q6H PRN    albuterol-ipratropium (DUO-NEB) 2.5 MG-0.5 MG/3 ML  3 mL Nebulization Q4H PRN    azithromycin (ZITHROMAX) 500 mg in 0.9% sodium chloride 250 mL IVPB  500 mg IntraVENous Q24H    cefTRIAXone (ROCEPHIN) 1 g in 0.9% sodium chloride (MBP/ADV) 50 mL MBP  1 g IntraVENous Q24H    oseltamivir (TAMIFLU) capsule 30 mg  30 mg Oral DAILY       Objective:     Visit Vitals    /52 (BP 1 Location: Right arm, BP Patient Position: Supine)    Pulse (!) 56    Temp 97.8 °F (36.6 °C)    Resp 18    Wt 64 kg (141 lb 3.2 oz)    SpO2 92%    Breastfeeding No    BMI 19.69 kg/m2       Intake/Output Summary (Last 24 hours) at 02/26/17 1347  Last data filed at 02/26/17 0900   Gross per 24 hour   Intake              620 ml   Output             1400 ml   Net             -780 ml       Physical Examination:       RS: lungs diminished breath sounds  CVS: S1-S2 heard, RRR, No S3 / murmur  Abdomen: Soft, Non tender, Not distended, Positive bowel sounds, no organomegaly, no CVA / supra pubic tenderness  Extremities: No edema, no cyanosis, skin is warm on touch. HEENT: Head is atraumatic, PERRLA, conjunctiva pink & non icteric. No JVD or carotid bruit   Musculoskeletal: No gross joints or bone deformities   Lymph Node: No palpable cervical, axillary or groin lymphadenopathy.       Data Review:      Labs:     Hematology: Recent Labs      02/26/17   0400  02/25/17   0940 02/24/17   2354  02/24/17   0650  02/23/17   1830   WBC  4.3*  4.6  5.1  4.3*  8.8   HGB  7.4*  7.7*  7.3*  7.4*  8.0*   HCT  23.8*  24.9*  23.4*  24.4*  26.1*     Chemistry: Recent Labs      02/26/17   0400  02/25/17   1757  02/24/17   2354  02/23/17   1830   BUN  77*   --   76*  76*   CREA  4.01*   --   4.23*  3.91*   CA  8.8  8.4*  8.2*  8.7   ALB   --    --   2.5*  3.0*   K  3.7   --   3.9  4.6   NA  137   --   142  140   CL  100   --   104  103   CO2  29   --   27  27   PHOS   --   4.8   --    --    GLU  95   --   121*  157*        Images:    XR (Most Recent). CXR reviewed by me and compared with previous CXR   Results from [place] encounter on 02/23/17   XR CHEST PORT   Narrative Portable Chest 1546 hours    CPT CODE:20360    HISTORY:  Productive cough, chest pain, recent pneumonia,   rule out PE.    COMPARISON: Chest x-ray February 23 of February 15, 2017, January 23, 2017, lung  scan same day 2/25/2017. FINDINGS:     There is persistent opacity in the retrocardiac region on the left with minimal  lateral blunting of the costophrenic angle. The left costophrenic angle is  sharp. Pulmonary vascularity is normal. The heart is mildly enlarged. Bilateral  glenohumeral and AC joint space osteoarthritic change demonstrated. Cassette  artifact projects over the patient's lower neck. .         Impression IMPRESSION:    Persistent abnormality at the left base consistent with left pleural effusion  with underlying infiltrate or atelectasis. CT (Most Recent)   Results from Hospital Encounter encounter on 02/23/17   CT ABD PELV WO CONT   Narrative CT Of The Abdomen And Pelvis Without Contrast    CPT CODE 70045,63172    CLINICAL HISTORY: Chronic renal insufficiency, CVA and hypertension. Breast  cancer. Presenting with shortness of breath, and left flank pain. TECHNIQUE: 5 mm helical MDCT scan was obtained of the abdomen and pelvis. Sagittal and coronal images created from original data set.   All CT scans at  this facility are performed using dose optimization techniques as appropriate to  a performed exam, to include automated exposure control, adjustment of the mA  and/or kV according to patient's size (including appropriate matching for site  specific examinations), or use of iterative reconstruction technique. COMPARISON: Portable chest x-ray today. FINDINGS:  view demonstrates normal bowel gas pattern. Dense barium in the  rectum. CT Of The Abdomen: Lung bases: Dense opacity basilar segments left lower lobe  medially. Hazy densities right lung base posteriorly. Marked calcifications of  the tracheobronchial tree. Small effusions layering posteriorly. Larger  subpulmonic component on the left than the right. Heart and pericardium: Cardiomegaly. Left ventricular dilatation. Mild coronary  artery calcifications. CHEST WALL: Coarse calcifications inferiorly at the right breast. No mass is  identified. Liver: No focal lesions. Artifact traversing the liver because the patient was  scanned with her arms at her sides. Spleen: Normal.    Gallbladder: Nondistended. No calcified stones. Pancreas: Normal.    Adrenal glands: Mild thickening bilaterally. Kidneys: No hydronephrosis or nephrolithiasis. There is a hypodense lesion at  the interpolar region right kidney anteriorly measuring 22 mm and water  attenuation (39). Retroperitoneum: Moderate burden calcified plaque at aortoiliac vessels. No  lymphadenopathy. The bowel: Stomach and duodenum and small bowel are normal. Appendix not  identified but there is no inflammatory stranding around the base of the cecum. .    CT Of The Pelvis: Peritoneal space: No free fluid. GYN: Prior hysterectomy. No adnexal masses. Urinary bladder: Normal.    Bony skeleton: Multiple levels severe disc space narrowing at the lumbar spine. Multiple levels marked spinal stenosis due to ligamentous and facet hypertrophy,  disc disease.  No acute bony abnormalities. .         Impression IMPRESSION:    No kidney stones or secondary signs of recently passed stone. Small effusions. Dependent densities at the lung bases, likely passive  atelectasis. Cannot exclude infection. Right renal cyst.    Significant degenerative changes of the lumbar spine. EKG No results found for this or any previous visit. I have personally reviewed the old medical records and patient's labs    Plan / Recommendation:      1.acute/crf stage 4,related to diuretics,infection. discussed with niece ,poor candidate for dialysis  2. crf stage 4,proteinuria,check 24 hours urine for protein and immunofixation  3.anemia,related to iron deficiency  And renal failure. checking for mm  4.adjust all meds to renal failure    D/w Dr. Radha Velarde MD  Nephrology  2/26/2017

## 2017-02-26 NOTE — ROUTINE PROCESS
Patient: Naveen Browne Age: 80 y.o. Sex: female     Bedside and Verbal shift change report given to Umm BURROUGHS RN (oncoming nurse) by Ayush Gray RN (offgoing nurse). Report included the following information SBAR, Kardex, MAR and Recent Results. PATIENT GOALS FOR TODAY PATIENT PRIORITIES FOR TODAY   Goals are:strict intake and output, monitor labs, aspiration precaustions  Updated on Whiteboard in Patients Room: yes  Patient states his/her priorities are: unable to state  Updated on Whiteboard in Patients Room: yes     SITUATION:   Code Status: Full Code Reason for Admission: Altered mental state  Acute coronary syndromes ([facility] [address]  Acute diastolic heart failure [facility])   Hospital day: 2 Problem List: Active Problems:    Altered mental state (2/23/2017)      Acute coronary syndromes ([facility] [address] (2/23/2017)      Acute diastolic heart failure ([facility] [address] (2/24/2017)       Attending Provider:   Tessy Carvalho MD Allergies: No Known Allergies   BACKGROUND:   Past Medical History:   Past Medical History:   Diagnosis Date    Breast cancer ([facility] [address] 1/17/14    right breast    Carotid duplex 11/19/2014    Severe 70% or greater bilateral ICA stenosis. LICA dissection suggested.  Chronic renal insufficiency     CVA (cerebral vascular accident) ([facility] [address] 2003    with slight Right sided weakness    Echocardiogram 02/22/2016    EF 55-60%. No WMA. Mild-mod LVH. Gr 2 DDfx. No significant valvular heart disease.  Edema     Glaucoma     Gout flare     Hyperlipidemia     Hypertension     Lower extremity arterial testing 12/19/2014    Mod tibio-peroneal arterial disease bilaterally. R YANCY 1.24.  L YANCY 0.74. Bilateral sm vessel disease.     Lump of right breast     URI (upper respiratory infection)      ASSESSMENT:   Neuro:  Neurologic State: Alert, Orientation Level: Disoriented to place, Disoriented to situation, Disoriented to time, Oriented to person CV:  Patient on Telemetry: Cardiac/Telemetry Monitor On: No Box Number: [id number]   Cardiac Rhythm: Normal sinus rhythm  Patient has a pace maker:   Endocrine   Recent Glucose Results:   Lab Results   Component Value Date/Time     (H) 02/24/2017 11:54 PM    GLUCPOC 193 (H) 02/25/2017 01:01 PM    GLUCPOC 120 (H) 02/25/2017 08:11 AM        Respiratory:  O2 Device: Nasal cannula       Supplemental O2 O2 Flow Rate (L/min): 2 l/min       Incentive Spirometery          GI  Current diet: DIET DIABETIC WITH OPTIONS Consistent Carb 1800kcal; Regular; No Conc. Sweets                                Urinary Catheter 02/24/17 [place]-Criteria for Appropriate Use: Strict I/Os       Reasons if patient has a escalante: yes, strict intake and output   Patient Safety  Restraints:    Family notified:    Other Alternatives:     Fall Risk   Total Score: 3   Safety Measures: Bed/Chair alarm on, Bed/Chair-Wheels locked, Bed in low position, Call light within reach, Fall prevention (comment), Side rails X2 VTE Prophylaxis     Sequential Compression Device: Bilateral     Patient Refused VTE Prophylaxis: Yes    WOUND (if present)  Wound Type:  none  Dressing present Dressing Present : No  Wound Concerns/Notes: none IV ACCESS     Reasons if patient has a central line: none     PAIN  Pain Assessment  Pain Intensity 1: 0 (02/25/17 1133)  Pain Location 1: Leg, Back  Pain Intervention(s) 1: Medication (see MAR)  Patient Stated Pain Goal: 0  Time of last intervention: 1800   Reassessment Completed: no Last Vitals:  Vitals:    02/24/17 2109 02/25/17 0059 02/25/17 0115 02/25/17 0442   BP: 181/73 152/58  164/61   Pulse: 79 68  71   Resp: 25 20  18   Temp: 99.3 °F (37.4 °C) 98.6 °F (37 °C)  98.1 °F (36.7 °C)   SpO2: 97% 94%  97%   Weight:   63.6 kg (140 lb 3.4 oz)       Last 3 Weights:  Last 3 Recorded Weights in this Encounter    02/23/17 2331 02/25/17 0115   Weight: 64.9 kg (143 lb) 63.6 kg (140 lb 3.4 oz)     Weight change: -1.264 kg (-2 lb 12.6 oz)  LAB RESULTS  Recent Labs      02/25/17   0940  02/24/17 2354  02/24/17   0650   WBC  4.6  5.1  4.3*   HGB  7.7*  7.3*  7.4*   HCT  24.9*  23.4*  24.4*   PLT  203  203  205     Recent Labs      02/25/17   1757  02/24/17   2354  02/23/17   1830   NA   --   142  140   K   --   3.9  4.6   GLU   --   121*  157*   BUN   --   76*  76*   CREA   --   4.23*  3.91*   CA  8.4*  8.2*  8.7   INR   --   1.3*  1.1      RECOMMENDATIONS AND DISCHARGE PLANNING   1. Discharge plan for patient // Needs or barriers to disharge: tbd .  2. Estimated Discharge Date: tbd Posted on Whiteboard in Patients Room: yes   \"HEALS\" SAFETY CHECK   A safety check occurred in the patient's room between off going nurse and oncoming nurse listed above. The safety check included the below items  Area Items   H  High Alert Meds  - Verify all high alert medication drips (heparin, PCA, etc.)   E  Equipment - Suction is set up for ALL patients (with eric)  - Red plugs utilized for all equipment (IV pumps, etc.)  - WOWs wiped down at end of shift.  - Room stocked with oxygen, suction, and other unit-specific supplies   A  Alarms - Bed alarm is set for fall risk patients  - Ensure chair alarm is in place and activated if patient is up in a chair   L  Lines - Check IV for any infiltration  - Jang bag is empty if patient has a Jang   - Tubing and IV bags are labeled   S  Safety   - Room is clean, patient is clean, and equipment is clean. - Ensure room is clear and free of clutter  - Hallways are clear from equipment besides carts.    - Fall bracelet on for fall risk patients  - Suction is set up for ALL patients (with eric)  - Isolation precautions followed, supplies available outside room, sign posted   Rodolfo Morin RN

## 2017-02-26 NOTE — ROUTINE PROCESS
Patient: Hector Simpson Age: 80 y.o. Sex: female     Bedside and Verbal shift change report given to JOAQUIM Levine (oncoming nurse) by Darylene Poag, RN (offgoing nurse). Report included the following information SBAR, Kardex, MAR and Recent Results. PATIENT GOALS FOR TODAY PATIENT PRIORITIES FOR TODAY   Goals are: monitor labs, strict intake and output, monitor breathing  Updated on Whiteboard in Patients Room: yes Patient states his/her priorities are: get better  Updated on Whiteboard in Patients Room: yes     SITUATION:   Code Status: Full Code Reason for Admission: Altered mental state  Acute coronary syndromes ([facility] [address]  Acute diastolic heart failure [facility])   Hospital day: 3 Problem List: Active Problems:    Altered mental state (2/23/2017)      Acute coronary syndromes ([facility] [address] (2/23/2017)      [facility] (2/24/2017)       Attending Provider:   Shelby Rausch MD Allergies: No Known Allergies   BACKGROUND:   Past Medical History:   Past Medical History:   Diagnosis Date    [facility] 1/17/14    right breast    Carotid duplex 11/19/2014    Severe 70% or greater bilateral ICA stenosis. LICA dissection suggested.  Chronic renal insufficiency     CVA (cerebral vascular accident) ([facility] [address] 2003    with slight Right sided weakness    Echocardiogram 02/22/2016    EF 55-60%. No WMA. Mild-mod LVH. Gr 2 DDfx. No significant valvular heart disease.  Edema     Glaucoma     Gout flare     Hyperlipidemia     Hypertension     Lower extremity arterial testing 12/19/2014    Mod tibio-peroneal arterial disease bilaterally. R YANCY 1.24.  L YANCY 0.74. Bilateral sm vessel disease.     Lump of right breast     URI (upper respiratory infection)      ASSESSMENT:   Neuro:  Neurologic State: Alert, Orientation Level: Oriented X4 CV:  Patient on Telemetry: Cardiac/Telemetry Monitor On: Yes    Box Number: 82   Cardiac Rhythm: Normal sinus rhythm  Patient has a pace maker:   Endocrine Recent Glucose Results:   Lab Results   Component Value Date/Time    GLU 95 02/26/2017 04:00 AM    GLUCPOC 130 (H) 02/26/2017 03:38 PM    GLUCPOC 110 02/26/2017 11:07 AM    GLUCPOC 97 02/26/2017 06:31 AM        Respiratory:  O2 Device: Nasal cannula       Supplemental O2 O2 Flow Rate (L/min): 2 l/min       Incentive Spirometery          GI  Current diet: DIET DIABETIC WITH OPTIONS Consistent Carb 1800kcal; Soft Solids; 2 HONEY; No Conc. Sweets                                Urinary Catheter 02/24/17 [place]-Criteria for Appropriate Use: Strict I/Os       Reasons if patient has a escalante: yes, strict intake and output and 24 hour urine collection   Patient Safety  Restraints:    Family notified:    Other Alternatives:     Fall Risk   Total Score: 3   Safety Measures: Bed/Chair alarm on, Bed/Chair-Wheels locked, Bed in low position, Call light within reach, Fall prevention (comment) VTE Prophylaxis     Sequential Compression Device: Bilateral     Patient Refused VTE Prophylaxis: Yes    WOUND (if present)  Wound Type:  none  Dressing present Dressing Present : No  Wound Concerns/Notes: none IV ACCESS     Reasons if patient has a central line: no     PAIN  Pain Assessment  Pain Intensity 1: 0 (02/26/17 0730)  Pain Location 1: Leg, Back  Pain Intervention(s) 1: Medication (see MAR)  Patient Stated Pain Goal: 0  Time of last intervention: 1800   Reassessment Completed: no Last Vitals:  Vitals:    02/26/17 0514 02/26/17 0800 02/26/17 1158 02/26/17 1600   BP:  137/48 146/52 152/56   Pulse:  61 (!) 56 65   Resp:  18 18 17   Temp:  97.8 °F (36.6 °C) 97.8 °F (36.6 °C) 97.5 °F (36.4 °C)   SpO2:  95% 92% 98%   Weight: 64 kg (141 lb 3.2 oz)         Last 3 Weights:  Last 3 Recorded Weights in this Encounter    02/23/17 2331 02/25/17 0115 02/26/17 0514   Weight: 64.9 kg (143 lb) 63.6 kg (140 lb 3.4 oz) 64 kg (141 lb 3.2 oz)     Weight change: 0.448 kg (15.8 oz)  LAB RESULTS  Recent Labs      02/26/17   0400  02/25/17   0940 02/24/17   2354   WBC  4.3*  4.6  5.1   HGB  7.4*  7.7*  7.3*   HCT  23.8*  24.9*  23.4*   PLT  204  203  203     Recent Labs      02/26/17   0400  02/25/17   1757  02/24/17   2354   NA  137   --   142   K  3.7   --   3.9   GLU  95   --   121*   BUN  77*   --   76*   CREA  4.01*   --   4.23*   CA  8.8  8.4*  8.2*   INR   --    --   1.3*      RECOMMENDATIONS AND DISCHARGE PLANNING   1. Discharge plan for patient // Needs or barriers to disharge: return to snf . 2. Estimated Discharge Date: 2/28/17 Posted on Whiteboard in Hospitals in [place]: yes   \"HEALS\" SAFETY CHECK   A safety check occurred in the patient's room between off going nurse and oncoming nurse listed above. The safety check included the below items  Area Items   H  High Alert Meds  - Verify all high alert medication drips (heparin, PCA, etc.)   E  Equipment - Suction is set up for ALL patients (with eric)  - Red plugs utilized for all equipment (IV pumps, etc.)  - WOWs wiped down at end of shift.  - Room stocked with oxygen, suction, and other unit-specific supplies   A  Alarms - Bed alarm is set for fall risk patients  - Ensure chair alarm is in place and activated if patient is up in a chair   L  Lines - Check IV for any infiltration  - Jang bag is empty if patient has a Jang   - Tubing and IV bags are labeled   S  Safety   - Room is clean, patient is clean, and equipment is clean. - Ensure room is clear and free of clutter  - Hallways are clear from equipment besides carts.    - Fall bracelet on for fall risk patients  - Suction is set up for ALL patients (with eric)  - Isolation precautions followed, supplies available outside room, sign posted   Tonio Castañeda RN

## 2017-02-26 NOTE — PROGRESS NOTES
[facility] - Progress Note  Admit Date: 2/23/2017    Assessment:     -Indeterminate troponin in setting of acute infectious process and renal failure, suspect demand ischemia, not primary ACS. Normal CKMB. EKG w/LVH. - Influenza A confirmed rapid antigen swab  -Acute on chronic respiratory failure  -Acute on chronic renal failure, stage IV  -Acute on chronic encephalopathy  -Fever with acute infectious process/SIRS with recent toe infection  -Chronic diastolic heart failure, Echo 2/17/17 with EF 50%  -h/o remote stroke  -h/o right sided breast cancer  -HTN  -Poor functional status, SNF resident    Plan:     - Volume status stable on present cardiac regimen.  Continue with maintenance Lasix, Coreg, ASA, Hydralazine and Imdur  - Continue with supportive care/Oseltamivir for Influenza    Subjective:     Complains of chills, cough    Objective:      Patient Vitals for the past 8 hrs:   Temp Pulse Resp BP SpO2   02/26/17 0400 98.1 °F (36.7 °C) 73 18 145/65 96 %         Patient Vitals for the past 96 hrs:   Weight   02/26/17 0514 141 lb 3.2 oz (64 kg)   02/25/17 0115 140 lb 3.4 oz (63.6 kg)   02/23/17 2331 143 lb (64.9 kg)                    Intake/Output Summary (Last 24 hours) at 02/26/17 0929  Last data filed at 02/26/17 0515   Gross per 24 hour   Intake              520 ml   Output             1400 ml   Net             -880 ml       Physical Exam:  General:  alert, cooperative, no distress  Neck:  nontender, no JVD  Lungs:  Rhonchi/coarse breath sounds throughout  Heart:  regular rate and rhythm, S1, S2 normal, no murmur, click, rub or gallop  Abdomen:  abdomen is soft without significant tenderness, masses, organomegaly or guarding  Extremities:  extremities normal, atraumatic, no cyanosis or edema    Data Review:     Labs: Results:       Chemistry Recent Labs      02/26/17   0400  02/25/17   1757  02/24/17   2354  02/23/17   1830   GLU  95   --   121*  157*   NA  137   --   142  140   K  3.7   -- 3.9  4.6   CL  100   --   104  103   CO2  29   --   27  27   BUN  77*   --   76*  76*   CREA  4.01*   --   4.23*  3.91*   CA  8.8  8.4*  8.2*  8.7   PHOS   --   4.8   --    --    AGAP  8   --   11  10   BUCR  19   --   18  19   AP   --    --   68  64   TP   --    --   5.7*  6.2*   ALB   --    --   2.5*  3.0*   GLOB   --    --   3.2  3.2   AGRAT   --    --   0.8  0.9      CBC w/Diff Recent Labs      02/26/17   0400  02/25/17   0940  02/24/17   2354   WBC  4.3*  4.6  5.1   RBC  2.50*  2.60*  2.46*   HGB  7.4*  7.7*  7.3*   HCT  23.8*  24.9*  23.4*   PLT  204  203  203   GRANS  54  64  64   LYMPH  31  27  21   EOS  1  0  0      Cardiac Enzymes Lab Results   Component Value Date/Time    TROIQ 0.22 (H) 02/25/2017 09:40 AM      Coagulation Recent Labs      02/25/17   0940  02/24/17   2354   02/23/17   1830   PTP   --   15.8*   --   14.0   INR   --   1.3*   --   1.1   APTT  122.4*  61.5*   < >  27.1    < > = values in this interval not displayed. Lipid Panel Lab Results   Component Value Date/Time    Cholesterol, total 148 02/25/2017 09:40 AM    HDL Cholesterol 52 02/25/2017 09:40 AM    LDL, calculated 82.2 02/25/2017 09:40 AM    VLDL, calculated 13.8 02/25/2017 09:40 AM    Triglyceride 69 02/25/2017 09:40 AM    CHOL/HDL Ratio 2.8 02/25/2017 09:40 AM      BNP No results found for: BNP, BNPP, XBNPT   Liver Enzymes Recent Labs      02/24/17   2354   TP  5.7*   ALB  2.5*   AP  68   SGOT  54*      Digoxin    Thyroid Studies Lab Results   Component Value Date/Time    TSH 3.09 02/24/2017 11:54 PM          Signed By: Arthur Luz.  Malu Mendes     February 26, 2017

## 2017-02-26 NOTE — PROGRESS NOTES
Hospitalist Progress Note    Patient: Catie Steward MRN: [id number]  CSN: [id number]    YOB: 1928  Age: 80 y.o. Sex: female    DOA: 2/23/2017 LOS:  LOS: 3 days         States she is coughing, unable to expectorate. Denies chest pain, no sweats or chills overnight. 24 hour urine collection not started yet, discussed w/ Eward Page she will begin. Assessment/Plan     1. Worsening shortness of breath with left lower lobe pneumonia and acute on chronic diastolic chf. Follow VQ interpreted as intermediate, or possibly intermediate prob for PE. 2. Acute on chronic diastolic heart failure. Resolving s/p lasix, now on home dose. 3. Influenza A - droplet isolation. tamiflu  4. 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 5. History of breast cancer, status post partial right mastectomy, no clinical evidence of any recurrence of cancer at this time. 6. Hypertensive urgency - improving control. 7. Chronic lacunar infarcts at bilateral basal ganglia, interval new on the right since 2/2016. On asa, statin  8. Echo from a couple weeks ago with EF 50%  9. Advanced age  8. LLL pna - blood cx (2/25) ngtd. On ABX. RT for bronchial hygiene protocol. Humibid. 11. Constipation - bowel regimen. 12.  2dary hyperparathyroidism of ckd - per renal   13. Sepsis poa (fever, tachypnea) - sources include influenza, pna. Urine cx negative. 14. ckd 4 - per Dr. Roman Ignacio, she is poor candidate for dialysis. Multiple myeloma workup underway. Will leave escalante in until 24 hour urine collection completed. 15. Elevated blood glucose - A1c 5.1. ssi ADA diet. 16. moderate-severe pharyngeal dysphagia. Patient completed MBS 2/17/17 with silent penetration to the laryngeal vestibule with thin and nectar thick liquids - ST recs noted. 17. dvt prophylaxis  18. Full code per documentation from [facility]. Palliative care consult. pcp at [facility] is [employer].      Additional Notes:      Case discussed with:  [x]Patient  []Family  [x]Nursing  []Case Management  DVT Prophylaxis:  []Lovenox  [x]Hep SQ  []SCDs  []Coumadin   []On Heparin gtt    Vital signs/Intake and Output:  Visit Vitals    /48 (BP 1 Location: Right arm, BP Patient Position: Supine)    Pulse 61    Temp 97.8 °F (36.6 °C)    Resp 18    Wt 64 kg (141 lb 3.2 oz)    SpO2 95%    Breastfeeding No    BMI 19.69 kg/m2     Current Shift:  02/26 0701 - 02/26 1900  In: 100 [P.O.:100]  Out: -   Last three shifts:  02/24 1901 - 02/26 0700  In: 1000 [P.O.:600; I.V.:400]  Out: 2300 [Urine:2300]    Awake alert, appropriate. Ncat. Perrl. RRR  Decreased bs left base  Soft nt nd  No edema  Moving all 4s. No rash    Medications Reviewed      Labs: Results:       Chemistry Recent Labs      02/26/17   0400  02/25/17   1757 02/24/17 2354 02/23/17   1830   GLU  95   --   121*  157*   NA  137   --   142  140   K  3.7   --   3.9  4.6   CL  100   --   104  103   CO2  29   --   27  27   BUN  77*   --   76*  76*   CREA  4.01*   --   4.23*  3.91*   CA  8.8  8.4*  8.2*  8.7   AGAP  8   --   11  10   BUCR  19   --   18  19   AP   --    --   68  64   TP   --    --   5.7*  6.2*   ALB   --    --   2.5*  3.0*   GLOB   --    --   3.2  3.2   AGRAT   --    --   0.8  0.9      CBC w/Diff Recent Labs      02/26/17   0400  02/25/17   0940  02/24/17 2354   WBC  4.3*  4.6  5.1   RBC  2.50*  2.60*  2.46*   HGB  7.4*  7.7*  7.3*   HCT  23.8*  24.9*  23.4*   PLT  204  203  203   GRANS  54  64  64   LYMPH  31  27  21   EOS  1  0  0      Cardiac Enzymes Recent Labs      02/23/17 2355 02/23/17   1830   CPK  41  39   CKND1  Cannot be calulated  Cannot be calulated      Coagulation Recent Labs      02/25/17   0940  02/24/17   2354   02/23/17 1830   PTP   --   15.8*   --   14.0   INR   --   1.3*   --   1.1   APTT  122.4*  61.5*   < >  27.1    < > = values in this interval not displayed.        Lipid Panel Lab Results   Component Value Date/Time    Cholesterol, total 148 02/25/2017 09:40 AM    HDL Cholesterol 52 02/25/2017 09:40 AM    LDL, calculated 82.2 02/25/2017 09:40 AM    VLDL, calculated 13.8 02/25/2017 09:40 AM    Triglyceride 69 02/25/2017 09:40 AM    CHOL/HDL Ratio 2.8 02/25/2017 09:40 AM      BNP No results for input(s): BNPP in the last 72 hours. Liver Enzymes Recent Labs      02/24/17   2354   TP  5.7*   ALB  2.5*   AP  68   SGOT  54*      Thyroid Studies Lab Results   Component Value Date/Time    TSH 3.09 02/24/2017 11:54 PM        Procedures/imaging: see electronic medical records for all procedures/Xrays and details which were not copied into this note but were reviewed prior to creation of Plan.

## 2017-02-27 LAB
ANION GAP BLD CALC-SCNC: 10 MMOL/L (ref 3–18)
BASOPHILS # BLD AUTO: 0 K/UL (ref 0–0.1)
BASOPHILS # BLD: 1 % (ref 0–2)
BUN SERPL-MCNC: 72 MG/DL (ref 7–18)
BUN/CREAT SERPL: 18 (ref 12–20)
CALCIUM SERPL-MCNC: 8.2 MG/DL (ref 8.5–10.1)
CHLORIDE SERPL-SCNC: 100 MMOL/L (ref 100–108)
CO2 SERPL-SCNC: 27 MMOL/L (ref 21–32)
CREAT SERPL-MCNC: 4.04 MG/DL (ref 0.6–1.3)
DIFFERENTIAL METHOD BLD: ABNORMAL
EOSINOPHIL # BLD: 0 K/UL (ref 0–0.4)
EOSINOPHIL NFR BLD: 0 % (ref 0–5)
ERYTHROCYTE [DISTWIDTH] IN BLOOD BY AUTOMATED COUNT: 13.4 % (ref 11.6–14.5)
GLUCOSE BLD STRIP.AUTO-MCNC: 101 MG/DL (ref 70–110)
GLUCOSE BLD STRIP.AUTO-MCNC: 114 MG/DL (ref 70–110)
GLUCOSE BLD STRIP.AUTO-MCNC: 115 MG/DL (ref 70–110)
GLUCOSE BLD STRIP.AUTO-MCNC: 116 MG/DL (ref 70–110)
GLUCOSE SERPL-MCNC: 97 MG/DL (ref 74–99)
HCT VFR BLD AUTO: 22.1 % (ref 35–45)
HGB BLD-MCNC: 7.1 G/DL (ref 12–16)
LYMPHOCYTES # BLD AUTO: 33 % (ref 21–52)
LYMPHOCYTES # BLD: 1.4 K/UL (ref 0.9–3.6)
MAGNESIUM SERPL-MCNC: 2.2 MG/DL (ref 1.8–2.4)
MCH RBC QN AUTO: 30.3 PG (ref 24–34)
MCHC RBC AUTO-ENTMCNC: 32.1 G/DL (ref 31–37)
MCV RBC AUTO: 94.4 FL (ref 74–97)
MONOCYTES # BLD: 0.5 K/UL (ref 0.05–1.2)
MONOCYTES NFR BLD AUTO: 12 % (ref 3–10)
NEUTS SEG # BLD: 2.3 K/UL (ref 1.8–8)
NEUTS SEG NFR BLD AUTO: 54 % (ref 40–73)
PHOSPHATE SERPL-MCNC: 4 MG/DL (ref 2.5–4.9)
PLATELET # BLD AUTO: 194 K/UL (ref 135–420)
PMV BLD AUTO: 10.6 FL (ref 9.2–11.8)
POTASSIUM SERPL-SCNC: 3.5 MMOL/L (ref 3.5–5.5)
RBC # BLD AUTO: 2.34 M/UL (ref 4.2–5.3)
SODIUM SERPL-SCNC: 137 MMOL/L (ref 136–145)
WBC # BLD AUTO: 4.2 K/UL (ref 4.6–13.2)

## 2017-02-27 PROCEDURE — 86900 BLOOD TYPING SEROLOGIC ABO: CPT | Performed by: HOSPITALIST

## 2017-02-27 PROCEDURE — 36415 COLL VENOUS BLD VENIPUNCTURE: CPT | Performed by: INTERNAL MEDICINE

## 2017-02-27 PROCEDURE — 74011000250 HC RX REV CODE- 250: Performed by: HOSPITALIST

## 2017-02-27 PROCEDURE — 97161 PT EVAL LOW COMPLEX 20 MIN: CPT

## 2017-02-27 PROCEDURE — 83735 ASSAY OF MAGNESIUM: CPT | Performed by: INTERNAL MEDICINE

## 2017-02-27 PROCEDURE — 84100 ASSAY OF PHOSPHORUS: CPT | Performed by: INTERNAL MEDICINE

## 2017-02-27 PROCEDURE — 74011250637 HC RX REV CODE- 250/637: Performed by: HOSPITALIST

## 2017-02-27 PROCEDURE — 97530 THERAPEUTIC ACTIVITIES: CPT

## 2017-02-27 PROCEDURE — 65660000000 HC RM CCU STEPDOWN

## 2017-02-27 PROCEDURE — 85025 COMPLETE CBC W/AUTO DIFF WBC: CPT | Performed by: INTERNAL MEDICINE

## 2017-02-27 PROCEDURE — 74011250637 HC RX REV CODE- 250/637: Performed by: EMERGENCY MEDICINE

## 2017-02-27 PROCEDURE — 74011250636 HC RX REV CODE- 250/636: Performed by: HOSPITALIST

## 2017-02-27 PROCEDURE — 97165 OT EVAL LOW COMPLEX 30 MIN: CPT

## 2017-02-27 PROCEDURE — 77010033678 HC OXYGEN DAILY

## 2017-02-27 PROCEDURE — 80048 BASIC METABOLIC PNL TOTAL CA: CPT | Performed by: INTERNAL MEDICINE

## 2017-02-27 PROCEDURE — 94640 AIRWAY INHALATION TREATMENT: CPT

## 2017-02-27 PROCEDURE — 86920 COMPATIBILITY TEST SPIN: CPT | Performed by: HOSPITALIST

## 2017-02-27 PROCEDURE — 92526 ORAL FUNCTION THERAPY: CPT

## 2017-02-27 PROCEDURE — 82962 GLUCOSE BLOOD TEST: CPT

## 2017-02-27 PROCEDURE — 74011250637 HC RX REV CODE- 250/637: Performed by: INTERNAL MEDICINE

## 2017-02-27 PROCEDURE — 74011250636 HC RX REV CODE- 250/636: Performed by: EMERGENCY MEDICINE

## 2017-02-27 PROCEDURE — 74011000258 HC RX REV CODE- 258: Performed by: HOSPITALIST

## 2017-02-27 RX ORDER — IPRATROPIUM BROMIDE AND ALBUTEROL SULFATE 2.5; .5 MG/3ML; MG/3ML
3 SOLUTION RESPIRATORY (INHALATION)
Status: COMPLETED | OUTPATIENT
Start: 2017-02-27 | End: 2017-02-27

## 2017-02-27 RX ORDER — SODIUM CHLORIDE 9 MG/ML
250 INJECTION, SOLUTION INTRAVENOUS AS NEEDED
Status: DISCONTINUED | OUTPATIENT
Start: 2017-02-27 | End: 2017-02-28 | Stop reason: HOSPADM

## 2017-02-27 RX ADMIN — LEVOFLOXACIN 500 MG: 5 INJECTION, SOLUTION INTRAVENOUS at 22:00

## 2017-02-27 RX ADMIN — HEPARIN SODIUM 5000 UNITS: 5000 INJECTION, SOLUTION INTRAVENOUS; SUBCUTANEOUS at 13:33

## 2017-02-27 RX ADMIN — CYANOCOBALAMIN TAB 1000 MCG 1000 MCG: 1000 TAB at 10:21

## 2017-02-27 RX ADMIN — SODIUM CHLORIDE 500 MG: 900 INJECTION, SOLUTION INTRAVENOUS at 23:20

## 2017-02-27 RX ADMIN — THERA TABS 1 TABLET: TAB at 10:20

## 2017-02-27 RX ADMIN — Medication 10 ML: at 21:12

## 2017-02-27 RX ADMIN — HEPARIN SODIUM 5000 UNITS: 5000 INJECTION, SOLUTION INTRAVENOUS; SUBCUTANEOUS at 21:10

## 2017-02-27 RX ADMIN — ISOSORBIDE DINITRATE 20 MG: 20 TABLET ORAL at 21:05

## 2017-02-27 RX ADMIN — ISOSORBIDE DINITRATE 20 MG: 20 TABLET ORAL at 10:20

## 2017-02-27 RX ADMIN — SIMVASTATIN 20 MG: 20 TABLET, FILM COATED ORAL at 21:05

## 2017-02-27 RX ADMIN — OSELTAMIVIR PHOSPHATE 30 MG: 30 CAPSULE ORAL at 10:30

## 2017-02-27 RX ADMIN — ISOSORBIDE DINITRATE 20 MG: 20 TABLET ORAL at 18:57

## 2017-02-27 RX ADMIN — HYDRALAZINE HYDROCHLORIDE 100 MG: 50 TABLET, FILM COATED ORAL at 05:11

## 2017-02-27 RX ADMIN — ASPIRIN 81 MG: 81 TABLET, COATED ORAL at 10:20

## 2017-02-27 RX ADMIN — IPRATROPIUM BROMIDE AND ALBUTEROL SULFATE 3 ML: .5; 3 SOLUTION RESPIRATORY (INHALATION) at 17:14

## 2017-02-27 RX ADMIN — VITAMIN D, TAB 1000IU (100/BT) 5000 UNITS: 25 TAB at 10:19

## 2017-02-27 RX ADMIN — CARVEDILOL 12.5 MG: 12.5 TABLET, FILM COATED ORAL at 18:57

## 2017-02-27 RX ADMIN — ALLOPURINOL 100 MG: 100 TABLET ORAL at 19:03

## 2017-02-27 RX ADMIN — HEPARIN SODIUM 5000 UNITS: 5000 INJECTION, SOLUTION INTRAVENOUS; SUBCUTANEOUS at 05:12

## 2017-02-27 RX ADMIN — HYDRALAZINE HYDROCHLORIDE 100 MG: 50 TABLET, FILM COATED ORAL at 21:05

## 2017-02-27 RX ADMIN — GUAIFENESIN AND DEXTROMETHORPHAN HYDROBROMIDE 1 TABLET: 600; 30 TABLET, EXTENDED RELEASE ORAL at 18:57

## 2017-02-27 RX ADMIN — Medication 10 ML: at 06:20

## 2017-02-27 RX ADMIN — HYDRALAZINE HYDROCHLORIDE 100 MG: 50 TABLET, FILM COATED ORAL at 13:33

## 2017-02-27 RX ADMIN — FERROUS SULFATE TAB 325 MG (65 MG ELEMENTAL FE) 325 MG: 325 (65 FE) TAB at 10:20

## 2017-02-27 RX ADMIN — GUAIFENESIN AND DEXTROMETHORPHAN HYDROBROMIDE 1 TABLET: 600; 30 TABLET, EXTENDED RELEASE ORAL at 10:20

## 2017-02-27 RX ADMIN — CEFTRIAXONE 1 G: 1 INJECTION, POWDER, FOR SOLUTION INTRAMUSCULAR; INTRAVENOUS at 21:12

## 2017-02-27 RX ADMIN — CARVEDILOL 12.5 MG: 12.5 TABLET, FILM COATED ORAL at 10:20

## 2017-02-27 NOTE — CDMP QUERY
Please clarify if dx sepsis is severe sepsis:    =>Severe Sepsis with associated acute renal failure and/or encephalopathy  =>Other Explanation of clinical findings  =>Unable to Determine (no explanation of clinical findings)    The medical record reflects the following clinical findings, treatment, and risk factors:  --79 yo admitted with sepsis, influenza A with pneumonia, acute/chronic diastolic CHF  --H&P \"Late effects of cerebrovascular accident with weakness and some cognitive impairment\"  --ER \"The patient's granddaughter arrived and provided a different history from the patient. She states that the patient became SOB in front of her with O2 of 92%. She also reports that the patient's confusion started today\"  --renal consult \"acute/CRF stage 4\" \"worsening renal failure is probably related to infection,diuretics. she is poor candidate for dialysis. \"  --cardiology consult \"Acute on chronic renal failure, stage IV, Acute on chronic encephalopathy\"    Please clarify and document your clinical opinion in the progress notes and discharge summary including the definitive and/or presumptive diagnosis, (suspected or probable), related to the above clinical findings. Please include clinical findings supporting your diagnosis. If you DECLINE this query or would like to communicate with [facility], please utilize the \"[employer] message box\" at the TOP of the Progress Note on the right. QUESTIONS?    Duyen Calhoun RN BSN CCDS [phone]

## 2017-02-27 NOTE — PROGRESS NOTES
Hospitalist Progress Note    Patient: Brianda Moreland MRN: [id number]  CSN: [id number]    YOB: 1928  Age: 80 y.o. Sex: female    DOA: 2/23/2017 LOS:  LOS: 4 days         Still coughing, but able to expectorate. Hb has dropped to 7.1. Dr. Vargas Has to discuss possible HD with family    Assessment/Plan     1. Worsening shortness of breath with left lower lobe pneumonia and acute on chronic diastolic chf. Follow VQ interpreted as intermediate, or possibly intermediate prob for PE. 2. Acute on chronic diastolic heart failure. Resolving s/p lasix, now on home dose. 3. Influenza A - droplet isolation. tamiflu  4. 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 5. History of breast cancer, status post partial right mastectomy, no clinical evidence of any recurrence of cancer at this time. 6. Hypertensive urgency - improving control. 7. Chronic lacunar infarcts at bilateral basal ganglia, interval new on the right since 2/2016. On asa, statin  8. Echo from a couple weeks ago with EF 50%  9. Advanced age  8. LLL pna - blood cx (2/25) ngtd. On ABX. RT for bronchial hygiene protocol. Humibid. 11. Constipation - bowel regimen. 12.  2dary hyperparathyroidism of ckd - per renal   13. Severe sepsis poa (fever, tachypnea), with evidence of end organ damage (acute on chronic renal failure stage 4, acute on chronic encephalopathy) - sources include influenza, pna. Urine cx negative. 14. ckd 4 - per Dr. Vargas Has, she is poor candidate for dialysis, but discussing w/ family. Multiple myeloma workup underway. Will leave escalante in until 24 hour urine collection completed. 15. Elevated blood glucose - A1c 5.1. ssi ADA diet. 16. moderate-severe pharyngeal dysphagia. Patient completed MBS 2/17/17 with silent penetration to the laryngeal vestibule with thin and nectar thick liquids - ST recs noted. 17. Anemia requiring transfusion - 1 unit today. 17. dvt prophylaxis  18. Full code. pcp at [facility] is [employer]. Additional Notes:      Case discussed with:  [x]Patient  []Family  [x]Nursing  [x]Case Management  DVT Prophylaxis:  []Lovenox  [x]Hep SQ  []SCDs  []Coumadin   []On Heparin gtt    Vital signs/Intake and Output:  Visit Vitals    /50 (BP 1 Location: Right arm, BP Patient Position: At rest)    Pulse 68    Temp 98.4 °F (36.9 °C)    Resp 18    Wt 64 kg (141 lb)    SpO2 96%    Breastfeeding No    BMI 19.67 kg/m2     Current Shift:     Last three shifts:  02/25 1901 - 02/27 0700  In: 620 [P.O.:220; I.V.:400]  Out: 2900 [Urine:2900]    Awake alert, appropriate. Ncat. Perrl. RRR  Decreased bs left base  Soft nt nd  No edema  Moving all 4s. No rash    Medications Reviewed      Labs: Results:       Chemistry Recent Labs      02/27/17   0336 02/26/17   0400  02/25/17   1757  02/24/17   2354   GLU  97  95   --   121*   NA  137  137   --   142   K  3.5  3.7   --   3.9   CL  100  100   --   104   CO2  27  29   --   27   BUN  72*  77*   --   76*   CREA  4.04*  4.01*   --   4.23*   CA  8.2*  8.8  8.4*  8.2*   AGAP  10  8   --   11   BUCR  18  19   --   18   AP   --    --    --   68   TP   --    --    --   5.7*   ALB   --    --    --   2.5*   GLOB   --    --    --   3.2   AGRAT   --    --    --   0.8      CBC w/Diff Recent Labs      02/27/17   0336 02/26/17   0400  02/25/17   0940   WBC  4.2*  4.3*  4.6   RBC  2.34*  2.50*  2.60*   HGB  7.1*  7.4*  7.7*   HCT  22.1*  23.8*  24.9*   PLT  194  204  203   GRANS  54  54  64   LYMPH  33  31  27   EOS  0  1  0      Cardiac Enzymes No results for input(s): CPK, CKND1, VIRGILIO in the last 72 hours.     No lab exists for component: CKRMB, TROIP   Coagulation Recent Labs      02/25/17   0940  02/24/17   2354   PTP   --   15.8*   INR   --   1.3*   APTT  122.4*  61.5*       Lipid Panel Lab Results   Component Value Date/Time    Cholesterol, total 148 02/25/2017 09:40 AM    HDL Cholesterol 52 02/25/2017 09:40 AM    LDL, calculated 82.2 02/25/2017 09:40 AM    VLDL, calculated 13.8 02/25/2017 09:40 AM    Triglyceride 69 02/25/2017 09:40 AM    CHOL/HDL Ratio 2.8 02/25/2017 09:40 AM      BNP No results for input(s): BNPP in the last 72 hours. Liver Enzymes Recent Labs      02/24/17   2354   TP  5.7*   ALB  2.5*   AP  68   SGOT  54*      Thyroid Studies Lab Results   Component Value Date/Time    TSH 3.09 02/24/2017 11:54 PM        Procedures/imaging: see electronic medical records for all procedures/Xrays and details which were not copied into this note but were reviewed prior to creation of Plan.

## 2017-02-27 NOTE — ROUTINE PROCESS
Patient: Irene Winters Age: 80 y.o. Sex: female     Bedside and Verbal shift change report given to Dontae RN (oncoming nurse) by Silvester Duane, RN (offgoing nurse). Report included the following information SBAR, Kardex, MAR and Recent Results. PATIENT GOALS FOR TODAY PATIENT PRIORITIES FOR TODAY   Goals are: monitor labs, strict intake and output, monitor breathing  Updated on Whiteboard in Patients Room: yes Patient states his/her priorities are: get better  Updated on Whiteboard in Patients Room: yes     SITUATION:   Code Status: Full Code Reason for Admission: Altered mental state  Acute coronary syndromes ([facility]  Acute diastolic heart failure [facility])   Hospital day: 4 Problem List: Active Problems:    Altered mental state (2/23/2017)      Acute coronary syndromes ([facility] (2/23/2017)      [facility] (2/24/2017)       Attending Provider:   Branden Harrell MD Allergies: No Known Allergies   BACKGROUND:   Past Medical History:   Past Medical History:   Diagnosis Date    Breast cancer ([facility] 1/17/14    right breast    Carotid duplex 11/19/2014    Severe 70% or greater bilateral ICA stenosis. LICA dissection suggested.  Chronic renal insufficiency     CVA (cerebral vascular accident) ([facility] 2003    with slight Right sided weakness    Echocardiogram 02/22/2016    EF 55-60%. No WMA. Mild-mod LVH. Gr 2 DDfx. No significant valvular heart disease.  Edema     Glaucoma     Gout flare     Hyperlipidemia     Hypertension     Lower extremity arterial testing 12/19/2014    Mod tibio-peroneal arterial disease bilaterally. R YANCY 1.24.  L YANCY 0.74. Bilateral sm vessel disease.     Lump of right breast     URI (upper respiratory infection)      ASSESSMENT:   Neuro:  Neurologic State: Alert, Eyes open spontaneously, Orientation Level: Oriented to person, Oriented to place, Oriented to situation CV:  Patient on Telemetry: Cardiac/Telemetry Monitor On: Yes    Box Number: 82 Cardiac Rhythm: Normal sinus rhythm, Sinus bradycardia  Patient has a pace maker:   Endocrine   Recent Glucose Results:   Lab Results   Component Value Date/Time    GLU 97 02/27/2017 03:36 AM    GLUCPOC 112 (H) 02/26/2017 09:23 PM    GLUCPOC 130 (H) 02/26/2017 03:38 PM    GLUCPOC 110 02/26/2017 11:07 AM        Respiratory:  O2 Device: Room air       Supplemental O2 O2 Flow Rate (L/min): 2 l/min       Incentive Spirometery          GI  Current diet: DIET DIABETIC WITH OPTIONS Consistent Carb 1800kcal; Soft Solids; 2 HONEY; No Conc. Sweets                                Urinary Catheter 02/24/17 [place]alante-Criteria for Appropriate Use: Strict I/Os       Reasons if patient has a escalante: yes, strict intake and output and 24 hour urine collection   Patient Safety  Restraints:    Family notified:    Other Alternatives:     Fall Risk   Total Score: 4   Safety Measures: Bed/Chair alarm on, Bed/Chair-Wheels locked, Bed in low position, Call light within reach, Emergency bedside equipment, Fall prevention (comment), Family at bedside, Gripper socks, Side rails X2, Side rails X 3 VTE Prophylaxis     Sequential Compression Device: Bilateral     Patient Refused VTE Prophylaxis: Yes    WOUND (if present)  Wound Type:  none  Dressing present Dressing Present : No  Wound Concerns/Notes: none IV ACCESS     Reasons if patient has a central line: no     PAIN  Pain Assessment  Pain Intensity 1: 5 (02/27/17 0619)  Pain Location 1: Leg  Pain Intervention(s) 1: Refused  Patient Stated Pain Goal: 0  Time of last intervention: refused pain medication  Reassessment Completed: no Last Vitals:  Vitals:    02/26/17 1956 02/27/17 0000 02/27/17 0400 02/27/17 0532   BP: 119/53 171/55 187/62    Pulse: (!) 55 67 70    Resp: 18 16 16    Temp: 97.5 °F (36.4 °C) 97.9 °F (36.6 °C) 97.4 °F (36.3 °C)    SpO2: 98% 99% 99%    Weight:    64 kg (141 lb)      Last 3 Weights:  Last 3 Recorded Weights in this Encounter    02/25/17 0115 02/26/17 9736 02/27/17 0532 Weight: 63.6 kg (140 lb 3.4 oz) 64 kg (141 lb 3.2 oz) 64 kg (141 lb)     Weight change: -0.091 kg (-3.2 oz)  LAB RESULTS  Recent Labs      02/27/17   0336  02/26/17   0400  02/25/17   0940   WBC  4.2*  4.3*  4.6   HGB  7.1*  7.4*  7.7*   HCT  22.1*  23.8*  24.9*   PLT  194  204  203     Recent Labs      02/27/17   0336  02/26/17   0400  02/25/17   1757  02/24/17   2354   NA  137  137   --   142   K  3.5  3.7   --   3.9   GLU  97  95   --   121*   BUN  72*  77*   --   76*   CREA  4.04*  4.01*   --   4.23*   CA  8.2*  8.8  8.4*  8.2*   MG  2.2   --    --    --    INR   --    --    --   1.3*      RECOMMENDATIONS AND DISCHARGE PLANNING   1. Discharge plan for patient // Needs or barriers to disharge: return to snf . 2. Estimated Discharge Date: 2/28/17 Posted on Whiteboard in [facility]: yes   \"HEALS\" SAFETY CHECK   A safety check occurred in the patient's room between off going nurse and oncoming nurse listed above. The safety check included the below items  Area Items   H  High Alert Meds  - Verify all high alert medication drips (heparin, PCA, etc.)   E  Equipment - Suction is set up for ALL patients (with eric)  - Red plugs utilized for all equipment (IV pumps, etc.)  - WOWs wiped down at end of shift.  - Room stocked with oxygen, suction, and other unit-specific supplies   A  Alarms - Bed alarm is set for fall risk patients  - Ensure chair alarm is in place and activated if patient is up in a chair   L  Lines - Check IV for any infiltration  - Jang bag is empty if patient has a Jang   - Tubing and IV bags are labeled   S  Safety   - Room is clean, patient is clean, and equipment is clean. - Ensure room is clear and free of clutter  - Hallways are clear from equipment besides carts.    - Fall bracelet on for fall risk patients  - Suction is set up for ALL patients (with eric)  - Isolation precautions followed, supplies available outside room, sign posted   Silvester Duane, RN

## 2017-02-27 NOTE — PROGRESS NOTES
RENAL DAILY PROGRESS NOTE    Patient: Medardo Brooke               Sex: female          DOA: 2/23/2017  5:55 PM        YOB: 1928      Age:  80 y.o.        LOS:  LOS: 4 days     Subjective: Medardo Brooke is a 80 y.o.  who presents with Altered mental state  Acute coronary syndromes (HCC)  Acute diastolic heart failure ([address] Asked to evaluate for worsening renal failure. hx of crf stage 4,admitted with sob,flu,diastolic heart failure,treated with lasix  Chief complains: none  - Reviewed last 24 hrs events     Current Facility-Administered Medications   Medication Dose Route Frequency    guaiFENesin-dextromethorphan SR (HUMIBID DM) 600-30 mg tablet 1 Tab  1 Tab Oral BID    polyethylene glycol (MIRALAX) packet 17 g  17 g Oral DAILY    heparin (porcine) injection 5,000 Units  5,000 Units SubCUTAneous Q8H    insulin lispro (HUMALOG) injection   SubCUTAneous AC&HS    glucose chewable tablet 16 g  16 g Oral PRN    glucagon (GLUCAGEN) injection 1 mg  1 mg IntraMUSCular PRN    dextrose (D50W) injection syrg 12.5-25 g  25-50 mL IntraVENous PRN    acetaminophen (TYLENOL) tablet 500 mg  500 mg Oral Q6H PRN    allopurinol (ZYLOPRIM) tablet 100 mg  100 mg Oral Q MON, WED & FRI    levoFLOXacin (LEVAQUIN) 500 mg in D5W IVPB  500 mg IntraVENous Q48H    aspirin delayed-release tablet 81 mg  81 mg Oral DAILY    bisacodyl (DULCOLAX) suppository 10 mg  10 mg Rectal DAILY PRN    carvedilol (COREG) tablet 12.5 mg  12.5 mg Oral BID WITH MEALS    cholecalciferol (VITAMIN D3) tablet 5,000 Units  5,000 Units Oral DAILY    cyanocobalamin tablet 1,000 mcg  1,000 mcg Oral DAILY    ferrous sulfate tablet 325 mg  325 mg Oral ACB    hydrALAZINE (APRESOLINE) tablet 100 mg  100 mg Oral Q8H    isosorbide dinitrate (ISORDIL) tablet 20 mg  20 mg Oral TID    simvastatin (ZOCOR) tablet 20 mg  20 mg Oral QHS    therapeutic multivitamin (THERAGRAN) tablet 1 Tab  1 Tab Oral DAILY    traMADol (ULTRAM) tablet 50 mg  50 mg Oral Q8H PRN    docusate sodium (COLACE) capsule 100 mg  100 mg Oral BID    morphine injection 2 mg  2 mg IntraVENous Q4H PRN    nitroglycerin (NITROSTAT) tablet 0.4 mg  0.4 mg SubLINGual Q5MIN PRN    naloxone (NARCAN) injection 0.4 mg  0.4 mg IntraVENous PRN    diphenhydrAMINE (BENADRYL) injection 12.5 mg  12.5 mg IntraVENous Q4H PRN    sodium chloride (NS) flush 5-10 mL  5-10 mL IntraVENous Q8H    sodium chloride (NS) flush 5-10 mL  5-10 mL IntraVENous PRN    ondansetron (ZOFRAN) injection 4 mg  4 mg IntraVENous Q6H PRN    albuterol-ipratropium (DUO-NEB) 2.5 MG-0.5 MG/3 ML  3 mL Nebulization Q4H PRN    azithromycin (ZITHROMAX) 500 mg in 0.9% sodium chloride 250 mL IVPB  500 mg IntraVENous Q24H    cefTRIAXone (ROCEPHIN) 1 g in 0.9% sodium chloride (MBP/ADV) 50 mL MBP  1 g IntraVENous Q24H       Objective:     Visit Vitals    /50 (BP 1 Location: Right arm, BP Patient Position: At rest)    Pulse 68    Temp 98.4 °F (36.9 °C)    Resp 18    Wt 64 kg (141 lb)    SpO2 96%    Breastfeeding No    BMI 19.67 kg/m2       Intake/Output Summary (Last 24 hours) at 02/27/17 1407  Last data filed at 02/27/17 0629   Gross per 24 hour   Intake                0 ml   Output             1500 ml   Net            -1500 ml       Physical Examination:       RS: lungs diminished breath sounds  CVS: S1-S2 heard, RRR, No S3 / murmur  Abdomen: Soft, Non tender, Not distended, Positive bowel sounds, no organomegaly, no CVA / supra pubic tenderness  Extremities: No edema, no cyanosis, skin is warm on touch. HEENT: Head is atraumatic, PERRLA, conjunctiva pink & non icteric. No JVD or carotid bruit   Musculoskeletal: No gross joints or bone deformities   Lymph Node: No palpable cervical, axillary or groin lymphadenopathy.       Data Review:      Labs:     Hematology:   Recent Labs      02/27/17   0336  02/26/17   0400  02/25/17   0940  02/24/17   2354   WBC  4.2*  4.3*  4.6  5.1   HGB  7.1*  7.4*  7.7* 7.3*   HCT  22.1*  23.8*  24.9*  23.4*     Chemistry:   Recent Labs      02/27/17   0336  02/26/17   0400  02/25/17   1757  02/24/17   2354   BUN  72*  77*   --   76*   CREA  4.04*  4.01*   --   4.23*   CA  8.2*  8.8  8.4*  8.2*   ALB   --    --    --   2.5*   K  3.5  3.7   --   3.9   NA  137  137   --   142   CL  100  100   --   104   CO2  27  29   --   27   PHOS  4.0   --   4.8   --    GLU  97  95   --   121*        Images:    XR (Most Recent). CXR reviewed by me and compared with previous CXR   Results from [place] encounter on 02/23/17   XR CHEST PORT   Narrative Portable Chest 1546 hours    CPT CODE:56902    HISTORY:  Productive cough, chest pain, recent pneumonia,   rule out PE.    COMPARISON: Chest x-ray February 23 of February 15, 2017, January 23, 2017, lung  scan same day 2/25/2017. FINDINGS:     There is persistent opacity in the retrocardiac region on the left with minimal  lateral blunting of the costophrenic angle. The left costophrenic angle is  sharp. Pulmonary vascularity is normal. The heart is mildly enlarged. Bilateral  glenohumeral and AC joint space osteoarthritic change demonstrated. Cassette  artifact projects over the patient's lower neck. .         Impression IMPRESSION:    Persistent abnormality at the left base consistent with left pleural effusion  with underlying infiltrate or atelectasis. CT (Most Recent)   Results from Hospital Encounter encounter on 02/23/17   CT ABD PELV WO CONT   Narrative CT Of The Abdomen And Pelvis Without Contrast    CPT CODE 55297,[id number]    CLINICAL HISTORY: Chronic renal insufficiency, CVA and hypertension. Breast  cancer. Presenting with shortness of breath, and left flank pain. TECHNIQUE: 5 mm helical MDCT scan was obtained of the abdomen and pelvis. Sagittal and coronal images created from original data set.   All CT scans at  this facility are performed using dose optimization techniques as appropriate to  a performed exam, to include automated exposure control, adjustment of the mA  and/or kV according to patient's size (including appropriate matching for site  specific examinations), or use of iterative reconstruction technique. COMPARISON: Portable chest x-ray today. FINDINGS:  view demonstrates normal bowel gas pattern. Dense barium in the  rectum. CT Of The Abdomen: Lung bases: Dense opacity basilar segments left lower lobe  medially. Hazy densities right lung base posteriorly. Marked calcifications of  the tracheobronchial tree. Small effusions layering posteriorly. Larger  subpulmonic component on the left than the right. Heart and pericardium: Cardiomegaly. Left ventricular dilatation. Mild coronary  artery calcifications. CHEST WALL: Coarse calcifications inferiorly at the right breast. No mass is  identified. Liver: No focal lesions. Artifact traversing the liver because the patient was  scanned with her arms at her sides. Spleen: Normal.    Gallbladder: Nondistended. No calcified stones. Pancreas: Normal.    Adrenal glands: Mild thickening bilaterally. Kidneys: No hydronephrosis or nephrolithiasis. There is a hypodense lesion at  the interpolar region right kidney anteriorly measuring 22 mm and water  attenuation (39). Retroperitoneum: Moderate burden calcified plaque at aortoiliac vessels. No  lymphadenopathy. The bowel: Stomach and duodenum and small bowel are normal. Appendix not  identified but there is no inflammatory stranding around the base of the cecum. .    CT Of The Pelvis: Peritoneal space: No free fluid. GYN: Prior hysterectomy. No adnexal masses. Urinary bladder: Normal.    Bony skeleton: Multiple levels severe disc space narrowing at the lumbar spine. Multiple levels marked spinal stenosis due to ligamentous and facet hypertrophy,  disc disease. No acute bony abnormalities. .         Impression IMPRESSION:    No kidney stones or secondary signs of recently passed stone. Small effusions. Dependent densities at the lung bases, likely passive  atelectasis. Cannot exclude infection. Right renal cyst.    Significant degenerative changes of the lumbar spine. EKG No results found for this or any previous visit. I have personally reviewed the old medical records and patient's labs    Plan / Recommendation:      1.acute/crf stage 4,related to diuretics,infection. discussed with niece ,poor candidate for dialysis  2. crf stage 4,proteinuria,check 24 hours urine for protein and immunofixation  3.anemia,related to iron deficiency  And renal failure. checking for mm. ? epogen. transfuse 1 unit prbc today  4.adjust all meds to renal failure  Discussed with her son regarding benefits and complications of dialysis  D/w Dr. Felipa Marquez MD  Nephrology  2/27/2017

## 2017-02-27 NOTE — PROGRESS NOTES
Problem: Self Care Deficits Care Plan (Adult)  Goal: *Acute Goals and Plan of Care (Insert Text)  Occupational Therapy Goals  Initiated 2/27/2017 within 7 day(s). 1. Patient will perform upper body bathing and upper body dressing with modified independence and no SOB  2. Patient will perform lower body bathing and lower body dressing with minimal assistance/contact guard assist and no SOB  3. Patient will demonstrate 4/5 UB strength in preparation for her efficient participation in her self care routine  4. Patient will perform toilet transfers with minimal assistance/contact guard assist.  OCCUPATIONAL THERAPY EVALUATION     Patient: Madeline Cushing (80 y.o. female)  Date: 2/27/2017  Primary Diagnosis: Altered mental state  Acute coronary syndromes (HCC)  Acute diastolic heart failure (HCC)   Precautions: droplet         ASSESSMENT :  Based on the objective data described below, the patient presents with decreased strength and decreased functional mobility which limits her independence with her self care routine. She was seen in conjunction with PT evaluation. Patient will benefit from skilled intervention to address the above impairments.   Patients rehabilitation potential is considered to be Good for goals  Factors which may influence rehabilitation potential include:   [ ]             None noted  [ ]             Mental ability/status  [X]             Medical condition  [ ]             Home/family situation and support systems  [ ]             Safety awareness  [ ]             Pain tolerance/management  [ ]             Other:        PLAN :  Recommendations and Planned Interventions:  [X]               Self Care Training                  [X]        Therapeutic Activities  [X]               Functional Mobility Training    [ ]        Cognitive Retraining  [X]               Therapeutic Exercises           [ ]        Endurance Activities  [ ]               Balance Training                   [ ] Neuromuscular Re-Education  [ ]               Visual/Perceptual Training     [X]   Home Safety Training  [X]               Patient Education                 [X]        Family Training/Education  [ ]               Other (comment):     Frequency/Duration: Patient will be followed by occupational therapy 1-2 times per day/4-7 days per week to address goals. Discharge Recommendations: Hemant Winston  Further Equipment Recommendations for Discharge: N/A       PATIENT COMPLEXITY      Eval Complexity: History: LOW Complexity : Brief history review ; Examination: LOW Complexity : 1-3 performance deficits relating to physical, cognitive , or psychosocial skils that result in activity limitations and / or participation restrictions ; Decision Making:LOW Complexity : No comorbidities that affect functional and no verbal or physical assistance needed to complete eval tasks  Assessment: LOW Complexity       G-CODES:      Self Care  Current  CM= 80-99%   Goal  CJ= 20-39%. The severity rating is based on the Level of Assistance required for Functional Mobility and ADLs. SUBJECTIVE:   Patient stated I am just so weak and very cold.  she was wrapped up her blankets in her chair      OBJECTIVE DATA SUMMARY:       Past Medical History:   Diagnosis Date    Breast cancer ([facility] 1/17/14     right breast    Carotid duplex 11/19/2014     Severe 70% or greater bilateral ICA stenosis. LICA dissection suggested.  Chronic renal insufficiency      CVA (cerebral vascular accident) ([facility] 2003     with slight Right sided weakness    Echocardiogram 02/22/2016     EF 55-60%. No WMA. Mild-mod LVH. Gr 2 DDfx. No significant valvular heart disease.  Edema      Glaucoma      Gout flare      Hyperlipidemia      Hypertension      Lower extremity arterial testing 12/19/2014     Mod tibio-peroneal arterial disease bilaterally. R YANCY 1.24.  L YANCY 0.74. Bilateral sm vessel disease.     Lump of right breast  URI (upper respiratory infection)       Past Surgical History:   Procedure Laterality Date    HX APPENDECTOMY        HX CYST INCISION AND DRAINAGE         PT DOES NOT REMEMBER EXACT DATES, TUCKER BREAST DONE MORE THAN 20 YEARS AGO. BENIGN FINDINGS PER PATIENT.  HX GYN         JUSTIN/BSO    HX HYSTERECTOMY        HX MASTECTOMY   1/17/2014     RIGHT PARTIAL MASTECTOMY performed by Amberly Adams MD at [address]     Prior Level of Function/Home Situation: she was previously at a SNF for rehab.  She is unable to describe her rehab process secondary to reporting she has been from the hospital to there and now in the hospital and it is overwhelming for her right now  [address]: Private residence  [address] [place] [address] St: One story  Living Alone: No  Support Systems: Family member(s)  Patient Expects to be Discharged to[de-identified] Private residence  Current DME Used/Available at Home: [address], [facility], [address] [facility] chair  [X]  Right hand dominant          [ ]  Left hand dominant  Cognitive/Behavioral Status:   she is alert, oriented X 3   Skin: no skin integrity issues noted during OT eval  Edema: no extremity edema noted  Vision/Perceptual:     tracking is [facility]     Coordination:  BUE's: WFL  Balance:  Sitting: Impaired  Sitting - Static: Fair (occasional)  Sitting - Dynamic: Fair (occasional)  Standing: Impaired  Standing - Static: Fair  Standing - Dynamic : Fair  Strength:  BUE's: 3+/5  Tone & Sensation:  BUE's WFL   Range of Motion:  BUE's AROM is WFL  Functional Mobility and Transfers for ADLs:  Bed Mobility:  Rolling: Minimum assistance  Supine to Sit: Minimum assistance   Transfers:  Sit to Stand: Minimum assistance  Stand Pivot Transfers: Minimum assistance   ADL Assessment:   self feeding: independent (simulated)  Grooming: unable to simulated secondary to fatigue and feeling chilled; anticipate set up and occasional min assist secondary to her weakness and decreased standing skills  Pain:  0/10 pain prior to OT session  0/10 pain following OT session  Activity Tolerance:   No SOB noted; she is reporting generalized fatigue  Please refer to the flowsheet for vital signs taken during this treatment. After treatment:   [X] Patient left in no apparent distress sitting up in chair  [ ] Patient left in no apparent distress in bed  [X] Call bell left within reach  [X] Nursing notified  [ ] Caregiver present  [ ] Bed alarm activated      COMMUNICATION/EDUCATION:   [ ] Home safety education was provided and the patient/caregiver indicated understanding. [ ] Patient/family have participated as able in goal setting and plan of care. [ ] Patient/family agree to work toward stated goals and plan of care. [X] Patient understands intent and goals of therapy, but is neutral about his/her participation. [ ] Patient is unable to participate in goal setting and plan of care.      Thank you for this referral.  Keyanna Cruz, OTR/L  Time Calculation: 13 mins

## 2017-02-27 NOTE — PROGRESS NOTES
visited with patient Brianda Moreland who is 80 y.o. of age and Baptist is [employer] and completed a Spiritual Assessment. When I entered the room the patient was laying on her bed the tv was off and she was totally covered. Patient appeared to be sleepy and I sensed her a little confused. She did not know how many days she was in the hospitals. Patient talked about family and Protestant.  confirmed patient's Baptist affiliation, [place].   Patient shared limited information about both his medical narrative and spiritual beliefs; and his beliefs will not hinder his medical treatment. Patient appreciated my visit     The  provided the following Interventions:  Initiated a relationship of care and support. Explored issues of kelly, belief, spirituality and Presybeterian/ritual needs while hospitalized. Listened empathically and provided chaplaincy education. Patient asked for continued daily prayers. Chart reviewed. Assessment:  Patient does not have any Presybeterian/cultural needs that will affect patients preferences in health care. Plan:   Chaplains will continue to follow and will provide pastoral care as needed.     [address] [facility] Kishor Person  [phone]

## 2017-02-27 NOTE — PROGRESS NOTES
Problem: Mobility Impaired (Adult and Pediatric)  Goal: *Acute Goals and Plan of Care (Insert Text)  Physical Therapy Goals  Initiated 2/27/2017 and to be accomplished within 7 day(s)  1. Patient will move from supine to sit and sit to supine , scoot up and down and roll side to side in bed with supervision/set-up. 2. Patient will transfer from bed to chair and chair to bed with supervision/set-up using the least restrictive device. 3. Patient will perform sit to stand with supervision/set-up. 4. Patient will ambulate with supervision/set-up for 100 feet with the least restrictive device. 5. Patient will perform HEP with supervision in order to improve strength for carryover into functional tasks. 6. Patient will tolerate sitting up in chair for 3-4hrs/day in order to maintain strength for carryover into functional tasks. PHYSICAL THERAPY EVALUATION     Patient: Hemalatha Huynh (80 y.o. female)  Date: 2/27/2017  Primary Diagnosis: Altered mental state  Acute coronary syndromes (HCC)  Acute diastolic heart failure (HCC)        Precautions: Fall, Droplet         ASSESSMENT :  Pt is 79yo F admitted to hospital for AMS and presents today alert and agreeable to eval. PMH includes cognitive impairments and imaging revealed intermediate possibility of PE this admission with patient on anticoagulants. Pt is also undergoing workup for multiple myeloma at this time. Pt declining attempts to walk, but was agreeable to transfer into recliner. Pt rolled both directions in bed to change incontinence of bowel and then transferred sup to sit with Deniz. Pt then transferred stand pivot with Deniz into locked recliner and demonstrates fair balance. Pt sat in recliner for objective assessment then was left resting with call saucedo by her side; JOAQUIM Dunham in room and present for transfer.  Pt currently demonstrates decreased mobility, endurance, and strength, and will benefit from skilled intervention to address the above impairments. Patients rehabilitation potential is considered to be Fair  Factors which may influence rehabilitation potential include:   [ ]         None noted  [X]         Mental ability/status  [X]         Medical condition  [X]         Home/family situation and support systems  [X]         Safety awareness  [X]         Pain tolerance/management  [ ]         Other:        PLAN :  Recommendations and Planned Interventions:  [X]           Bed Mobility Training             [X]    Neuromuscular Re-Education  [X]           Transfer Training                   [ ]    Orthotic/Prosthetic Training  [X]           Gait Training                          [ ]    Modalities  [X]           Therapeutic Exercises          [ ]    Edema Management/Control  [X]           Therapeutic Activities            [X]    Patient and Family Training/Education  [ ]           Other (comment):     Frequency/Duration: Patient will be followed by physical therapy 1-2 times per day/4-7 days per week to address goals. Discharge Recommendations: Hemant Winston  Further Equipment Recommendations for Discharge: N/A       G-CODES      Mobility  Current  CJ= 20-39%   Goal  CI= 1-19%. The severity rating is based on the Level of Assistance required for Functional Mobility and ADLs.            G-CODES      Eval Complexity: History: MEDIUM  Complexity : 1-2 comorbidities / personal factors will impact the outcome/ POC Exam:MEDIUM Complexity : 3 Standardized tests and measures addressing body structure, function, activity limitation and / or participation in recreation  Presentation: LOW Complexity : Stable, uncomplicated  Clinical Decision Making:Low Complexity   Overall Complexity:LOW       SUBJECTIVE:   Patient stated I can't walk. I don't want to move all around.  Pt tearful and anxious, most likely about foot pain and PT ecouraged pt to transfer from bed to chair, pt agreeable.       OBJECTIVE DATA SUMMARY:       Past Medical History:   Diagnosis Date    Breast cancer ([facility] [address] 1/17/14     right breast    Carotid duplex 11/19/2014     Severe 70% or greater bilateral ICA stenosis. LICA dissection suggested.  Chronic renal insufficiency      CVA (cerebral vascular accident) ([facility] [address] 2003     with slight Right sided weakness    Echocardiogram 02/22/2016     EF 55-60%. No WMA. Mild-mod LVH. Gr 2 DDfx. No significant valvular heart disease.  Edema      Glaucoma      Gout flare      Hyperlipidemia      Hypertension      Lower extremity arterial testing 12/19/2014     Mod tibio-peroneal arterial disease bilaterally. R YANCY 1.24.  L YANCY 0.74. Bilateral sm vessel disease.  Lump of right breast      URI (upper respiratory infection)       Past Surgical History:   Procedure Laterality Date    HX APPENDECTOMY        HX CYST INCISION AND DRAINAGE         PT DOES NOT REMEMBER EXACT DATES, TUCKER BREAST DONE MORE THAN 20 YEARS AGO. BENIGN FINDINGS PER PATIENT.  HX GYN         JUSTIN/BSO    HX HYSTERECTOMY        HX MASTECTOMY   1/17/2014     RIGHT PARTIAL MASTECTOMY performed by Maverick Monroe MD at [address] [place] [address]     Prior Level of Function/Home Situation: Pt came from home where she states she lives alone in [facility]. Pt's son would come by in the AM and at lunch to prepare her food. Pt states she has 3STE her home and 3 stairs inside the home which she was able to navigate with HR. Pt was using SPC for mobility and could grocery shop with power scooter in store. Pt has RW and SC at home.    Home Situation  Home Environment: Private residence  One/Two Story Residence: One story  Living Alone: No  Support Systems: Family member(s)  Patient Expects to be Discharged to[de-identified] Private residence  Current DME Used/Available at Home: [employer], suzanne, Shower chair  Critical Behavior:  Alert, oriented x3, cooperative, tearful   Strength:    Strength: Generally decreased, functional (BLE 3+/5)   Tone & Sensation:     Sensation: Intact (withdrawl from painful stimulus)   Range Of Motion:  AROM: Generally decreased, functional (BLE)    Functional Mobility:  Bed Mobility:  Rolling: Minimum assistance  Supine to Sit: Minimum assistance   Transfers:  Sit to Stand: Minimum assistance  Stand to Sit: Minimum assistance  Stand Pivot Transfers: Minimum assistance      Balance:   Sitting: Impaired  Sitting - Static: Fair (occasional)  Sitting - Dynamic: Fair (occasional)  Standing: Impaired  Standing - Static: Fair  Standing - Dynamic : Fair  Ambulation/Gait Training:  Pt did not tolerate 2/2 to foot pain   Pain:  Pt reports 3/10 pain or discomfort prior to treatment. (feet; states toenails cut too short recently)  Pt reports 3/10 pain or discomfort post treatment. Activity Tolerance:   Pt tolerated activity with no c/o chest pain, SOB; pt states her feet hurt from her toenails being trimmed too short but no complaints otherwise. Please refer to the flowsheet for vital signs taken during this treatment. After treatment:   [X]         Patient left in no apparent distress sitting up in chair  [ ]         Patient left in no apparent distress in bed  [X]         Call bell left within reach  [X]         Nursing notified  [ ]         Caregiver present  [ ]         Bed alarm activated      COMMUNICATION/EDUCATION:   [X]         Fall prevention education was provided and the patient/caregiver indicated understanding. [X]         Patient/family have participated as able in goal setting and plan of care. [X]         Patient/family agree to work toward stated goals and plan of care. [ ]         Patient understands intent and goals of therapy, but is neutral about his/her participation. [ ]         Patient is unable to participate in goal setting and plan of care.      Thank you for this referral.  Oskar Pineda, PT   Time Calculation: 26 mins

## 2017-02-27 NOTE — PROGRESS NOTES
Care Management Interventions  PCP Verified by CM: Yes  Palliative Care Consult (Criteria: CHF and RRAT>21): No  Reason for No Palliative Care Consult: Other (see comment)  Mode of Transport at Discharge: BLS  Transition of Care Consult (CM Consult): Discharge Planning  MyChart Signup: No  Discharge Durable Medical Equipment: No  Health Maintenance Reviewed: Yes  Physical Therapy Consult: Yes  Occupational Therapy Consult: Yes  Speech Therapy Consult: Yes  Current Support Network: Other  Confirm Follow Up Transport: Family  Plan discussed with Pt/Family/Caregiver: Yes  Discharge Location  Discharge Placement: Skilled nursing facility [facility])    Patient 80years old with new onset of a-fib.     [place]

## 2017-02-27 NOTE — PROGRESS NOTES
attended the interdisciplinary rounds for Brianda Moreland, who is a 80 y.o.,female. Patients Primary Language is: [place]. According to the patients EMR Lutheran Affiliation is: Yazdanism.   Patient was sitting by the window when IDR team entered the room. Patient did not share any concern about her treatment. Nurse shared that her family has come to see her     Plan:  Chaplains will continue to follow and will provide pastoral care on daily basis.     [address] [facility] Kishor Person  [phone]

## 2017-02-27 NOTE — PROGRESS NOTES
Palliative Medicine  [place]: [phone] ([phone])  [facility]: [phone] ([phone])      Resuscitation Status: Full Code   Durable DNR addressed? [] Yes   [x] No    [] Not Applicable    Advance Care Planning 2/24/2017   Patient's Healthcare Decision Maker is: Named in scanned ACP document   Primary Decision Maker Name Michelle Sahu    Primary Decision Maker Phone Number [phone]   Primary Decision Maker Relationship to Patient Adult child   Secondary Decision Maker Name [facility] Phone Number [phone]   Secondary Decision Maker Relationship to Patient Adult child   Confirm Advance Directive Yes, on file     Palliative SW initiated consult with patient. She just finished PT and was placed in side chair. On arrival she was asleep sitting in chair. She verbalized wishing to rest and preferred SW to return later. Patient has advance directive onfile naming daughter Ja Thompson primary MPOA and daughter Chong Kenyatta secondary MPOA. The directive notes patients wishes for all life prolonging measures. Palliative team will follow up at a later time. Thank you for the opportunity to assist in the care of Mrs. Latrell Reyes.   Akiko Payton MSW  Palliative Medicine

## 2017-02-27 NOTE — PROGRESS NOTES
Problem: Dysphagia (Adult)  Goal: *Acute Goals and Plan of Care (Insert Text)  Goals: Patient will:  1. Tolerate PO trials with 0 s/s overt distress in 4/5 trials  2. Utilize compensatory swallow strategies/maneuvers (decrease bite/sip, size/rate, alt. liq/sol) with min cues in 4/5 trials  3. Perform oral-motor/laryngeal exercises to increase oropharyngeal swallow function with min cues (yrn and effortful swallow)   4. Complete an objective swallow study (i.e., MBSS) to assess swallow integrity, r/o aspiration, and determine of safest LRD, min A     Recommendations:  Diet: soft solids, honey thick liquids  Meds: in puree or with honey thick liquid wash  Aspiration Precautions  Oral Care TID  SPEECH LANGUAGE PATHOLOGY DYSPHAGIA TREATMENT     Patient: Ashley Webster (80 y.o. female)  Date: 2/27/2017  Diagnosis: Altered mental state  Acute coronary syndromes (HCC)  Acute diastolic heart failure (HCC) <principal problem not specified>       Precautions: aspiration        ASSESSMENT:  Pt seen for f/u dysphagia management. Pt tolerating HTL with no overt s/sx of aspiration. Pt accepted PO trials with maximum encouragement from SLP. Pt educated on reason for HTL consistency, results of previous MBS and introduced to oropharyngeal exercises (i.e., effortful swallow and yrn). Pt completed: effortful swallow exercise in 3 sets of 2 reps each and yrn exercise in 1 set of 3 reps with maximum verbal cues and models from clinician. Pt demo poor endurance and suspected limited understanding of verbal directions. Recommend pt on soft solid with HTL with safe swallowing strategies. Pt encouraged to utilize oropharyngeal exercises 1-2 times a day. St will continue to follow.    Progression toward goals:  [ ]         Improving appropriately and progressing toward goals  [ ]         Improving slowly and progressing toward goals  [ ]         Not making progress toward goals and plan of care will be adjusted PLAN:  Recommendations and Planned Interventions: sof  Patient continues to benefit from skilled intervention to address the above impairments. Continue treatment per established plan of care. Discharge Recommendations:  [address] and To Be Determined       SUBJECTIVE:   Patient stated I can't do it. OBJECTIVE:   Cognitive and Communication Status:  Neurologic State: Drowsy  Orientation Level: Oriented to person, Oriented to place, Oriented to situation  Cognition: Impaired decision making  Perception: Appears intact  Perseveration: No perseveration noted  Safety/Judgement: Awareness of environment  Dysphagia Treatment:  Oral Assessment:  Oral Assessment  Labial: No impairment  Dentition: Intact  Oral Hygiene: good  Lingual: No impairment  Velum: No impairment  Mandible: No impairment  P.O.  Trials:              Patient Position: Upright in chair              Vocal quality prior to P.O.: No impairment              Consistency Presented: Honey thick liquid              How Presented: Self-fed/presented, Cup/sip              How Much: 10              Bolus Acceptance: No impairment              Bolus Formation/Control: No impairment              Propulsion: No impairment              Oral Residue: None              Initiation of Swallow: No impairment              Laryngeal Elevation: Weak              Aspiration Signs/Symptoms: None              Pharyngeal Phase Characteristics: No impairment, issues, or problems               Effective Modifications: None              Oral Phase Severity: No impairment              Pharyngeal Phase Severity : Moderate-severe           Exercises:  Laryngeal Exercises:   Effortful Swallow: Yes  Sets : 3               Reps : 2  Marti: Yes  Sets : 1  Reps :  (3)  PAIN:  No pain noted      After treatment:   [X]              Patient left in no apparent distress sitting up in chair  [ ]              Patient left in no apparent distress in bed  [X] Call bell left within reach         COMMUNICATION/EDUCATION:   [X]              SLP educated pt with regard to compensatory swallow strategies and                   aspiration/reflux precautions including: small bites/sips,                   alternate liquids/solids, decrease feeding rate, HOB > 45 with all po, and                   upright in bed at 30 degrees after po for at least 45                   minutes. Pt educated ion oropharyngeal exercises and importance of exercises. Limited understanding suspected. Galileo Wheeler,  Graduate SLP Student

## 2017-02-28 VITALS
OXYGEN SATURATION: 95 % | WEIGHT: 142.41 LBS | RESPIRATION RATE: 18 BRPM | SYSTOLIC BLOOD PRESSURE: 164 MMHG | DIASTOLIC BLOOD PRESSURE: 55 MMHG | BODY MASS INDEX: 19.86 KG/M2 | TEMPERATURE: 98.2 F | HEART RATE: 65 BPM

## 2017-02-28 LAB
ALBUMIN SERPL ELPH-MCNC: 2.6 G/DL (ref 2.9–4.4)
ALBUMIN/GLOB SERPL: 1.1 {RATIO} (ref 0.7–1.7)
ALPHA1 GLOB SERPL ELPH-MCNC: 0.3 G/DL (ref 0–0.4)
ALPHA2 GLOB SERPL ELPH-MCNC: 0.8 G/DL (ref 0.4–1)
ANION GAP BLD CALC-SCNC: 11 MMOL/L (ref 3–18)
B-GLOBULIN SERPL ELPH-MCNC: 0.8 G/DL (ref 0.7–1.3)
BASOPHILS # BLD AUTO: 0.1 K/UL (ref 0–0.06)
BASOPHILS # BLD: 1 % (ref 0–3)
BUN SERPL-MCNC: 71 MG/DL (ref 7–18)
BUN/CREAT SERPL: 19 (ref 12–20)
CALCIUM SERPL-MCNC: 8.5 MG/DL (ref 8.5–10.1)
CHLORIDE SERPL-SCNC: 101 MMOL/L (ref 100–108)
CO2 SERPL-SCNC: 27 MMOL/L (ref 21–32)
CREAT SERPL-MCNC: 3.75 MG/DL (ref 0.6–1.3)
DIFFERENTIAL METHOD BLD: ABNORMAL
EOSINOPHIL # BLD: 0 K/UL (ref 0–0.4)
EOSINOPHIL NFR BLD: 0 % (ref 0–5)
ERYTHROCYTE [DISTWIDTH] IN BLOOD BY AUTOMATED COUNT: 14.1 % (ref 11.6–14.5)
GAMMA GLOB SERPL ELPH-MCNC: 0.4 G/DL (ref 0.4–1.8)
GLOBULIN SER-MCNC: 2.4 G/DL (ref 2.2–3.9)
GLUCOSE BLD STRIP.AUTO-MCNC: 104 MG/DL (ref 70–110)
GLUCOSE BLD STRIP.AUTO-MCNC: 116 MG/DL (ref 70–110)
GLUCOSE BLD STRIP.AUTO-MCNC: 119 MG/DL (ref 70–110)
GLUCOSE SERPL-MCNC: 110 MG/DL (ref 74–99)
HCT VFR BLD AUTO: 25.2 % (ref 35–45)
HGB BLD-MCNC: 8.1 G/DL (ref 12–16)
IGA SERPL-MCNC: 176 MG/DL (ref 64–422)
IGG SERPL-MCNC: 405 MG/DL (ref 700–1600)
IGM SERPL-MCNC: 66 MG/DL (ref 26–217)
INTERPRETATION SERPL IEP-IMP: ABNORMAL
KAPPA LC FREE SER-MCNC: 63.32 MG/L (ref 3.3–19.4)
KAPPA LC FREE/LAMBDA FREE SER: 1.08 {RATIO} (ref 0.26–1.65)
LAMBDA LC FREE SERPL-MCNC: 58.7 MG/L (ref 5.71–26.3)
LYMPHOCYTES # BLD AUTO: 25 % (ref 20–51)
LYMPHOCYTES # BLD: 1.5 K/UL (ref 0.8–3.5)
M PROTEIN SERPL ELPH-MCNC: ABNORMAL G/DL
MCH RBC QN AUTO: 30.1 PG (ref 24–34)
MCHC RBC AUTO-ENTMCNC: 32.1 G/DL (ref 31–37)
MCV RBC AUTO: 93.7 FL (ref 74–97)
MONOCYTES # BLD: 0.4 K/UL (ref 0–1)
MONOCYTES NFR BLD AUTO: 6 % (ref 2–9)
NEUTS SEG # BLD: 3.9 K/UL (ref 1.8–8)
NEUTS SEG NFR BLD AUTO: 68 % (ref 42–75)
PLATELET # BLD AUTO: 174 K/UL (ref 135–420)
PLATELET COMMENTS,PCOM: ABNORMAL
PMV BLD AUTO: 10.3 FL (ref 9.2–11.8)
POTASSIUM SERPL-SCNC: 3.9 MMOL/L (ref 3.5–5.5)
PROT SERPL-MCNC: 5 G/DL (ref 6–8.5)
RBC # BLD AUTO: 2.69 M/UL (ref 4.2–5.3)
RBC MORPH BLD: ABNORMAL
SODIUM SERPL-SCNC: 139 MMOL/L (ref 136–145)
WBC # BLD AUTO: 5.9 K/UL (ref 4.6–13.2)

## 2017-02-28 PROCEDURE — 85025 COMPLETE CBC W/AUTO DIFF WBC: CPT | Performed by: HOSPITALIST

## 2017-02-28 PROCEDURE — P9016 RBC LEUKOCYTES REDUCED: HCPCS | Performed by: HOSPITALIST

## 2017-02-28 PROCEDURE — 97110 THERAPEUTIC EXERCISES: CPT

## 2017-02-28 PROCEDURE — 80048 BASIC METABOLIC PNL TOTAL CA: CPT | Performed by: HOSPITALIST

## 2017-02-28 PROCEDURE — 74011250636 HC RX REV CODE- 250/636: Performed by: HOSPITALIST

## 2017-02-28 PROCEDURE — 74011250637 HC RX REV CODE- 250/637: Performed by: HOSPITALIST

## 2017-02-28 PROCEDURE — 36430 TRANSFUSION BLD/BLD COMPNT: CPT

## 2017-02-28 PROCEDURE — 97530 THERAPEUTIC ACTIVITIES: CPT

## 2017-02-28 PROCEDURE — 92526 ORAL FUNCTION THERAPY: CPT

## 2017-02-28 PROCEDURE — 30233N1 TRANSFUSION OF NONAUTOLOGOUS RED BLOOD CELLS INTO PERIPHERAL VEIN, PERCUTANEOUS APPROACH: ICD-10-PCS | Performed by: INTERNAL MEDICINE

## 2017-02-28 PROCEDURE — 36415 COLL VENOUS BLD VENIPUNCTURE: CPT | Performed by: HOSPITALIST

## 2017-02-28 PROCEDURE — 82962 GLUCOSE BLOOD TEST: CPT

## 2017-02-28 PROCEDURE — 77030011256 HC DRSG MEPILEX <16IN NO BORD MOLN -A

## 2017-02-28 PROCEDURE — 77030033263 HC DRSG MEPILEX 16-48IN BORD MOLN -B

## 2017-02-28 RX ORDER — LEVOFLOXACIN 500 MG/1
500 TABLET, FILM COATED ORAL
Qty: 3 TAB | Refills: 0 | Status: SHIPPED
Start: 2017-02-28 | End: 2017-03-06

## 2017-02-28 RX ORDER — DOCUSATE SODIUM 100 MG/1
100 CAPSULE, LIQUID FILLED ORAL 2 TIMES DAILY
Qty: 30 CAP | Refills: 0 | Status: SHIPPED
Start: 2017-02-28 | End: 2017-05-29

## 2017-02-28 RX ADMIN — CARVEDILOL 12.5 MG: 12.5 TABLET, FILM COATED ORAL at 18:31

## 2017-02-28 RX ADMIN — VITAMIN D, TAB 1000IU (100/BT) 5000 UNITS: 25 TAB at 11:05

## 2017-02-28 RX ADMIN — ISOSORBIDE DINITRATE 20 MG: 20 TABLET ORAL at 15:19

## 2017-02-28 RX ADMIN — CARVEDILOL 12.5 MG: 12.5 TABLET, FILM COATED ORAL at 11:05

## 2017-02-28 RX ADMIN — HYDRALAZINE HYDROCHLORIDE 100 MG: 50 TABLET, FILM COATED ORAL at 05:14

## 2017-02-28 RX ADMIN — HEPARIN SODIUM 5000 UNITS: 5000 INJECTION, SOLUTION INTRAVENOUS; SUBCUTANEOUS at 12:26

## 2017-02-28 RX ADMIN — GUAIFENESIN AND DEXTROMETHORPHAN HYDROBROMIDE 1 TABLET: 600; 30 TABLET, EXTENDED RELEASE ORAL at 18:30

## 2017-02-28 RX ADMIN — CYANOCOBALAMIN TAB 1000 MCG 1000 MCG: 1000 TAB at 11:05

## 2017-02-28 RX ADMIN — THERA TABS 1 TABLET: TAB at 11:05

## 2017-02-28 RX ADMIN — HYDRALAZINE HYDROCHLORIDE 100 MG: 50 TABLET, FILM COATED ORAL at 15:19

## 2017-02-28 RX ADMIN — ASPIRIN 81 MG: 81 TABLET, COATED ORAL at 11:05

## 2017-02-28 RX ADMIN — GUAIFENESIN AND DEXTROMETHORPHAN HYDROBROMIDE 1 TABLET: 600; 30 TABLET, EXTENDED RELEASE ORAL at 11:05

## 2017-02-28 RX ADMIN — HEPARIN SODIUM 5000 UNITS: 5000 INJECTION, SOLUTION INTRAVENOUS; SUBCUTANEOUS at 05:15

## 2017-02-28 RX ADMIN — Medication 10 ML: at 15:24

## 2017-02-28 RX ADMIN — ISOSORBIDE DINITRATE 20 MG: 20 TABLET ORAL at 11:05

## 2017-02-28 RX ADMIN — Medication 10 ML: at 05:21

## 2017-02-28 RX ADMIN — POLYETHYLENE GLYCOL 3350 17 G: 17 POWDER, FOR SOLUTION ORAL at 11:06

## 2017-02-28 RX ADMIN — DOCUSATE SODIUM 100 MG: 100 CAPSULE, LIQUID FILLED ORAL at 11:05

## 2017-02-28 RX ADMIN — DOCUSATE SODIUM 100 MG: 100 CAPSULE, LIQUID FILLED ORAL at 18:31

## 2017-02-28 RX ADMIN — FERROUS SULFATE TAB 325 MG (65 MG ELEMENTAL FE) 325 MG: 325 (65 FE) TAB at 11:05

## 2017-02-28 NOTE — PROGRESS NOTES
Report given to Lakeisha Gary RN,staff of [facility] nursing [facility]([phone]). No questions or concerns after report. pt left facility around 1830. Prior to leaving son and other family members were at bedside.

## 2017-02-28 NOTE — DISCHARGE SUMMARY
[facility]  TRANSFER SUMMARY    Name:  Kerwin Orozco  MR#:  [id number]  :  [id number]  Account #:  [de-identified]  Date of Adm:  2017  Date of Transfer:  2017      PRIMARY CARE PHYSICIAN: Dr. Drake Muñoz. DISPOSITION: Transfer to skilled nursing home. DISCHARGE CONDITION: Stable. TRANSFER DIAGNOSES:  1. Left lower lobe pneumonia. 2. Possible acute on chronic diastolic heart failure present on admission, resolved now. 3. Influenza A infection. 4. Hypertensive urgency. 5. Severe sepsis present on admission, resolved now. 6. Acute on chronic renal failure, stage IV, needs dialysis as an  outpatient. 7. Moderate severe oropharyngeal dysphagia. 8. Anemia secondary to anemia of chronic disease secondary to renal disease requiring transfusion in the hospital.  9. Acute debility. DISCHARGE MEDICATIONS:  1. Colace 100 mg b.i.d. p.r.n.  2. Humibid 1 tablet b.i.d.  3. Levofloxacin 500 mg every 48 hours for 6 days. 4. Albuterol 1-2 puffs every 6 hours p.r.n.  5. Allopurinol 100 mg daily. 6. Aspirin 81 mg daily. 7. Dulcolax suppositories as needed. 8. Coreg 12.5 mg b.i.d.  9. Vitamin D3, 5000 international units daily. 10. Ferrous sulfate 325 mg daily. 11. Lasix 40 mg daily. 12. Hydralazine 100 mg 3 times daily. 13. Isosorbide dinitrate 20 mg 3 times daily. 14. MiraLax 17 gram daily. 15. Simvastatin 20 mg at bedtime. 16. Multivitamin 1 tablet daily. 17. Ultram 50 mg every 6 hours p.r.n. 18. Vitamin B12, 1000 mcg daily. CONSULTATIONS IN THE HOSPITAL:  1. Consultation with Dr. Nirmala Borges, Nephrology. 2. Consultation with Dr. Tito Antoine, Cardiology. MAJOR INVESTIGATIONS DURING THE HOSPITAL STAY: The  patient had a V/Q scan which showed intermediate probability for PE.    HOSPITAL COURSE: This is an 19-year-old [facility] American female  who presents to the emergency room sent from the [facility] for evaluation of shortness of breath.  The patient in the  emergency room was noted to have mild heart failure and also with  mildly elevated troponin and influenza A positive. The patient was  started on empiric antibiotics and also was given IV Lasix and the  patient was admitted to the hospital. Cardiology was consulted and the  patient was started on heparin because of her mildly elevated troponin. The patient was seen by Cardiology, thought that mild elevated  troponin not consistent with acute coronary syndrome and  recommended no need for heparin and recommended discontinue. Initially, the patient had IV Lasix, but Cardiology, recommended to  change her from IV to p.o. Lasix because of her worsening renal  failure. Nephrology saw the patient and recommended to hold diuresis. With holding diuresis, the patient's creatinine slowly improved. The  patient was treated with empiric antibiotics, concerning for pneumonia,  as she had influenza positive. She was also treated with Tamiflu. It  was thought that the patient most likely had secondary infection post-  influenza with a pneumonia. The patient was empirically treated with  Levaquin and ceftriaxone and Zithromax in the hospital. The patient's  symptoms improved. She has been weaned off oxygen. Currently,  been afebrile, no fevers. The patient's creatinine is currently stable. Since creatinine has been stabilized. I have resumed her back on p.o. Lasix. The patient also had more than 3 liters of negative balance in  the hospital. She did have some anemia for which she received some  blood transfusion yesterday. Renal thinks that the patient would need  Neupogen as an outpatient. Anemia is most likely secondary to chronic  kidney disease. I discussed with nephrologist today who recommends  okay for discharge from their standpoint. The patient was weaned off  oxygen, the patient will be okay for discharge from our standpoint. The patient is currently hemodynamically and medically stable for  discharge.  The patient will be discharged to SNF today. Patient is high risk for readmission. Discharge instructions, followup appointments and physical exam,  please refer to the electronic medical records. The patient is alert, awake, oriented x 2-3. I discussed with the patient  and also with her granddaughter, Angela Chun, over the phone about her  discharge plan and followup appointments. She completely understood  and agreed with the plan. I also answered all her questions and  concerns appropriately.         Dimitry Bowden MD BT / BRII  D:  02/28/2017   16:09  T:  02/28/2017   16:34  Job #:  [id number]

## 2017-02-28 NOTE — PROGRESS NOTES
Problem: Mobility Impaired (Adult and Pediatric)  Goal: *Acute Goals and Plan of Care (Insert Text)  Physical Therapy Goals  Initiated 2/27/2017 and to be accomplished within 7 day(s)  1. Patient will move from supine to sit and sit to supine , scoot up and down and roll side to side in bed with supervision/set-up. 2. Patient will transfer from bed to chair and chair to bed with supervision/set-up using the least restrictive device. 3. Patient will perform sit to stand with supervision/set-up. 4. Patient will ambulate with supervision/set-up for 100 feet with the least restrictive device. 5. Patient will perform HEP with supervision in order to improve strength for carryover into functional tasks. 6. Patient will tolerate sitting up in chair for 3-4hrs/day in order to maintain strength for carryover into functional tasks. PHYSICAL THERAPY TREATMENT     Patient: Ashley Webster (80 y.o. female)  Date: 2/28/2017  Diagnosis: Altered mental state  Acute coronary syndromes (HCC)  Acute diastolic heart failure (HCC) <principal problem not specified>       Precautions:    Chart, physical therapy assessment, plan of care and goals were reviewed. ASSESSMENT:  Pt  Performed  Supine  There ex  Fairly well  With cues. Required  Min (A) with bed mobility and  With sit to stand. Unable to assess  Gait  Due to pt was fatigue. Progression toward goals:  [X]      Improving appropriately and progressing toward goals  [ ]      Improving slowly and progressing toward goals  [ ]      Not making progress toward goals and plan of care will be adjusted       PLAN:  Patient continues to benefit from skilled intervention to address the above impairments. Continue treatment per established plan of care.   Discharge Recommendations:  Rehab, Home Health and Outpatient  Further Equipment Recommendations for Discharge:  bedside commode, rolling walker and N/A       G-CODES:             SUBJECTIVE:   Patient stated I cant  Get up.      OBJECTIVE DATA SUMMARY:   Critical Behavior:  Neurologic State: Confused  Orientation Level: Oriented to person, Disoriented to place, Disoriented to situation, Disoriented to time  Cognition: Follows commands, Impaired decision making  Safety/Judgement: Awareness of environment  Functional Mobility Training:  Bed Mobility:  Rolling: Minimum assistance  Supine to Sit: Minimum assistance   Transfers:  Sit to Stand: Minimum assistance  Stand to Sit: Minimum assistance     Balance:  Sitting: Intact  Sitting - Static: Fair (occasional)  Standing: Intact; With support  Standing - Static: Fair  Standing - Dynamic : Poor  Ambulation/Gait Training:not  assessed       Therapeutic Exercises:   Supine there ex: SLR/AP/hip  abd / addx  15  Each  Seated  There ex  LAQ  Pain: no #  given      Activity Tolerance:   fair  Please refer to the flowsheet for vital signs taken during this treatment.   After treatment:   [ ] Patient left in no apparent distress sitting up in chair  [X] Patient left in no apparent distress in bed  [X] Call bell left within reach  [ ] Nursing notified  [ ] Caregiver present  [X] Bed alarm activated      Romana Thompson PTA   Time Calculation: 17 mins

## 2017-02-28 NOTE — ROUTINE PROCESS
Patient: José Miguel Rashid Age: 80 y.o. Sex: female     Bedside and Verbal shift change report given to JOAQUIM Pompa (oncoming nurse) by Larissa Winslow RN (offgoing nurse). Report included the following information SBAR, Kardex, MAR and Recent Results. PATIENT GOALS FOR TODAY PATIENT PRIORITIES FOR TODAY   Goals are: monitor labs, strict intake and output,  Updated on Whiteboard in Patients Room: yes Patient states his/her priorities are: go home  Updated on Whiteboard in Patients Room: yes     SITUATION:   Code Status: Full Code Reason for Admission: Altered mental state  Acute coronary syndromes ([facility]  Acute diastolic heart failure [facility])   Hospital day: 4 Problem List: Active Problems:    Altered mental state (2/23/2017)      Acute coronary syndromes ([facility] (2/23/2017)      [facility] (2/24/2017)       Attending Provider:   Tori Walton MD Allergies: No Known Allergies   BACKGROUND:   Past Medical History:   Past Medical History:   Diagnosis Date    [facility]14    right breast    Carotid duplex 11/19/2014    Severe 70% or greater bilateral ICA stenosis. LICA dissection suggested.  Chronic renal insufficiency     CVA (cerebral vascular accident) ([facility] 2003    with slight Right sided weakness    Echocardiogram 02/22/2016    EF 55-60%. No WMA. Mild-mod LVH. Gr 2 DDfx. No significant valvular heart disease.  Edema     Glaucoma     Gout flare     Hyperlipidemia     Hypertension     Lower extremity arterial testing 12/19/2014    Mod tibio-peroneal arterial disease bilaterally. R YANCY 1.24.  L YANCY 0.74. Bilateral sm vessel disease.     Lump of right breast     URI (upper respiratory infection)      ASSESSMENT:   Neuro:  Neurologic State: Drowsy, Orientation Level: Oriented to person, Oriented to place, Oriented to situation CV:  Patient on Telemetry: Cardiac/Telemetry Monitor On: Yes    Box Number: 82   Cardiac Rhythm: Normal sinus rhythm, Sinus bradycardia  Patient has a pace maker:   Endocrine   Recent Glucose Results:   Lab Results   Component Value Date/Time    GLU 97 02/27/2017 03:36 AM    GLUCPOC 114 (H) 02/27/2017 03:36 PM    GLUCPOC 115 (H) 02/27/2017 11:16 AM    GLUCPOC 101 02/27/2017 07:27 AM        Respiratory:  O2 Device: Nasal cannula       Supplemental O2 O2 Flow Rate (L/min): 3 l/min       Incentive Spirometery          GI  Current diet: DIET DIABETIC WITH OPTIONS Consistent Carb 1800kcal; Soft Solids; 2 HONEY; No Conc. Sweets                                Urinary Catheter 02/24/17 [place]-Criteria for Appropriate Use: Strict I/Os       Reasons if patient has a escalante: no   Patient Safety  Restraints:    Family notified:    Other Alternatives:     Fall Risk   Total Score: 4   Safety Measures: Bed/Chair alarm on, Bed/Chair-Wheels locked, Bed in low position, Call light within reach, Emergency bedside equipment, Fall prevention (comment), Family at bedside, Gripper socks, Side rails X2, Side rails X 3 VTE Prophylaxis     Sequential Compression Device: Bilateral     Patient Refused VTE Prophylaxis: Yes    WOUND (if present)  Wound Type:  no  Dressing present Dressing Present : No  Wound Concerns/Notes: no IV ACCESS     Reasons if patient has a central line: no     PAIN  Pain Assessment  Pain Intensity 1: 0 (02/27/17 1911)  Pain Location 1: Leg  Pain Intervention(s) 1: Refused  Patient Stated Pain Goal: 0  Time of last intervention: 1800   Reassessment Completed: no Last Vitals:  Vitals:    02/27/17 0800 02/27/17 1141 02/27/17 1615 02/27/17 1718   BP: 153/50 123/47 170/60    Pulse: 68 (!) 59 68    Resp: 18 18 18    Temp: 98.4 °F (36.9 °C) 97.5 °F (36.4 °C) 97.4 °F (36.3 °C)    SpO2: 96% 98% 91% 97%   Weight:          Last 3 Weights:  Last 3 Recorded Weights in this Encounter    02/25/17 0115 02/26/17 0514 02/27/17 0532   Weight: 63.6 kg (140 lb 3.4 oz) 64 kg (141 lb 3.2 oz) 64 kg (141 lb)     Weight change: -0.091 kg (-3.2 oz)  LAB RESULTS  Recent Labs      02/27/17   0336  02/26/17   0400  02/25/17   0940   WBC  4.2*  4.3*  4.6   HGB  7.1*  7.4*  7.7*   HCT  22.1*  23.8*  24.9*   PLT  194  204  203     Recent Labs      02/27/17   0336  02/26/17   0400  02/25/17   1757  02/24/17   2354   NA  137  137   --   142   K  3.5  3.7   --   3.9   GLU  97  95   --   121*   BUN  72*  77*   --   76*   CREA  4.04*  4.01*   --   4.23*   CA  8.2*  8.8  8.4*  8.2*   MG  2.2   --    --    --    INR   --    --    --   1.3*      RECOMMENDATIONS AND DISCHARGE PLANNING   1. Discharge plan for patient // Needs or barriers to disharge: Elle Nam return to snf . 2. Estimated Discharge Date: 2/28/17 Posted on Whiteboard in [facility]: yes    \"HEALS\" SAFETY CHECK   A safety check occurred in the patient's room between off going nurse and oncoming nurse listed above. The safety check included the below items  Area Items   H  High Alert Meds  - Verify all high alert medication drips (heparin, PCA, etc.)   E  Equipment - Suction is set up for ALL patients (with eric)  - Red plugs utilized for all equipment (IV pumps, etc.)  - WOWs wiped down at end of shift.  - Room stocked with oxygen, suction, and other unit-specific supplies   A  Alarms - Bed alarm is set for fall risk patients  - Ensure chair alarm is in place and activated if patient is up in a chair   L  Lines - Check IV for any infiltration  - Jang bag is empty if patient has a Jang   - Tubing and IV bags are labeled   S  Safety   - Room is clean, patient is clean, and equipment is clean. - Ensure room is clear and free of clutter  - Hallways are clear from equipment besides carts.    - Fall bracelet on for fall risk patients  - Suction is set up for ALL patients (with eric)  - Isolation precautions followed, supplies available outside room, sign posted   Анна Olson RN

## 2017-02-28 NOTE — PROGRESS NOTES
Care Management Interventions  PCP Verified by CM: Yes  Palliative Care Consult (Criteria: CHF and RRAT>21): No  Reason for No Palliative Care Consult: Other (see comment)  Mode of Transport at Discharge: [address]  [address] Time of Discharge: Ekaterina David [address] (CM Consult): Discharge Planning  MyChart Signup: No  Discharge Durable Medical Equipment: No  Health Maintenance Reviewed: Yes  Physical Therapy Consult: Yes  Occupational Therapy Consult: Yes  Speech Therapy Consult: Yes  Current Support Network: Other  Confirm Follow Up Transport: Family  Plan discussed with Pt/Family/Caregiver: Yes  Discharge Location  Discharge Placement: Skilled nursing facility     CM met with pt and spoke with her daughter Judy Herrera who wishes for pt to return to [facility] where she was receiving short term rehab  Pt is being discharged back to [facility] and transfer  has been scheduled for [id number] with [id number]. Pt, pt's daughter Judy Herrera and facility are aware.

## 2017-02-28 NOTE — INTERDISCIPLINARY ROUNDS
Interdisciplinary Round Note   Patient Information: Patient Information: Ashley Webster                                      [birth year]   Reason for Admission: Altered mental state  [facility]  Acute diastolic heart failure [facility])   Attending Provider:   Fab Valentine MD  Primary Care Physician:       Yadira Ospina MD       [phone]   Past Medical History:   Past Medical History:   Diagnosis Date    [facility]/17[facility]14    right breast    Carotid duplex 11/19/2014    Severe 70% or greater bilateral ICA stenosis. LICA dissection suggested.  Chronic renal insufficiency     [facility]    with slight Right sided weakness    Echocardiogram 02/22/2016    EF 55-60%. No WMA. Mild-mod LVH. Gr 2 DDfx. No significant valvular heart disease.  Edema     Glaucoma     Gout flare     Hyperlipidemia     Hypertension     Lower extremity arterial testing 12/19/2014    Mod tibio-peroneal arterial disease bilaterally. R YANCY 1.24.  L YANCY 0.74. Bilateral sm vessel disease.  Lump of right breast     URI (upper respiratory infection)       Hospital day: 4  Estimated discharge date: 2/28/17  RRAT Score: High Risk            38       Total Score        3 Relationship with PCP    9 More than 1 Admission in calendar year    5 Patient Insurance is Medicare, Medicaid or Self Pay    21 Charlson Comorbidity Score        Criteria that do not apply:    Patient Living Status    Patient Length of Stay > 5       Goals for Today: monitor intake and output, safety, isolation precautions.  24 hour urine collection      Overnight Events: none     VITAL SIGNS  Vitals:    02/27/17 0800 02/27/17 1141 02/27/17 1615 02/27/17 1718   BP: 153/50 123/47 170/60    Pulse: 68 (!) 59 68    Resp: 18 18 18    Temp: 98.4 °F (36.9 °C) 97.5 °F (36.4 °C) 97.4 °F (36.3 °C)    SpO2: 96% 98% 91% 97%   Weight:              Lines, Drains, & Airways    Peripheral IV 02/23/17 Left Arm-Site Assessment: Clean, dry, & intact  Peripheral IV 02/25/17 Left Forearm-Site Assessment: Clean, dry, & intact  [REMOVED] Peripheral IV 02/23/17 Right Antecubital-Site Assessment: Clean, dry, & intact       VTE Prophylaxis               Sequential Compression Device: Bilateral               Patient Refused VTE Prophylaxis: Yes   Intake and Output:   02/26 0701 - 02/27 1900  In: 100 [P.O.:100]  Out: 1500 [Urine:1500]    Current Diet: DIET DIABETIC WITH OPTIONS Consistent Carb 1800kcal; Soft Solids; 2 HONEY; No Conc. Sweets          GI Prophylaxis: no        Type: no     Recent Glucose Results:   Lab Results   Component Value Date/Time    GLU 97 02/27/2017 03:36 AM    GLUCPOC 114 (H) 02/27/2017 03:36 PM    GLUCPOC 115 (H) 02/27/2017 11:16 AM    GLUCPOC 101 02/27/2017 07:27 AM          IV Antibiotics? yes       When started: 2/24/17   Activity Level:   Activity Level: Head of bed elevated (degrees)  Needs assistance with ADLs: yes  PT Consult Status: yes Current Immunizations:  Immunization History   Administered Date(s) Administered    Influenza High Dose Vaccine PF 10/28/2016    Influenza Vaccine 10/22/2013, 11/05/2014, 10/12/2015    Influenza Vaccine Whole 11/08/2010    Pneumococcal Vaccine (Unspecified Type) 05/10/2007, 03/05/2012    TD Vaccine 11/08/2002          Recommendations:   Discharge Disposition: SNF    Needs for Discharge: return to snf Recommendations from IDR team: cont with current plan of care    Other Notes: none

## 2017-02-28 NOTE — ROUTINE PROCESS
Patient: Emily Messina Age: 80 y.o. Sex: female     Bedside and Verbal shift change report given to JOAQUIM Blue (oncoming nurse) by Mason Eric RN (offgoing nurse). Report included the following information SBAR, Kardex, MAR and Recent Results. PATIENT GOALS FOR TODAY PATIENT PRIORITIES FOR TODAY   Goals are: monitor labs, strict intake and output,  Updated on Whiteboard in Patients Room: yes Patient states his/her priorities are: go home  Updated on Whiteboard in Patients Room: yes     SITUATION:   Code Status: Full Code Reason for Admission: Altered mental state  Acute coronary syndromes ([facility] [address]  Acute diastolic heart failure [facility])   Hospital day: 5 Problem List: Active Problems:    Altered mental state (2/23/2017)      Acute coronary syndromes ([facility] (2/23/2017)      [facility] (2/24/2017)       Attending Provider:   Reza Do MD Allergies: No Known Allergies   BACKGROUND:   Past Medical History:   Past Medical History:   Diagnosis Date    Breast cancer ([facility] [facility] 1/17/14    right breast    Carotid duplex 11/19/2014    Severe 70% or greater bilateral ICA stenosis. LICA dissection suggested.  Chronic renal insufficiency     CVA (cerebral vascular accident) ([facility] 2003    with slight Right sided weakness    Echocardiogram 02/22/2016    EF 55-60%. No WMA. Mild-mod LVH. Gr 2 DDfx. No significant valvular heart disease.  Edema     Glaucoma     Gout flare     Hyperlipidemia     Hypertension     Lower extremity arterial testing 12/19/2014    Mod tibio-peroneal arterial disease bilaterally. R YANCY 1.24.  L YANCY 0.74. Bilateral sm vessel disease.     Lump of right breast     URI (upper respiratory infection)      ASSESSMENT:   Neuro:  Neurologic State: Alert, Confused, Orientation Level: Oriented to person CV:  Patient on Telemetry: Cardiac/Telemetry Monitor On: Yes    Box Number: 82   Cardiac Rhythm: Normal sinus rhythm  Patient has a pace maker:   Endocrine Recent Glucose Results:   Lab Results   Component Value Date/Time    GLUCPOC 116 (H) 02/27/2017 09:09 PM    GLUCPOC 114 (H) 02/27/2017 03:36 PM    GLUCPOC 115 (H) 02/27/2017 11:16 AM        Respiratory:  O2 Device: Nasal cannula       Supplemental O2 O2 Flow Rate (L/min): 3 l/min       Incentive Spirometery          GI  Current diet: DIET DIABETIC WITH OPTIONS Consistent Carb 1800kcal; Soft Solids; 2 HONEY; No Conc. Sweets                                [REMOVED] Urinary Catheter 02/24/17 [place]-Criteria for Appropriate Use: Strict I/Os       Reasons if patient has a escalante: no   Patient Safety  Restraints:    Family notified:    Other Alternatives:     Fall Risk   Total Score: 4   Safety Measures: Bed/Chair-Wheels locked, Bed in low position, Call light within reach, Emergency bedside equipment, Fall prevention (comment), Gripper socks, Side rails X 3 VTE Prophylaxis     Sequential Compression Device: Bilateral     Patient Refused VTE Prophylaxis: Yes    WOUND (if present)  Wound Type:  no  Dressing present Dressing Present : No  Wound Concerns/Notes: no IV ACCESS     Reasons if patient has a central line: no     PAIN  Pain Assessment  Pain Intensity 1: 0 (02/28/17 0336)  Pain Location 1: Leg  Pain Intervention(s) 1: Refused  Patient Stated Pain Goal: 0  Time of last intervention: 1800   Reassessment Completed: no Last Vitals:  Vitals:    02/28/17 0135 02/28/17 0202 02/28/17 0351 02/28/17 0530   BP: 173/62 176/67 177/61    Pulse: (!) 58 (!) 58 68    Resp: 18 18 20    Temp: 98 °F (36.7 °C) 97.9 °F (36.6 °C) 98.1 °F (36.7 °C)    SpO2: 99% 99% 100%    Weight:    64.6 kg (142 lb 6.6 oz)      Last 3 Weights:  Last 3 Recorded Weights in this Encounter    02/26/17 0514 02/27/17 0532 02/28/17 0530   Weight: 64 kg (141 lb 3.2 oz) 64 kg (141 lb) 64.6 kg (142 lb 6.6 oz)     Weight change: 0.64 kg (1 lb 6.6 oz)  LAB RESULTS  Recent Labs      02/27/17   0336  02/26/17   0400  02/25/17   0940   WBC  4.2*  4.3*  4.6   HGB  7.1* 7. 4*  7.7*   HCT  22.1*  23.8*  24.9*   PLT  194  204  203     Recent Labs      02/27/17   0336  02/26/17   0400  02/25/17   1757   NA  137  137   --    K  3.5  3.7   --    GLU  97  95   --    BUN  72*  77*   --    CREA  4.04*  4.01*   --    CA  8.2*  8.8  8.4*   MG  2.2   --    --       RECOMMENDATIONS AND DISCHARGE PLANNING   1. Discharge plan for patient // Needs or barriers to disharge: Dax Avendano return to snf . 2. Estimated Discharge Date: 2/28/17 Posted on Whiteboard in [facility]: yes    \"HEALS\" SAFETY CHECK   A safety check occurred in the patient's room between off going nurse and oncoming nurse listed above. The safety check included the below items  Area Items   H  High Alert Meds  - Verify all high alert medication drips (heparin, PCA, etc.)   E  Equipment - Suction is set up for ALL patients (with eric)  - Red plugs utilized for all equipment (IV pumps, etc.)  - WOWs wiped down at end of shift.  - Room stocked with oxygen, suction, and other unit-specific supplies   A  Alarms - Bed alarm is set for fall risk patients  - Ensure chair alarm is in place and activated if patient is up in a chair   L  Lines - Check IV for any infiltration  - Jang bag is empty if patient has a Jang   - Tubing and IV bags are labeled   S  Safety   - Room is clean, patient is clean, and equipment is clean. - Ensure room is clear and free of clutter  - Hallways are clear from equipment besides carts.    - Fall bracelet on for fall risk patients  - Suction is set up for ALL patients (with eric)  - Isolation precautions followed, supplies available outside room, sign posted   Ed Roach RN

## 2017-02-28 NOTE — PROGRESS NOTES
RENAL DAILY PROGRESS NOTE    Patient: Jalyn Mcfarlane               Sex: female          DOA: 2/23/2017  5:55 PM        YOB: 1928      Age:  80 y.o.        LOS:  LOS: 5 days     Subjective: Jalyn Mcfarlane is a 80 y.o.  who presents with Altered mental state  Acute coronary syndromes (HCC)  Acute diastolic heart failure ([address] Asked to evaluate for worsening renal failure. hx of crf stage 4,admitted with sob,flu,diastolic heart failure,treated with lasix  Chief complains: none  - Reviewed last 24 hrs events     Current Facility-Administered Medications   Medication Dose Route Frequency    0.9% sodium chloride infusion 250 mL  250 mL IntraVENous PRN    guaiFENesin-dextromethorphan SR (HUMIBID DM) 600-30 mg tablet 1 Tab  1 Tab Oral BID    polyethylene glycol (MIRALAX) packet 17 g  17 g Oral DAILY    heparin (porcine) injection 5,000 Units  5,000 Units SubCUTAneous Q8H    insulin lispro (HUMALOG) injection   SubCUTAneous AC&HS    glucose chewable tablet 16 g  16 g Oral PRN    glucagon (GLUCAGEN) injection 1 mg  1 mg IntraMUSCular PRN    dextrose (D50W) injection syrg 12.5-25 g  25-50 mL IntraVENous PRN    acetaminophen (TYLENOL) tablet 500 mg  500 mg Oral Q6H PRN    allopurinol (ZYLOPRIM) tablet 100 mg  100 mg Oral Q MON, WED & FRI    levoFLOXacin (LEVAQUIN) 500 mg in D5W IVPB  500 mg IntraVENous Q48H    aspirin delayed-release tablet 81 mg  81 mg Oral DAILY    bisacodyl (DULCOLAX) suppository 10 mg  10 mg Rectal DAILY PRN    carvedilol (COREG) tablet 12.5 mg  12.5 mg Oral BID WITH MEALS    cholecalciferol (VITAMIN D3) tablet 5,000 Units  5,000 Units Oral DAILY    cyanocobalamin tablet 1,000 mcg  1,000 mcg Oral DAILY    ferrous sulfate tablet 325 mg  325 mg Oral ACB    hydrALAZINE (APRESOLINE) tablet 100 mg  100 mg Oral Q8H    isosorbide dinitrate (ISORDIL) tablet 20 mg  20 mg Oral TID    simvastatin (ZOCOR) tablet 20 mg  20 mg Oral QHS    therapeutic multivitamin [facility]) tablet 1 Tab  1 Tab Oral DAILY    traMADol (ULTRAM) tablet 50 mg  50 mg Oral Q8H PRN    docusate sodium (COLACE) capsule 100 mg  100 mg Oral BID    morphine injection 2 mg  2 mg IntraVENous Q4H PRN    nitroglycerin (NITROSTAT) tablet 0.4 mg  0.4 mg SubLINGual Q5MIN PRN    naloxone (NARCAN) injection 0.4 mg  0.4 mg IntraVENous PRN    diphenhydrAMINE (BENADRYL) injection 12.5 mg  12.5 mg IntraVENous Q4H PRN    sodium chloride (NS) flush 5-10 mL  5-10 mL IntraVENous Q8H    sodium chloride (NS) flush 5-10 mL  5-10 mL IntraVENous PRN    ondansetron (ZOFRAN) injection 4 mg  4 mg IntraVENous Q6H PRN    albuterol-ipratropium (DUO-NEB) 2.5 MG-0.5 MG/3 ML  3 mL Nebulization Q4H PRN    azithromycin (ZITHROMAX) 500 mg in 0.9% sodium chloride 250 mL IVPB  500 mg IntraVENous Q24H    cefTRIAXone (ROCEPHIN) 1 g in 0.9% sodium chloride (MBP/ADV) 50 mL MBP  1 g IntraVENous Q24H       Objective:     Visit Vitals    /63 (BP 1 Location: Right arm, BP Patient Position: At rest)    Pulse 74    Temp 98.1 °F (36.7 °C)    Resp 20    Wt 64.6 kg (142 lb 6.6 oz)    SpO2 100%    Breastfeeding No    BMI 19.86 kg/m2       Intake/Output Summary (Last 24 hours) at 02/28/17 1356  Last data filed at 02/28/17 1232   Gross per 24 hour   Intake              420 ml   Output                0 ml   Net              420 ml       Physical Examination:       RS: lungs diminished breath sounds  CVS: S1-S2 heard, RRR, No S3 / murmur  Abdomen: Soft, Non tender, Not distended, Positive bowel sounds, no organomegaly, no CVA / supra pubic tenderness  Extremities: No edema, no cyanosis, skin is warm on touch. HEENT: Head is atraumatic, PERRLA, conjunctiva pink & non icteric. No JVD or carotid bruit   Musculoskeletal: No gross joints or bone deformities   Lymph Node: No palpable cervical, axillary or groin lymphadenopathy.       Data Review:      Labs:     Hematology:   Recent Labs      02/28/17   0630  02/27/17   0336  02/26/17 0400   WBC  5.9  4.2*  4.3*   HGB  8.1*  7.1*  7.4*   HCT  25.2*  22.1*  23.8*     Chemistry:   Recent Labs      02/28/17   0630  02/27/17   0336  02/26/17   0400  02/25/17   1757   BUN  71*  72*  77*   --    CREA  3.75*  4.04*  4.01*   --    CA  8.5  8.2*  8.8  8.4*   K  3.9  3.5  3.7   --    NA  139  137  137   --    CL  101  100  100   --    CO2  27  27  29   --    PHOS   --   4.0   --   4.8   GLU  110*  97  95   --         Images:    XR (Most Recent). CXR reviewed by me and compared with previous CXR   Results from [place] encounter on 02/23/17   XR CHEST PORT   Narrative Portable Chest 1546 hours    CPT CODE:32464    HISTORY:  Productive cough, chest pain, recent pneumonia,   rule out PE.    COMPARISON: Chest x-ray February 23 of February 15, 2017, January 23, 2017, lung  scan same day 2/25/2017. FINDINGS:     There is persistent opacity in the retrocardiac region on the left with minimal  lateral blunting of the costophrenic angle. The left costophrenic angle is  sharp. Pulmonary vascularity is normal. The heart is mildly enlarged. Bilateral  glenohumeral and AC joint space osteoarthritic change demonstrated. Cassette  artifact projects over the patient's lower neck. .         Impression IMPRESSION:    Persistent abnormality at the left base consistent with left pleural effusion  with underlying infiltrate or atelectasis. CT (Most Recent)   Results from Hospital Encounter encounter on 02/23/17   CT ABD PELV WO CONT   Narrative CT Of The Abdomen And Pelvis Without Contrast    CPT CODE 68303,11150    CLINICAL HISTORY: Chronic renal insufficiency, CVA and hypertension. Breast  cancer. Presenting with shortness of breath, and left flank pain. TECHNIQUE: 5 mm helical MDCT scan was obtained of the abdomen and pelvis. Sagittal and coronal images created from original data set.   All CT scans at  this facility are performed using dose optimization techniques as appropriate to  a performed exam, to include automated exposure control, adjustment of the mA  and/or kV according to patient's size (including appropriate matching for site  specific examinations), or use of iterative reconstruction technique. COMPARISON: Portable chest x-ray today. FINDINGS:  view demonstrates normal bowel gas pattern. Dense barium in the  rectum. CT Of The Abdomen: Lung bases: Dense opacity basilar segments left lower lobe  medially. Hazy densities right lung base posteriorly. Marked calcifications of  the tracheobronchial tree. Small effusions layering posteriorly. Larger  subpulmonic component on the left than the right. Heart and pericardium: Cardiomegaly. Left ventricular dilatation. Mild coronary  artery calcifications. CHEST WALL: Coarse calcifications inferiorly at the right breast. No mass is  identified. Liver: No focal lesions. Artifact traversing the liver because the patient was  scanned with her arms at her sides. Spleen: Normal.    Gallbladder: Nondistended. No calcified stones. Pancreas: Normal.    Adrenal glands: Mild thickening bilaterally. Kidneys: No hydronephrosis or nephrolithiasis. There is a hypodense lesion at  the interpolar region right kidney anteriorly measuring 22 mm and water  attenuation (39). Retroperitoneum: Moderate burden calcified plaque at aortoiliac vessels. No  lymphadenopathy. The bowel: Stomach and duodenum and small bowel are normal. Appendix not  identified but there is no inflammatory stranding around the base of the cecum. .    CT Of The Pelvis: Peritoneal space: No free fluid. GYN: Prior hysterectomy. No adnexal masses. Urinary bladder: Normal.    Bony skeleton: Multiple levels severe disc space narrowing at the lumbar spine. Multiple levels marked spinal stenosis due to ligamentous and facet hypertrophy,  disc disease. No acute bony abnormalities. .         Impression IMPRESSION:    No kidney stones or secondary signs of recently passed stone.     Small effusions. Dependent densities at the lung bases, likely passive  atelectasis. Cannot exclude infection. Right renal cyst.    Significant degenerative changes of the lumbar spine. EKG No results found for this or any previous visit. I have personally reviewed the old medical records and patient's labs    Plan / Recommendation:      1.acute/crf stage 4,related to diuretics,infection. improving. discussed with son ,poor candidate for dialysis. she will follow in my office and will discuss dialysis when needed  2. crf stage 4,proteinuria,24 hours urine for protein and immunofixation is pending  3.anemia,related to iron deficiency  And renal failure. checking for mm. transfused 1 unit prbc today. will discuss epo as outpatient  4.adjust all meds to renal failure  5.sec hyperparathyroidism,watch  D/w Dr. Maria M Muse MD  Nephrology  2/28/2017

## 2017-02-28 NOTE — PROGRESS NOTES
Problem: Dysphagia (Adult)  Goal: *Acute Goals and Plan of Care (Insert Text)  Goals: Patient will:  1. Tolerate PO trials with 0 s/s overt distress in 4/5 trials- met   2. Utilize compensatory swallow strategies/maneuvers (decrease bite/sip, size/rate, alt. liq/sol) with min cues in 4/5 trials  3. Perform oral-motor/laryngeal exercises to increase oropharyngeal swallow function with min cues (yrn and effortful swallow)   4. Complete an objective swallow study (i.e., MBSS) to assess swallow integrity, r/o aspiration, and determine of safest LRD, min A - met     Recommendations:  Diet: soft solids, honey thick liquids  Meds: in puree or with honey thick liquid wash  Aspiration Precautions  Oral Care TID     SPEECH LANGUAGE PATHOLOGY DYSPHAGIA TREATMENT/DISCHARGE     Patient: Medardo Brooke (80 y.o. female)  Date: 2/28/2017  Diagnosis: Altered mental state  Acute coronary syndromes (HCC)  Acute diastolic heart failure (HCC) <principal problem not specified>       Precautions: aspiration        ASSESSMENT:  Pt was seen for f/u dysphagia management. Pt was only oriented to person. Pt tolerating honey-thick liquids with no overt s/sx of aspiration. Pt unable to follow commands and verbal directions for oropharyngeal exercises with maximum encouragement by SLP; pt may benfit from [address] [place] [address] upon discharge from this facility. Recommend patient remain on soft solid and HTL. At this time, ST services are no longer warranted; Re-consult if needed. Progression toward goals:  [X]         Improving appropriately and progressing toward goals  [ ]         Improving slowly and progressing toward goals  [ ]         Not making progress toward goals and plan of care will be adjusted       PLAN:  Recommendations and Planned Interventions: soft solid; HTL   No further skilled intervention warranted at this time. Will sign off.   Discharge Recommendations:  Home Health, Outpatient and To Be Determined       SUBJECTIVE:   Patient stated I don't know where I am. OBJECTIVE:   Cognitive and Communication Status:  Neurologic State: Confused  Orientation Level: Oriented to person, Disoriented to place, Disoriented to situation, Disoriented to time  Cognition: Follows commands, Impaired decision making  Perception: Appears intact  Perseveration: No perseveration noted  Safety/Judgement: Awareness of environment  Dysphagia Treatment:  Oral Assessment:  Oral Assessment  Labial: No impairment  Dentition: Intact  Oral Hygiene: fair  Lingual: No impairment  Velum: No impairment  Mandible: No impairment  P.O. Trials:              Patient Position: [facility]              Vocal quality prior to P.O.: No impairment              Consistency Presented: Honey thick liquid              How Presented: Self-fed/presented, Cup/sip              How Much: 10              Bolus Acceptance: No impairment              Bolus Formation/Control: No impairment              Propulsion: No impairment              Oral Residue: None              Initiation of Swallow: No impairment              Laryngeal Elevation: Weak              Aspiration Signs/Symptoms: None              Pharyngeal Phase Characteristics: No impairment, issues, or problems               Effective Modifications: None              Cues for Modifications: None              Oral Phase Severity: No impairment              Pharyngeal Phase Severity : Moderate-severe              GCODESwallowing:  Swallow D/C Status CK= 40-59%     PAIN:  No pain noted.        After treatment:   [ ]              Patient left in no apparent distress sitting up in chair  [X]              Patient left in no apparent distress in bed  [X]              Call bell left within reach         COMMUNICATION/EDUCATION:   [X]              SLP educated pt with regard to compensatory swallow strategies and                   aspiration/reflux precautions including: small bites/sips,                   alternate liquids/solids, decrease feeding rate, HOB > 45 with all po, and                   upright in bed at 30 degrees after po for at least 45                   Minutes. Galileo Fink,  Graduate SLP Student

## 2017-02-28 NOTE — DISCHARGE SUMMARY
Transfer Summary    Patient: Hernando Ojeda MRN: [id number]  CSN: [id number]    YOB: 1928  Age: 80 y.o. Sex: female    DOA: 2/23/2017 LOS:  LOS: 5 days   Discharge Date:      Admission Diagnoses: Altered mental state  Acute coronary syndromes (HCC)  Acute diastolic heart failure [facility])    Discharge Diagnoses:  Please see Dictation. Discharge Condition: Stable    PHYSICAL EXAM  Visit Vitals    /63 (BP 1 Location: Right arm, BP Patient Position: At rest)    Pulse 74    Temp 98.1 °F (36.7 °C)    Resp 20    Wt 64.6 kg (142 lb 6.6 oz)    SpO2 100%    Breastfeeding No    BMI 19.86 kg/m2       General: Alert, cooperative, no acute distress    Lungs:  CTA Bilaterally. Heart:             Regular rate and Rhythm. Abdomen: Soft, Non distended, Non tender. + Bowel sounds. Extremities: No edema or cyanosis   Neurologic:  AA, oriented X 2-3, moves all ext. Hospital Course: Please see dictation. code # [id number]. Repeat /55. SPO2 96% on RA. Discharge Medications:     Current Discharge Medication List      START taking these medications    Details   guaiFENesin-dextromethorphan SR (HUMIBID DM) 600-30 mg per tablet Take 1 Tab by mouth two (2) times a day. Qty: 10 Tab, Refills: 0      docusate sodium (COLACE) 100 mg capsule Take 1 Cap by mouth two (2) times a day for 90 days. Qty: 30 Cap, Refills: 0      levoFLOXacin (LEVAQUIN) 500 mg tablet Take 1 Tab by mouth every fourty-eight (48) hours for 6 days. Qty: 3 Tab, Refills: 0         CONTINUE these medications which have NOT CHANGED    Details   furosemide (LASIX) 40 mg tablet Take 1 Tab by mouth daily. Qty: 30 Tab, Refills: 0      hydrALAZINE (APRESOLINE) 100 mg tablet Take 1 Tab by mouth every eight (8) hours for 30 days. Qty: 90 Tab, Refills: 0      isosorbide dinitrate (ISORDIL) 20 mg tablet Take 1 Tab by mouth three (3) times daily for 30 days.   Qty: 90 Tab, Refills: 0      polyethylene glycol (MIRALAX) 17 gram packet Take 1 Packet by mouth daily. Qty: 30 Packet, Refills: 0      traMADol (ULTRAM) 50 mg tablet Take 1 Tab by mouth every six (6) hours as needed. Max Daily Amount: 200 mg. Qty: 20 Tab, Refills: 0      therapeutic multivitamin (THERAGRAN) tablet Take 1 Tab by mouth daily. Qty: 30 Tab, Refills: 0      carvedilol (COREG) 12.5 mg tablet Take 1 Tab by mouth two (2) times daily (with meals) for 30 days. Qty: 60 Tab, Refills: 0      bisacodyl (DULCOLAX) 10 mg suppository Insert 10 mg into rectum as needed. Qty: 30 Suppository, Refills: 0      allopurinol (ZYLOPRIM) 100 mg tablet Take 1 Tab by mouth daily. Qty: 30 Tab, Refills: 0      simvastatin (ZOCOR) 20 mg tablet Take 1 Tab by mouth nightly. Qty: 30 Tab, Refills: 0      albuterol (PROVENTIL HFA, VENTOLIN HFA, PROAIR HFA) 90 mcg/actuation inhaler Take 1-2 Puffs by inhalation every four (4) hours as needed for Wheezing. Qty: 1 Inhaler, Refills: 0      inhalational spacing device 1 Each by Does Not Apply route as needed. Qty: 1 Device, Refills: 0      aspirin delayed-release 81 mg tablet Take 1 Tab by mouth daily. Qty: 100 Tab, Refills: prn      ferrous sulfate 325 mg (65 mg iron) tablet Take 325 mg by mouth Daily (before breakfast). cyanocobalamin (VITAMIN B-12) 1,000 mcg tablet Take 1,000 mcg by mouth daily. acetaminophen (TYLENOL) 325 mg tablet Take 2 Tabs by mouth every four (4) hours as needed. Qty: 30 Tab, Refills: 0      cholecalciferol, VITAMIN D3, (VITAMIN D3) 5,000 unit tab tablet Take 1 Tab by mouth daily. Qty: 100 Tab, Refills: 0             DIET:  Renal soft Diet and honey thick liquids    ACTIVITY: Activity as tolerated  Patient needs to be on Fall, aspiration, decubitus precaution.   ·    PT/OT consult  ·             Speech consult  ·             DVT prophylaxis     ADDITIONAL INFORMATION: If you experience any of the following symptoms but not limited to Fever, chills, nausea, vomiting, diarrhea, change in mentation, falling, bleeding, shortness of breath, chest pain, please call your primary care physician or return to the emergency room if you cannot get hold of your doctor:     FOLLOW UP CARE:  Follow-up with 1.  Physician at SNF in 1-2 days with Cbc with diff, bmp, mg.                             2. Dr. Manuela Ledesma, cardio in 2 weeks                           3. Dr. Sylvia Vegas in 2 weeks     Pt's PCP: Sunday Landers MD.    Minutes spent on discharge: >40 minutes spent coordinating this discharge (review instructions/follow-up, prescriptions, preparing report for sign off)    Eden Wadsworth MD  2/28/2017 4:11 PM

## 2017-03-01 ENCOUNTER — PATIENT OUTREACH (OUTPATIENT)
Dept: INTERNAL MEDICINE CLINIC | Age: 82
End: 2017-03-01

## 2017-03-01 LAB
ABO + RH BLD: NORMAL
ALBUMIN 24H MFR UR ELPH: 56.8 %
ALPHA1 GLOB 24H MFR UR ELPH: 9 %
ALPHA2 GLOB 24H MFR UR ELPH: 9.9 %
B-GLOBULIN MFR UR ELPH: 17.3 %
BACTERIA SPEC CULT: NORMAL
BACTERIA SPEC CULT: NORMAL
BLD PROD TYP BPU: NORMAL
BLOOD GROUP ANTIBODIES SERPL: NORMAL
BPU ID: NORMAL
CALLED TO:,BCALL1: NORMAL
COLLECT DURATION TIME UR: 24 HR
CROSSMATCH RESULT,%XM: NORMAL
GAMMA GLOB 24H MFR UR ELPH: 6.9 %
INTERPRETATION UR IFE-IMP: ABNORMAL
M PROTEIN 24H MFR UR ELPH: ABNORMAL %
NOTE, 149533: ABNORMAL
PROT 24H UR-MRATE: 1063.2 MG/24 HR (ref 30–150)
PROT UR-MCNC: 88.6 MG/DL
SERVICE CMNT-IMP: NORMAL
SERVICE CMNT-IMP: NORMAL
SPECIMEN EXP DATE BLD: NORMAL
SPECIMEN VOL ?TM UR: 1200 ML
STATUS OF UNIT,%ST: NORMAL
UNIT DIVISION, %UDIV: 0

## 2017-03-01 RX ORDER — MENTHOL AND ZINC OXIDE .44; 20.625 G/100G; G/100G
OINTMENT TOPICAL 2 TIMES DAILY
Status: ON HOLD | COMMUNITY
End: 2018-01-01

## 2017-03-01 NOTE — PROGRESS NOTES
Hospital to SNF:  Patient transferred from [facility] to [facility] on 2/23/17 to 2/25/17 for left lower lobe PNA. Previous admission 2/15/17 to 2/20/17 for chest pain/hypertensive crisis. Course of hospitalization:  SOB; noted to have mild acute HF and elevated troponin  Positive for influenza A; received Tamiflu   Treated with levaquin, rocephin, zithromax and lasix  Anemia; received transfusion    Inpatient Consults:  Dr. Kyree Ocampo, nephrology  Winona Cowden, PA, cardiology    Discharge diagnoses:   1. Left lower lobe pneumonia. 2. Possible acute on chronic diastolic heart failure present on admission, resolved now. 3. Influenza A infection. 4. Hypertensive urgency. 5. Severe sepsis present on admission, resolved now. 6. Acute on chronic renal failure, stage IV, needs dialysis as an  outpatient. 7. Moderate severe oropharyngeal dysphagia. 8. Anemia secondary to anemia of chronic disease secondary to renal disease requiring transfusion in the hospital.  9. Acute debility. Contacted [facility] to verify admission, review discharge instructions and reconcile medications. Spoke to Rock Brooke RN . Introduced self and reason for call. Reconciled transfer medications. Reviewed appts. Provided opportunity to ask questions. Contact information was provided for future reference or further questions. Will continue to monitor.

## 2017-03-02 ENCOUNTER — HOSPITAL ENCOUNTER (OUTPATIENT)
Dept: LAB | Age: 82
Discharge: HOME | End: 2017-03-02

## 2017-03-02 LAB
ANION GAP BLD CALC-SCNC: 9 MMOL/L (ref 3–18)
BASOPHILS # BLD AUTO: 0 K/UL (ref 0–0.06)
BASOPHILS # BLD: 0 % (ref 0–2)
BUN SERPL-MCNC: 62 MG/DL (ref 7–18)
BUN/CREAT SERPL: 17 (ref 12–20)
CALCIUM SERPL-MCNC: 8.5 MG/DL (ref 8.5–10.1)
CHLORIDE SERPL-SCNC: 108 MMOL/L (ref 100–108)
CO2 SERPL-SCNC: 27 MMOL/L (ref 21–32)
CREAT SERPL-MCNC: 3.75 MG/DL (ref 0.6–1.3)
DIFFERENTIAL METHOD BLD: ABNORMAL
EOSINOPHIL # BLD: 0.1 K/UL (ref 0–0.4)
EOSINOPHIL NFR BLD: 1 % (ref 0–5)
ERYTHROCYTE [DISTWIDTH] IN BLOOD BY AUTOMATED COUNT: 13.8 % (ref 11.6–14.5)
GLUCOSE SERPL-MCNC: 96 MG/DL (ref 74–99)
HCT VFR BLD AUTO: 26.4 % (ref 35–45)
HGB BLD-MCNC: 8.6 G/DL (ref 12–16)
LYMPHOCYTES # BLD AUTO: 19 % (ref 21–52)
LYMPHOCYTES # BLD: 1.5 K/UL (ref 0.9–3.6)
MAGNESIUM SERPL-MCNC: 2.5 MG/DL (ref 1.8–2.4)
MCH RBC QN AUTO: 30.2 PG (ref 24–34)
MCHC RBC AUTO-ENTMCNC: 32.6 G/DL (ref 31–37)
MCV RBC AUTO: 92.6 FL (ref 74–97)
MONOCYTES # BLD: 0.8 K/UL (ref 0.05–1.2)
MONOCYTES NFR BLD AUTO: 10 % (ref 3–10)
NEUTS SEG # BLD: 5.3 K/UL (ref 1.8–8)
NEUTS SEG NFR BLD AUTO: 70 % (ref 40–73)
PLATELET # BLD AUTO: 211 K/UL (ref 135–420)
PMV BLD AUTO: 10.9 FL (ref 9.2–11.8)
POTASSIUM SERPL-SCNC: 3.7 MMOL/L (ref 3.5–5.5)
RBC # BLD AUTO: 2.85 M/UL (ref 4.2–5.3)
SODIUM SERPL-SCNC: 144 MMOL/L (ref 136–145)
WBC # BLD AUTO: 7.6 K/UL (ref 4.6–13.2)

## 2017-03-02 PROCEDURE — 80048 BASIC METABOLIC PNL TOTAL CA: CPT | Performed by: FAMILY MEDICINE

## 2017-03-02 PROCEDURE — 83735 ASSAY OF MAGNESIUM: CPT | Performed by: FAMILY MEDICINE

## 2017-03-02 PROCEDURE — 85025 COMPLETE CBC W/AUTO DIFF WBC: CPT | Performed by: FAMILY MEDICINE

## 2017-03-07 ENCOUNTER — OFFICE VISIT (OUTPATIENT)
Dept: CARDIOLOGY CLINIC | Age: 82
End: 2017-03-07

## 2017-03-07 VITALS
OXYGEN SATURATION: 99 % | HEART RATE: 74 BPM | SYSTOLIC BLOOD PRESSURE: 150 MMHG | HEIGHT: 71 IN | BODY MASS INDEX: 18.9 KG/M2 | WEIGHT: 135 LBS | DIASTOLIC BLOOD PRESSURE: 60 MMHG

## 2017-03-07 DIAGNOSIS — I50.33 DIASTOLIC CHF, ACUTE ON CHRONIC (HCC): ICD-10-CM

## 2017-03-07 DIAGNOSIS — I65.23 BILATERAL CAROTID ARTERY STENOSIS: ICD-10-CM

## 2017-03-07 DIAGNOSIS — R07.9 CHEST PAIN, UNSPECIFIED TYPE: ICD-10-CM

## 2017-03-07 DIAGNOSIS — N18.30 CHRONIC RENAL FAILURE, STAGE 3 (MODERATE) (HCC): ICD-10-CM

## 2017-03-07 DIAGNOSIS — R06.02 SOB (SHORTNESS OF BREATH): ICD-10-CM

## 2017-03-07 DIAGNOSIS — I24.9 ACS (ACUTE CORONARY SYNDROME) (HCC): Primary | ICD-10-CM

## 2017-03-07 RX ORDER — ASCORBIC ACID 250 MG
TABLET ORAL
Status: ON HOLD | COMMUNITY
End: 2018-01-01 | Stop reason: SDUPTHER

## 2017-03-07 NOTE — PROGRESS NOTES
1. Have you been to the ER, urgent care clinic since your last visit? Hospitalized since your last visit? yes  2. Have you seen or consulted any other health care providers outside of the [address] [place] [address] since your last visit? Include any pap smears or colon screening.   no

## 2017-03-07 NOTE — PROGRESS NOTES
HISTORY OF PRESENT ILLNESS  Qi Méndez is a 80 y.o. female. HPI  This is her first visit with me for a post hospital cardiac evaluation. She comes in with multiple serious medical issues. She is alert and oriented. She is complaining of mild dyspnea on exertion at this time. She has had no recurrence of chest pain. She denies palpitations, dizziness or syncope. She has had no symptoms to indicate TIA or amaurosis fugax. She is in a wheelchair and was brought in by two of her children. She was admitted to the hospital on 02/23/2017 and discharged on 02/28/2017. Her son indicates that she was taken to the hospital because of chest pain and shortness of breath. The patient does not recall any chest pain. The discharge diagnosis from the hospital was left lower lobe pneumonia and acute on chronic diastolic heart failure. She was found to have severe anemia with H & H down to 7.3 and 23.4. She received a blood transfusion. She had mild troponin elevation and Cardiology was consulted. Continued medical treatment was recommended. She was found to have severe renal dysfunction and the recommendation was made for possible dialysis in the future. She underwent an echocardiogram on 02/17/2017, which demonstrated lower limit of normal left ventricular systolic function with EF in the 50% range. There was mild aortic valve regurgitation by color Doppler examination. There was calcification of the mitral valve annulus with no evidence of stenosis. There was no significant aortic stenosis. There was no mentioning of significant pulmonary hypertension. She has been found to have peripheral vascular disease with high-grade stenosis of the internal carotid arteries bilaterally with 70% or greater stenosis. She also has been found to have peripheral vascular disease with a left ankle-brachial index of 0.74. She has history of hypertension, but no diabetes mellitus.   She has been on a statin for dyslipidemia. Review of Systems   Constitutional: Positive for malaise/fatigue. Negative for weight loss. HENT: Positive for hearing loss. Eyes: Negative for blurred vision and double vision. Respiratory: Positive for shortness of breath. Cardiovascular: Positive for leg swelling. Negative for chest pain, palpitations, orthopnea, claudication and PND. Gastrointestinal: Negative for blood in stool, heartburn and melena. Genitourinary: Positive for frequency. Negative for dysuria, hematuria and urgency. Musculoskeletal: Positive for back pain and joint pain. Skin: Negative for itching and rash. Neurological: Positive for weakness. Negative for dizziness, loss of consciousness and headaches. Psychiatric/Behavioral: Positive for memory loss. Negative for depression. Physical Exam   Constitutional: She is oriented to person, place, and time. She appears well-developed and well-nourished. HENT:   Head: Normocephalic and atraumatic. Eyes: Conjunctivae are normal. Pupils are equal, round, and reactive to light. Neck: Normal range of motion. Neck supple. No JVD present. Cardiovascular: Normal rate, regular rhythm, S1 normal and S2 normal.   No extrasystoles are present. PMI is not displaced. Exam reveals no gallop and no friction rub. Murmur heard. Harsh early systolic murmur is present with a grade of 2/6  at the upper right sternal border radiating to the neck  Pulses:       Carotid pulses are 3+ on the right side with bruit, and 3+ on the left side with bruit. Pulmonary/Chest: Effort normal. She has no wheezes. She has no rales. Diminished BS on bases   Abdominal: Soft. There is no tenderness. Musculoskeletal: She exhibits no edema. Neurological: She is alert and oriented to person, place, and time. No cranial nerve deficit. Skin: Skin is warm and dry. Psychiatric: She has a normal mood and affect.  Her behavior is normal.     Visit Vitals    /60  Pulse 74    Ht 5' 11\" (1.803 m)    Wt 61.2 kg (135 lb)    SpO2 99%    BMI 18.83 kg/m2       Past Medical History:   Diagnosis Date    Breast cancer ([facility] 1/17/14    right breast    Cardiac echocardiogram 02/17/2017    EF 50%. No WMA. Mod conc LVH. Indeterminate diastolic fx. Mod LAE. Mild MR. Mild AI. Mild PI. No signifciant valvular heart disease.  Cardiovascular lower extremity arterial testing 12/19/2014    Mod tibio-peroneal arterial disease bilaterally. R YANCY 1.24.  L YANCY 0.74. Bilateral sm vessel disease.  Carotid duplex 11/19/2014    Severe 70% or greater bilateral ICA stenosis. LICA dissection suggested.  Chronic renal insufficiency     [facility]    with slight Right sided weakness    Edema     Glaucoma     Gout flare     Hyperlipidemia     Hypertension     Lump of right breast     URI (upper respiratory infection)        Social History     Social History    Marital status:      Spouse name: N/A    Number of children: N/A    Years of education: N/A     Occupational History    Not on file. Social History Main Topics    Smoking status: Never Smoker    Smokeless tobacco: Never Used    Alcohol use No    Drug use: No    Sexual activity: Not Currently     Other Topics Concern    Not on file     Social History Narrative       Family History   Problem Relation Age of Onset    Hypertension Mother     Hypertension Brother     Diabetes Brother        Past Surgical History:   Procedure Laterality Date    HX APPENDECTOMY      HX CYST INCISION AND DRAINAGE      PT DOES NOT REMEMBER EXACT DATES, TUCKER BREAST DONE MORE THAN 20 YEARS AGO. BENIGN FINDINGS PER PATIENT.     HX GYN      JUSTIN/BSO    HX HYSTERECTOMY      HX MASTECTOMY  1/17/2014    RIGHT PARTIAL MASTECTOMY performed by Elliott Mckinley MD at [facility] MAIN OR       Current Outpatient Prescriptions   Medication Sig Dispense Refill    ascorbic acid, vitamin C, (VITAMIN C) 250 mg tablet Take  by mouth.  amino acids-protein hydrolys (PRO-STAT AWC)  gram-kcal/30 mL liqd Take  by mouth two (2) times a day.  Menthol-Zinc Oxide (RISAMINE) 0.44-20.6 % oint Apply  to affected area two (2) times a day. Indications: SKIN IRRITATION      guaiFENesin-dextromethorphan SR (HUMIBID DM) 600-30 mg per tablet Take 1 Tab by mouth two (2) times a day. 10 Tab 0    docusate sodium (COLACE) 100 mg capsule Take 1 Cap by mouth two (2) times a day for 90 days. 30 Cap 0    furosemide (LASIX) 40 mg tablet Take 1 Tab by mouth daily. 30 Tab 0    hydrALAZINE (APRESOLINE) 100 mg tablet Take 1 Tab by mouth every eight (8) hours for 30 days. 90 Tab 0    isosorbide dinitrate (ISORDIL) 20 mg tablet Take 1 Tab by mouth three (3) times daily for 30 days. 90 Tab 0    polyethylene glycol (MIRALAX) 17 gram packet Take 1 Packet by mouth daily. 30 Packet 0    traMADol (ULTRAM) 50 mg tablet Take 1 Tab by mouth every six (6) hours as needed. Max Daily Amount: 200 mg. 20 Tab 0    therapeutic multivitamin (THERAGRAN) tablet Take 1 Tab by mouth daily. 30 Tab 0    carvedilol (COREG) 12.5 mg tablet Take 1 Tab by mouth two (2) times daily (with meals) for 30 days. 60 Tab 0    bisacodyl (DULCOLAX) 10 mg suppository Insert 10 mg into rectum as needed. 30 Suppository 0    allopurinol (ZYLOPRIM) 100 mg tablet Take 1 Tab by mouth daily. 30 Tab 0    simvastatin (ZOCOR) 20 mg tablet Take 1 Tab by mouth nightly. 30 Tab 0    albuterol (PROVENTIL HFA, VENTOLIN HFA, PROAIR HFA) 90 mcg/actuation inhaler Take 1-2 Puffs by inhalation every four (4) hours as needed for Wheezing. 1 Inhaler 0    inhalational spacing device 1 Each by Does Not Apply route as needed. 1 Device 0    aspirin delayed-release 81 mg tablet Take 1 Tab by mouth daily. 100 Tab prn    ferrous sulfate 325 mg (65 mg iron) tablet Take 325 mg by mouth Daily (before breakfast).       cyanocobalamin (VITAMIN B-12) 1,000 mcg tablet Take 1,000 mcg by mouth daily.      acetaminophen (TYLENOL) 325 mg tablet Take 2 Tabs by mouth every four (4) hours as needed. 30 Tab 0    cholecalciferol, VITAMIN D3, (VITAMIN D3) 5,000 unit tab tablet Take 1 Tab by mouth daily. 100 Tab 0       EKG: unchanged from previous tracings, normal sinus rhythm, nonspecific ST and T waves changes, left axis deviation, mild IVCD  . ASSESSMENT and PLAN  Encounter Diagnoses   Name Primary?  ACS (acute coronary syndrome) (HCC) Yes    SOB (shortness of breath)     Chronic renal failure, stage 3 (moderate)     Bilateral carotid artery stenosis     Diastolic CHF, acute on chronic (HCC)     Chest pain, unspecified type    This patient has multiple serious medical issues. She has extremely strong risk factors for coronary artery disease and the possibility of acute coronary artery syndrome cannot be fully excluded. She is currently stable with no recurrence of chest pain. She did have a somewhat atypical troponin elevation during the hospitalization. She is not a candidate for invasive diagnostic workup because of the multiple comorbidities, including severe renal failure, anemia, advanced age and severe peripheral vascular disease, including high-grade bilateral carotid artery stenosis. I would recommend continued medical treatment that includes a betablocker, long-acting nitrates and aspirin along with a statin. If she is symptomatic with anginal pain, I would consider Ranexa. This was discussed with the patient and her family member. The current issues would be her renal failure and anemia. There were advised to see a nephrologist for further evaluation and plan for further treatment in terms of dialysis and anemia treatment.   Further adjustment of her diuretics will be decided by her nephrologist.

## 2017-03-08 ENCOUNTER — PATIENT OUTREACH (OUTPATIENT)
Dept: INTERNAL MEDICINE CLINIC | Age: 82
End: 2017-03-08

## 2017-03-08 NOTE — PROGRESS NOTES
Hendrick Hashimoto  to follow up on admission to SNF. Spoke with Arpit Tucker LPN. Introduced self and reason for call. Provided 2 patient identifiers. Patient remains a current admission. Spoke with Hank Ramirez   Anticipated discharge date is 3/29/17; last day of PT 3/28/17. Will continue to monitor.

## 2017-03-09 ENCOUNTER — PATIENT OUTREACH (OUTPATIENT)
Dept: PULMONOLOGY | Age: 82
End: 2017-03-09

## 2017-03-09 NOTE — PROGRESS NOTES
SNF rounding notes:     Multidisciplinary rounding with Silvana Puga attending: Dr. Craig Woody date: 2/28/2017  Anticipated LOS: 29 days  Anticipated SNF DC: 3/29/2017  Dispo: d/c to home with son/DIL and [facility] for speech/pt/ot/CHF    Patient progress: participating in therapies, ambulating 150ft with contact guard assist. Speech recommending f/u [facility] to reassess dysphagia. Chronic condition management:   1. CHF: last weight taken 3/6. Team will request orders for daily weights. With severity of renal failure and EF 50% on 2/17/2016 patient is high risk for fluid overload and hospital readmission. 2. HTN: patient BP noted elevated, team plan is to recheck and consult MD/NP. Had cardiology f/u on 3/7/2017. BP at that visit was 150/60. No medication changes noted. 3. Renal failure: renal consult while inpatient, noted recommendation for possible HD but considered a poor candidate. Will need outpatient appointment scheduled. 4. Dysphagia: working with speech therapy, discharge orders noted for Renal soft Diet and honey thick liquids. DME needs: identified as needed rolling walker at discharge    Team identified facility interventions:   1. Order for daily weights for HF monitoring  2. Palliative care consult for goals of care: has AMD with noted wishes for all life prolonging measures    NN interventions needed:   1. PCP follow up appointment scheduled within 3 days of SNF discharge: request made to ambulatory NN. 2. Renal follow up appointment scheduled. Spoke with Dr. Luz Elena Curtis MD's office, appointment already previously scheduled for Friday, 3/10 at [address] [place] [address] [phone] notified.

## 2017-03-16 ENCOUNTER — PATIENT OUTREACH (OUTPATIENT)
Dept: PULMONOLOGY | Age: 82
End: 2017-03-16

## 2017-03-16 ENCOUNTER — HOSPITAL ENCOUNTER (OUTPATIENT)
Dept: GENERAL RADIOLOGY | Age: 82
Discharge: HOME | End: 2017-03-16
Attending: FAMILY MEDICINE
Payer: COMMERCIAL

## 2017-03-16 DIAGNOSIS — R13.10 DYSPHAGIA: ICD-10-CM

## 2017-03-16 DIAGNOSIS — R13.10 DYSPHAGIA, UNSPECIFIED TYPE: ICD-10-CM

## 2017-03-16 PROCEDURE — G8997 SWALLOW GOAL STATUS: HCPCS

## 2017-03-16 PROCEDURE — 74230 X-RAY XM SWLNG FUNCJ C+: CPT

## 2017-03-16 PROCEDURE — G8996 SWALLOW CURRENT STATUS: HCPCS

## 2017-03-16 PROCEDURE — 92611 MOTION FLUOROSCOPY/SWALLOW: CPT

## 2017-03-16 PROCEDURE — G8998 SWALLOW D/C STATUS: HCPCS

## 2017-03-16 PROCEDURE — 74011000255 HC RX REV CODE- 255: Performed by: FAMILY MEDICINE

## 2017-03-16 RX ADMIN — BARIUM SULFATE 60 G: 960 POWDER, FOR SUSPENSION ORAL at 14:00

## 2017-03-16 RX ADMIN — BARIUM SULFATE 60 ML: 400 SUSPENSION ORAL at 14:00

## 2017-03-16 RX ADMIN — BARIUM SULFATE 700 MG: 700 TABLET ORAL at 14:00

## 2017-03-16 RX ADMIN — BARIUM SULFATE 30 ML: 400 PASTE ORAL at 14:00

## 2017-03-16 NOTE — PROGRESS NOTES
Outpatient Modified Barium Swallow Evaluation    Patient: Mulu Rebolledo (80 y.o. female)  Date: 3/16/2017  Primary Diagnosis: Dysphagia [R13.10]  Precautions: Aspiration       Videofluoroscopy Results  MBS completed with pt demonstrating mod pharyngeal dysphagia, improved since MBS completed at this facility on 2/17/17. Pt demo silent laryngeal penetration during the swallow with thin liquids +/- straw, not improved with chin tuck, effortful swallow or small sips. Pt able to tolerate NTL +/- straw, puree, regular solids and 13 mm Ba pill with NTL wash. Deficits include decreased pharyngeal motility/sensation and decreased laryngeal elevation. Pt with mild vallecular residue in valleculae and on posterior pharyngeal wall post trials that was cleared with cued dry swallow. Rec regular diet with NTL, aspiration precautions, oral care TID, and meds whole with NTL. Pt may benefit from consideration of free water between meals at primary therapist's discretion. Results/recommendations d/w pt and family members present following examination. Recommend:  Regular, NTL  Meds with NTL  Aspiration precautions   Upright for all intake and for at least 30-45 minutes after po   Double swallow per bite/sip  Consider free water between meals at primary therapist's discretion     Video Flouroscopic Procedures  [x] Lateral View   [] A-P View [] Scanned to level of Sternum    [x] Seated at 90 deg. [] Other:    Presentation:    [x] Spoon   [x] Cup   [x] Straw   [x] Syringe   [x] Consecutive Swallows  [] Other:    Consistencies:   [x] Ba+ liquid   [x] Ba+ liquid (nectar)   [] Ba+ liquid (honey)   [x] Ba+ puree [x] Ba+ cookie [x] 13 mm Ba pill with thin Ba wash    Treatment Techniques Attempted  [] Head Turn: [] Right [] Left     [] Head Tilt: [] Right [] Left     [x] Chin Down:  [x] Small Sips/bites: Ineffective  [x] Effortful swallow: Ineffective   [] Double swallow:  Ineffective   [] Other:    Results  Dysphagia Present: [x] Yes  [] No    Ratings of Dysphagia:     [] Mild  [x] Moderate  [] Severe    Stages of Breakdown:   [] Oral  [x] Pharyngeal  [] Esophageal    Aspiration:   [] Yes    [x] No  [x] At Risk     Cough: [] Yes      [] No     Penetration:   [x] Yes    [] No     Cough: [] Yes      [x] No   [] Flash/trace   [x] Mod   [] To Chords          Consistency Aspirated:   Consistency Penetrated:   [] Thin Liquid     [x] Thin Liquid  [] Nectar-thick Liquid    [] Nectar-thick Liquid   [] Honey-thick Liquid    [] Honey-thick Liquid   [] Puree     [] Puree    [] Solid     [] Solid  [] 13 mm Ba pill    [] 13 mm Ba pill    Motility Problems with:  [] Lip Closure:    [] Mastication:   [] Bolus Formation/control:   [] A-P Transport:  [] Tongue Base Retraction:  [] Swallow Response (delayed):  [] Velopharyngeal Closure:  [] Pharyngeal Aspirations:  [x] Laryngeal Elevation/adduction:  [] Epiglottic Inversion:  [x] Pharyngeal motility/sensation:  [] Cricopharyngeal Relaxation:  [] Esophageal Peristalsis:  [] Other:    Timing of Aspiration/Penetration:  [] Before Swallow:  [x] During Swallow:  [] After Swallow:    Transit Time Delay: N/A  [] >1 Second  Oral  [] >1 Second Pharyngeal  [] >20 Second Esophageal     Residuals:  [x] Vallecula:    [x] Mild  [] Mod  [] Severe  [] Pyriform Sinus:   [] Mild  [] Mod  [] Severe  [x] Posterior Pharyngeal Wall:  [x] Mild  [] Mod  [] Severe    GCODES(GN):GCODESwallowing:  Swallow Current Status CJ= 20-39%   Swallow Goal Status CJ= 20-39%   Swallow D/C Status CJ= 20-39%     Thank you for this referral,  Johnna Barthel, M.S., [id number] [facility]  Speech-Language Pathologist

## 2017-03-16 NOTE — Clinical Note
Rounding updates: family now wants LTC placement, no payer source. I am checking with hospital if Medicaid screening was done.

## 2017-03-16 NOTE — PROGRESS NOTES
Community Care Team Documentation for Patient in [facility] rounding with Zaheer Gordon attending: Dr. Deedee Osorio date: 2/28/2017  Anticipated LOS: 29 days  Anticipated SNF DC: 3/29/2017  Dispo: d/c to home with son/DIL and [facility] for speech/pt/ot/CHF: UPDATE: Family considering LTC placement, needs payer source. No LTC beds at [facility] at this time.        PCP : Derrick Larkin MD    Nurse Navigator: Chuy Griffith    Patient scheduled for swallow study today, 3/16/2017 at [facility]. Team identified facility interventions:   1. Order for daily weights for HF monitoring: per facility weights are stable  2. Palliative care consult for goals of care: has AMD with noted wishes for all life prolonging measures: completed, patient and family continue to wish for all life prolonging measures. NN interventions needed:   1. PCP follow up appointment scheduled within 3 days of SNF discharge: request made to ambulatory NN. 3/31/2017 11:00 AM   2. Renal follow up appointment scheduled. Spoke with Dr. Hitesh Perez MD's office, appointment already previously scheduled for Friday, 3/10 at [address] [facility] 635 notified. Per facility, DIL cancelled appointment. Unknown if rescheduled at this time.    3. NN to f/u with [facility] for outcome of Medicaid/[employer] request.     Barriers to discharge: Need to LTC due to debility

## 2017-03-23 ENCOUNTER — PATIENT OUTREACH (OUTPATIENT)
Dept: INTERNAL MEDICINE CLINIC | Age: 82
End: 2017-03-23

## 2017-03-23 NOTE — Clinical Note
Patient will be to LTC. To be discharged from skilled 3/29 and transitioned to LTC bed with Medicaid pending. Will remain at [facility] until LTC bed available.

## 2017-03-23 NOTE — PROGRESS NOTES
Community Care Team Documentation for Patient in [facility]      Multidisciplinary rounding with Pily Weiss attending: Dr. Rocio Quesada date: 2/28/2017  Anticipated LOS: 29 days  Anticipated SNF DC: 3/29/2017  Dispo: d/c to home with son/DIL and [facility] for speech/pt/ot/CHF: UPDATE: Family considering LTC placement, needs payer source. No LTC beds at [facility] at this time.         PCP : Madi Molina MD     Nurse Navigator: Cady Rios    Patient to transition to LTC, family pursuing Medicaid and placement. Scheduled to be discharged from skilled services on 3/29 to LTC Medicaid pending bed at [facility].

## 2017-03-27 ENCOUNTER — HOME HEALTH ADMISSION (OUTPATIENT)
Dept: HOME HEALTH SERVICES | Facility: HOME HEALTH | Age: 82
End: 2017-03-27

## 2017-03-30 ENCOUNTER — PATIENT OUTREACH (OUTPATIENT)
Dept: INTERNAL MEDICINE CLINIC | Age: 82
End: 2017-03-30

## 2017-03-30 ENCOUNTER — TELEPHONE (OUTPATIENT)
Dept: CARDIAC REHAB | Age: 82
End: 2017-03-30

## 2017-03-30 ENCOUNTER — HOME CARE VISIT (OUTPATIENT)
Dept: HOME HEALTH SERVICES | Facility: HOME HEALTH | Age: 82
End: 2017-03-30

## 2017-03-30 NOTE — PROGRESS NOTES
Carmen Rapp  to follow up on admission to SNF. Spoke with Kenzie Hubbard, unit secretary. Introduced self and reason for call. Provided 2 patient identifiers. Patient remains a current admission. As per Ms. Calloway patient is now LTC. New PCP is Dr. Milena Hernandez. Left message for Renato Dee,  to verify patient's status. Contact information provided.

## 2017-03-31 ENCOUNTER — PATIENT OUTREACH (OUTPATIENT)
Dept: INTERNAL MEDICINE CLINIC | Age: 82
End: 2017-03-31

## 2017-03-31 NOTE — PROGRESS NOTES
Spoke with Melissa Pak,  at [facility]. Patient is now LTC resident. New PCP will be Dr. Alejandra Argueta. Chart has been updated. Episode resolved.

## 2017-03-31 NOTE — PROGRESS NOTES
Jesse Mike  to follow up on admission status. Spoke with Milan Robins, unit secretary. Introduced self and reason for call. Provided 2 patient identifiers. Patient is current admission. Left message for Ladonna Parsons  for [facility] regarding patient's status. Contact information provided.

## 2017-04-03 ENCOUNTER — HOME CARE VISIT (OUTPATIENT)
Dept: HOME HEALTH SERVICES | Facility: HOME HEALTH | Age: 82
End: 2017-04-03

## 2017-04-04 ENCOUNTER — HOSPITAL ENCOUNTER (OUTPATIENT)
Dept: LAB | Age: 82
Discharge: HOME | End: 2017-04-04
Payer: MEDICARE

## 2017-04-04 LAB
ANION GAP BLD CALC-SCNC: 8 MMOL/L (ref 3–18)
BACTERIA SPEC CULT: ABNORMAL
BASOPHILS # BLD AUTO: 0 K/UL (ref 0–0.1)
BASOPHILS # BLD: 0 % (ref 0–2)
BUN SERPL-MCNC: 48 MG/DL (ref 7–18)
BUN/CREAT SERPL: 15 (ref 12–20)
CALCIUM SERPL-MCNC: 8.4 MG/DL (ref 8.5–10.1)
CHLORIDE SERPL-SCNC: 111 MMOL/L (ref 100–108)
CO2 SERPL-SCNC: 25 MMOL/L (ref 21–32)
CREAT SERPL-MCNC: 3.3 MG/DL (ref 0.6–1.3)
DIFFERENTIAL METHOD BLD: ABNORMAL
EOSINOPHIL # BLD: 0.1 K/UL (ref 0–0.4)
EOSINOPHIL NFR BLD: 2 % (ref 0–5)
ERYTHROCYTE [DISTWIDTH] IN BLOOD BY AUTOMATED COUNT: 14.3 % (ref 11.6–14.5)
GLUCOSE SERPL-MCNC: 96 MG/DL (ref 74–99)
HCT VFR BLD AUTO: 26.9 % (ref 35–45)
HGB BLD-MCNC: 8.5 G/DL (ref 12–16)
LYMPHOCYTES # BLD AUTO: 12 % (ref 21–52)
LYMPHOCYTES # BLD: 0.9 K/UL (ref 0.9–3.6)
MAGNESIUM SERPL-MCNC: 2.4 MG/DL (ref 1.6–2.6)
MCH RBC QN AUTO: 29.5 PG (ref 24–34)
MCHC RBC AUTO-ENTMCNC: 31.6 G/DL (ref 31–37)
MCV RBC AUTO: 93.4 FL (ref 74–97)
MONOCYTES # BLD: 0.9 K/UL (ref 0.05–1.2)
MONOCYTES NFR BLD AUTO: 11 % (ref 3–10)
NEUTS SEG # BLD: 5.8 K/UL (ref 1.8–8)
NEUTS SEG NFR BLD AUTO: 75 % (ref 40–73)
PLATELET # BLD AUTO: 256 K/UL (ref 135–420)
PMV BLD AUTO: 10.9 FL (ref 9.2–11.8)
POTASSIUM SERPL-SCNC: 4.5 MMOL/L (ref 3.5–5.5)
RBC # BLD AUTO: 2.88 M/UL (ref 4.2–5.3)
SERVICE CMNT-IMP: ABNORMAL
SODIUM SERPL-SCNC: 144 MMOL/L (ref 136–145)
WBC # BLD AUTO: 7.8 K/UL (ref 4.6–13.2)

## 2017-04-04 PROCEDURE — 85025 COMPLETE CBC W/AUTO DIFF WBC: CPT | Performed by: FAMILY MEDICINE

## 2017-04-04 PROCEDURE — 80048 BASIC METABOLIC PNL TOTAL CA: CPT | Performed by: FAMILY MEDICINE

## 2017-04-04 PROCEDURE — 87493 C DIFF AMPLIFIED PROBE: CPT | Performed by: FAMILY MEDICINE

## 2017-04-04 PROCEDURE — 83735 ASSAY OF MAGNESIUM: CPT | Performed by: FAMILY MEDICINE

## 2017-04-05 ENCOUNTER — HOSPITAL ENCOUNTER (OUTPATIENT)
Dept: LAB | Age: 82
Discharge: HOME | End: 2017-04-05

## 2017-04-05 LAB
BACTERIA SPEC CULT: ABNORMAL
SERVICE CMNT-IMP: ABNORMAL

## 2017-04-05 PROCEDURE — 36415 COLL VENOUS BLD VENIPUNCTURE: CPT | Performed by: FAMILY MEDICINE

## 2017-04-05 PROCEDURE — 87493 C DIFF AMPLIFIED PROBE: CPT | Performed by: FAMILY MEDICINE

## 2017-04-06 ENCOUNTER — PATIENT OUTREACH (OUTPATIENT)
Dept: INTERNAL MEDICINE CLINIC | Age: 82
End: 2017-04-06

## 2017-04-06 NOTE — PROGRESS NOTES
Received voicemail message left from 4/5/17 at 3:59 PM from Sherlynn Brunner,  at [facility]. MsCharisse Yahaira Rutherford request a letter from Dr. Bryce Nichols stating patient is unable to make decision on her behalf de to cognitive deficit.  Notified PCP of request.

## 2017-04-07 ENCOUNTER — HOSPITAL ENCOUNTER (OUTPATIENT)
Dept: LAB | Age: 82
Discharge: HOME | End: 2017-04-07

## 2017-04-07 PROCEDURE — 87493 C DIFF AMPLIFIED PROBE: CPT | Performed by: FAMILY MEDICINE

## 2017-04-08 ENCOUNTER — HOSPITAL ENCOUNTER (OUTPATIENT)
Dept: LAB | Age: 82
Discharge: HOME | End: 2017-04-08

## 2017-04-08 LAB
BACTERIA SPEC CULT: NORMAL
SERVICE CMNT-IMP: NORMAL

## 2017-04-11 ENCOUNTER — HOSPITAL ENCOUNTER (OUTPATIENT)
Dept: LAB | Age: 82
Discharge: HOME | End: 2017-04-11

## 2017-04-11 LAB — ALBUMIN SERPL BCP-MCNC: 2.3 G/DL (ref 3.4–5)

## 2017-04-11 PROCEDURE — 82040 ASSAY OF SERUM ALBUMIN: CPT | Performed by: PHYSICIAN ASSISTANT

## 2017-04-12 ENCOUNTER — HOSPITAL ENCOUNTER (OUTPATIENT)
Dept: GENERAL RADIOLOGY | Age: 82
Discharge: HOME | End: 2017-04-12
Attending: FAMILY MEDICINE
Payer: COMMERCIAL

## 2017-04-12 DIAGNOSIS — R13.10 DYSPHAGIA: ICD-10-CM

## 2017-04-12 DIAGNOSIS — R13.12 OROPHARYNGEAL DYSPHAGIA: ICD-10-CM

## 2017-04-12 PROCEDURE — G8996 SWALLOW CURRENT STATUS: HCPCS

## 2017-04-12 PROCEDURE — 74230 X-RAY XM SWLNG FUNCJ C+: CPT

## 2017-04-12 PROCEDURE — G8998 SWALLOW D/C STATUS: HCPCS

## 2017-04-12 PROCEDURE — 74011000255 HC RX REV CODE- 255: Performed by: FAMILY MEDICINE

## 2017-04-12 PROCEDURE — G8997 SWALLOW GOAL STATUS: HCPCS

## 2017-04-12 PROCEDURE — 92611 MOTION FLUOROSCOPY/SWALLOW: CPT

## 2017-04-12 RX ADMIN — BARIUM SULFATE 75 G: 960 POWDER, FOR SUSPENSION ORAL at 14:00

## 2017-04-12 RX ADMIN — BARIUM SULFATE 700 MG: 700 TABLET ORAL at 14:00

## 2017-04-12 RX ADMIN — BARIUM SULFATE 45 ML: 400 PASTE ORAL at 14:00

## 2017-04-12 RX ADMIN — BARIUM SULFATE 60 ML: 400 SUSPENSION ORAL at 14:00

## 2017-04-12 NOTE — PROGRESS NOTES
Outpatient Modified Barium Swallow Evaluation    Patient: Sukh Lopez (80 y.o. female)  Date: 4/12/2017  Primary Diagnosis: Dysphagia [R13.10]  Precautions: Aspiration       Videofluoroscopy Results  MBS completed with pt demonstrating mild oropharyngeal dysphagia c/b increased oral prep phase and silent laryngeal penetration with thin liquids when used as a wash to clear 13 mm Ba pill from oral cavity. Pt able to tolerate thin liquids +/- straw, NTL +/- straw, puree and regular solids with positive airway protection noted across multiple trials. Pt further challenged with consecutive swallows of thin liquids, tolerating with no aspiration/penetration visualized. Deficits include decreased base of tongue strength with premature spillage, swallow delay, decreased laryngeal elevation/adduction/sensation and decreased epiglottic tilt. Recommend soft solid diet related to increased oral prep phase with thin liquids with meds whole in puree. Further recommend aspiration precautions and follow up with primary SLP. Results/recommendations d/w pt who verbalized understanding. Recommend:  Soft solid diet with thin liquids  Meds in puree  Aspiration precautions  Upright for all intake and for at least 30 min after po   Small bites/sips     Video Flouroscopic Procedures  [x] Lateral View   [] A-P View [] Scanned to level of Sternum    [x] Seated at 90 deg.   [] Other:    Presentation:    [x] Spoon   [x] Cup   [x] Straw   [] Syringe   [x] Consecutive Swallows  [] Other:    Consistencies:   [x] Ba+ liquid   [x] Ba+ liquid (nectar)   [] Ba+ liquid (honey)   [x] Ba+ puree [x] Ba+ cookie [x] 13 mm Ba pill with thin Ba wash      Treatment Techniques Attempted  [] Head Turn: [] Right [] Left     [] Head Tilt: [] Right [] Left     [] Chin Down:  [x] Small Sips/bites:  [] Effortful swallow:  [] Double swallow:  [] Other:    Results  Dysphagia Present:     [x] Yes  [] No    Ratings of Dysphagia:     [x] Mild  [] Moderate  [] Severe    Stages of Breakdown:   [x] Oral  [x] Pharyngeal  [] Esophageal    Aspiration:   [] Yes    [x] No  [x] At Risk     Cough: [] Yes      [] No     Penetration:   [x] Yes    [] No     Cough: [] Yes      [x] No   [] Flash/trace   [x] Mod   [] To Chords          Consistency Aspirated:   Consistency Penetrated:   [] Thin Liquid     [] Thin Liquid  [] Nectar-thick Liquid    [] Nectar-thick Liquid   [] Honey-thick Liquid    [] Honey-thick Liquid   [] Puree     [] Puree  [] Solid     [] Solid  [] 13 mm Ba pill     [x] 13 mm Ba pill with thin liquid wash     Motility Problems with:  [] Lip Closure:    [] Mastication:   [x] Bolus Formation/control:   [] A-P Transport:  [x] Tongue Base Retraction:  [x] Swallow Response (delayed):  [] Velopharyngeal Closure:  [] Pharyngeal Aspirations:  [x] Laryngeal Elevation/adduction:  [x] Epiglottic Inversion:  [] Pharyngeal motility/sensation:  [] Cricopharyngeal Relaxation:  [] Esophageal Peristalsis:  [] Other:    Timing of Aspiration/Penetration:  [] Before Swallow:  [x] During Swallow:  [] After Swallow:    Transit Time Delay:  [x] >1 Second  Oral  [] >1 Second Pharyngeal  [] >20 Second Esophageal     Residuals: N/A   [] Vallecula:    [] Mild  [] Mod  [] Severe  [] Pyriform Sinus:   [] Mild  [] Mod  [] Severe  [] Posterior Pharyngeal Wall:  [] Mild  [] Mod  [] Severe    GCODES(GN):GCODESwallowing:  Swallow Current Status CI= 1-19%   Swallow Goal Status CI= 1-19%   Swallow D/C Status CI= 1-19%     Thank you for this referral,   NELLY BallS., [address] [facility]  Speech-Language Pathologist

## 2017-04-20 ENCOUNTER — HOSPITAL ENCOUNTER (OUTPATIENT)
Dept: LAB | Age: 82
Discharge: HOME | End: 2017-04-20

## 2017-04-20 LAB
ANION GAP BLD CALC-SCNC: 11 MMOL/L (ref 3–18)
BASOPHILS # BLD AUTO: 0 K/UL (ref 0–0.1)
BASOPHILS # BLD: 1 % (ref 0–2)
BUN SERPL-MCNC: 45 MG/DL (ref 7–18)
BUN/CREAT SERPL: 16 (ref 12–20)
CALCIUM SERPL-MCNC: 8 MG/DL (ref 8.5–10.1)
CHLORIDE SERPL-SCNC: 106 MMOL/L (ref 100–108)
CO2 SERPL-SCNC: 18 MMOL/L (ref 21–32)
CREAT SERPL-MCNC: 2.83 MG/DL (ref 0.6–1.3)
DIFFERENTIAL METHOD BLD: ABNORMAL
EOSINOPHIL # BLD: 0.1 K/UL (ref 0–0.4)
EOSINOPHIL NFR BLD: 3 % (ref 0–5)
ERYTHROCYTE [DISTWIDTH] IN BLOOD BY AUTOMATED COUNT: 15.2 % (ref 11.6–14.5)
GLUCOSE SERPL-MCNC: 85 MG/DL (ref 74–99)
HCT VFR BLD AUTO: 22.4 % (ref 35–45)
HGB BLD-MCNC: 7.4 G/DL (ref 12–16)
LYMPHOCYTES # BLD AUTO: 31 % (ref 21–52)
LYMPHOCYTES # BLD: 1.6 K/UL (ref 0.9–3.6)
MCH RBC QN AUTO: 29.7 PG (ref 24–34)
MCHC RBC AUTO-ENTMCNC: 33 G/DL (ref 31–37)
MCV RBC AUTO: 90 FL (ref 74–97)
MONOCYTES # BLD: 0.6 K/UL (ref 0.05–1.2)
MONOCYTES NFR BLD AUTO: 12 % (ref 3–10)
NEUTS SEG # BLD: 2.8 K/UL (ref 1.8–8)
NEUTS SEG NFR BLD AUTO: 53 % (ref 40–73)
PLATELET # BLD AUTO: 273 K/UL (ref 135–420)
PMV BLD AUTO: 9.6 FL (ref 9.2–11.8)
POTASSIUM SERPL-SCNC: 5.1 MMOL/L (ref 3.5–5.5)
RBC # BLD AUTO: 2.49 M/UL (ref 4.2–5.3)
SODIUM SERPL-SCNC: 135 MMOL/L (ref 136–145)
WBC # BLD AUTO: 5.2 K/UL (ref 4.6–13.2)

## 2017-04-20 PROCEDURE — 80048 BASIC METABOLIC PNL TOTAL CA: CPT | Performed by: FAMILY MEDICINE

## 2017-04-20 PROCEDURE — 85025 COMPLETE CBC W/AUTO DIFF WBC: CPT | Performed by: FAMILY MEDICINE

## 2017-04-22 ENCOUNTER — HOSPITAL ENCOUNTER (OUTPATIENT)
Dept: LAB | Age: 82
Discharge: HOME | End: 2017-04-22

## 2017-04-22 LAB
ERYTHROCYTE [DISTWIDTH] IN BLOOD BY AUTOMATED COUNT: 15.3 % (ref 11.6–14.5)
HCT VFR BLD AUTO: 21.3 % (ref 35–45)
HGB BLD-MCNC: 7.1 G/DL (ref 12–16)
MCH RBC QN AUTO: 30.2 PG (ref 24–34)
MCHC RBC AUTO-ENTMCNC: 33.3 G/DL (ref 31–37)
MCV RBC AUTO: 90.6 FL (ref 74–97)
PLATELET # BLD AUTO: 246 K/UL (ref 135–420)
PMV BLD AUTO: 9.8 FL (ref 9.2–11.8)
RBC # BLD AUTO: 2.35 M/UL (ref 4.2–5.3)
WBC # BLD AUTO: 4.9 K/UL (ref 4.6–13.2)

## 2017-04-22 PROCEDURE — 85027 COMPLETE CBC AUTOMATED: CPT | Performed by: FAMILY MEDICINE

## 2017-04-24 ENCOUNTER — HOSPITAL ENCOUNTER (OUTPATIENT)
Dept: LAB | Age: 82
Discharge: HOME | End: 2017-04-24

## 2017-04-24 LAB
BASOPHILS # BLD AUTO: 0 K/UL (ref 0–0.1)
BASOPHILS # BLD: 1 % (ref 0–2)
DIFFERENTIAL METHOD BLD: ABNORMAL
EOSINOPHIL # BLD: 0.2 K/UL (ref 0–0.4)
EOSINOPHIL NFR BLD: 5 % (ref 0–5)
ERYTHROCYTE [DISTWIDTH] IN BLOOD BY AUTOMATED COUNT: 15.6 % (ref 11.6–14.5)
FERRITIN SERPL-MCNC: 99 NG/ML (ref 8–388)
FOLATE SERPL-MCNC: >20 NG/ML (ref 3.1–17.5)
HCT VFR BLD AUTO: 23.9 % (ref 35–45)
HGB BLD-MCNC: 7.9 G/DL (ref 12–16)
IRON SATN MFR SERPL: 37 %
IRON SERPL-MCNC: 60 UG/DL (ref 50–175)
LYMPHOCYTES # BLD AUTO: 47 % (ref 21–52)
LYMPHOCYTES # BLD: 2.2 K/UL (ref 0.9–3.6)
MCH RBC QN AUTO: 30 PG (ref 24–34)
MCHC RBC AUTO-ENTMCNC: 33.1 G/DL (ref 31–37)
MCV RBC AUTO: 90.9 FL (ref 74–97)
MONOCYTES # BLD: 0.5 K/UL (ref 0.05–1.2)
MONOCYTES NFR BLD AUTO: 11 % (ref 3–10)
NEUTS SEG # BLD: 1.6 K/UL (ref 1.8–8)
NEUTS SEG NFR BLD AUTO: 36 % (ref 40–73)
PLATELET # BLD AUTO: 274 K/UL (ref 135–420)
PMV BLD AUTO: 9.6 FL (ref 9.2–11.8)
RBC # BLD AUTO: 2.63 M/UL (ref 4.2–5.3)
RETICS/RBC NFR AUTO: 2.5 % (ref 0.5–2.3)
TIBC SERPL-MCNC: 161 UG/DL (ref 250–450)
VIT B12 SERPL-MCNC: 1821 PG/ML (ref 211–911)
WBC # BLD AUTO: 4.6 K/UL (ref 4.6–13.2)

## 2017-04-24 PROCEDURE — 85025 COMPLETE CBC W/AUTO DIFF WBC: CPT | Performed by: PHYSICIAN ASSISTANT

## 2017-04-24 PROCEDURE — 85045 AUTOMATED RETICULOCYTE COUNT: CPT | Performed by: PHYSICIAN ASSISTANT

## 2017-04-24 PROCEDURE — 82746 ASSAY OF FOLIC ACID SERUM: CPT | Performed by: PHYSICIAN ASSISTANT

## 2017-04-24 PROCEDURE — 82607 VITAMIN B-12: CPT | Performed by: PHYSICIAN ASSISTANT

## 2017-04-24 PROCEDURE — 82728 ASSAY OF FERRITIN: CPT | Performed by: PHYSICIAN ASSISTANT

## 2017-04-24 PROCEDURE — 83540 ASSAY OF IRON: CPT | Performed by: PHYSICIAN ASSISTANT

## 2017-05-02 ENCOUNTER — HOSPITAL ENCOUNTER (OUTPATIENT)
Dept: LAB | Age: 82
Discharge: HOME | End: 2017-05-02
Payer: COMMERCIAL

## 2017-05-02 ENCOUNTER — HOSPITAL ENCOUNTER (OUTPATIENT)
Dept: LAB | Age: 82
Discharge: HOME | End: 2017-05-02

## 2017-05-02 LAB
BASOPHILS # BLD AUTO: 0 K/UL (ref 0–0.06)
BASOPHILS # BLD: 1 % (ref 0–2)
DIFFERENTIAL METHOD BLD: ABNORMAL
EOSINOPHIL # BLD: 0.3 K/UL (ref 0–0.4)
EOSINOPHIL NFR BLD: 5 % (ref 0–5)
ERYTHROCYTE [DISTWIDTH] IN BLOOD BY AUTOMATED COUNT: 16 % (ref 11.6–14.5)
HCT VFR BLD AUTO: 23 % (ref 35–45)
HGB BLD-MCNC: 7.6 G/DL (ref 12–16)
LYMPHOCYTES # BLD AUTO: 40 % (ref 21–52)
LYMPHOCYTES # BLD: 2.3 K/UL (ref 0.9–3.6)
MCH RBC QN AUTO: 30.3 PG (ref 24–34)
MCHC RBC AUTO-ENTMCNC: 33 G/DL (ref 31–37)
MCV RBC AUTO: 91.6 FL (ref 74–97)
MONOCYTES # BLD: 0.7 K/UL (ref 0.05–1.2)
MONOCYTES NFR BLD AUTO: 12 % (ref 3–10)
NEUTS SEG # BLD: 2.4 K/UL (ref 1.8–8)
NEUTS SEG NFR BLD AUTO: 42 % (ref 40–73)
PLATELET # BLD AUTO: 233 K/UL (ref 135–420)
PMV BLD AUTO: 9.1 FL (ref 9.2–11.8)
RBC # BLD AUTO: 2.51 M/UL (ref 4.2–5.3)
WBC # BLD AUTO: 5.7 K/UL (ref 4.6–13.2)

## 2017-05-02 PROCEDURE — 85025 COMPLETE CBC W/AUTO DIFF WBC: CPT | Performed by: PHYSICIAN ASSISTANT

## 2017-05-05 ENCOUNTER — HOSPITAL ENCOUNTER (OUTPATIENT)
Dept: LAB | Age: 82
Discharge: HOME | End: 2017-05-05
Payer: COMMERCIAL

## 2017-05-05 PROCEDURE — 85025 COMPLETE CBC W/AUTO DIFF WBC: CPT | Performed by: PHYSICIAN ASSISTANT

## 2017-05-06 LAB
BASOPHILS # BLD AUTO: 0 K/UL (ref 0–0.1)
BASOPHILS # BLD: 0 % (ref 0–2)
DIFFERENTIAL METHOD BLD: ABNORMAL
EOSINOPHIL # BLD: 0.2 K/UL (ref 0–0.4)
EOSINOPHIL NFR BLD: 3 % (ref 0–5)
ERYTHROCYTE [DISTWIDTH] IN BLOOD BY AUTOMATED COUNT: 16.3 % (ref 11.6–14.5)
HCT VFR BLD AUTO: 24.9 % (ref 35–45)
HGB BLD-MCNC: 8.2 G/DL (ref 12–16)
LYMPHOCYTES # BLD AUTO: 32 % (ref 21–52)
LYMPHOCYTES # BLD: 1.9 K/UL (ref 0.9–3.6)
MCH RBC QN AUTO: 30.6 PG (ref 24–34)
MCHC RBC AUTO-ENTMCNC: 32.9 G/DL (ref 31–37)
MCV RBC AUTO: 92.9 FL (ref 74–97)
MONOCYTES # BLD: 0.7 K/UL (ref 0.05–1.2)
MONOCYTES NFR BLD AUTO: 11 % (ref 3–10)
NEUTS SEG # BLD: 3.3 K/UL (ref 1.8–8)
NEUTS SEG NFR BLD AUTO: 54 % (ref 40–73)
PLATELET # BLD AUTO: 245 K/UL (ref 135–420)
PMV BLD AUTO: 9.9 FL (ref 9.2–11.8)
RBC # BLD AUTO: 2.68 M/UL (ref 4.2–5.3)
WBC # BLD AUTO: 6.1 K/UL (ref 4.6–13.2)

## 2017-05-12 ENCOUNTER — APPOINTMENT (OUTPATIENT)
Dept: GENERAL RADIOLOGY | Age: 82
DRG: 280 | End: 2017-05-12
Attending: EMERGENCY MEDICINE
Payer: MEDICARE

## 2017-05-12 ENCOUNTER — HOSPITAL ENCOUNTER (INPATIENT)
Age: 82
LOS: 5 days | Discharge: NURSING, REHAB OR LONG-TERM CARE | DRG: 280 | End: 2017-05-17
Attending: EMERGENCY MEDICINE | Admitting: INTERNAL MEDICINE
Payer: MEDICARE

## 2017-05-12 DIAGNOSIS — I50.23 ACUTE ON CHRONIC SYSTOLIC CONGESTIVE HEART FAILURE (HCC): Primary | ICD-10-CM

## 2017-05-12 DIAGNOSIS — I21.4 NON-STEMI (NON-ST ELEVATED MYOCARDIAL INFARCTION) (HCC): ICD-10-CM

## 2017-05-12 PROBLEM — I50.9 CHF (CONGESTIVE HEART FAILURE) (HCC): Status: ACTIVE | Noted: 2017-05-12

## 2017-05-12 PROBLEM — I50.33 ACUTE ON CHRONIC DIASTOLIC CONGESTIVE HEART FAILURE (HCC): Status: ACTIVE | Noted: 2017-05-12

## 2017-05-12 PROBLEM — N18.4 STAGE 4 CHRONIC KIDNEY DISEASE (HCC): Status: ACTIVE | Noted: 2017-05-12

## 2017-05-12 PROBLEM — I50.9 CHF EXACERBATION (HCC): Status: ACTIVE | Noted: 2017-05-12

## 2017-05-12 LAB
ANION GAP BLD CALC-SCNC: 11 MMOL/L (ref 3–18)
ATRIAL RATE: 100 BPM
BASOPHILS # BLD AUTO: 0 K/UL (ref 0–0.06)
BASOPHILS # BLD: 0 % (ref 0–2)
BNP SERPL-MCNC: ABNORMAL PG/ML (ref 0–1800)
BNP SERPL-MCNC: ABNORMAL PG/ML (ref 0–1800)
BUN SERPL-MCNC: 47 MG/DL (ref 7–18)
BUN/CREAT SERPL: 16 (ref 12–20)
CALCIUM SERPL-MCNC: 9.1 MG/DL (ref 8.5–10.1)
CALCULATED P AXIS, ECG09: 68 DEGREES
CALCULATED R AXIS, ECG10: -41 DEGREES
CALCULATED T AXIS, ECG11: 108 DEGREES
CHLORIDE SERPL-SCNC: 100 MMOL/L (ref 100–108)
CO2 SERPL-SCNC: 23 MMOL/L (ref 21–32)
CREAT SERPL-MCNC: 2.95 MG/DL (ref 0.6–1.3)
DIAGNOSIS, 93000: NORMAL
DIFFERENTIAL METHOD BLD: ABNORMAL
EOSINOPHIL # BLD: 0 K/UL (ref 0–0.4)
EOSINOPHIL NFR BLD: 0 % (ref 0–5)
ERYTHROCYTE [DISTWIDTH] IN BLOOD BY AUTOMATED COUNT: 15.3 % (ref 11.6–14.5)
GLUCOSE SERPL-MCNC: 122 MG/DL (ref 74–99)
HCT VFR BLD AUTO: 26.3 % (ref 35–45)
HGB BLD-MCNC: 8.5 G/DL (ref 12–16)
LYMPHOCYTES # BLD AUTO: 9 % (ref 21–52)
LYMPHOCYTES # BLD: 0.9 K/UL (ref 0.9–3.6)
MCH RBC QN AUTO: 30 PG (ref 24–34)
MCHC RBC AUTO-ENTMCNC: 32.3 G/DL (ref 31–37)
MCV RBC AUTO: 92.9 FL (ref 74–97)
MONOCYTES # BLD: 0.6 K/UL (ref 0.05–1.2)
MONOCYTES NFR BLD AUTO: 6 % (ref 3–10)
NEUTS SEG # BLD: 8.4 K/UL (ref 1.8–8)
NEUTS SEG NFR BLD AUTO: 85 % (ref 40–73)
P-R INTERVAL, ECG05: 156 MS
PLATELET # BLD AUTO: 290 K/UL (ref 135–420)
PMV BLD AUTO: 9.5 FL (ref 9.2–11.8)
POTASSIUM SERPL-SCNC: 5.1 MMOL/L (ref 3.5–5.5)
Q-T INTERVAL, ECG07: 346 MS
QRS DURATION, ECG06: 116 MS
QTC CALCULATION (BEZET), ECG08: 446 MS
RBC # BLD AUTO: 2.83 M/UL (ref 4.2–5.3)
SODIUM SERPL-SCNC: 134 MMOL/L (ref 136–145)
TROPONIN I SERPL-MCNC: 0.51 NG/ML (ref 0–0.06)
TROPONIN I SERPL-MCNC: 1.68 NG/ML (ref 0–0.06)
VENTRICULAR RATE, ECG03: 100 BPM
WBC # BLD AUTO: 9.9 K/UL (ref 4.6–13.2)

## 2017-05-12 PROCEDURE — 83880 ASSAY OF NATRIURETIC PEPTIDE: CPT | Performed by: EMERGENCY MEDICINE

## 2017-05-12 PROCEDURE — 74011000250 HC RX REV CODE- 250: Performed by: EMERGENCY MEDICINE

## 2017-05-12 PROCEDURE — 74011250636 HC RX REV CODE- 250/636: Performed by: INTERNAL MEDICINE

## 2017-05-12 PROCEDURE — 74011250637 HC RX REV CODE- 250/637: Performed by: INTERNAL MEDICINE

## 2017-05-12 PROCEDURE — 71010 XR CHEST PORT: CPT

## 2017-05-12 PROCEDURE — 93005 ELECTROCARDIOGRAM TRACING: CPT

## 2017-05-12 PROCEDURE — 85025 COMPLETE CBC W/AUTO DIFF WBC: CPT | Performed by: EMERGENCY MEDICINE

## 2017-05-12 PROCEDURE — 96374 THER/PROPH/DIAG INJ IV PUSH: CPT

## 2017-05-12 PROCEDURE — 84484 ASSAY OF TROPONIN QUANT: CPT | Performed by: EMERGENCY MEDICINE

## 2017-05-12 PROCEDURE — 74011250637 HC RX REV CODE- 250/637: Performed by: EMERGENCY MEDICINE

## 2017-05-12 PROCEDURE — 74011250636 HC RX REV CODE- 250/636: Performed by: EMERGENCY MEDICINE

## 2017-05-12 PROCEDURE — 80048 BASIC METABOLIC PNL TOTAL CA: CPT | Performed by: EMERGENCY MEDICINE

## 2017-05-12 PROCEDURE — 65660000000 HC RM CCU STEPDOWN

## 2017-05-12 PROCEDURE — 99285 EMERGENCY DEPT VISIT HI MDM: CPT

## 2017-05-12 RX ORDER — FUROSEMIDE 10 MG/ML
40 INJECTION INTRAMUSCULAR; INTRAVENOUS EVERY 12 HOURS
Status: DISCONTINUED | OUTPATIENT
Start: 2017-05-12 | End: 2017-05-13

## 2017-05-12 RX ORDER — CARVEDILOL 3.12 MG/1
3.12 TABLET ORAL 2 TIMES DAILY WITH MEALS
Status: DISCONTINUED | OUTPATIENT
Start: 2017-05-13 | End: 2017-05-14

## 2017-05-12 RX ORDER — ACETAMINOPHEN 325 MG/1
325 TABLET ORAL
Status: DISCONTINUED | OUTPATIENT
Start: 2017-05-12 | End: 2017-05-17 | Stop reason: HOSPADM

## 2017-05-12 RX ORDER — GUAIFENESIN 100 MG/5ML
81 LIQUID (ML) ORAL
Status: COMPLETED | OUTPATIENT
Start: 2017-05-12 | End: 2017-05-12

## 2017-05-12 RX ORDER — GUAIFENESIN 100 MG/5ML
81 LIQUID (ML) ORAL DAILY
Status: DISCONTINUED | OUTPATIENT
Start: 2017-05-13 | End: 2017-05-17 | Stop reason: HOSPADM

## 2017-05-12 RX ORDER — ISOSORBIDE DINITRATE 20 MG/1
20 TABLET ORAL 3 TIMES DAILY
Status: DISCONTINUED | OUTPATIENT
Start: 2017-05-12 | End: 2017-05-17

## 2017-05-12 RX ORDER — SODIUM CHLORIDE 0.9 % (FLUSH) 0.9 %
5-10 SYRINGE (ML) INJECTION EVERY 8 HOURS
Status: DISCONTINUED | OUTPATIENT
Start: 2017-05-12 | End: 2017-05-17 | Stop reason: HOSPADM

## 2017-05-12 RX ORDER — HEPARIN SODIUM 5000 [USP'U]/ML
5000 INJECTION, SOLUTION INTRAVENOUS; SUBCUTANEOUS EVERY 8 HOURS
Status: DISCONTINUED | OUTPATIENT
Start: 2017-05-12 | End: 2017-05-17 | Stop reason: HOSPADM

## 2017-05-12 RX ORDER — FACIAL-BODY WIPES
10 EACH TOPICAL DAILY PRN
Status: DISCONTINUED | OUTPATIENT
Start: 2017-05-12 | End: 2017-05-17 | Stop reason: HOSPADM

## 2017-05-12 RX ORDER — IPRATROPIUM BROMIDE AND ALBUTEROL SULFATE 2.5; .5 MG/3ML; MG/3ML
3 SOLUTION RESPIRATORY (INHALATION) ONCE
Status: COMPLETED | OUTPATIENT
Start: 2017-05-12 | End: 2017-05-12

## 2017-05-12 RX ORDER — LANOLIN ALCOHOL/MO/W.PET/CERES
325 CREAM (GRAM) TOPICAL
Status: DISCONTINUED | OUTPATIENT
Start: 2017-05-13 | End: 2017-05-17 | Stop reason: HOSPADM

## 2017-05-12 RX ORDER — FUROSEMIDE 10 MG/ML
80 INJECTION INTRAMUSCULAR; INTRAVENOUS
Status: COMPLETED | OUTPATIENT
Start: 2017-05-12 | End: 2017-05-12

## 2017-05-12 RX ORDER — DOCUSATE SODIUM 100 MG/1
100 CAPSULE, LIQUID FILLED ORAL 2 TIMES DAILY
Status: DISCONTINUED | OUTPATIENT
Start: 2017-05-12 | End: 2017-05-17 | Stop reason: HOSPADM

## 2017-05-12 RX ORDER — SIMVASTATIN 20 MG/1
20 TABLET, FILM COATED ORAL
Status: DISCONTINUED | OUTPATIENT
Start: 2017-05-12 | End: 2017-05-17 | Stop reason: HOSPADM

## 2017-05-12 RX ORDER — SODIUM CHLORIDE 0.9 % (FLUSH) 0.9 %
5-10 SYRINGE (ML) INJECTION AS NEEDED
Status: DISCONTINUED | OUTPATIENT
Start: 2017-05-12 | End: 2017-05-17 | Stop reason: HOSPADM

## 2017-05-12 RX ORDER — ALLOPURINOL 100 MG/1
100 TABLET ORAL DAILY
Status: DISCONTINUED | OUTPATIENT
Start: 2017-05-13 | End: 2017-05-13

## 2017-05-12 RX ADMIN — IPRATROPIUM BROMIDE AND ALBUTEROL SULFATE 3 ML: 2.5; .5 SOLUTION RESPIRATORY (INHALATION) at 09:55

## 2017-05-12 RX ADMIN — HEPARIN SODIUM 5000 UNITS: 5000 INJECTION, SOLUTION INTRAVENOUS; SUBCUTANEOUS at 22:53

## 2017-05-12 RX ADMIN — FUROSEMIDE 40 MG: 10 INJECTION, SOLUTION INTRAMUSCULAR; INTRAVENOUS at 22:55

## 2017-05-12 RX ADMIN — SIMVASTATIN 20 MG: 20 TABLET, FILM COATED ORAL at 22:53

## 2017-05-12 RX ADMIN — DOCUSATE SODIUM 100 MG: 100 CAPSULE, LIQUID FILLED ORAL at 22:53

## 2017-05-12 RX ADMIN — Medication 5 ML: at 22:55

## 2017-05-12 RX ADMIN — ISOSORBIDE DINITRATE 20 MG: 20 TABLET ORAL at 22:53

## 2017-05-12 RX ADMIN — FUROSEMIDE 80 MG: 10 INJECTION INTRAVENOUS at 10:55

## 2017-05-12 RX ADMIN — ASPIRIN 81 MG 81 MG: 81 TABLET ORAL at 14:49

## 2017-05-12 NOTE — ED NOTES
Spoke to [place] in lab to run Pro BNP as ordered on second lab specimen obtained from patient.

## 2017-05-12 NOTE — ROUTINE PROCESS
TRANSFER - OUT RePORT:    Verbal report given to oneforty on Delmy Saniya  being transferred to [facility](unit) for routine progression of care       Report consisted of patients Situation, Background, Assessment and   Recommendations(SBAR). Information from the following report(s) Kardex, ED Summary, Procedure Summary, Intake/Output and MAR was reviewed with the receiving nurse. Lines:   Peripheral IV 05/12/17 Left Antecubital (Active)   Site Assessment Clean, dry, & intact 5/12/2017  8:47 AM   Phlebitis Assessment 0 5/12/2017  8:47 AM   Infiltration Assessment 0 5/12/2017  8:47 AM   Dressing Status Clean, dry, & intact 5/12/2017  8:47 AM   Dressing Type Transparent 5/12/2017  8:47 AM   Hub Color/Line Status Pink 5/12/2017  8:47 AM        Opportunity for questions and clarification was provided.       Patient transported with:   O2 @ 2 liters  Patients medications from home  Patient-specific medications from Pharmacy

## 2017-05-12 NOTE — ED NOTES
Patient c/o bilateral foot pain , took socks off skin very dry and toenails very thick, applied lotion to site states they feel much better.

## 2017-05-12 NOTE — ED PROVIDER NOTES
HPI Comments: 8:51 AM Frandy Hays is a 80 y.o. female with a hx of HTN who presents to the ED via EMS from a nursing home c/o SOB that began last night. Per EMS, upon arrival the pt was on room air saturation of 79%. Therefore, the pt was placed on NRB and her saturation catalina to 100%. Per the pt, she is also complaining of bilateral leg pain. She states that she has never experienced SOB before. She denies a hx of asthma, chest pain, and any further complaints or concerns at this time. The history is provided by the patient. Past Medical History:   Diagnosis Date    Breast cancer ([facility] 1/17/14    right breast    Cardiac echocardiogram 02/17/2017    EF 50%. No WMA. Mod conc LVH. Indeterminate diastolic fx. Mod LAE. Mild MR. Mild AI. Mild PI. No signifciant valvular heart disease.  Cardiovascular lower extremity arterial testing 12/19/2014    Mod tibio-peroneal arterial disease bilaterally. R YANCY 1.24.  L YANCY 0.74. Bilateral sm vessel disease.  Carotid duplex 11/19/2014    Severe 70% or greater bilateral ICA stenosis. LICA dissection suggested.  Chronic renal insufficiency     CVA (cerebral vascular accident) ([facility] 2003    with slight Right sided weakness    Edema     Glaucoma     Gout flare     Hyperlipidemia     Hypertension     Lump of right breast     URI (upper respiratory infection)        Past Surgical History:   Procedure Laterality Date    HX APPENDECTOMY      HX CYST INCISION AND DRAINAGE      PT DOES NOT REMEMBER EXACT DATES, TUCKER BREAST DONE MORE THAN 20 YEARS AGO. BENIGN FINDINGS PER PATIENT.     HX GYN      JUSTIN/BSO    HX HYSTERECTOMY      HX MASTECTOMY  1/17/2014    RIGHT PARTIAL MASTECTOMY performed by Ayaan Prather MD at [facility] MAIN OR         Family History:   Problem Relation Age of Onset    Hypertension Mother     Hypertension Brother     Diabetes Brother        Social History     Social History    Marital status:      Spouse name: N/A   [facility] Number of children: N/A    Years of education: N/A     Occupational History    Not on file. Social History Main Topics    Smoking status: Never Smoker    Smokeless tobacco: Never Used    Alcohol use No    Drug use: No    Sexual activity: Not Currently     Other Topics Concern    Not on file     Social History Narrative         ALLERGIES: Review of patient's allergies indicates no known allergies. Review of Systems   Constitutional: Negative for diaphoresis and fever. HENT: Negative for ear pain, rhinorrhea and trouble swallowing. Eyes: Negative for visual disturbance. Respiratory: Positive for shortness of breath. Negative for cough. Cardiovascular: Negative for chest pain and leg swelling. Gastrointestinal: Negative for abdominal pain, blood in stool, diarrhea, nausea and vomiting. Genitourinary: Negative for difficulty urinating, flank pain and hematuria. Musculoskeletal: Positive for myalgias (bilateral lower extremities ). Negative for back pain and neck pain. Skin: Negative for rash. Neurological: Negative for dizziness, weakness, numbness and headaches. Hematological: Negative. Psychiatric/Behavioral: Negative. All other systems reviewed and are negative. Vitals:    05/12/17 1100 05/12/17 1156 05/12/17 1245 05/12/17 1315   BP: 155/55 161/61 163/59 133/62   Pulse: 78 83 78 74   Resp: 22 16 21 21   Temp:       SpO2: 100% 100% 100% 100%   Weight:       Height:                Physical Exam   Constitutional: She is oriented to person, place, and time. She appears well-developed and well-nourished. No distress. Elderly and frail, no acute distress   HENT:   Head: Normocephalic and atraumatic. Right Ear: External ear normal.   Left Ear: External ear normal.   Nose: Nose normal.   Mouth/Throat: Oropharynx is clear and moist.   Eyes: Conjunctivae and EOM are normal. Pupils are equal, round, and reactive to light. No scleral icterus. Neck: Normal range of motion. Neck supple. No JVD present. No tracheal deviation present. No thyromegaly present. Cardiovascular: Normal rate, regular rhythm, normal heart sounds and intact distal pulses. Exam reveals no gallop and no friction rub. No murmur heard. Pulmonary/Chest: Effort normal. She has wheezes (mild, bilateral ). She exhibits no tenderness. Abdominal: Soft. Bowel sounds are normal. She exhibits no distension. There is no tenderness. There is no rebound and no guarding. Musculoskeletal: Normal range of motion. She exhibits no edema or tenderness. extremities are thin and cachectic bilaterally    Lymphadenopathy:     She has no cervical adenopathy. Neurological: She is alert and oriented to person, place, and time. No cranial nerve deficit. Coordination normal.   No sensory loss, Gait normal, Motor 5/5  Moves all 4 extremities spontaneously   Oriented to person   Skin: Skin is warm and dry. Psychiatric: She has a normal mood and affect. Her behavior is normal. Judgment and thought content normal.   Nursing note and vitals reviewed. MDM  Number of Diagnoses or Management Options  Acute on chronic systolic congestive heart failure ([address]):   Non-STEMI (non-ST elevated myocardial infarction) [facility]):   Diagnosis management comments: Case D/W Dr Syed Sevilla and Dr Amira Lewis,  Agreed to admit to [facility] for continued care.   Ildefonso Baker MD  1:40 PM         Amount and/or Complexity of Data Reviewed  Clinical lab tests: ordered and reviewed  Tests in the radiology section of CPT®: ordered and reviewed  Discuss the patient with other providers: yes  Independent visualization of images, tracings, or specimens: yes      ED Course       Procedures    Vitals:  Patient Vitals for the past 12 hrs:   Temp Pulse Resp BP SpO2   05/12/17 1315 - 74 21 133/62 100 %   05/12/17 1245 - 78 21 163/59 100 %   05/12/17 1156 - 83 16 161/61 100 %   05/12/17 1100 - 78 22 155/55 100 %   05/12/17 1030 - 88 23 169/55 100 %   05/12/17 1000 - 88 23 170/58 95 %   05/12/17 0841 98.3 °F (36.8 °C) (!) 104 28 157/55 90 %   90 % Pulse ox reviewed and WNL.        Medications ordered:   Medications   albuterol-ipratropium (DUO-NEB) 2.5 MG-0.5 MG/3 ML (3 mL Nebulization Given 5/12/17 6334)   furosemide (LASIX) injection 80 mg (80 mg IntraVENous Given 5/12/17 1055)         Lab findings:  Recent Results (from the past 12 hour(s))   EKG, 12 LEAD, INITIAL    Collection Time: 05/12/17  8:49 AM   Result Value Ref Range    Ventricular Rate 100 BPM    Atrial Rate 100 BPM    P-R Interval 156 ms    QRS Duration 116 ms    Q-T Interval 346 ms    QTC Calculation (Bezet) 446 ms    Calculated P Axis 68 degrees    Calculated R Axis -41 degrees    Calculated T Axis 108 degrees    Diagnosis       Normal sinus rhythm  Possible Left atrial enlargement  Left axis deviation  Left ventricular hypertrophy with QRS widening and repolarization abnormality  Abnormal ECG  When compared with ECG of 23-FEB-2017 18:02,  No significant change was found  Confirmed by Lisa Delong MD ([id number]) on 5/12/2017 5:22:78 AM     METABOLIC PANEL, BASIC    Collection Time: 05/12/17 10:00 AM   Result Value Ref Range    Sodium 134 (L) 136 - 145 mmol/L    Potassium 5.1 3.5 - 5.5 mmol/L    Chloride 100 100 - 108 mmol/L    CO2 23 21 - 32 mmol/L    Anion gap 11 3.0 - 18 mmol/L    Glucose 122 (H) 74 - 99 mg/dL    BUN 47 (H) 7.0 - 18 MG/DL    Creatinine 2.95 (H) 0.6 - 1.3 MG/DL    BUN/Creatinine ratio 16 12 - 20      GFR est AA 18 (L) >60 ml/min/1.73m2    GFR est non-AA 15 (L) >60 ml/min/1.73m2    Calcium 9.1 8.5 - 10.1 MG/DL   CBC WITH AUTOMATED DIFF    Collection Time: 05/12/17 10:00 AM   Result Value Ref Range    WBC 9.9 4.6 - 13.2 K/uL    RBC 2.83 (L) 4.20 - 5.30 M/uL    HGB 8.5 (L) 12.0 - 16.0 g/dL    HCT 26.3 (L) 35.0 - 45.0 %    MCV 92.9 74.0 - 97.0 FL    MCH 30.0 24.0 - 34.0 PG    MCHC 32.3 31.0 - 37.0 g/dL    RDW 15.3 (H) 11.6 - 14.5 %    PLATELET 649 678 - 036 K/uL    MPV 9.5 9.2 - 11.8 FL    NEUTROPHILS 85 (H) 40 - 73 %    LYMPHOCYTES 9 (L) 21 - 52 %    MONOCYTES 6 3 - 10 %    EOSINOPHILS 0 0 - 5 %    BASOPHILS 0 0 - 2 %    ABS. NEUTROPHILS 8.4 (H) 1.8 - 8.0 K/UL    ABS. LYMPHOCYTES 0.9 0.9 - 3.6 K/UL    ABS. MONOCYTES 0.6 0.05 - 1.2 K/UL    ABS. EOSINOPHILS 0.0 0.0 - 0.4 K/UL    ABS. BASOPHILS 0.0 0.0 - 0.06 K/UL    DF AUTOMATED     TROPONIN I    Collection Time: 05/12/17 10:00 AM   Result Value Ref Range    Troponin-I, Qt. 0.51 (H) 0.00 - 0.06 NG/ML   TROPONIN I    Collection Time: 05/12/17 12:25 PM   Result Value Ref Range    Troponin-I, Qt. 1.68 (HH) 0.00 - 0.06 NG/ML       EKG interpretation by ED Physician: NSR at 100, no acute changes. Cassy Hubbard MD         X-Ray, CT or other radiology findings or impressions:  XR CHEST PORT   Final Result   Impression:     Central vascular congestion with likely interstitial edema and pleural effusions  associated with basilar consolidations, likely compressive atelectasis. Progress notes, Consult notes or additional Procedure notes:  1:35 PM Consult:  Discussed care with Dr. Miky Escalante, hospitalist. Standard discussion; including history of patients chief complaint, available diagnostic results, and treatment course. Accepts patient for admission. Disposition:  Diagnosis: No diagnosis found. Disposition: admitted to [address] [place] [address] N  Documented by: Chas oliveraibing for, and in the presence of, Cassy Hubbard MD 05/12/17 1:36 PM     Signed by: Rashad Karimi, 05/12/17 9:34 AM     PROVIDER ATTESTATION STATEMENT  I personally performed the services described in the documentation, reviewed the documentation, as recorded by the scribe in my presence, and it accurately and completely records my words and actions.   Cassy Hubbard MD

## 2017-05-12 NOTE — ED TRIAGE NOTES
Per EMS, Pt has had difficulty breathing since last PM. Pt is a nursing home pt that was recently dx with pneumonia. EMS states that upon arrival, pt had room air sat of 79%. Pt was placed on NRB and sats came up to 100%.

## 2017-05-12 NOTE — H&P
History and Physical    Patient: Minor Soto MRN: [id number]  SSN: [id number]    YOB: 1928  Age: 80 y.o. Sex: female      Subjective: Minor Soto is a 80 y.o. female with PMH of Chronic diastolic heart failure ,  HTN, old CVA , Gout , Chronic renal failure -4 , being admitted for acute on chronic diastolic heart failure     Patient is poor historian , her daughter Tricia Benitez in room with her     Patient was brought to ED via EMS with h/o sudden development of SOB, in ED she was in moderate distress and she was hypoxic with o2 sats at 79% , she was placed on NRFM with improvement in her sat. Now she is on 2 L NC and sats around 100 % . Her troponin was elevated , this was second set . ED consulted cardiology and she was transferred to [facility] and being admitted . Patient denies any chest pains. No palpitations . Full CODE   DVT prophylaxis - heparin     Past Medical History:   Diagnosis Date    Breast cancer ([facility] 1/17/14    right breast    Cardiac echocardiogram 02/17/2017    EF 50%. No WMA. Mod conc LVH. Indeterminate diastolic fx. Mod LAE. Mild MR. Mild AI. Mild PI. No signifciant valvular heart disease.  Cardiovascular lower extremity arterial testing 12/19/2014    Mod tibio-peroneal arterial disease bilaterally. R YANCY 1.24.  L YANCY 0.74. Bilateral sm vessel disease.  Carotid duplex 11/19/2014    Severe 70% or greater bilateral ICA stenosis. LICA dissection suggested.  Chronic renal insufficiency     CVA (cerebral vascular accident) ([facility] [address] 2003    with slight Right sided weakness    Edema     Glaucoma     Gout flare     Hyperlipidemia     Hypertension     Lump of right breast     URI (upper respiratory infection)      Past Surgical History:   Procedure Laterality Date    HX APPENDECTOMY      HX CYST INCISION AND DRAINAGE      PT DOES NOT REMEMBER EXACT DATES, TUCKER BREAST DONE MORE THAN 20 YEARS AGO. BENIGN FINDINGS PER PATIENT.     HX GYN      JUSTIN/BSO    HX HYSTERECTOMY      HX MASTECTOMY  1/17/2014    RIGHT PARTIAL MASTECTOMY performed by Tommy Delong MD at [facility] MAIN OR      Family History   Problem Relation Age of Onset    Hypertension Mother     Hypertension Brother     Diabetes Brother      Social History   Substance Use Topics    Smoking status: Never Smoker    Smokeless tobacco: Never Used    Alcohol use No      Prior to Admission medications    Medication Sig Start Date End Date Taking? Authorizing Provider   ascorbic acid, vitamin C, (VITAMIN C) 250 mg tablet Take  by mouth. Historical Provider   amino acids-protein hydrolys (PRO-STAT AWC)  gram-kcal/30 mL liqd Take  by mouth two (2) times a day. Historical Provider   Menthol-Zinc Oxide (RISAMINE) 0.44-20.6 % oint Apply  to affected area two (2) times a day. Indications: SKIN IRRITATION    Historical Provider   guaiFENesin-dextromethorphan SR (HUMIBID DM) 600-30 mg per tablet Take 1 Tab by mouth two (2) times a day. 2/28/17   Salvatore Levy MD   docusate sodium (COLACE) 100 mg capsule Take 1 Cap by mouth two (2) times a day for 90 days. 2/28/17 5/29/17  Salvatore Levy MD   furosemide (LASIX) 40 mg tablet Take 1 Tab by mouth daily. 2/20/17   Salvatore Levy MD   polyethylene glycol (MIRALAX) 17 gram packet Take 1 Packet by mouth daily. 2/20/17   Salvatore Levy MD   traMADol (ULTRAM) 50 mg tablet Take 1 Tab by mouth every six (6) hours as needed. Max Daily Amount: 200 mg. 2/20/17   Salvatore Levy MD   therapeutic multivitamin [facility]) tablet Take 1 Tab by mouth daily. 2/20/17   Salvatore Levy MD   bisacodyl (DULCOLAX) 10 mg suppository Insert 10 mg into rectum as needed. 2/20/17   Salvatore Levy MD   allopurinol (ZYLOPRIM) 100 mg tablet Take 1 Tab by mouth daily. 2/20/17   Salvatore Levy MD   simvastatin (ZOCOR) 20 mg tablet Take 1 Tab by mouth nightly.  2/20/17   Williams Crawford MD Freddy   albuterol (PROVENTIL HFA, VENTOLIN HFA, PROAIR HFA) 90 mcg/actuation inhaler Take 1-2 Puffs by inhalation every four (4) hours as needed for Wheezing. 1/27/17   TRACIE Bee   inhalational spacing device 1 Each by Does Not Apply route as needed. 1/27/17   TRACIE Morse   aspirin delayed-release 81 mg tablet Take 1 Tab by mouth daily. 10/28/16   Jacob Medina MD   ferrous sulfate 325 mg (65 mg iron) tablet Take 325 mg by mouth Daily (before breakfast). Historical Provider   cyanocobalamin (VITAMIN B-12) 1,000 mcg tablet Take 1,000 mcg by mouth daily. Historical Provider   acetaminophen (TYLENOL) 325 mg tablet Take 2 Tabs by mouth every four (4) hours as needed. 2/29/16   Kamini Garcia MD   cholecalciferol, VITAMIN D3, (VITAMIN D3) 5,000 unit tab tablet Take 1 Tab by mouth daily. 8/10/15   Jacob Medina MD        No Known Allergies    Review of Systems:  Review of systems not obtained due to patient factors. Objective: Body mass index is 20.98 kg/(m^2).   Vitals:    05/12/17 1715 05/12/17 1745 05/12/17 1800 05/12/17 1930   BP: 157/47 149/49 141/49 168/56   Pulse: 72 63 63 67   Resp: 19 19 18 18   Temp:    98.4 °F (36.9 °C)   SpO2: 100% 100% 100% 100%   Weight:       Height:            Physical Exam:  General appearance - alert, well appearing, and in no distress and oriented to person, place, and time  Mental status - alert, oriented to person, place, and time, normal mood, behavior, speech, dress, motor activity, and thought processes  Eyes - pupils equal and reactive, extraocular eye movements intact  Ears - bilateral TM's and external ear canals normal  Nose - normal and patent, no erythema, discharge or polyps  Mouth - mucous membranes moist, pharynx normal without lesions  Neck - supple, no significant adenopathy  Chest - rales noted bilateral basal   Heart - normal rate, regular rhythm, normal S1, S2, no murmurs, rubs, clicks or gallops  Abdomen - soft, nontender, nondistended, no masses or organomegaly  Neurological - alert, oriented, normal speech, no focal findings or movement disorder noted  Musculoskeletal - no joint tenderness, deformity or swelling  Extremities - peripheral pulses normal, no pedal edema, no clubbing or cyanosis  Skin - normal coloration and turgor, no rashes, no suspicious skin lesions noted     Assessment:     Hospital Problems  Date Reviewed: 5/12/2017          Codes Class Noted POA    Non-STEMI (non-ST elevated myocardial infarction) ([facility] ICD-10-CM: I21.4  ICD-9-CM: 410.70  5/12/2017 Unknown        CHF exacerbation ([facility] ICD-10-CM: I50.9  ICD-9-CM: 428.0  5/12/2017 Unknown        Acute on chronic diastolic congestive heart failure ([facility] ICD-10-CM: I50.33  ICD-9-CM: 428.33, 428.0  5/12/2017 Unknown        Stage 4 chronic kidney disease ([facility] ICD-10-CM: N18.4  ICD-9-CM: 585.4  5/12/2017 Unknown        CHF (congestive heart failure) ([facility] ICD-10-CM: I50.9  ICD-9-CM: 428.0  5/12/2017 Unknown        ACS (acute coronary syndrome) ([facility] ICD-10-CM: I24.9  ICD-9-CM: 411.1  2/21/2016 Yes              CBC:  Lab Results   Component Value Date/Time    WBC 9.9 05/12/2017 10:00 AM    HGB 8.5 05/12/2017 10:00 AM    HCT 26.3 05/12/2017 10:00 AM    PLATELET 661 97/70/2060 10:00 AM    MCV 92.9 05/12/2017 10:00 AM        CMP:  Lab Results   Component Value Date/Time    Sodium 134 05/12/2017 10:00 AM    Potassium 5.1 05/12/2017 10:00 AM    Chloride 100 05/12/2017 10:00 AM    CO2 23 05/12/2017 10:00 AM    Anion gap 11 05/12/2017 10:00 AM    Glucose 122 05/12/2017 10:00 AM    BUN 47 05/12/2017 10:00 AM    Creatinine 2.95 05/12/2017 10:00 AM    BUN/Creatinine ratio 16 05/12/2017 10:00 AM    GFR est AA 18 05/12/2017 10:00 AM    GFR est non-AA 15 05/12/2017 10:00 AM    Calcium 9.1 05/12/2017 10:00 AM    AST (SGOT) 54 02/24/2017 11:54 PM    Alk.  phosphatase 68 02/24/2017 11:54 PM    Protein, total 5.0 02/25/2017 05:57 PM    Albumin 2.3 04/11/2017 09:15 AM Globulin 3.2 02/24/2017 11:54 PM    A-G Ratio 0.8 02/24/2017 11:54 PM    ALT (SGPT) 55 02/24/2017 11:54 PM        PT/INR  Lab Results   Component Value Date/Time    INR 1.3 02/24/2017 11:54 PM    INR 1.1 02/23/2017 06:30 PM    INR 1.0 02/15/2017 08:37 AM    Prothrombin time 15.8 02/24/2017 11:54 PM    Prothrombin time 14.0 02/23/2017 06:30 PM    Prothrombin time 13.0 02/15/2017 08:37 AM            EKG:   Results for orders placed or performed in visit on 03/07/17   AMB POC EKG ROUTINE W/ 12 LEADS, INTER & REP     Status: None    Narrative    EKG: unchanged from previous tracings, normal sinus rhythm, nonspecific ST and T waves changes, left axis deviation, mild IVCD     SUMMARY:  Left ventricle: Systolic function was at the lower limits of normal.  Ejection fraction was estimated to be 50 %. No obvious wall motion  abnormalities identified in the views obtained. Wall thickness was  moderately to markedly increased. There was moderate concentric  hypertrophy. Left atrium: The atrium was moderately dilated. Mitral valve: There was mild annular calcification. There was mild  regurgitation. Aortic valve: There was mild regurgitation. Pulmonic valve: There was mild regurgitation. Inferior vena cava, hepatic veins: Respirophasic changes were blunted  (less than 50% variation). COMPARISONS:  from 60 % to 50 %. Left atrium has increased in size. INDICATIONS: Shortness of breath. HISTORY: Symptoms: chest pain. Prior history: Breast cancer Congestive  heart failure. Remote stroke. PROCEDURE: This was a routine study. The study included complete 2D  imaging, M-mode, complete spectral Doppler, and color Doppler. The heart  rate was 66 bpm, at the start of the study. Systolic blood pressure was  157 mmHg, at the start of the study. Diastolic blood pressure was 51 mmHg,  at the start of the study. Images were obtained from the parasternal,  apical, subcostal, and suprasternal notch acoustic windows. Image quality  was adequate. LEFT VENTRICLE: Size was normal. Systolic function was at the lower limits  of normal. Ejection fraction was estimated to be 50 %. No obvious wall  motion abnormalities identified in the views obtained. Wall thickness was  moderately to markedly increased. There was moderate concentric  hypertrophy. DOPPLER: Indeterminate diastolic function. RIGHT VENTRICLE: The size was normal. Systolic function was normal.    LEFT ATRIUM: The atrium was moderately dilated. LA volume index was 45  ml/mï¾². RIGHT ATRIUM: Size was normal.    MITRAL VALVE: There was mild annular calcification. There was mild  calcification. DOPPLER: There was no evidence for stenosis. There was mild  regurgitation. AORTIC VALVE: The valve was trileaflet. Leaflets exhibited mild  calcification and normal cuspal separation. DOPPLER: There was no  stenosis. There was mild regurgitation. TRICUSPID VALVE: Normal valve structure. There was normal leaflet  separation. DOPPLER: There was no evidence for tricuspid stenosis. There  was no regurgitation. PULMONIC VALVE: Leaflets exhibited normal thickness, no calcification, and  normal cuspal separation. DOPPLER: There was mild regurgitation. AORTA: The root exhibited normal size. PULMONARY ARTERY: The size was normal. DOPPLER: Systolic pressure was  within the normal range. SYSTEMIC VEINS: IVC: Respirophasic changes were blunted (less than 50%  variation). PERICARDIUM: Insignificant pericardial effusion and/or pericardial fat was  present. MEASUREMENT TABLES    2D measurements  Right atrium   (Reference normals)  Area sys   16 cmï¾²   (8.3-19. 5)    SYSTEM MEASUREMENT TABLES    2D  Ao Diam: 2.99 cm  IVSd: 1.63 cm  LVIDd: 4.17 cm  LVIDs: 3.14 cm  LVPWd: 1.59 cm  SV(Teich): 38.42 ml  AVC: 366 ms  EDV(Teich): 77.42 ml  ESV(Cube): 30.84 ml  ESV(Teich): 39 ml  LAAs A2C: 25.77 cm2  LAAs A4C: 20.98 cm2  LAESV A-L A2C: 98.11 ml  LAESV A-L A4C: 68.88 ml  LAESV Index (A-L): 45.49 ml/m2  LAESV MOD A2C: 94.01 ml  LAESV MOD A4C: 60.72 ml  LAESV(A-L): 84.61 ml  LALs A2C: 5.74 cm  LALs A4C: 5.42 cm  RA Area: 15.59 cm2    PW  LVOT VTI: 19.83 cm  LVOT Vmax: 0.9 m/s  LVOT Vmean: 0.65 m/s  MV A Mckay: 0.86 m/s  MV Dec Sandoval: 7.09 m/s2  MV DecT: 154.58 ms  MV E Mckay: 1.1 m/s  MV E/A Ratio: 1.27  LVOT maxPG: 3.23 mmHG  LVOT meanP.85 mmHG    Prepared and E-signed by    Juan Samaniego MD  Signed 2017 14:20:34     Plan:   Acute on chronic diastolic heart failure   High trop - NSTEMI   HTN  CKD- 4    Plan   - Patient is feeling better , lying flat with out distress ,   - Continue to trend Trop   - Follow up with cardiology   - ASA, BB , Statin   - Lasix - with close eye on renal function   -       Signed By: Dahlia Ovalles MD     May 12, 2017

## 2017-05-13 LAB
ANION GAP BLD CALC-SCNC: 8 MMOL/L (ref 3–18)
ATRIAL RATE: 66 BPM
BUN SERPL-MCNC: 50 MG/DL (ref 7–18)
BUN/CREAT SERPL: 16 (ref 12–20)
CALCIUM SERPL-MCNC: 8.4 MG/DL (ref 8.5–10.1)
CALCULATED P AXIS, ECG09: -20 DEGREES
CALCULATED R AXIS, ECG10: -63 DEGREES
CALCULATED T AXIS, ECG11: -81 DEGREES
CHLORIDE SERPL-SCNC: 104 MMOL/L (ref 100–108)
CO2 SERPL-SCNC: 25 MMOL/L (ref 21–32)
CREAT SERPL-MCNC: 3.07 MG/DL (ref 0.6–1.3)
DIAGNOSIS, 93000: NORMAL
ERYTHROCYTE [DISTWIDTH] IN BLOOD BY AUTOMATED COUNT: 15.5 % (ref 11.6–14.5)
GLUCOSE SERPL-MCNC: 89 MG/DL (ref 74–99)
HCT VFR BLD AUTO: 20.4 % (ref 35–45)
HGB BLD-MCNC: 6.9 G/DL (ref 12–16)
MCH RBC QN AUTO: 30.9 PG (ref 24–34)
MCHC RBC AUTO-ENTMCNC: 33.8 G/DL (ref 31–37)
MCV RBC AUTO: 91.5 FL (ref 74–97)
P-R INTERVAL, ECG05: 138 MS
PLATELET # BLD AUTO: 229 K/UL (ref 135–420)
PMV BLD AUTO: 9.6 FL (ref 9.2–11.8)
POTASSIUM SERPL-SCNC: 4.5 MMOL/L (ref 3.5–5.5)
Q-T INTERVAL, ECG07: 484 MS
QRS DURATION, ECG06: 118 MS
QTC CALCULATION (BEZET), ECG08: 507 MS
RBC # BLD AUTO: 2.23 M/UL (ref 4.2–5.3)
SODIUM SERPL-SCNC: 137 MMOL/L (ref 136–145)
VENTRICULAR RATE, ECG03: 66 BPM
WBC # BLD AUTO: 6.3 K/UL (ref 4.6–13.2)

## 2017-05-13 PROCEDURE — 80048 BASIC METABOLIC PNL TOTAL CA: CPT | Performed by: INTERNAL MEDICINE

## 2017-05-13 PROCEDURE — 86900 BLOOD TYPING SEROLOGIC ABO: CPT | Performed by: HOSPITALIST

## 2017-05-13 PROCEDURE — 36415 COLL VENOUS BLD VENIPUNCTURE: CPT | Performed by: INTERNAL MEDICINE

## 2017-05-13 PROCEDURE — 74011250636 HC RX REV CODE- 250/636: Performed by: INTERNAL MEDICINE

## 2017-05-13 PROCEDURE — 74011250637 HC RX REV CODE- 250/637: Performed by: EMERGENCY MEDICINE

## 2017-05-13 PROCEDURE — 85027 COMPLETE CBC AUTOMATED: CPT | Performed by: INTERNAL MEDICINE

## 2017-05-13 PROCEDURE — 65660000000 HC RM CCU STEPDOWN

## 2017-05-13 PROCEDURE — 74011250637 HC RX REV CODE- 250/637: Performed by: INTERNAL MEDICINE

## 2017-05-13 PROCEDURE — 93005 ELECTROCARDIOGRAM TRACING: CPT

## 2017-05-13 PROCEDURE — 86920 COMPATIBILITY TEST SPIN: CPT | Performed by: HOSPITALIST

## 2017-05-13 RX ORDER — ALLOPURINOL 100 MG/1
50 TABLET ORAL DAILY
Status: DISCONTINUED | OUTPATIENT
Start: 2017-05-14 | End: 2017-05-17 | Stop reason: HOSPADM

## 2017-05-13 RX ORDER — FUROSEMIDE 10 MG/ML
40 INJECTION INTRAMUSCULAR; INTRAVENOUS DAILY
Status: DISCONTINUED | OUTPATIENT
Start: 2017-05-14 | End: 2017-05-15

## 2017-05-13 RX ORDER — SODIUM CHLORIDE 9 MG/ML
250 INJECTION, SOLUTION INTRAVENOUS AS NEEDED
Status: DISCONTINUED | OUTPATIENT
Start: 2017-05-13 | End: 2017-05-17 | Stop reason: HOSPADM

## 2017-05-13 RX ADMIN — CARVEDILOL 3.12 MG: 3.12 TABLET, FILM COATED ORAL at 10:20

## 2017-05-13 RX ADMIN — ISOSORBIDE DINITRATE 20 MG: 20 TABLET ORAL at 10:20

## 2017-05-13 RX ADMIN — HEPARIN SODIUM 5000 UNITS: 5000 INJECTION, SOLUTION INTRAVENOUS; SUBCUTANEOUS at 22:22

## 2017-05-13 RX ADMIN — DOCUSATE SODIUM 100 MG: 100 CAPSULE, LIQUID FILLED ORAL at 18:10

## 2017-05-13 RX ADMIN — ASPIRIN 81 MG CHEWABLE TABLET 81 MG: 81 TABLET CHEWABLE at 10:20

## 2017-05-13 RX ADMIN — DOCUSATE SODIUM 100 MG: 100 CAPSULE, LIQUID FILLED ORAL at 10:20

## 2017-05-13 RX ADMIN — ACETAMINOPHEN 325 MG: 325 TABLET, FILM COATED ORAL at 06:56

## 2017-05-13 RX ADMIN — ISOSORBIDE DINITRATE 20 MG: 20 TABLET ORAL at 18:10

## 2017-05-13 RX ADMIN — ACETAMINOPHEN 325 MG: 325 TABLET, FILM COATED ORAL at 22:22

## 2017-05-13 RX ADMIN — Medication 10 ML: at 15:00

## 2017-05-13 RX ADMIN — FUROSEMIDE 40 MG: 10 INJECTION, SOLUTION INTRAMUSCULAR; INTRAVENOUS at 10:21

## 2017-05-13 RX ADMIN — HEPARIN SODIUM 5000 UNITS: 5000 INJECTION, SOLUTION INTRAVENOUS; SUBCUTANEOUS at 07:05

## 2017-05-13 RX ADMIN — Medication 10 ML: at 22:23

## 2017-05-13 RX ADMIN — ISOSORBIDE DINITRATE 20 MG: 20 TABLET ORAL at 22:22

## 2017-05-13 RX ADMIN — CARVEDILOL 3.12 MG: 3.12 TABLET, FILM COATED ORAL at 18:10

## 2017-05-13 RX ADMIN — Medication 5 ML: at 07:05

## 2017-05-13 RX ADMIN — ALLOPURINOL 100 MG: 100 TABLET ORAL at 10:21

## 2017-05-13 RX ADMIN — SIMVASTATIN 20 MG: 20 TABLET, FILM COATED ORAL at 22:22

## 2017-05-13 RX ADMIN — FERROUS SULFATE TAB 325 MG (65 MG ELEMENTAL FE) 325 MG: 325 (65 FE) TAB at 10:21

## 2017-05-13 RX ADMIN — HEPARIN SODIUM 5000 UNITS: 5000 INJECTION, SOLUTION INTRAVENOUS; SUBCUTANEOUS at 13:00

## 2017-05-13 NOTE — PROGRESS NOTES
Bedside and Verbal shift change report given to Beryle Fortune, RN (oncoming nurse) by Leland Moreno RN (offgoing nurse). Report included the following information SBAR, Kardex, MAR and Recent Results. SITUATION:    Code Status: Full Code   Reason for Admission: Non-STEMI (non-ST elevated myocardial infarction) ([facility]   CHF exacerbation (HCC)   CHF (congestive heart failure) ([facility]    [facility] day: 1   Problem List:       Hospital Problems  Date Reviewed: 5/12/2017          Codes Class Noted POA    Non-STEMI (non-ST elevated myocardial infarction) ([facility] ICD-10-CM: I21.4  ICD-9-CM: 410.70  5/12/2017 Unknown        CHF exacerbation ([facility] ICD-10-CM: I50.9  ICD-9-CM: 428.0  5/12/2017 Unknown        Acute on chronic diastolic congestive heart failure ([facility] ICD-10-CM: I50.33  ICD-9-CM: 428.33, 428.0  5/12/2017 Unknown        Stage 4 chronic kidney disease ([facility] ICD-10-CM: N18.4  ICD-9-CM: 585.4  5/12/2017 Unknown        CHF (congestive heart failure) ([facility] ICD-10-CM: I50.9  ICD-9-CM: 428.0  5/12/2017 Unknown        ACS (acute coronary syndrome) ([facility] ICD-10-CM: I24.9  ICD-9-CM: 411.1  2/21/2016 Yes              BACKGROUND:    Past Medical History:   Past Medical History:   Diagnosis Date    Breast cancer ([facility] 1/17/14    right breast    Cardiac echocardiogram 02/17/2017    EF 50%. No WMA. Mod conc LVH. Indeterminate diastolic fx. Mod LAE. Mild MR. Mild AI. Mild PI. No signifciant valvular heart disease.  Cardiovascular lower extremity arterial testing 12/19/2014    Mod tibio-peroneal arterial disease bilaterally. R YANCY 1.24.  L YANCY 0.74. Bilateral sm vessel disease.  Carotid duplex 11/19/2014    Severe 70% or greater bilateral ICA stenosis. LICA dissection suggested.     Chronic renal insufficiency     CVA (cerebral vascular accident) ([address] 2003    with slight Right sided weakness    Edema     Glaucoma     Gout flare     Hyperlipidemia     Hypertension     Lump of right breast     URI (upper respiratory infection)          Patient taking anticoagulants Yes    ASSESSMENT:    Changes in Assessment Throughout Shift: N/A     Patient has Central Line: no Reasons if yes: N/A   Patient has Jang Cath: no Reasons if yes: N/A      Last Vitals:     Vitals:    05/13/17 0500 05/13/17 0815 05/13/17 1100 05/13/17 1608   BP:  153/68 128/48 153/71   Pulse:  73 67 68   Resp:  18 20 18   Temp:  99.1 °F (37.3 °C) 97.6 °F (36.4 °C) 98.9 °F (37.2 °C)   SpO2:  100% 100% 100%   Weight: 61.4 kg (135 lb 6.4 oz)      Height:            IV and DRAINS (will only show if present)   Peripheral IV 05/12/17 Left Antecubital-Site Assessment: Clean, dry, & intact     WOUND (if present)   Wound Type:  none   Dressing present Dressing Present : No   Wound Concerns/Notes:  none     PAIN    Pain Assessment    Pain Intensity 1: 0 (05/13/17 1640)    Pain Location 1: Leg, Foot    Pain Intervention(s) 1: Medication (see MAR)    Patient Stated Pain Goal: 0  o Interventions for Pain:  none  o Intervention effective: no and N/A  o Time of last intervention: N/A   o Reassessment Completed: no and N/A      Last 3 Weights:  Last 3 Recorded Weights in this Encounter    05/12/17 0841 05/13/17 0500   Weight: 59 kg (130 lb) 61.4 kg (135 lb 6.4 oz)     Weight change:      INTAKE/OUPUT    Current Shift: 05/13 0701 - 05/13 1900  In: 540 [P.O.:540]  Out: -     Last three shifts:       LAB RESULTS     Recent Labs      05/13/17   0330  05/12/17   1000   WBC  6.3  9.9   HGB  6.9*  8.5*   HCT  20.4*  26.3*   PLT  229  290        Recent Labs      05/13/17   0330  05/12/17   1000   NA  137  134*   K  4.5  5.1   GLU  89  122*   BUN  50*  47*   CREA  3.07*  2.95*   CA  8.4*  9.1       RECOMMENDATIONS AND DISCHARGE PLANNING     1. Pending tests/procedures/ Plan of Care or Other Needs:      2. Discharge plan for patient and Needs/Barriers:     3. Estimated Discharge Date: TBD Posted on Whiteboard in Patients Room: yes      4.  The patient's care plan was reviewed with the oncoming nurse. \"HEALS\" SAFETY CHECK      Fall Risk    Total Score: 3    Safety Measures: Safety Measures: Bed/Chair alarm on, Bed/Chair-Wheels locked, Bed in low position, Call light within reach, Side rails X 3, Gripper socks    A safety check occurred in the patient's room between off going nurse and oncoming nurse listed above. The safety check included the below items  Area Items   H  High Alert Medications - Verify all high alert medication drips (heparin, PCA, etc.)   E  Equipment - Suction is set up for ALL patients (with eric)  - Red plugs utilized for all equipment (IV pumps, etc.)  - WOWs wiped down at end of shift.  - Room stocked with oxygen, suction, and other unit-specific supplies   A  Alarms - Bed alarm is set for fall risk patients  - Ensure chair alarm is in place and activated if patient is up in a chair   L  Lines - Check IV for any infiltration  - Jang bag is empty if patient has a Jang   - Tubing and IV bags are labeled   S  Safety   - Room is clean, patient is clean, and equipment is clean. - Hallways are clear from equipment besides carts. - Fall bracelet on for fall risk patients  - Ensure room is clear and free of clutter  - Suction is set up for ALL patients (with eric)  - Hallways are clear from equipment besides carts.    - Isolation precautions followed, supplies available outside room, sign posted     Frandy Lorenz RN

## 2017-05-13 NOTE — PROGRESS NOTES
Hospitalist Progress Note    Patient: Darci Westfall MRN: [id number]  CSN: [id number]    YOB: 1928  Age: 80 y.o. Sex: female    DOA: 5/12/2017 LOS:  LOS: 1 day          Hb 6.9 this am.    Son visiting from [place]. Patient denies chest pain or shortness of breath. Assessment/Plan     1. Acute hypoxic respiratory failure, Sats 79% per EMS, improved with O2  2. Acute on chronic diastolic HF exacerbation. On 40mg PO daily of lasix as outpatient. Gentle diuresis, strict i/o  3. NSTEMI, plan for medical management given advanced age/frailty/co-morbidities. Has been maintained on BB, asa, statin and long-acting nitrates in outpatient setting. EKG unchanged from prior studies with non-specific ST abnormalities. 4. Historically low/normal LV function (50%) by echo (2/17/17), no WMAs, mild mitral regurge and aortic regurg. 5. CKD 4 - she is poor candidate for dialysis. 6. PAD, high grade bilat carotid artery stenosis  7. Anemia of chronic disease  8. HTN  9. Dyslipidemia on statin  10. CVA hx with residual R sided weakness on asa, statin. 11. 2ndary hyperparathyroidism of ckd  12. Advanced age - consult ST.   13. Full code. SNF resident. Palliative care consulted. Additional Notes:      Case discussed with:  [x]Patient  [x]Family  [x]Nursing  []Case Management  DVT Prophylaxis:  []Lovenox  []Hep SQ  []SCDs  []Coumadin   []On Heparin gtt    Vital signs/Intake and Output:  Visit Vitals    /48 (BP 1 Location: Left arm, BP Patient Position: At rest)    Pulse 67    Temp 97.6 °F (36.4 °C)    Resp 20    Ht 5' 6\" (1.676 m)    Wt 61.4 kg (135 lb 6.4 oz)    SpO2 100%    BMI 21.85 kg/m2     Current Shift:  05/13 0701 - 05/13 1900  In: 300 [P.O.:300]  Out: -   Last three shifts:       Awake alert and oriented x 1-2  Ncat. perrl  RRR  Decreased bs at bases  Soft nt nd nabs. No edema.    No focal deficit  No rash      Medications Reviewed      Labs: Results:       Chemistry Recent Labs 05/13/17   0330  05/12/17   1000   GLU  89  122*   NA  137  134*   K  4.5  5.1   CL  104  100   CO2  25  23   BUN  50*  47*   CREA  3.07*  2.95*   CA  8.4*  9.1   AGAP  8  11   BUCR  16  16      CBC w/Diff Recent Labs      05/13/17   0330  05/12/17   1000   WBC  6.3  9.9   RBC  2.23*  2.83*   HGB  6.9*  8.5*   HCT  20.4*  26.3*   PLT  229  290   GRANS   --   85*   LYMPH   --   9*   EOS   --   0      Cardiac Enzymes No results for input(s): CPK, CKND1, VIRGILIO in the last 72 hours. No lab exists for component: CKRMB, TROIP   Coagulation No results for input(s): PTP, INR, APTT in the last 72 hours. No lab exists for component: INREXT    Lipid Panel Lab Results   Component Value Date/Time    Cholesterol, total 148 02/25/2017 09:40 AM    HDL Cholesterol 52 02/25/2017 09:40 AM    LDL, calculated 82.2 02/25/2017 09:40 AM    VLDL, calculated 13.8 02/25/2017 09:40 AM    Triglyceride 69 02/25/2017 09:40 AM    CHOL/HDL Ratio 2.8 02/25/2017 09:40 AM      BNP No results for input(s): BNPP in the last 72 hours. Liver Enzymes No results for input(s): TP, ALB, TBIL, AP, SGOT, GPT in the last 72 hours.     No lab exists for component: DBIL   Thyroid Studies Lab Results   Component Value Date/Time    TSH 3.09 02/24/2017 11:54 PM        Procedures/imaging: see electronic medical records for all procedures/Xrays and details which were not copied into this note but were reviewed prior to creation of Plan

## 2017-05-13 NOTE — CONSULTS
Consult Note  Consult requested by: dr Neftali Rios is a 80 y.o. female [address] who is being seen on consult for crf  Chief Complaint   Patient presents with    Shortness of Breath     Admission diagnosis: <principal problem not specified>     HPI: 80 y o  [place] female ,admitted with sob ,elevated troponin. hx of crf stage 4 ,anemia. has no complaints now,flat in bed. hx of cad,cva  Past Medical History:   Diagnosis Date    Breast cancer ([facility] [address] 1/17/14    right breast    Cardiac echocardiogram 02/17/2017    EF 50%. No WMA. Mod conc LVH. Indeterminate diastolic fx. Mod LAE. Mild MR. Mild AI. Mild PI. No signifciant valvular heart disease.  Cardiovascular lower extremity arterial testing 12/19/2014    Mod tibio-peroneal arterial disease bilaterally. R YANCY 1.24.  L YANCY 0.74. Bilateral sm vessel disease.  Carotid duplex 11/19/2014    Severe 70% or greater bilateral ICA stenosis. LICA dissection suggested.  Chronic renal insufficiency     CVA (cerebral vascular accident) ([facility] [address] 2003    with slight Right sided weakness    Edema     Glaucoma     Gout flare     Hyperlipidemia     Hypertension     Lump of right breast     URI (upper respiratory infection)       Past Surgical History:   Procedure Laterality Date    HX APPENDECTOMY      HX CYST INCISION AND DRAINAGE      PT DOES NOT REMEMBER EXACT DATES, TUCKER BREAST DONE MORE THAN 20 YEARS AGO. BENIGN FINDINGS PER PATIENT.  HX GYN      JUSTIN/BSO    HX HYSTERECTOMY      HX MASTECTOMY  1/17/2014    RIGHT PARTIAL MASTECTOMY performed by Yamileth Mesa MD at [facility] Marital status:      Spouse name: N/A    Number of children: N/A    Years of education: N/A     Occupational History    Not on file.      Social History Main Topics    Smoking status: Never Smoker    Smokeless tobacco: Never Used    Alcohol use No    Drug use: No    Sexual activity: Not Currently     Other Topics Concern    Not on file     Social History Narrative       Family History   Problem Relation Age of Onset    Hypertension Mother     Hypertension Brother     Diabetes Brother      No Known Allergies     Home Medications:     Prior to Admission Medications   Prescriptions Last Dose Informant Patient Reported? Taking? Menthol-Zinc Oxide (RISAMINE) 0.44-20.6 % oint   Yes No   Sig: Apply  to affected area two (2) times a day. Indications: SKIN IRRITATION   acetaminophen (TYLENOL) 325 mg tablet   No No   Sig: Take 2 Tabs by mouth every four (4) hours as needed. albuterol (PROVENTIL HFA, VENTOLIN HFA, PROAIR HFA) 90 mcg/actuation inhaler   No No   Sig: Take 1-2 Puffs by inhalation every four (4) hours as needed for Wheezing. allopurinol (ZYLOPRIM) 100 mg tablet   No No   Sig: Take 1 Tab by mouth daily. amino acids-protein hydrolys (PRO-STAT AWC)  gram-kcal/30 mL liqd   Yes No   Sig: Take  by mouth two (2) times a day. ascorbic acid, vitamin C, (VITAMIN C) 250 mg tablet   Yes No   Sig: Take  by mouth. aspirin delayed-release 81 mg tablet   No No   Sig: Take 1 Tab by mouth daily. bisacodyl (DULCOLAX) 10 mg suppository   No No   Sig: Insert 10 mg into rectum as needed. cholecalciferol, VITAMIN D3, (VITAMIN D3) 5,000 unit tab tablet   No No   Sig: Take 1 Tab by mouth daily. cyanocobalamin (VITAMIN B-12) 1,000 mcg tablet   Yes No   Sig: Take 1,000 mcg by mouth daily. docusate sodium (COLACE) 100 mg capsule   No No   Sig: Take 1 Cap by mouth two (2) times a day for 90 days. ferrous sulfate 325 mg (65 mg iron) tablet   Yes No   Sig: Take 325 mg by mouth Daily (before breakfast). furosemide (LASIX) 40 mg tablet   No No   Sig: Take 1 Tab by mouth daily. guaiFENesin-dextromethorphan SR (HUMIBID DM) 600-30 mg per tablet   No No   Sig: Take 1 Tab by mouth two (2) times a day. inhalational spacing device   No No   Si Each by Does Not Apply route as needed. polyethylene glycol (MIRALAX) 17 gram packet   No No   Sig: Take 1 Packet by mouth daily. simvastatin (ZOCOR) 20 mg tablet   No No   Sig: Take 1 Tab by mouth nightly. therapeutic multivitamin (THERAGRAN) tablet   No No   Sig: Take 1 Tab by mouth daily. traMADol (ULTRAM) 50 mg tablet   No No   Sig: Take 1 Tab by mouth every six (6) hours as needed. Max Daily Amount: 200 mg. Facility-Administered Medications: None       Current Facility-Administered Medications   Medication Dose Route Frequency    aspirin chewable tablet 81 mg  81 mg Oral DAILY    acetaminophen (TYLENOL) tablet 325 mg  325 mg Oral Q4H PRN    allopurinol (ZYLOPRIM) tablet 100 mg  100 mg Oral DAILY    bisacodyl (DULCOLAX) suppository 10 mg  10 mg Rectal DAILY PRN    docusate sodium (COLACE) capsule 100 mg  100 mg Oral BID    ferrous sulfate tablet 325 mg  325 mg Oral ACB    furosemide (LASIX) injection 40 mg  40 mg IntraVENous Q12H    simvastatin (ZOCOR) tablet 20 mg  20 mg Oral QHS    carvedilol (COREG) tablet 3.125 mg  3.125 mg Oral BID WITH MEALS    isosorbide dinitrate (ISORDIL) tablet 20 mg  20 mg Oral TID    sodium chloride (NS) flush 5-10 mL  5-10 mL IntraVENous Q8H    sodium chloride (NS) flush 5-10 mL  5-10 mL IntraVENous PRN    heparin (porcine) injection 5,000 Units  5,000 Units SubCUTAneous Q8H       Review of Systems:   A comprehensive review of systems was negative except for that written in the HPI.   Data Review:    Labs: Results:       Chemistry Recent Labs      05/13/17   0330  05/12/17   1000   GLU  89  122*   NA  137  134*   K  4.5  5.1   CL  104  100   CO2  25  23   BUN  50*  47*   CREA  3.07*  2.95*   CA  8.4*  9.1   AGAP  8  11   BUCR  16  16      PTH  Lab Results   Component Value Date/Time    Calcium 8.4 05/13/2017 03:30 AM    Phosphorus 4.0 02/27/2017 03:36 AM    PTH, Intact 183.7 02/25/2017 05:57 PM      CBC w/Diff Recent Labs      05/13/17   0330  05/12/17   1000   WBC  6.3  9.9   RBC  2.23*  2.83* HGB  6.9*  8.5*   HCT  20.4*  26.3*   PLT  229  290   GRANS   --   85*   LYMPH   --   9*   EOS   --   0      Coagulation No results for input(s): PTP, INR, APTT in the last 72 hours. No lab exists for component: INREXT    Iron/Ferritin No results for input(s): IRON in the last 72 hours. No lab exists for component: TIBCCALC   BNP No results for input(s): BNPP in the last 72 hours. Cardiac Enzymes No results for input(s): CPK, CKND1, VIRGILIO in the last 72 hours. No lab exists for component: CKRMB, TROIP   Liver Enzymes No results for input(s): TP, ALB, TBIL, AP, SGOT, GPT in the last 72 hours. No lab exists for component: DBIL   Thyroid Studies Lab Results   Component Value Date/Time    TSH 3.09 02/24/2017 11:54 PM        Urinalysis Lab Results   Component Value Date/Time    Color YELLOW 02/23/2017 06:40 PM    Appearance CLEAR 02/23/2017 06:40 PM    Specific gravity 1.012 02/23/2017 06:40 PM    pH (UA) 7.5 02/23/2017 06:40 PM    Protein 300 02/23/2017 06:40 PM    Glucose NEGATIVE  02/23/2017 06:40 PM    Ketone NEGATIVE  02/23/2017 06:40 PM    Bilirubin NEGATIVE  02/23/2017 06:40 PM    Urobilinogen 1.0 02/23/2017 06:40 PM    Nitrites NEGATIVE  02/23/2017 06:40 PM    Leukocyte Esterase NEGATIVE  02/23/2017 06:40 PM    Epithelial cells 2+ 02/23/2017 06:40 PM    Bacteria See additional order 04/27/2015 08:51 AM    WBC 1 to 3 02/23/2017 06:40 PM    RBC 0 to 2 10/26/2016 10:59 AM         IMAGES:   XR Results (maximum last 3): Results from Hospital Encounter encounter on 05/12/17   XR CHEST PORT   Narrative Portable chest    History: Shortness of breath since last night. Comparison: Chest radiograph 2/25/2017    Findings: The cardiomediastinal silhouette is borderline considering technique. The  pulmonary vasculature is prominent. Calcified atherosclerotic plaque is present  in the aorta. Linear opacities are present throughout the lungs.  Opacities are  present at the lung bases with silhouetting of the medial right hemidiaphragm  and the left hemidiaphragm with meniscus at the left costophrenic angle. No  acute osseous abnormality. Degenerative changes are redemonstrated at the  shoulders and within the thoracic spine. Impression Impression:    Central vascular congestion with likely interstitial edema and pleural effusions  associated with basilar consolidations, likely compressive atelectasis. Thank you for this referral.      Results from [facility] encounter on 04/12/17   XR SWALLOW FUNC VIDEO   Narrative EXAM: Modified Barium Swallow    INDICATION: Dysphagia. History of aspiration. Stroke. TECHNIQUE: Modified barium swallow performed in conjunction with speech  pathology. COMPARISON: None    Fluoroscopy Time: 1 minute 12 seconds. 6 cine loops    FINDINGS:   Patient given multiple consistencies of barium including: Thin, nectar, paste,  paste with a cracker and thin with a pill. Penetration was noted with thin  consistency with a pill. No penetration or aspiration with other consistencies. Impression IMPRESSION:   1. Penetration noted with thin consistency when swallowing a pill. For further information, please review the speech pathologist's notes. Results from [facility] encounter on 03/16/17   XR SWALLOW FUNC VIDEO   Narrative Modified barium swallow    History: Dysphagia. Penetration on thin liquids and nectar on recent modified  barium swallow. Evaluate for improvement after speech therapy    Fluoroscopy time was 1 minute 54 seconds    TECHNIQUE: With the patient in the seated lateral position and in coordination  with speech pathology, liquid, solid, and semisolid barium was administered  orally and swallowing was observed. Fluoroscopic images were also obtained. COMPARISON: February 17 2017    Findings:     Penetration with thin liquid barium.  No aspiration or penetration with nectar or  puree consistency barium, barium with cracker, or barium tablet. Impression Impression:     No aspiration. Penetration with thin liquid barium. Please refer to speech therapist report for full details. CT Results (maximum last 3): [facility]   CT ABD PELV WO CONT   Narrative CT Of The Abdomen And Pelvis Without Contrast    CPT CODE 74745,63210    CLINICAL HISTORY: Chronic renal insufficiency, CVA and hypertension. Breast  cancer. Presenting with shortness of breath, and left flank pain. TECHNIQUE: 5 mm helical MDCT scan was obtained of the abdomen and pelvis. Sagittal and coronal images created from original data set. All CT scans at  this facility are performed using dose optimization techniques as appropriate to  a performed exam, to include automated exposure control, adjustment of the mA  and/or kV according to patient's size (including appropriate matching for site  specific examinations), or use of iterative reconstruction technique. COMPARISON: Portable chest x-ray today. FINDINGS:  view demonstrates normal bowel gas pattern. Dense barium in the  rectum. CT Of The Abdomen: Lung bases: Dense opacity basilar segments left lower lobe  medially. Hazy densities right lung base posteriorly. Marked calcifications of  the tracheobronchial tree. Small effusions layering posteriorly. Larger  subpulmonic component on the left than the right. Heart and pericardium: Cardiomegaly. Left ventricular dilatation. Mild coronary  artery calcifications. CHEST WALL: Coarse calcifications inferiorly at the right breast. No mass is  identified. Liver: No focal lesions. Artifact traversing the liver because the patient was  scanned with her arms at her sides. Spleen: Normal.    Gallbladder: Nondistended. No calcified stones. Pancreas: Normal.    Adrenal glands: Mild thickening bilaterally. Kidneys: No hydronephrosis or nephrolithiasis.  There is a hypodense lesion at  the interpolar region right kidney anteriorly measuring 22 mm and water  attenuation (39). Retroperitoneum: Moderate burden calcified plaque at aortoiliac vessels. No  lymphadenopathy. The bowel: Stomach and duodenum and small bowel are normal. Appendix not  identified but there is no inflammatory stranding around the base of the cecum. .    CT Of The Pelvis: Peritoneal space: No free fluid. GYN: Prior hysterectomy. No adnexal masses. Urinary bladder: Normal.    Bony skeleton: Multiple levels severe disc space narrowing at the lumbar spine. Multiple levels marked spinal stenosis due to ligamentous and facet hypertrophy,  disc disease. No acute bony abnormalities. .         Impression IMPRESSION:    No kidney stones or secondary signs of recently passed stone. Small effusions. Dependent densities at the lung bases, likely passive  atelectasis. Cannot exclude infection. Right renal cyst.    Significant degenerative changes of the lumbar spine. CT HEAD WO CONT   Narrative CT HEAD WITHOUT IV CONTRAST    INDICATION: Confusion. COMPARISON: CT head 2/21/2016. TECHNIQUE: Serial axial CT images were obtained from the skull vertex to foramen  magnum without IV contrast. All CT scans at this facility are performed using  dose optimization technique as appropriate to a performed exam, to include  automated exposure control, adjustment of the mA and/or kV according to  patient's size (including appropriate matching for site-specific examinations),  or use of iterative reconstruction technique. FINDINGS:  Evaluation mildly limited due to motion artifact. Visualized paranasal sinuses  are free from significant mucosal disease. . Moderate periventricular/cerebral  white matter hypoattenuation. Chronic lacunar infarct at the left basal ganglia. Chronic-appearing lacunar infarct in the right basal ganglia, but possibly new  from prior exam.No evidence for acute intracranial hemorrhage, acute infarct, or  mass lesion. No evidence for hydrocephalus. Cerebral atrophy noted. The  calvarium appears intact. Impression IMPRESSION:  No definite evidence for acute intracranial process. -CT is known to be relatively insensitive for acute ischemia and first 24-48  hours, and MRI may be helpful in clinically indicated. 2. Moderate white matter disease, presumed chronic ischemic. Chronic lacunar  infarcts at bilateral basal ganglia, interval new on the right since 2/2016. Results from [facility] encounter on 02/21/16   CT HEAD WO CONT   Addendum Addendum: Addendum: Additional images were reformatted from the original  images to see the hyperdensity better. With reformatting, the hyperdensity is  likely the result of streak artifact. Follow up as clinically indicated. Lesli Piper MD 2/21/2016  9:41 AM             Narrative EXAM: CT of the Head without contrast    INDICATION: Increasing unsteadiness and weakness especially on the left. TECHNIQUE: CT of the head from the vertex to the skull base performed. No IV  contrast administered. COMPARISON: None. FINDINGS: There is a rounded hyperdensity in the medulla which is seen on only  one slice. It measures 1.8 cm. Ventricles and sulci are symmetric but moderately  enlarged. There is a left caudate lacune. Mild to moderate periventricular and  subcortical white matter hypodensities are noted. No midline shift, mass effect  or mass lesion appreciated. The gray-white junction is preserved. No evidence  of an acute infarct identified. The mastoid air cells are well aerated. The  paranasal sinuses are unremarkable. The orbits are normal. The scalp and skull  are unremarkable. Impression IMPRESSION:  1. Rounded hyperdensity in the medulla. Correlation with MRI versus follow up  CT with and without IV contrast may be considered. It may be artifactual.           MRI Results (maximum last 3):     Results from [facility] Encounter encounter on 02/21/16 MRI BRAIN WO CONT   Narrative MR BRAIN WITHOUT CONTRAST    CPT CODE: 20777    HISTORY: Dizziness. COMPARISON: Head CT 2/21/2016. TECHNIQUE: Brain scanned with sagittal T1W scans, axial T2W scans, axial  diffusion weighted images. FINDINGS:      Diffusion-weighted sequences markedly limited by motion artifact. No convincing  areas of restricted diffusion. No foci of susceptibility change to suggest areas  of remote microhemorrhage or pathologic mineralization. Scattered foci of  increased T2/flair signal hyperintensity along the periventricular and  subcortical white matter is a nonspecific finding which is most commonly  encountered in the setting of chronic microvascular disease. Remote infarct left  lentiform nucleus to corona radiata. No mass effect. No evidence of extra axial  fluid collection. Flow voids are maintained in the major vessels at the base of  the skull and dural venous sinuses, suggesting patency. Midline structures to  include the skull base, pituitary, pineal region, corpus callosum and contents  of the posterior fossa are unremarkable. There is diffuse prominence of the  ventricular system, associated with proportional widening of the cortical  cerebral sulci, compatible with mild to moderate generalized volume loss. Impression IMPRESSION:    Within limitations of motion artifact on the diffusion-weighted images, no  evidence of an acute intracranial process. Mild to moderate generalized volume loss and moderate burden chronic  microvascular disease including remote lacunar infarct left basal ganglia to  corona radiata. Nuclear Medicine Results (maximum last 3): Results from [facility] encounter on 02/23/17   NM LUNG PERFUSION W VENT   Narrative Nuclear medicine pulmonary ventilation and perfusion scan:        INDICATION:    Productive cough. Recent pneumonia. Chest pain. Evaluation for possible pulmonary embolism.     TECHNIQUE:    Pulmonary ventilation scan has been performed when the patient inhaled 30.0 mCi  of 99M technetium DTPA in aerosolized form. Then with intravenous administration  of 5.9 mCi of 99M technetium macroaggregated albumin in left forearm vein,  pulmonary perfusion scan has been performed with standard imaging protocol. Correlation is being made with portable chest x-ray on 02/25/2017. FINDINGS:    On pulmonary ventilation scan there are moderate impaired ventilation in both  lungs but more so in right lung with deposition of tracer particles in central  airways. There appears to be ventilation defect involving right lower lobe and  left lower lobe. On the pulmonary perfusion scan there is perfusion defect involving right lower  lobe. There is also perfusion defect involving left lower lobe. In rest of both  lungs there is no additional segmental or larger perfusion defect. On the portable chest x-ray there are findings of moderate pneumonic  infiltrates, and/or atelectatic changes in left lower lung field. In the medial  portion of right lower lung field there are also mild atelectatic changes,  and/or infiltrates. On the CT scan of abdomen on 02/23/2017 there are evidence  of left pleural effusion in left lower hemithorax with atelectasis/infiltrates  in left lower lobe. There was smaller right pleural effusion with  atelectasis/infiltrates in right lower lobe. The mass, the findings are consistent with triple match with perfusion defect,  ventilation defect and radiographic abnormality in lower lobes of both lungs. With these findings probability  pulmonary embolism is indeterminate or  intermediate. Impression IMPRESSION:    Probability of pulmonary embolism is intermediate, or possibly intermediate. US Results (maximum last 3):     Results from [facility] encounter on 08/06/12   US BREAST RT   Narrative Bilateral DIAGNOSTIC MAMMOGRAM  Right BREAST ULTRASOUND    HISTORY: Palpable lump right breast. Prior benign breast biopsies. COMPARISON: None. FINDINGS:    Digital mammograms were performed. There are scattered bilateral fibroglandular densities. A marker was placed on  the patient's skin within the medial right breast near the 3:00 position in the  area of the palpable abnormality. Adjacent to the marker there is an  ill-defined 2.1 cm x 1.3 cm x 1.7 cm mass corresponding with the palpable  abnormality. There are bilateral benign-appearing coarse calcifications. No  suspicious masses or calcifications are present in the left breast.    Subsequently sonography was performed of the right breast 3:00 position 4 cm  from the nipple in the area of the mammographic mass. This was performed by  both myself and the ultrasound technologist. This demonstrates a 1.7 x 1.4 x  1.5 cm rounded solid micro lobulated ill-defined mass. Sonography was also  performed by both myself and the technologist of the right axilla demonstrating  no adenopathy. BIRADS: 5: Highly Suspicious Of Malignancy: Appropriate Action Should Be Taken. Impression IMPRESSION:    Recommend referral to surgeon and ultrasound-guided biopsy of the highly  suspicious mass within the medial right breast 3:00 position. This was  discussed with the patient and the patient met with nurse navigator, Michael Dewitt, prior to leaving the department. She will contact the patient's physician  and coordinate followup appointment. DEXA Results (maximum last 3): No results found for this or any previous visit. ENRRIQUE Results (maximum last 3): Results from [place] [facility] encounter on 02/12/15   ENRRIQUE 3D SELAM W MAMMO RT DX DIGTL   Addendum Technique descriptions are in error (I used a template and forgot to edit  out the necessary technique descriptions). The following is the correct  technique descriptions. There are no implants and the images are not \"implant  displaced. \" - Technique:  2D/3D combined mammographic evaluation is performed with spot magnification views. CAD (R2 alia. 9.4) analysis is performed. Any area  marked are correlated with the mammogram.       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 Comparison:  8/6/12, 8/12/14. Technique:  2D digital mammograms are performed with CC and MLO views obtained  of the right breasts along with digital tomosynthesis (with breast implant  displacement only - no native tomosynthesis images with implants) and additional  spot compression views. CAD analysis  performed with the computer-aided  detection system. Any areas marked are correlated with the mammogram.    Breast Composition:  Dense heterogeneously. Findings: The right breast medial aspect focal area of architectural distortion  is again noted, related to prior surgery. No new suspicious focal area of  architectural distortion is appreciated. No dominant mass or suspicious  microcalcification cluster is detected. There are stable coarse calcifications  in the right breast as well. No significant overall interval changes are  appreciated. The digital breast tomosynthesis images demonstrate no suspicious  findings. Impression Impression:    Probably benign mammogram.  Recommend bilateral mammograms in 6 months to return  to screening schedule. BI-RADS Assessment Category 3. Probably benign. Results from [facility] encounter on 08/12/14   ENRRIQUE MAMMO BI DX DIGTL   Narrative DIGITAL BILATERAL DIAGNOSTIC MAMMOGRAM with CAD     CPT CODE:  and 70576    HISTORY: History of right breast carcinoma treated with partial mastectomy  2012., Previous benign left breast biopsy    COMPARISON: Mammogram August 6, 2012.     TECHNIQUE: Computer assisted detection, iCAD Second Look 7. 2- H was utilized. CC  and MLO views obtained of both breasts. Additional true lateral views were  obtained of both breasts. Raised skin surface lesions are marked with a  circular marker. Linear marker(s) placed on raised skin surface scar(s). BBs  are placed on the nipples. FINDINGS:     There are scattered areas of fibroglandular density. The right breast is slightly smaller than the left breast. There is mild  anterior skin thickening. There is distortion with mild skin retraction along  the medial inferior border of the nipple at the site of previous partial  mastectomy   No new focal mass is seen. There are no suspicious calcifications. Impression IMPRESSION:    Expected mild postsurgical changes right breast. No suspicious finding. Annual mammography recommended. BIRADS 2: Benign finding(s)        Results from Hospital Encounter encounter on 08/06/12   ENRRIQUE MAMMO BI DX DIGTL   Narrative Bilateral DIAGNOSTIC MAMMOGRAM  Right BREAST ULTRASOUND    HISTORY: Palpable lump right breast. Prior benign breast biopsies. COMPARISON: None. FINDINGS:    Digital mammograms were performed. There are scattered bilateral fibroglandular densities. A marker was placed on  the patient's skin within the medial right breast near the 3:00 position in the  area of the palpable abnormality. Adjacent to the marker there is an  ill-defined 2.1 cm x 1.3 cm x 1.7 cm mass corresponding with the palpable  abnormality. There are bilateral benign-appearing coarse calcifications. No  suspicious masses or calcifications are present in the left breast.    Subsequently sonography was performed of the right breast 3:00 position 4 cm  from the nipple in the area of the mammographic mass. This was performed by  both myself and the ultrasound technologist. This demonstrates a 1.7 x 1.4 x  1.5 cm rounded solid micro lobulated ill-defined mass.  Sonography was also  performed by both myself and the technologist of the right axilla demonstrating  no adenopathy. BIRADS: 5: Highly Suspicious Of Malignancy: Appropriate Action Should Be Taken. Impression IMPRESSION:    Recommend referral to surgeon and ultrasound-guided biopsy of the highly  suspicious mass within the medial right breast 3:00 position. This was  discussed with the patient and the patient met with nurse navigator, Alberta Hercules, prior to leaving the department. She will contact the patient's physician  and coordinate followup appointment. IR Results (maximum last 3): No results found for this or any previous visit. VAS/US Results (maximum last 3): Results from Hospital Encounter encounter on 12/19/14   LOWER EXT ART PVR MULT LEVEL SEG PRESSURES  Results from Hospital Encounter encounter on 11/19/14   DUPLEX CAROTID BILATERAL    PET Results (maximum last 3): No results found for this or any previous visit. No results found for this or any previous visit. @LASTPROCAMB([id number])    CULTURE:   )No results for input(s): SDES, CULT in the last 72 hours. No results for input(s): CULT in the last 72 hours. Physical Assessment:     Visit Vitals    /48 (BP 1 Location: Left arm, BP Patient Position: At rest)    Pulse 67    Temp 97.6 °F (36.4 °C)    Resp 20    Ht 5' 6\" (1.676 m)    Wt 61.4 kg (135 lb 6.4 oz)    SpO2 100%    BMI 21.85 kg/m2     Last 3 Recorded Weights in this Encounter    05/12/17 0841 05/13/17 0500   Weight: 59 kg (130 lb) 61.4 kg (135 lb 6.4 oz)       Intake/Output Summary (Last 24 hours) at 05/13/17 1314  Last data filed at 05/13/17 0917   Gross per 24 hour   Intake              300 ml   Output                0 ml   Net              300 ml       Physial Exam:  General appearance: alert, cooperative, no distress  Skin: normal coloration and turgor, no rashes, no suspicious skin lesions noted. HEENT: Head; normocephalic, atraumatic. ALIDA. ENT- ENT exam normal, no neck nodes or sinus tenderness.    Lungs: clear to auscultation bilaterally  Heart: regular rate and rhythm, S1, S2 normal, no murmur, click, rub or gallop  Abdomen: soft, non-tender. Bowel sounds normal. No masses,  no organomegaly  Extremities: extremities normal, atraumatic, no cyanosis or edema    PLAN / RECOMMENDATION:    crf stage 4,reviewed medical records and labs,discussed with son and his wife. pt and family are refusing dialysis,they want conservative therapy  Anemia,workup last admission is not c/w with multiple myeloma. start epo. transfuse prn  Clinically,she doesn,t look fluid overloaded,will not increase diuretics  Adjust meds for renal failure  Sec hyperparathyroidism     Thank you for the consultation to participate in patient's care. I have personally discussed my plan with the referring physician.      Linnea Oconnor MD  May 13, 2017

## 2017-05-13 NOTE — CONSULTS
[facility] - Consult Note    Consultation request by Pacheco Colunga MD for advice/opinion related to evaluating Non-STEMI (non-ST elevated myocardial infarction) [facility])  CHF exacerbation ([facility]  CHF (congestive heart failure) ([facility]    Date of  Admission: 5/12/2017  8:46 AM   Primary Care Physician:  Erika Perez MD     Assessment:     Patient Active Problem List   Diagnosis Code    Hyperlipidemia E78.5    Glaucoma H40.9    CVA (cerebral vascular accident) ([facility] I63.9    Cerebral thrombosis with cerebral infarction ([facility] I63.30    Long term (current) use of anticoagulants Z79.01    Gout attack M10.9    Chronic renal failure N18.9    Leg pain M79.606    Lump of right breast N63    Breast mass in female N58    Breast cancer ([facility] C50.919    Cellulitis L03.90    Psoriasis L40.9    CRF (chronic renal failure) N18.9    Carotid stenosis I65.29    Fatigue R53.83    Hypertensive renal sclerosis with hypertension I12.9    Primary osteoarthritis involving multiple joints M15.0    ACS (acute coronary syndrome) (HCC) I24.9    NSTEMI (non-ST elevated myocardial infarction) ([facility] P57.6    Diastolic CHF, acute on chronic (HCC) I50.33    Chest pain R07.9    Altered mental state R41.82    Acute coronary syndromes (HCC) I24.9    Acute diastolic heart failure (HCC) I50.31    Non-STEMI (non-ST elevated myocardial infarction) (HCC) I21.4    CHF exacerbation (HCC) I50.9    Acute on chronic diastolic congestive heart failure (HCC) I50.33    Stage 4 chronic kidney disease (HCC) N18.4    CHF (congestive heart failure) (HCC) I50.9     -Acute hypoxic respiratory failure, Sats 79% per EMS, improved with O2  -Acute on chronic diastolic HF exacerbation, BNP >70k, has had significantly elevated BNPs in the past with HF exacerbations as well as chronic kidney disease. CXR consistent with heart failure given pulmonary vascular congestions and pleural effusions. On 40mg PO daily of lasix as outpatient. -NSTEMI, plan for medical management given advanced age/frailty/co-morbidities. Has been maintained on BB, asa, statin and long-acting nitrates in outpatient setting. EKG unchanged from prior studies with non-specific ST abnormalities.  -Historically low/normal LV function (50%) by echo (2/17/17), no WMAs, mild mitral regurge and aortic regurg.  -CKD, stage IV, advised to follow up with nephrology for possible dialysis initiation   -PVD, high grade bilat carotid artery stenosis  -Anemia of chronic disease  -HTN  -HLD  -CVA, remote, some residual R sided weakness  -GOUT  -Advanced age  -Full code  -SNF resident     Plan:     -Given 80mg IV lasix in ER. Unclear how much output she has had since she is incontinent. Would recommend ecsalante catheter. Given advanced kidney disease would recommend nephrology consult to assess use of diuretics vs dialysis for volume management. Monitor I&O's closely.   -Will continue with asa, statin, BB, and long-acting nitrates. History of Present Illness: This is a 80 y.o. female admitted for Non-STEMI (non-ST elevated myocardial infarction) ([facility]  CHF exacerbation (HCC)  CHF (congestive heart failure) ([facility] Patient admitted for shortness of breath and chest discomfort. Referred to [facility] ER from SNF. Pt found to be hypoxic by EMS in 80% range upon arrival. Pt is pleasantly confused. Oriented to self only. Therefore, history is unreliable from patient. No family present during my evaluation. Pt states, \"I feel much better today. \" Pt is well known to our service. She has a history of diastolic heart failure, PVD, and carotid artery disease. She has chronic shortness of breath and intermittent chest discomfort. ALthough due to frailty/co-morbidities she has not had any further work up for regarding possible coronary disease. She has been medically managed with BB, asa, statin and long-acting nitrate therapy.  There was discussion with son at last office visit in February of adding Ranexa therapy to regimen if her chest discomfort was to continue to worsen. Cardiac risk factors:  HTN  HLD  CHF    Review of Symptoms:  Except as stated above include:  Constitutional:  Negative for fevers/chills  Respiratory:  Positive for sob and cough  Cardiovascular:  Negative for chest pains  Gastrointestinal: negative for abd pain, nausea or vomiting  Genitourinary:  Negative for hematuria  Musculoskeletal:  Negative for muscle aches  Neurological:  Negative for dizziness  Dermatological:  Negative for itching  Endocrinological: Negative  Psychological:  Negative for anxiety, positive for confusion     Past Medical History:     Past Medical History:   Diagnosis Date    Breast cancer ([facility] 1/17/14    right breast    Cardiac echocardiogram 02/17/2017    EF 50%. No WMA. Mod conc LVH. Indeterminate diastolic fx. Mod LAE. Mild MR. Mild AI. Mild PI. No signifciant valvular heart disease.  Cardiovascular lower extremity arterial testing 12/19/2014    Mod tibio-peroneal arterial disease bilaterally. R YANCY 1.24.  L YANCY 0.74. Bilateral sm vessel disease.  Carotid duplex 11/19/2014    Severe 70% or greater bilateral ICA stenosis. LICA dissection suggested.     Chronic renal insufficiency     CVA (cerebral vascular accident) ([facility] 2003    with slight Right sided weakness    Edema     Glaucoma     Gout flare     Hyperlipidemia     Hypertension     Lump of right breast     URI (upper respiratory infection)          Social History:     Social History     Social History    Marital status:      Spouse name: N/A    Number of children: N/A    Years of education: N/A     Social History Main Topics    Smoking status: Never Smoker    Smokeless tobacco: Never Used    Alcohol use No    Drug use: No    Sexual activity: Not Currently     Other Topics Concern    None     Social History Narrative        Family History:     Family History   Problem Relation Age of Onset    Hypertension Mother     Hypertension Brother     Diabetes Brother         Medications:   No Known Allergies     Current Facility-Administered Medications   Medication Dose Route Frequency    aspirin chewable tablet 81 mg  81 mg Oral DAILY    acetaminophen (TYLENOL) tablet 325 mg  325 mg Oral Q4H PRN    allopurinol (ZYLOPRIM) tablet 100 mg  100 mg Oral DAILY    bisacodyl (DULCOLAX) suppository 10 mg  10 mg Rectal DAILY PRN    docusate sodium (COLACE) capsule 100 mg  100 mg Oral BID    ferrous sulfate tablet 325 mg  325 mg Oral ACB    furosemide (LASIX) injection 40 mg  40 mg IntraVENous Q12H    simvastatin (ZOCOR) tablet 20 mg  20 mg Oral QHS    carvedilol (COREG) tablet 3.125 mg  3.125 mg Oral BID WITH MEALS    isosorbide dinitrate (ISORDIL) tablet 20 mg  20 mg Oral TID    sodium chloride (NS) flush 5-10 mL  5-10 mL IntraVENous Q8H    sodium chloride (NS) flush 5-10 mL  5-10 mL IntraVENous PRN    heparin (porcine) injection 5,000 Units  5,000 Units SubCUTAneous Q8H         Physical Exam:     Visit Vitals    /68 (BP 1 Location: Left arm, BP Patient Position: At rest)    Pulse 73    Temp 99.1 °F (37.3 °C)    Resp 18    Ht 5' 6\" (1.676 m)    Wt 61.4 kg (135 lb 6.4 oz)    SpO2 100%    BMI 21.85 kg/m2     BP Readings from Last 3 Encounters:   05/13/17 153/68   03/07/17 150/60   02/28/17 164/55     Pulse Readings from Last 3 Encounters:   05/13/17 73   03/07/17 74   02/28/17 65     Wt Readings from Last 3 Encounters:   05/13/17 61.4 kg (135 lb 6.4 oz)   03/07/17 61.2 kg (135 lb)   02/28/17 64.6 kg (142 lb 6.6 oz)       General:  Awake and alert, oriented to self only, no family at present  Neck:  {supple without jvd  Lungs:  bilat wheezes, diminished bases, no crackles or rales  Heart:  Reg rate and rhythm, no murmur appreciated  Abdomen:  Soft, non-tender  Extremities:  Atraumatic without edema  Skin: no rashes  Neuro: no focal deficits appreciated  Psych: normal mood and affect     Data Review:     Recent Labs      05/13/17   0330  05/12/17   1000   WBC  6.3  9.9   HGB  6.9*  8.5*   HCT  20.4*  26.3*   PLT  229  290     Recent Labs      05/13/17   0330  05/12/17   1000   NA  137  134*   K  4.5  5.1   CL  104  100   CO2  25  23   GLU  89  122*   BUN  50*  47*   CREA  3.07*  2.95*   CA  8.4*  9.1       Results for orders placed or performed during the hospital encounter of 05/12/17   EKG, 12 LEAD, INITIAL   Result Value Ref Range    Ventricular Rate 100 BPM    Atrial Rate 100 BPM    P-R Interval 156 ms    QRS Duration 116 ms    Q-T Interval 346 ms    QTC Calculation (Bezet) 446 ms    Calculated P Axis 68 degrees    Calculated R Axis -41 degrees    Calculated T Axis 108 degrees    Diagnosis       Normal sinus rhythm  Possible Left atrial enlargement  Left axis deviation  Left ventricular hypertrophy with QRS widening and repolarization abnormality  Abnormal ECG  When compared with ECG of 23-FEB-2017 18:02,  No significant change was found  Confirmed by Brian Ann MD ([id number]) on 5/12/2017 9:55:57 AM     Results for orders placed or performed in visit on 03/07/17   AMB POC EKG ROUTINE W/ 12 LEADS, INTER & REP    Narrative    EKG: unchanged from previous tracings, normal sinus rhythm, nonspecific ST and T waves changes, left axis deviation, mild IVCD       All Cardiac Markers in the last 24 hours:    Lab Results   Component Value Date/Time    TROIQ 1.68 (HH) 05/12/2017 12:25 PM    TROIQ 0.51 (H) 05/12/2017 10:00 AM       Last Lipid:    Lab Results   Component Value Date/Time    Cholesterol, total 148 02/25/2017 09:40 AM    HDL Cholesterol 52 02/25/2017 09:40 AM    LDL, calculated 82.2 02/25/2017 09:40 AM    Triglyceride 69 02/25/2017 09:40 AM    CHOL/HDL Ratio 2.8 02/25/2017 09:40 AM       Signed By: TRACIE Loredo     May 13, 2017

## 2017-05-14 LAB
ANION GAP BLD CALC-SCNC: 10 MMOL/L (ref 3–18)
BUN SERPL-MCNC: 50 MG/DL (ref 7–18)
BUN/CREAT SERPL: 17 (ref 12–20)
CALCIUM SERPL-MCNC: 8.3 MG/DL (ref 8.5–10.1)
CHLORIDE SERPL-SCNC: 103 MMOL/L (ref 100–108)
CO2 SERPL-SCNC: 25 MMOL/L (ref 21–32)
CREAT SERPL-MCNC: 2.92 MG/DL (ref 0.6–1.3)
ERYTHROCYTE [DISTWIDTH] IN BLOOD BY AUTOMATED COUNT: 15.3 % (ref 11.6–14.5)
GLUCOSE SERPL-MCNC: 89 MG/DL (ref 74–99)
HCT VFR BLD AUTO: 20.5 % (ref 35–45)
HGB BLD-MCNC: 6.7 G/DL (ref 12–16)
MCH RBC QN AUTO: 30.2 PG (ref 24–34)
MCHC RBC AUTO-ENTMCNC: 32.7 G/DL (ref 31–37)
MCV RBC AUTO: 92.3 FL (ref 74–97)
PLATELET # BLD AUTO: 249 K/UL (ref 135–420)
PMV BLD AUTO: 9.6 FL (ref 9.2–11.8)
POTASSIUM SERPL-SCNC: 4.6 MMOL/L (ref 3.5–5.5)
RBC # BLD AUTO: 2.22 M/UL (ref 4.2–5.3)
SODIUM SERPL-SCNC: 138 MMOL/L (ref 136–145)
WBC # BLD AUTO: 6.4 K/UL (ref 4.6–13.2)

## 2017-05-14 PROCEDURE — 74011250637 HC RX REV CODE- 250/637: Performed by: INTERNAL MEDICINE

## 2017-05-14 PROCEDURE — 74011250637 HC RX REV CODE- 250/637: Performed by: EMERGENCY MEDICINE

## 2017-05-14 PROCEDURE — 74011250636 HC RX REV CODE- 250/636: Performed by: INTERNAL MEDICINE

## 2017-05-14 PROCEDURE — 36430 TRANSFUSION BLD/BLD COMPNT: CPT

## 2017-05-14 PROCEDURE — 30233N1 TRANSFUSION OF NONAUTOLOGOUS RED BLOOD CELLS INTO PERIPHERAL VEIN, PERCUTANEOUS APPROACH: ICD-10-PCS | Performed by: INTERNAL MEDICINE

## 2017-05-14 PROCEDURE — 36415 COLL VENOUS BLD VENIPUNCTURE: CPT | Performed by: INTERNAL MEDICINE

## 2017-05-14 PROCEDURE — 65660000000 HC RM CCU STEPDOWN

## 2017-05-14 PROCEDURE — 80048 BASIC METABOLIC PNL TOTAL CA: CPT | Performed by: INTERNAL MEDICINE

## 2017-05-14 PROCEDURE — P9016 RBC LEUKOCYTES REDUCED: HCPCS | Performed by: HOSPITALIST

## 2017-05-14 PROCEDURE — 74011250637 HC RX REV CODE- 250/637: Performed by: PHYSICIAN ASSISTANT

## 2017-05-14 PROCEDURE — 74011250636 HC RX REV CODE- 250/636: Performed by: FAMILY MEDICINE

## 2017-05-14 PROCEDURE — 74011000250 HC RX REV CODE- 250

## 2017-05-14 PROCEDURE — 77010033678 HC OXYGEN DAILY

## 2017-05-14 PROCEDURE — 85027 COMPLETE CBC AUTOMATED: CPT | Performed by: INTERNAL MEDICINE

## 2017-05-14 RX ORDER — FUROSEMIDE 10 MG/ML
INJECTION INTRAMUSCULAR; INTRAVENOUS
Status: DISPENSED
Start: 2017-05-14 | End: 2017-05-15

## 2017-05-14 RX ORDER — IPRATROPIUM BROMIDE AND ALBUTEROL SULFATE 2.5; .5 MG/3ML; MG/3ML
SOLUTION RESPIRATORY (INHALATION)
Status: COMPLETED
Start: 2017-05-14 | End: 2017-05-14

## 2017-05-14 RX ORDER — CARVEDILOL 6.25 MG/1
6.25 TABLET ORAL 2 TIMES DAILY WITH MEALS
Status: DISCONTINUED | OUTPATIENT
Start: 2017-05-14 | End: 2017-05-17 | Stop reason: HOSPADM

## 2017-05-14 RX ORDER — FUROSEMIDE 10 MG/ML
20 INJECTION INTRAMUSCULAR; INTRAVENOUS ONCE
Status: COMPLETED | OUTPATIENT
Start: 2017-05-14 | End: 2017-05-14

## 2017-05-14 RX ORDER — HYDRALAZINE HYDROCHLORIDE 20 MG/ML
10 INJECTION INTRAMUSCULAR; INTRAVENOUS
Status: DISCONTINUED | OUTPATIENT
Start: 2017-05-14 | End: 2017-05-17 | Stop reason: HOSPADM

## 2017-05-14 RX ORDER — CARVEDILOL 3.12 MG/1
3.12 TABLET ORAL ONCE
Status: COMPLETED | OUTPATIENT
Start: 2017-05-14 | End: 2017-05-14

## 2017-05-14 RX ADMIN — HYDRALAZINE HYDROCHLORIDE 10 MG: 20 INJECTION INTRAMUSCULAR; INTRAVENOUS at 21:16

## 2017-05-14 RX ADMIN — ALLOPURINOL 50 MG: 100 TABLET ORAL at 09:13

## 2017-05-14 RX ADMIN — ISOSORBIDE DINITRATE 20 MG: 20 TABLET ORAL at 21:33

## 2017-05-14 RX ADMIN — HEPARIN SODIUM 5000 UNITS: 5000 INJECTION, SOLUTION INTRAVENOUS; SUBCUTANEOUS at 21:24

## 2017-05-14 RX ADMIN — Medication 10 ML: at 22:07

## 2017-05-14 RX ADMIN — Medication 10 ML: at 21:30

## 2017-05-14 RX ADMIN — CARVEDILOL 3.12 MG: 3.12 TABLET, FILM COATED ORAL at 09:13

## 2017-05-14 RX ADMIN — DOCUSATE SODIUM 100 MG: 100 CAPSULE, LIQUID FILLED ORAL at 18:58

## 2017-05-14 RX ADMIN — SIMVASTATIN 20 MG: 20 TABLET, FILM COATED ORAL at 21:23

## 2017-05-14 RX ADMIN — ISOSORBIDE DINITRATE 20 MG: 20 TABLET ORAL at 16:14

## 2017-05-14 RX ADMIN — FERROUS SULFATE TAB 325 MG (65 MG ELEMENTAL FE) 325 MG: 325 (65 FE) TAB at 09:13

## 2017-05-14 RX ADMIN — IPRATROPIUM BROMIDE AND ALBUTEROL SULFATE 3 ML: .5; 3 SOLUTION RESPIRATORY (INHALATION) at 22:04

## 2017-05-14 RX ADMIN — ISOSORBIDE DINITRATE 20 MG: 20 TABLET ORAL at 09:14

## 2017-05-14 RX ADMIN — DOCUSATE SODIUM 100 MG: 100 CAPSULE, LIQUID FILLED ORAL at 09:13

## 2017-05-14 RX ADMIN — HEPARIN SODIUM 5000 UNITS: 5000 INJECTION, SOLUTION INTRAVENOUS; SUBCUTANEOUS at 06:09

## 2017-05-14 RX ADMIN — HEPARIN SODIUM 5000 UNITS: 5000 INJECTION, SOLUTION INTRAVENOUS; SUBCUTANEOUS at 13:17

## 2017-05-14 RX ADMIN — ASPIRIN 81 MG CHEWABLE TABLET 81 MG: 81 TABLET CHEWABLE at 09:13

## 2017-05-14 RX ADMIN — FUROSEMIDE 40 MG: 10 INJECTION, SOLUTION INTRAMUSCULAR; INTRAVENOUS at 09:21

## 2017-05-14 RX ADMIN — CARVEDILOL 3.12 MG: 3.12 TABLET, FILM COATED ORAL at 13:05

## 2017-05-14 RX ADMIN — Medication 10 ML: at 09:21

## 2017-05-14 RX ADMIN — FUROSEMIDE 20 MG: 10 INJECTION, SOLUTION INTRAVENOUS at 22:42

## 2017-05-14 RX ADMIN — CARVEDILOL 6.25 MG: 6.25 TABLET, FILM COATED ORAL at 17:00

## 2017-05-14 RX ADMIN — Medication 10 ML: at 13:10

## 2017-05-14 NOTE — PROGRESS NOTES
[facility]  -  Progress Note      Patient: Monica Anton MRN: [id number]  SSN: [id number]    YOB: 1928  Age: 80 y.o. Sex: female      Admit Date: 5/12/2017    Hospital Problem List:     Hospital Problems  Date Reviewed: 5/12/2017          Codes Class Noted POA    Non-STEMI (non-ST elevated myocardial infarction) ([facility] ICD-10-CM: I21.4  ICD-9-CM: 410.70  5/12/2017 Unknown        CHF exacerbation ([facility] ICD-10-CM: I50.9  ICD-9-CM: 428.0  5/12/2017 Unknown        Acute on chronic diastolic congestive heart failure ([facility] ICD-10-CM: I50.33  ICD-9-CM: 428.33, 428.0  5/12/2017 Unknown        Stage 4 chronic kidney disease ([facility] ICD-10-CM: N18.4  ICD-9-CM: 585.4  5/12/2017 Unknown        CHF (congestive heart failure) ([facility] ICD-10-CM: I50.9  ICD-9-CM: 428.0  5/12/2017 Unknown        ACS (acute coronary syndrome) ([facility] ICD-10-CM: I24.9  ICD-9-CM: 411.1  2/21/2016 Yes          -Acute hypoxic respiratory failure, Sats 79% per EMS, improved with O2  -Acute on chronic diastolic HF exacerbation, BNP >70k, has had significantly elevated BNPs in the past with HF exacerbations as well as chronic kidney disease. CXR consistent with heart failure given pulmonary vascular congestions and pleural effusions. On 40mg PO daily of lasix as outpatient. -NSTEMI, plan for medical management given advanced age/frailty/co-morbidities. Has been maintained on BB, asa, statin and long-acting nitrates in outpatient setting.  EKG unchanged from prior studies with non-specific ST abnormalities.  -Historically low/normal LV function (50%) by echo (2/17/17), no WMAs, mild mitral regurge and aortic regurg.  -CKD, stage IV, advised to follow up with nephrology for possible dialysis initiation   -PVD, high grade bilat carotid artery stenosis  -Anemia of chronic disease  -HTN  -HLD  -CVA, remote, some residual R sided weakness  -GOUT  -Advanced age  -Full code  -SNF resident    Plan:     -Increasing Coreg as BP remains elevated. Giving one time dose of 3.125mg now. Increasing BID dosing to 6.25mg.  -Hgb concerning given presumed CAD. Being transfused 2 units of PRBCs today. -Purwick placed this morning. Discussed importance of strict I&O's. Patient has put out about 450cc this morning. Will continue IV lasix today. Most likely switch to PO tomorrow. Subjective:     Resting comfortably. Pleasantly confused.      Objective:      Patient Vitals for the past 8 hrs:   Temp Pulse Resp BP SpO2   05/14/17 1011 99.5 °F (37.5 °C) 85 20 172/65 100 %   05/14/17 0713 98.6 °F (37 °C) 62 18 170/56 100 %   05/14/17 0653 98 °F (36.7 °C) 68 20 178/64 100 %   05/14/17 0332 98.9 °F (37.2 °C) 68 16 167/65 100 %         Patient Vitals for the past 96 hrs:   Weight   05/14/17 0332 62.4 kg (137 lb 8 oz)   05/13/17 0500 61.4 kg (135 lb 6.4 oz)   05/12/17 0841 59 kg (130 lb)         Intake/Output Summary (Last 24 hours) at 05/14/17 1035  Last data filed at 05/14/17 0947   Gross per 24 hour   Intake              480 ml   Output              300 ml   Net              180 ml       Physical Exam:  General:  Awake, confused, pleasant and talkative  Neck:  Supple, no jvd  Lungs:  Diminished breath sounds, no appreciation of crackles or rales  Heart:  Reg rate and rhythm  Abdomen:  Soft, non-tender  Extremities: no edema, atraumatic    Data Review:     Labs: Results:       Chemistry Recent Labs      05/14/17   0300  05/13/17   0330  05/12/17   1000   GLU  89  89  122*   NA  138  137  134*   K  4.6  4.5  5.1   CL  103  104  100   CO2  25  25  23   BUN  50*  50*  47*   CREA  2.92*  3.07*  2.95*   CA  8.3*  8.4*  9.1   AGAP  10  8  11   BUCR  17  16  16      CBC w/Diff Recent Labs      05/14/17   0300  05/13/17   0330  05/12/17   1000   WBC  6.4  6.3  9.9   RBC  2.22*  2.23*  2.83*   HGB  6.7*  6.9*  8.5*   HCT  20.5*  20.4*  26.3*   PLT  249  229  290   GRANS   --    --   85*   LYMPH   --    --   9*   EOS   --    --   0      Cardiac Enzymes No results found for: CPK, CKMMB, CKMB, RCK3, CKMBT, CKNDX, CKND1, VIRGILIO, TROPT, TROIQ, ELIZABETH, TROPT, TNIPOC, BNP, BNPP   Coagulation No results for input(s): PTP, INR, APTT in the last 72 hours. No lab exists for component: INREXT    Lipid Panel Lab Results   Component Value Date/Time    Cholesterol, total 148 02/25/2017 09:40 AM    HDL Cholesterol 52 02/25/2017 09:40 AM    LDL, calculated 82.2 02/25/2017 09:40 AM    VLDL, calculated 13.8 02/25/2017 09:40 AM    Triglyceride 69 02/25/2017 09:40 AM    CHOL/HDL Ratio 2.8 02/25/2017 09:40 AM      BNP No results found for: BNP, BNPP, XBNPT   Liver Enzymes No results for input(s): TP, ALB, TBIL, AP, SGOT, GPT in the last 72 hours.     No lab exists for component: DBIL   Digoxin    Thyroid Studies Lab Results   Component Value Date/Time    TSH 3.09 02/24/2017 11:54 PM            Signed By: TRACIE Crum     May 14, 2017

## 2017-05-14 NOTE — PROGRESS NOTES
Patient is not available to be assessed at this time.       [address] [facility].   [phone]

## 2017-05-14 NOTE — ROUTINE PROCESS
[id number] Patient's daughter called for consent for blood transfusion. Consent verified by Forest Johnson. Patient quietly resting, VS recorded.

## 2017-05-14 NOTE — PROGRESS NOTES
Bedside and Verbal shift change report given to Tillie Skiff., RN (oncoming nurse) by Adrianne Palacios RN (offgoing nurse). Report included the following information SBAR, Kardex and MAR.

## 2017-05-14 NOTE — PROGRESS NOTES
Bedside and Verbal shift change report given to Laura Siu, JOAQUIM (oncoming nurse) by Sherrie Guevara RN (offgoing nurse). Report included the following information SBAR, Kardex, MAR and Recent Results. SITUATION:    Code Status: Full Code  Reason for Admission: Non-STEMI (non-ST elevated myocardial infarction) ([facility]  CHF exacerbation ([facility]   CHF (congestive heart failure) ([facility]    [facility] day: 2   Problem List:       Hospital Problems  Date Reviewed: 5/12/2017          Codes Class Noted POA    Non-STEMI (non-ST elevated myocardial infarction) ([facility] ICD-10-CM: I21.4  ICD-9-CM: 410.70  5/12/2017 Unknown        CHF exacerbation ([facility] ICD-10-CM: I50.9  ICD-9-CM: 428.0  5/12/2017 Unknown        Acute on chronic diastolic congestive heart failure ([facility] ICD-10-CM: I50.33  ICD-9-CM: 428.33, 428.0  5/12/2017 Unknown        Stage 4 chronic kidney disease ([facility] ICD-10-CM: N18.4  ICD-9-CM: 585.4  5/12/2017 Unknown        CHF (congestive heart failure) ([facility] ICD-10-CM: I50.9  ICD-9-CM: 428.0  5/12/2017 Unknown        ACS (acute coronary syndrome) ([facility] ICD-10-CM: I24.9  ICD-9-CM: 411.1  2/21/2016 Yes              BACKGROUND:    Past Medical History:   Past Medical History:   Diagnosis Date    Breast cancer ([facility] 1/17/14    right breast    Cardiac echocardiogram 02/17/2017    EF 50%. No WMA. Mod conc LVH. Indeterminate diastolic fx. Mod LAE. Mild MR. Mild AI. Mild PI. No signifciant valvular heart disease.  Cardiovascular lower extremity arterial testing 12/19/2014    Mod tibio-peroneal arterial disease bilaterally. R YANCY 1.24.  L YANCY 0.74. Bilateral sm vessel disease.  Carotid duplex 11/19/2014    Severe 70% or greater bilateral ICA stenosis. LICA dissection suggested.     Chronic renal insufficiency     CVA (cerebral vascular accident) ([facility] 2003    with slight Right sided weakness    Edema     Glaucoma     Gout flare     Hyperlipidemia     Hypertension     Lump of right breast     URI (upper respiratory infection)          Patient taking anticoagulants Yes    ASSESSMENT:    Changes in Assessment Throughout Shift: N/A     Patient has Central Line: no Reasons if yes: N/A   Patient has Jang Cath: no Reasons if yes: N/A      Last Vitals:     Vitals:    05/13/17 2038 05/14/17 0332 05/14/17 0653 05/14/17 0713   BP: 153/63 167/65 178/64 170/56   Pulse: 74 68 68 62   Resp: 16 16 20 18   Temp: 99.2 °F (37.3 °C) 98.9 °F (37.2 °C) 98 °F (36.7 °C) 98.6 °F (37 °C)   SpO2: 100% 100% 100% 100%   Weight:  62.4 kg (137 lb 8 oz)     Height:            IV and DRAINS (will only show if present)   Peripheral IV 05/12/17 Left Antecubital-Site Assessment: Clean, dry, & intact     WOUND (if present)   Wound Type:  none   Dressing present Dressing Present : No   Wound Concerns/Notes:  none     PAIN    Pain Assessment    Pain Intensity 1: 0 (05/14/17 0336)    Pain Location 1: Leg, Foot    Pain Intervention(s) 1: Medication (see MAR)    Patient Stated Pain Goal: 0  o Interventions for Pain:  none  o Intervention effective: no and N/A  o Time of last intervention: N/A   o Reassessment Completed: no and N/A      Last 3 Weights:  Last 3 Recorded Weights in this Encounter    05/12/17 0841 05/13/17 0500 05/14/17 0332   Weight: 59 kg (130 lb) 61.4 kg (135 lb 6.4 oz) 62.4 kg (137 lb 8 oz)     Weight change: 3.402 kg (7 lb 8 oz)     INTAKE/OUPUT    Current Shift:      Last three shifts: 05/12 1901 - 05/14 0700  In: 540 [P.O.:540]  Out: 300 [Urine:300]     LAB RESULTS     Recent Labs      05/14/17   0300  05/13/17   0330 05/12/17   1000   WBC  6.4  6.3  9.9   HGB  6.7*  6.9*  8.5*   HCT  20.5*  20.4*  26.3*   PLT  249  229  290        Recent Labs      05/14/17   0300  05/13/17   0330  05/12/17   1000   NA  138  137  134*   K  4.6  4.5  5.1   GLU  89  89  122*   BUN  50*  50*  47*   CREA  2.92*  3.07*  2.95*   CA  8.3*  8.4*  9.1       RECOMMENDATIONS AND DISCHARGE PLANNING     1.  Pending tests/procedures/ Plan of Care or Other Needs: 2. Discharge plan for patient and Needs/Barriers:     3. Estimated Discharge Date: TBD Posted on Whiteboard in Patients Room: yes      4. The patient's care plan was reviewed with the oncoming nurse. \"HEALS\" SAFETY CHECK      Fall Risk    Total Score: 4    Safety Measures: Safety Measures: Bed/Chair-Wheels locked, Bed in low position, Call light within reach, Fall prevention (comment)    A safety check occurred in the patient's room between off going nurse and oncoming nurse listed above. The safety check included the below items  Area Items   H  High Alert Medications - Verify all high alert medication drips (heparin, PCA, etc.)   E  Equipment - Suction is set up for ALL patients (with eric)  - Red plugs utilized for all equipment (IV pumps, etc.)  - WOWs wiped down at end of shift.  - Room stocked with oxygen, suction, and other unit-specific supplies   A  Alarms - Bed alarm is set for fall risk patients  - Ensure chair alarm is in place and activated if patient is up in a chair   L  Lines - Check IV for any infiltration  - Jang bag is empty if patient has a Jang   - Tubing and IV bags are labeled   S  Safety   - Room is clean, patient is clean, and equipment is clean. - Hallways are clear from equipment besides carts. - Fall bracelet on for fall risk patients  - Ensure room is clear and free of clutter  - Suction is set up for ALL patients (with eric)  - Hallways are clear from equipment besides carts.    - Isolation precautions followed, supplies available outside room, sign posted     Sia Johnson RN

## 2017-05-14 NOTE — PROGRESS NOTES
RENAL DAILY PROGRESS NOTE    Patient: Sudha Orourke               Sex: female          DOA: 5/12/2017  8:46 AM        YOB: 1928      Age:  80 y.o.        LOS:  LOS: 2 days     Subjective: Sudha Orourke is a 80 y.o.  who presents with Non-STEMI (non-ST elevated myocardial infarction) ([facility]  CHF exacerbation (HCC)  CHF (congestive heart failure) ([facility] Asked to evaluate for crf stage 4.hx of htn, chf,anemia.   Chief complains: Patient denies nausea, vomiting, chest pain, dizziness, or headache.  - Reviewed last 24 hrs events     Current Facility-Administered Medications   Medication Dose Route Frequency    carvedilol (COREG) tablet 6.25 mg  6.25 mg Oral BID WITH MEALS    allopurinol (ZYLOPRIM) tablet 50 mg  50 mg Oral DAILY    furosemide (LASIX) injection 40 mg  40 mg IntraVENous DAILY    epoetin daysi (EPOGEN;PROCRIT) injection 6,000 Units  6,000 Units SubCUTAneous DIALYSIS TUE, THU & SAT    0.9% sodium chloride infusion 250 mL  250 mL IntraVENous PRN    aspirin chewable tablet 81 mg  81 mg Oral DAILY    acetaminophen (TYLENOL) tablet 325 mg  325 mg Oral Q4H PRN    bisacodyl (DULCOLAX) suppository 10 mg  10 mg Rectal DAILY PRN    docusate sodium (COLACE) capsule 100 mg  100 mg Oral BID    ferrous sulfate tablet 325 mg  325 mg Oral ACB    simvastatin (ZOCOR) tablet 20 mg  20 mg Oral QHS    isosorbide dinitrate (ISORDIL) tablet 20 mg  20 mg Oral TID    sodium chloride (NS) flush 5-10 mL  5-10 mL IntraVENous Q8H    sodium chloride (NS) flush 5-10 mL  5-10 mL IntraVENous PRN    heparin (porcine) injection 5,000 Units  5,000 Units SubCUTAneous Q8H       Objective:     Visit Vitals    /65 (BP 1 Location: Left arm, BP Patient Position: At rest)    Pulse 85    Temp 99.5 °F (37.5 °C)    Resp 20    Ht 5' 6\" (1.676 m)    Wt 62.4 kg (137 lb 8 oz)    SpO2 98%    BMI 22.19 kg/m2       Intake/Output Summary (Last 24 hours) at 05/14/17 5553  Last data filed at 05/14/17 1039   Gross per 24 hour   Intake              240 ml   Output              450 ml   Net             -210 ml       Physical Examination:     GEN: AAO X 3, NAD  RS: Chest is bilateral equal, no wheezing / rales / crackles  CVS: S1-S2 heard, RRR, No S3 / murmur  Abdomen: Soft, Non tender, Not distended, Positive bowel sounds, no organomegaly, no CVA / supra pubic tenderness  Extremities: No edema, no cyanosis, skin is warm on touch  CNS: Awake & follows commands, CN II-XII are grossly intact. HEENT: Head is atraumatic, PERRLA, conjunctiva pink & non icteric. No JVD or carotid bruit   Psychiatric: Normal mood, affect, judgement & memory    Musculoskeletal: No gross joints or bone deformities   Lymph Node: No palpable cervical, axillary or groin lymphadenopathy. Data Review:      Labs:     Hematology: Recent Labs      05/14/17   0300  05/13/17   0330  05/12/17   1000   WBC  6.4  6.3  9.9   HGB  6.7*  6.9*  8.5*   HCT  20.5*  20.4*  26.3*     Chemistry: Recent Labs      05/14/17   0300  05/13/17   0330  05/12/17   1000   BUN  50*  50*  47*   CREA  2.92*  3.07*  2.95*   CA  8.3*  8.4*  9.1   K  4.6  4.5  5.1   NA  138  137  134*   CL  103  104  100   CO2  25  25  23   GLU  89  89  122*        Images:    XR (Most Recent). CXR reviewed by me and compared with previous CXR   Results from Hospital Encounter encounter on 05/12/17   XR CHEST PORT   Narrative Portable chest    History: Shortness of breath since last night. Comparison: Chest radiograph 2/25/2017    Findings: The cardiomediastinal silhouette is borderline considering technique. The  pulmonary vasculature is prominent. Calcified atherosclerotic plaque is present  in the aorta. Linear opacities are present throughout the lungs. Opacities are  present at the lung bases with silhouetting of the medial right hemidiaphragm  and the left hemidiaphragm with meniscus at the left costophrenic angle. No  acute osseous abnormality.  Degenerative changes are redemonstrated at the  shoulders and within the thoracic spine. Impression Impression:    Central vascular congestion with likely interstitial edema and pleural effusions  associated with basilar consolidations, likely compressive atelectasis. Thank you for this referral.         CT (Most Recent)   Results from [place] encounter on 02/23/17   CT ABD PELV WO CONT   Narrative CT Of The Abdomen And Pelvis Without Contrast    CPT CODE 49077,13714    CLINICAL HISTORY: Chronic renal insufficiency, CVA and hypertension. Breast  cancer. Presenting with shortness of breath, and left flank pain. TECHNIQUE: 5 mm helical MDCT scan was obtained of the abdomen and pelvis. Sagittal and coronal images created from original data set. All CT scans at  this facility are performed using dose optimization techniques as appropriate to  a performed exam, to include automated exposure control, adjustment of the mA  and/or kV according to patient's size (including appropriate matching for site  specific examinations), or use of iterative reconstruction technique. COMPARISON: Portable chest x-ray today. FINDINGS:  view demonstrates normal bowel gas pattern. Dense barium in the  rectum. CT Of The Abdomen: Lung bases: Dense opacity basilar segments left lower lobe  medially. Hazy densities right lung base posteriorly. Marked calcifications of  the tracheobronchial tree. Small effusions layering posteriorly. Larger  subpulmonic component on the left than the right. Heart and pericardium: Cardiomegaly. Left ventricular dilatation. Mild coronary  artery calcifications. CHEST WALL: Coarse calcifications inferiorly at the right breast. No mass is  identified. Liver: No focal lesions. Artifact traversing the liver because the patient was  scanned with her arms at her sides. Spleen: Normal.    Gallbladder: Nondistended. No calcified stones.     Pancreas: Normal.    Adrenal glands: Mild thickening bilaterally. Kidneys: No hydronephrosis or nephrolithiasis. There is a hypodense lesion at  the interpolar region right kidney anteriorly measuring 22 mm and water  attenuation (39). Retroperitoneum: Moderate burden calcified plaque at aortoiliac vessels. No  lymphadenopathy. The bowel: Stomach and duodenum and small bowel are normal. Appendix not  identified but there is no inflammatory stranding around the base of the cecum. .    CT Of The Pelvis: Peritoneal space: No free fluid. GYN: Prior hysterectomy. No adnexal masses. Urinary bladder: Normal.    Bony skeleton: Multiple levels severe disc space narrowing at the lumbar spine. Multiple levels marked spinal stenosis due to ligamentous and facet hypertrophy,  disc disease. No acute bony abnormalities. .         Impression IMPRESSION:    No kidney stones or secondary signs of recently passed stone. Small effusions. Dependent densities at the lung bases, likely passive  atelectasis. Cannot exclude infection. Right renal cyst.    Significant degenerative changes of the lumbar spine. EKG Results for orders placed or performed in visit on 03/07/17   AMB POC EKG ROUTINE W/ 12 LEADS, INTER & REP     Status: None    Narrative    EKG: unchanged from previous tracings, normal sinus rhythm, nonspecific ST and T waves changes, left axis deviation, mild IVCD        I have personally reviewed the old medical records and patient's labs    Plan / Recommendation:      1. crf stage 4,discussed with pt and her son. they want conservative treatment. no dialysis  2.anemia,nl spep,upep,light chains ratio is nl.receiving transfusion  3. chf ,compensated,on lasix    D/w Dr. Erika Clancy MD  Nephrology  5/14/2017

## 2017-05-15 LAB
ANION GAP BLD CALC-SCNC: 9 MMOL/L (ref 3–18)
BASOPHILS # BLD AUTO: 0 K/UL (ref 0–0.1)
BASOPHILS # BLD: 0 % (ref 0–2)
BUN SERPL-MCNC: 46 MG/DL (ref 7–18)
BUN/CREAT SERPL: 18 (ref 12–20)
CALCIUM SERPL-MCNC: 9.1 MG/DL (ref 8.5–10.1)
CHLORIDE SERPL-SCNC: 104 MMOL/L (ref 100–108)
CO2 SERPL-SCNC: 24 MMOL/L (ref 21–32)
CREAT SERPL-MCNC: 2.62 MG/DL (ref 0.6–1.3)
DIFFERENTIAL METHOD BLD: ABNORMAL
EOSINOPHIL # BLD: 0 K/UL (ref 0–0.4)
EOSINOPHIL NFR BLD: 0 % (ref 0–5)
ERYTHROCYTE [DISTWIDTH] IN BLOOD BY AUTOMATED COUNT: 15.5 % (ref 11.6–14.5)
GLUCOSE SERPL-MCNC: 104 MG/DL (ref 74–99)
HCT VFR BLD AUTO: 32.6 % (ref 35–45)
HGB BLD-MCNC: 11.1 G/DL (ref 12–16)
LYMPHOCYTES # BLD AUTO: 17 % (ref 21–52)
LYMPHOCYTES # BLD: 1.5 K/UL (ref 0.9–3.6)
MAGNESIUM SERPL-MCNC: 2.2 MG/DL (ref 1.6–2.6)
MCH RBC QN AUTO: 30.6 PG (ref 24–34)
MCHC RBC AUTO-ENTMCNC: 34 G/DL (ref 31–37)
MCV RBC AUTO: 89.8 FL (ref 74–97)
MONOCYTES # BLD: 0.6 K/UL (ref 0.05–1.2)
MONOCYTES NFR BLD AUTO: 7 % (ref 3–10)
NEUTS SEG # BLD: 6.7 K/UL (ref 1.8–8)
NEUTS SEG NFR BLD AUTO: 76 % (ref 40–73)
PLATELET # BLD AUTO: 264 K/UL (ref 135–420)
PMV BLD AUTO: 10.1 FL (ref 9.2–11.8)
POTASSIUM SERPL-SCNC: 4.7 MMOL/L (ref 3.5–5.5)
RBC # BLD AUTO: 3.63 M/UL (ref 4.2–5.3)
SODIUM SERPL-SCNC: 137 MMOL/L (ref 136–145)
WBC # BLD AUTO: 8.9 K/UL (ref 4.6–13.2)

## 2017-05-15 PROCEDURE — 74011250637 HC RX REV CODE- 250/637: Performed by: INTERNAL MEDICINE

## 2017-05-15 PROCEDURE — 80048 BASIC METABOLIC PNL TOTAL CA: CPT | Performed by: HOSPITALIST

## 2017-05-15 PROCEDURE — 74011250637 HC RX REV CODE- 250/637: Performed by: EMERGENCY MEDICINE

## 2017-05-15 PROCEDURE — 76450000000

## 2017-05-15 PROCEDURE — 85025 COMPLETE CBC W/AUTO DIFF WBC: CPT | Performed by: HOSPITALIST

## 2017-05-15 PROCEDURE — 74011250636 HC RX REV CODE- 250/636: Performed by: INTERNAL MEDICINE

## 2017-05-15 PROCEDURE — A6209 FOAM DRSG <=16 SQ IN W/O BDR: HCPCS

## 2017-05-15 PROCEDURE — 83735 ASSAY OF MAGNESIUM: CPT | Performed by: HOSPITALIST

## 2017-05-15 PROCEDURE — 74011250637 HC RX REV CODE- 250/637: Performed by: PHYSICIAN ASSISTANT

## 2017-05-15 PROCEDURE — 65660000000 HC RM CCU STEPDOWN

## 2017-05-15 PROCEDURE — 36415 COLL VENOUS BLD VENIPUNCTURE: CPT | Performed by: HOSPITALIST

## 2017-05-15 PROCEDURE — 92610 EVALUATE SWALLOWING FUNCTION: CPT

## 2017-05-15 RX ORDER — FUROSEMIDE 40 MG/1
40 TABLET ORAL DAILY
Status: DISCONTINUED | OUTPATIENT
Start: 2017-05-16 | End: 2017-05-15

## 2017-05-15 RX ORDER — FUROSEMIDE 40 MG/1
40 TABLET ORAL DAILY
Status: DISCONTINUED | OUTPATIENT
Start: 2017-05-15 | End: 2017-05-17 | Stop reason: HOSPADM

## 2017-05-15 RX ADMIN — Medication 10 ML: at 17:54

## 2017-05-15 RX ADMIN — HEPARIN SODIUM 5000 UNITS: 5000 INJECTION, SOLUTION INTRAVENOUS; SUBCUTANEOUS at 06:04

## 2017-05-15 RX ADMIN — DOCUSATE SODIUM 100 MG: 100 CAPSULE, LIQUID FILLED ORAL at 17:54

## 2017-05-15 RX ADMIN — Medication 10 ML: at 22:31

## 2017-05-15 RX ADMIN — Medication 10 ML: at 10:08

## 2017-05-15 RX ADMIN — ALLOPURINOL 50 MG: 100 TABLET ORAL at 10:06

## 2017-05-15 RX ADMIN — ISOSORBIDE DINITRATE 20 MG: 20 TABLET ORAL at 22:28

## 2017-05-15 RX ADMIN — ASPIRIN 81 MG CHEWABLE TABLET 81 MG: 81 TABLET CHEWABLE at 09:49

## 2017-05-15 RX ADMIN — HEPARIN SODIUM 5000 UNITS: 5000 INJECTION, SOLUTION INTRAVENOUS; SUBCUTANEOUS at 12:31

## 2017-05-15 RX ADMIN — SIMVASTATIN 20 MG: 20 TABLET, FILM COATED ORAL at 22:28

## 2017-05-15 RX ADMIN — CARVEDILOL 6.25 MG: 6.25 TABLET, FILM COATED ORAL at 09:48

## 2017-05-15 RX ADMIN — CARVEDILOL 6.25 MG: 6.25 TABLET, FILM COATED ORAL at 17:54

## 2017-05-15 RX ADMIN — HEPARIN SODIUM 5000 UNITS: 5000 INJECTION, SOLUTION INTRAVENOUS; SUBCUTANEOUS at 22:30

## 2017-05-15 RX ADMIN — ISOSORBIDE DINITRATE 20 MG: 20 TABLET ORAL at 17:53

## 2017-05-15 RX ADMIN — FUROSEMIDE 40 MG: 40 TABLET ORAL at 12:24

## 2017-05-15 RX ADMIN — FERROUS SULFATE TAB 325 MG (65 MG ELEMENTAL FE) 325 MG: 325 (65 FE) TAB at 09:49

## 2017-05-15 RX ADMIN — ISOSORBIDE DINITRATE 20 MG: 20 TABLET ORAL at 09:48

## 2017-05-15 NOTE — CDMP QUERY
The diagnosis of acute respiratory failure has been documented for your patient. Currently, the documentation does not meet criteria for this diagnosis, and may be challenged by an external reviewer. Please remember to include the clinical indicators to support this diagnosis. Current Documentation:     hypoxic at 79%   RA  placed on NRFM   with improvement, changed to 2L  NC  ER MD notes say;  Elderly and frail, no acute distress   Pulmonary/Chest: Effort normal. She has wheezes (mild, bilateral )  No ABG's  done. REFERENCE:  Hypoxemic respiratory failure is characterized by a PaO2 less than 60 mmHg (or 10 mmHg below COPD patients baseline) and a normal or low arterial PaCO2. Hypercapnic respiratory failure is characterized by a PaCO2 higher than 50 mmHg (or 10 mmHG above COPD patients baseline).     Acute Respiratory Failure indicators include:   - Respirations <12 or >25   - Air hunger   - Use of accessory muscles of respiration   - Inability to speak in full sentences   - Cyanosis     AND    - Pulse ox <90% RA or <95% on O2   - pH <7.35 or >7.45   - pO2 < 60 mm Hg (or 10mm below COPD patient's baseline)   - pCO2 >50mm Hg (or 10mm above COPD patient's baseline)       Thank you,   Cady Oshea  RN, BSN, CCDS

## 2017-05-15 NOTE — PROGRESS NOTES
Consult noted and appreciated. Chart reviewed. Palliative Medicine had been consulted on previous admission but patient was tired and did not want to speak when we visited. Today patient lying in the bed on back with eyes open. Alert and verbally responsive to voice and touch. Skin warm and dry. Color good. Respirations even and unlabored with O2 via n/c. Nurse in to administer medication. Spoke with patient who verbalizes she had a \"bad time last night\". She voiced that she thought she was \"going to meet her [employer] and [employer]" and that her son was here helping her get through the situation and she felt like a new woman this morning and stated \"but I am ready\". When asked what she was ready for, she said \"for my Lord to come and get me, now I don't want to go, but if it's my time I am ready\". Mrs. Silver Bahena oriented to self and place. Voiced it was 19. .something and could not name the president of the [place]. Denies pain or discomfort at this time. Call made to patient's daughter and [employer] Uriarte who is currently at work. Spoke briefly about my conversation with Mrs. Silver Bahena regarding \"being ready for her Lord\" in relation to Code Status. Michelle voiced that she had spoken to her mother before regarding her wishes and at that time she wanted all measures to prolong life. Discussed her Advance Directive that is on file and need for further discussion with Mrs. Silver Bahena to see if her wishes had changed. Michelle voiced that she worked until 3:30pm today but would be up here to talk to her mother about Code Status wishes and I should call her back about that time. Will continue to follow for support and assistance in decision making.

## 2017-05-15 NOTE — NURSE NAVIGATOR
Cardiac Nurse Navigator Initial Note       Date of  Admission: 5/12/2017  8:46 AM   Hospital Day: 3    Admission type:Emergency      Patient Active Problem List    Diagnosis Date Noted    Non-STEMI (non-ST elevated [facility] infarction) ([facility] 05/12/2017    CHF exacerbation ([address] 05/12/2017    Acute on chronic diastolic congestive heart failure ([address] 05/12/2017    Stage 4 chronic kidney disease ([facility] 05/12/2017    CHF (congestive heart failure) ([facility] 05/12/2017    Acute diastolic heart failure ([address] 02/24/2017    Altered mental state 02/23/2017    Acute coronary syndromes ([facility] 02/23/2017    Chest pain 54/31/3301    Diastolic CHF, acute on chronic ([facility] 10/24/2016    NSTEMI (non-ST elevated myocardial infarction) ([facility] 10/22/2016    ACS (acute coronary syndrome) ([facility] 02/21/2016    Primary osteoarthritis involving multiple joints 01/18/2016    Hypertensive renal sclerosis with hypertension 05/04/2015    Fatigue 03/27/2015    Carotid stenosis 11/20/2014    CRF (chronic renal failure) 08/05/2014    Cellulitis 03/12/2014    Psoriasis 03/12/2014    Breast cancer ([facility] 02/18/2014    Breast mass in female 06/25/2013    Lump of right breast     Chronic renal failure 07/18/2012    Leg pain 07/18/2012    Gout attack 10/28/2011    Cerebral thrombosis with cerebral infarction ([facility] 09/22/2011    Long term (current) use of anticoagulants 09/22/2011    Hyperlipidemia     Glaucoma     CVA (cerebral vascular accident) ([facility]       Malvin Aquino MD        Past Medical History:   Diagnosis Date    [facility] cancer ([facility] 1/17/14    right breast    Cardiac echocardiogram 02/17/2017    EF 50%. No WMA. Mod conc LVH. Indeterminate diastolic fx. Mod LAE. Mild MR. Mild AI. Mild PI. No signifciant valvular heart disease.  Cardiovascular lower extremity arterial testing 12/19/2014    Mod tibio-peroneal arterial disease bilaterally. R YANCY 1.24.  L YANCY 0.74. Bilateral sm vessel disease.     Carotid duplex 11/19/2014    Severe 70% or greater bilateral ICA stenosis. LICA dissection suggested.  Chronic renal insufficiency     CVA (cerebral vascular accident) ([facility] 2003    with slight Right sided weakness    Edema     Glaucoma     Gout flare     Hyperlipidemia     Hypertension     Lump of right breast     URI (upper respiratory infection)       Past Surgical History:   Procedure Laterality Date    HX APPENDECTOMY      HX CYST INCISION AND DRAINAGE      PT DOES NOT REMEMBER EXACT DATES, TUCKER BREAST DONE MORE THAN 20 YEARS AGO. BENIGN FINDINGS PER PATIENT.     HX GYN      JUSTIN/BSO    HX HYSTERECTOMY      HX MASTECTOMY  1/17/2014    RIGHT PARTIAL MASTECTOMY performed by Martha Miller MD at [facility] MAIN OR     Current Facility-Administered Medications   Medication Dose Route Frequency    [START ON 5/16/2017] epoetin daysi (EPOGEN;PROCRIT) injection 6,000 Units  6,000 Units SubCUTAneous Q TUE, THU & SAT    furosemide (LASIX) tablet 40 mg  40 mg Oral DAILY    carvedilol (COREG) tablet 6.25 mg  6.25 mg Oral BID WITH MEALS    hydrALAZINE (APRESOLINE) 20 mg/mL injection 10 mg  10 mg IntraVENous Q6H PRN    allopurinol (ZYLOPRIM) tablet 50 mg  50 mg Oral DAILY    0.9% sodium chloride infusion 250 mL  250 mL IntraVENous PRN    aspirin chewable tablet 81 mg  81 mg Oral DAILY    acetaminophen (TYLENOL) tablet 325 mg  325 mg Oral Q4H PRN    bisacodyl (DULCOLAX) suppository 10 mg  10 mg Rectal DAILY PRN    docusate sodium (COLACE) capsule 100 mg  100 mg Oral BID    ferrous sulfate tablet 325 mg  325 mg Oral ACB    simvastatin (ZOCOR) tablet 20 mg  20 mg Oral QHS    isosorbide dinitrate (ISORDIL) tablet 20 mg  20 mg Oral TID    sodium chloride (NS) flush 5-10 mL  5-10 mL IntraVENous Q8H    sodium chloride (NS) flush 5-10 mL  5-10 mL IntraVENous PRN    heparin (porcine) injection 5,000 Units  5,000 Units SubCUTAneous Q8H        Prior to admission patient presented with SOB    Patient observed resting in bed with granddaughter at bedside. Cardiographics  Patient on Telemetry: Cardiac/Telemetry Monitor On: Yes   Cardiac Rhythm (only for patients on telemetry): Cardiac Rhythm: Normal sinus rhythm    Echocardiogram:  []  None ordered    [] Results Pending       Results:     EF: 50% 2/2017          Labs:   Recent Results (from the past 24 hour(s))   METABOLIC PANEL, BASIC    Collection Time: 05/15/17  4:10 AM   Result Value Ref Range    Sodium 137 136 - 145 mmol/L    Potassium 4.7 3.5 - 5.5 mmol/L    Chloride 104 100 - 108 mmol/L    CO2 24 21 - 32 mmol/L    Anion gap 9 3.0 - 18 mmol/L    Glucose 104 (H) 74 - 99 mg/dL    BUN 46 (H) 7.0 - 18 MG/DL    Creatinine 2.62 (H) 0.6 - 1.3 MG/DL    BUN/Creatinine ratio 18 12 - 20      GFR est AA 21 (L) >60 ml/min/1.73m2    GFR est non-AA 17 (L) >60 ml/min/1.73m2    Calcium 9.1 8.5 - 10.1 MG/DL   CBC WITH AUTOMATED DIFF    Collection Time: 05/15/17  4:10 AM   Result Value Ref Range    WBC 8.9 4.6 - 13.2 K/uL    RBC 3.63 (L) 4.20 - 5.30 M/uL    HGB 11.1 (L) 12.0 - 16.0 g/dL    HCT 32.6 (L) 35.0 - 45.0 %    MCV 89.8 74.0 - 97.0 FL    MCH 30.6 24.0 - 34.0 PG    MCHC 34.0 31.0 - 37.0 g/dL    RDW 15.5 (H) 11.6 - 14.5 %    PLATELET 923 435 - 876 K/uL    MPV 10.1 9.2 - 11.8 FL    NEUTROPHILS 76 (H) 40 - 73 %    LYMPHOCYTES 17 (L) 21 - 52 %    MONOCYTES 7 3 - 10 %    EOSINOPHILS 0 0 - 5 %    BASOPHILS 0 0 - 2 %    ABS. NEUTROPHILS 6.7 1.8 - 8.0 K/UL    ABS. LYMPHOCYTES 1.5 0.9 - 3.6 K/UL    ABS. MONOCYTES 0.6 0.05 - 1.2 K/UL    ABS. EOSINOPHILS 0.0 0.0 - 0.4 K/UL    ABS.  BASOPHILS 0.0 0.0 - 0.1 K/UL    DF AUTOMATED     MAGNESIUM    Collection Time: 05/15/17  4:10 AM   Result Value Ref Range    Magnesium 2.2 1.6 - 2.6 mg/dL     Lab Results   Component Value Date/Time    Hemoglobin A1c 5.1 02/26/2017 04:00 AM       Code Status: Full Code    Patient would benefit from:  [] Cardiac Rehab             []Home Health   [] PT  [] Case Management             [] [facility]                           [] OT [] Nutrition                              []  Apnea Link                [] 6 minute walk                            []  Outpatient sleep study  [] Other:         [x] Palliative Care     Patient is pleasantly confused, granddaughter at bedside. Provided education folder and contents reviewed with family member, family given opportunity to ask questions.       Almaz Azevedo RN

## 2017-05-15 NOTE — ROUTINE PROCESS
Bedside shift change report given to Toya Franco RN (oncoming nurse) by JOAQUIM Bello (offgoing nurse). Report included the following information SBAR, Kardex, Intake/Output, MAR and Recent Results.

## 2017-05-15 NOTE — PROGRESS NOTES
RENAL DAILY PROGRESS NOTE    Patient: Shaniqua Gentile               Sex: female          DOA: 5/12/2017  8:46 AM        YOB: 1928      Age:  80 y.o.        LOS:  LOS: 3 days     Subjective: Shaniqua Gentile is a 80 y.o.  who presents with Non-STEMI (non-ST elevated myocardial infarction) ([facility]  CHF exacerbation (HCC)  CHF (congestive heart failure) ([facility] Asked to evaluate for crf stage 4.hx of htn, chf,anemia.   Chief complains: Patient denies nausea, vomiting, chest pain, dizziness, or headache.  - Reviewed last 24 hrs events     Current Facility-Administered Medications   Medication Dose Route Frequency    [START ON 5/16/2017] epoetin daysi (EPOGEN;PROCRIT) injection 6,000 Units  6,000 Units SubCUTAneous Q TUE, THU & SAT    [START ON 5/16/2017] furosemide (LASIX) tablet 40 mg  40 mg Oral DAILY    carvedilol (COREG) tablet 6.25 mg  6.25 mg Oral BID WITH MEALS    hydrALAZINE (APRESOLINE) 20 mg/mL injection 10 mg  10 mg IntraVENous Q6H PRN    allopurinol (ZYLOPRIM) tablet 50 mg  50 mg Oral DAILY    0.9% sodium chloride infusion 250 mL  250 mL IntraVENous PRN    aspirin chewable tablet 81 mg  81 mg Oral DAILY    acetaminophen (TYLENOL) tablet 325 mg  325 mg Oral Q4H PRN    bisacodyl (DULCOLAX) suppository 10 mg  10 mg Rectal DAILY PRN    docusate sodium (COLACE) capsule 100 mg  100 mg Oral BID    ferrous sulfate tablet 325 mg  325 mg Oral ACB    simvastatin (ZOCOR) tablet 20 mg  20 mg Oral QHS    isosorbide dinitrate (ISORDIL) tablet 20 mg  20 mg Oral TID    sodium chloride (NS) flush 5-10 mL  5-10 mL IntraVENous Q8H    sodium chloride (NS) flush 5-10 mL  5-10 mL IntraVENous PRN    heparin (porcine) injection 5,000 Units  5,000 Units SubCUTAneous Q8H       Objective:     Visit Vitals    /69 (BP 1 Location: Left arm, BP Patient Position: At rest)    Pulse 72    Temp 97.5 °F (36.4 °C)    Resp 18    Ht 5' 6\" (1.676 m)    Wt 62.1 kg (136 lb 14.4 oz)    SpO2 100%  BMI 22.1 kg/m2       Intake/Output Summary (Last 24 hours) at 05/15/17 1140  Last data filed at 05/15/17 8699   Gross per 24 hour   Intake              300 ml   Output             1550 ml   Net            -1250 ml       Physical Examination:     GEN: AAO X 3, NAD  RS: Chest is bilateral equal, no wheezing / rales / crackles  CVS: S1-S2 heard, RRR, No S3 / murmur  Abdomen: Soft, Non tender, Not distended, Positive bowel sounds, no organomegaly, no CVA / supra pubic tenderness  Extremities: No edema, no cyanosis, skin is warm on touch  CNS: Awake & follows commands, CN II-XII are grossly intact. HEENT: Head is atraumatic, PERRLA, conjunctiva pink & non icteric. No JVD or carotid bruit   Psychiatric: Normal mood, affect, judgement & memory    Musculoskeletal: No gross joints or bone deformities   Lymph Node: No palpable cervical, axillary or groin lymphadenopathy. Data Review:      Labs:     Hematology:   Recent Labs      05/15/17   0410  05/14/17   0300  05/13/17   0330   WBC  8.9  6.4  6.3   HGB  11.1*  6.7*  6.9*   HCT  32.6*  20.5*  20.4*     Chemistry:   Recent Labs      05/15/17   0410  05/14/17   0300  05/13/17   0330   BUN  46*  50*  50*   CREA  2.62*  2.92*  3.07*   CA  9.1  8.3*  8.4*   K  4.7  4.6  4.5   NA  137  138  137   CL  104  103  104   CO2  24  25  25   GLU  104*  89  89        Images:    XR (Most Recent). CXR reviewed by me and compared with previous CXR   Results from Hospital Encounter encounter on 05/12/17   XR CHEST PORT   Narrative Portable chest    History: Shortness of breath since last night. Comparison: Chest radiograph 2/25/2017    Findings: The cardiomediastinal silhouette is borderline considering technique. The  pulmonary vasculature is prominent. Calcified atherosclerotic plaque is present  in the aorta. Linear opacities are present throughout the lungs.  Opacities are  present at the lung bases with silhouetting of the medial right hemidiaphragm  and the left hemidiaphragm with meniscus at the left costophrenic angle. No  acute osseous abnormality. Degenerative changes are redemonstrated at the  shoulders and within the thoracic spine. Impression Impression:    Central vascular congestion with likely interstitial edema and pleural effusions  associated with basilar consolidations, likely compressive atelectasis. Thank you for this referral.         CT (Most Recent)   Results from [place] encounter on 02/23/17   CT ABD PELV WO CONT   Narrative CT Of The Abdomen And Pelvis Without Contrast    CPT CODE 63304,75764    CLINICAL HISTORY: Chronic renal insufficiency, CVA and hypertension. Breast  cancer. Presenting with shortness of breath, and left flank pain. TECHNIQUE: 5 mm helical MDCT scan was obtained of the abdomen and pelvis. Sagittal and coronal images created from original data set. All CT scans at  this facility are performed using dose optimization techniques as appropriate to  a performed exam, to include automated exposure control, adjustment of the mA  and/or kV according to patient's size (including appropriate matching for site  specific examinations), or use of iterative reconstruction technique. COMPARISON: Portable chest x-ray today. FINDINGS:  view demonstrates normal bowel gas pattern. Dense barium in the  rectum. CT Of The Abdomen: Lung bases: Dense opacity basilar segments left lower lobe  medially. Hazy densities right lung base posteriorly. Marked calcifications of  the tracheobronchial tree. Small effusions layering posteriorly. Larger  subpulmonic component on the left than the right. Heart and pericardium: Cardiomegaly. Left ventricular dilatation. Mild coronary  artery calcifications. CHEST WALL: Coarse calcifications inferiorly at the right breast. No mass is  identified. Liver: No focal lesions. Artifact traversing the liver because the patient was  scanned with her arms at her sides.     Spleen: Normal.    Gallbladder: Nondistended. No calcified stones. Pancreas: Normal.    Adrenal glands: Mild thickening bilaterally. Kidneys: No hydronephrosis or nephrolithiasis. There is a hypodense lesion at  the interpolar region right kidney anteriorly measuring 22 mm and water  attenuation (39). Retroperitoneum: Moderate burden calcified plaque at aortoiliac vessels. No  lymphadenopathy. The bowel: Stomach and duodenum and small bowel are normal. Appendix not  identified but there is no inflammatory stranding around the base of the cecum. .    CT Of The Pelvis: Peritoneal space: No free fluid. GYN: Prior hysterectomy. No adnexal masses. Urinary bladder: Normal.    Bony skeleton: Multiple levels severe disc space narrowing at the lumbar spine. Multiple levels marked spinal stenosis due to ligamentous and facet hypertrophy,  disc disease. No acute bony abnormalities. .         Impression IMPRESSION:    No kidney stones or secondary signs of recently passed stone. Small effusions. Dependent densities at the lung bases, likely passive  atelectasis. Cannot exclude infection. Right renal cyst.    Significant degenerative changes of the lumbar spine. EKG Results for orders placed or performed in visit on 03/07/17   AMB POC EKG ROUTINE W/ 12 LEADS, INTER & REP     Status: None    Narrative    EKG: unchanged from previous tracings, normal sinus rhythm, nonspecific ST and T waves changes, left axis deviation, mild IVCD        I have personally reviewed the old medical records and patient's labs    Plan / Recommendation:      1. crf stage 4,discussed with pt and her son. they want conservative treatment. no dialysis  2.anemia,nl spep,upep,light chains ratio is nl.received transfusion  3. chf ,compensated,on lasix    D/w Dr. Lizz Ruiz MD  Nephrology  5/15/2017

## 2017-05-15 NOTE — ROUTINE PROCESS
Bedside and Verbal shift change report given to Leonard (oncoming nurse) by Yuni Perez (offgoing nurse). Report included the following information SBAR, Kardex, MAR and Recent Results. SITUATION:    Code Status: Full Code   Reason for Admission: Non-STEMI (non-ST elevated myocardial infarction) ([facility]   CHF exacerbation (HCC)   CHF (congestive heart failure) ([facility]    [facility] day: 3   Problem List:       Hospital Problems  Date Reviewed: 5/12/2017          Codes Class Noted POA    Non-STEMI (non-ST elevated myocardial infarction) ([facility] ICD-10-CM: I21.4  ICD-9-CM: 410.70  5/12/2017 Unknown        CHF exacerbation ([facility] ICD-10-CM: I50.9  ICD-9-CM: 428.0  5/12/2017 Unknown        Acute on chronic diastolic congestive heart failure ([facility] ICD-10-CM: I50.33  ICD-9-CM: 428.33, 428.0  5/12/2017 Unknown        Stage 4 chronic kidney disease ([facility] ICD-10-CM: N18.4  ICD-9-CM: 585.4  5/12/2017 Unknown        CHF (congestive heart failure) ([facility] ICD-10-CM: I50.9  ICD-9-CM: 428.0  5/12/2017 Unknown        ACS (acute coronary syndrome) ([facility] ICD-10-CM: I24.9  ICD-9-CM: 411.1  2/21/2016 Yes              BACKGROUND:    Past Medical History:   Past Medical History:   Diagnosis Date    Breast cancer ([facility] 1/17/14    right breast    Cardiac echocardiogram 02/17/2017    EF 50%. No WMA. Mod conc LVH. Indeterminate diastolic fx. Mod LAE. Mild MR. Mild AI. Mild PI. No signifciant valvular heart disease.  Cardiovascular lower extremity arterial testing 12/19/2014    Mod tibio-peroneal arterial disease bilaterally. R YANCY 1.24.  L YANCY 0.74. Bilateral sm vessel disease.  Carotid duplex 11/19/2014    Severe 70% or greater bilateral ICA stenosis. LICA dissection suggested.     Chronic renal insufficiency     CVA (cerebral vascular accident) ([facility] 2003    with slight Right sided weakness    Edema     Glaucoma     Gout flare     Hyperlipidemia     Hypertension     Lump of right breast     URI (upper respiratory infection)          Patient taking anticoagulants yes     ASSESSMENT:    Changes in Assessment Throughout Shift: NA     Patient has Central Line: no Reasons if yes: NA   Patient has Jang Cath: no Reasons if yes: NA     Last Vitals:     Vitals:    05/15/17 0751 05/15/17 1114 05/15/17 1507 05/15/17 1904   BP: 156/71 163/69 166/72 162/53   Pulse: 81 72 70 71   Resp: 18 18 18 20   Temp: 97.5 °F (36.4 °C) 97.5 °F (36.4 °C) 97.1 °F (36.2 °C) 97.5 °F (36.4 °C)   SpO2: 100% 100% 100% 100%   Weight:       Height:            IV and DRAINS (will only show if present)   Peripheral IV 05/14/17 Left Wrist-Site Assessment: Clean, dry, & intact  Peripheral IV 05/12/17 Left Antecubital-Site Assessment: Clean, dry, & intact     WOUND (if present)   Wound Type:  L Buttock/ Sacral Skin tear   Dressing present Dressing Present : Yes, Changed   Wound Concerns/Notes:  none     PAIN    Pain Assessment    Pain Intensity 1: 0 (05/15/17 1600)    Pain Location 1: Leg    Pain Intervention(s) 1: Declines    Patient Stated Pain Goal: 0  o Interventions for Pain:  none  o Intervention effective: no  o Time of last intervention: NA   o Reassessment Completed: yes      Last 3 Weights:  Last 3 Recorded Weights in this Encounter    05/13/17 0500 05/14/17 0332 05/15/17 0400   Weight: 61.4 kg (135 lb 6.4 oz) 62.4 kg (137 lb 8 oz) 62.1 kg (136 lb 14.4 oz)     Weight change: -0.272 kg (-9.6 oz)     INTAKE/OUPUT    Current Shift:      Last three shifts: 05/14 0701 - 05/15 1900  In: 1500 [P.O.:1500]  Out: 2250 [Urine:2250]     LAB RESULTS     Recent Labs      05/15/17   0410  05/14/17   0300  05/13/17   0330   WBC  8.9  6.4  6.3   HGB  11.1*  6.7*  6.9*   HCT  32.6*  20.5*  20.4*   PLT  264  249  229        Recent Labs      05/15/17   0410  05/14/17   0300  05/13/17   0330   NA  137  138  137   K  4.7  4.6  4.5   GLU  104*  89  89   BUN  46*  50*  50*   CREA  2.62*  2.92*  3.07*   CA  9.1  8.3*  8.4*   MG  2.2   --    -- RECOMMENDATIONS AND DISCHARGE PLANNING     1. Pending tests/procedures/ Plan of Care or Other Needs: NA     2. Discharge plan for patient and Needs/Barriers: NA    3. Estimated Discharge Date: NA Posted on Whiteboard in Patients Room: no      4. The patient's care plan was reviewed with the oncoming nurse. \"HEALS\" SAFETY CHECK      Fall Risk    Total Score: 4    Safety Measures: Safety Measures: Bed/Chair-Wheels locked, Bed/Chair alarm on, Bed in low position, Call light within reach, Family at bedside    A safety check occurred in the patient's room between off going nurse and oncoming nurse listed above. The safety check included the below items  Area Items   H  High Alert Medications - Verify all high alert medication drips (heparin, PCA, etc.)   E  Equipment - Suction is set up for ALL patients (with eric)  - Red plugs utilized for all equipment (IV pumps, etc.)  - WOWs wiped down at end of shift.  - Room stocked with oxygen, suction, and other unit-specific supplies   A  Alarms - Bed alarm is set for fall risk patients  - Ensure chair alarm is in place and activated if patient is up in a chair   L  Lines - Check IV for any infiltration  - Jang bag is empty if patient has a Jang   - Tubing and IV bags are labeled   S  Safety   - Room is clean, patient is clean, and equipment is clean. - Hallways are clear from equipment besides carts. - Fall bracelet on for fall risk patients  - Ensure room is clear and free of clutter  - Suction is set up for ALL patients (with eric)  - Hallways are clear from equipment besides carts.    - Isolation precautions followed, supplies available outside room, sign posted     Nissa Vienna

## 2017-05-15 NOTE — PROGRESS NOTES
Problem: Dysphagia (Adult)  Goal: *Acute Goals and Plan of Care (Insert Text)  Patient will:  1. Tolerate PO trials with 0 s/s overt distress in 4/5 trials  2. Utilize compensatory swallow strategies/maneuvers (decrease bite/sip, size/rate, alt. liq/sol) with min cues in 4/5 trials    Rec:   Soft solid with thin liquids  Aspiration precautions  HOB >45 during po intake, remain >30 for 30-45 minutes after po   Small bites/sips; alternate liquid/solid with slow feeding rate   Oral care TID  Meds in puree  [address] EVALUATION     Patient: Kishan Gomez (80 y.o. female)  Date: 5/15/2017  Primary Diagnosis: Non-STEMI (non-ST elevated myocardial infarction) (HCC)  CHF exacerbation (HCC)  CHF (congestive heart failure) (HCC)        Precautions: aspiration         ASSESSMENT :  Based on the objective data described below, the patient presents with mild oropharyngeal dysphagia. Pt completed OP MBS at this facility on 4/12/17 with the following results: \"mild oropharyngeal dysphagia c/b increased oral prep phase and silent laryngeal penetration with thin liquids when used as a wash to clear 13 mm Ba pill from oral cavity. Pt able to tolerate thin liquids +/- straw, NTL +/- straw, puree and regular solids with positive airway protection noted across multiple trials. Pt further challenged with consecutive swallows of thin liquids, tolerating with no aspiration/penetration visualized. \" This AM, pt tolerated reg solid with thin liquids with mildly increased mastication. No overt s/sx of aspiration appreciated. Laryngeal elevation functional and timely to palpation. Rec soft solid with thin liquids, aspiration precautions, oral care TID, and meds in puree. D/w RN and pt. ST to f/u x 1-2 more visits to ensure safety of PO. Patient will benefit from skilled intervention to address the above impairments.   Patients rehabilitation potential is considered to be Good  Factors which may influence rehabilitation potential include:   [X]            None noted       PLAN :  Recommendations and Planned Interventions: See above  Frequency/Duration: Patient will be followed by speech-language pathology x 1-2 more visits to address goals. Discharge Recommendations: Home [facility] and To Be Determined       SUBJECTIVE:   Patient stated Thank you, lindsay. OBJECTIVE:       Past Medical History:   Diagnosis Date    Breast cancer ([facility] 1/17/14     right breast    Cardiac echocardiogram 02/17/2017     EF 50%. No WMA. Mod conc LVH. Indeterminate diastolic fx. Mod LAE. Mild MR. Mild AI. Mild PI. No signifciant valvular heart disease.  Cardiovascular lower extremity arterial testing 12/19/2014     Mod tibio-peroneal arterial disease bilaterally. R YANCY 1.24.  L YANCY 0.74. Bilateral sm vessel disease.  Carotid duplex 11/19/2014     Severe 70% or greater bilateral ICA stenosis. LICA dissection suggested.  Chronic renal insufficiency      CVA (cerebral vascular accident) ([facility] 2003     with slight Right sided weakness    Edema      Glaucoma      Gout flare      Hyperlipidemia      Hypertension      Lump of right breast      URI (upper respiratory infection)       Past Surgical History:   Procedure Laterality Date    HX APPENDECTOMY        HX CYST INCISION AND DRAINAGE         PT DOES NOT REMEMBER EXACT DATES, TUCKER BREAST DONE MORE THAN 20 YEARS AGO. BENIGN FINDINGS PER PATIENT.     HX GYN         JUSTIN/BSO    HX HYSTERECTOMY        HX MASTECTOMY   1/17/2014     RIGHT PARTIAL MASTECTOMY performed by Martha Treadwell MD at [address] [place] [address]     Prior Level of Function/Home Situation: SNF  Home Situation  Home Environment: Skilled nursing facility  One/Two Story Residence: One story  Living Alone: No  Support Systems: Child(phu), Skilled nursing facility, Family member(s)  Patient Expects to be Discharged to[de-identified] Skilled nursing facility  Diet prior to admission: Soft solid with thin  Current Diet:  Soft solid with thin   Cognitive and Communication Status:  Neurologic State: Alert  Orientation Level: Oriented to person, Oriented to place  Cognition: Follows commands   Oral Assessment:  Oral Assessment  Labial: No impairment  Dentition: Natural;Intact  Oral Hygiene: adequate  Lingual: No impairment  Velum: No impairment  Mandible: No impairment  P.O. Trials:  Patient Position: 50 at [facility]  Vocal quality prior to P.O.: No impairment  Consistency Presented: Thin liquid; Solid;Puree  How Presented: Self-fed/presented;Cup/sip;Spoon;Straw  Bolus Acceptance: No impairment  Bolus Formation/Control: Impaired  Type of Impairment: Delayed;Mastication  Propulsion: Delayed (# of seconds)  Oral Residue: None  Initiation of Swallow: No impairment  Laryngeal Elevation: Functional  Aspiration Signs/Symptoms: None  Pharyngeal Phase Characteristics: No impairment, issues, or problems   Effective Modifications: Small sips and bites  Cues for Modifications: Minimal     Oral Phase Severity: Mild  Pharyngeal Phase Severity : Mild     GCODESwallowing:  Swallow Current Status CJ= 20-39%   Swallow Goal Status CI= 1-19%     The severity rating is based on the following outcomes:             Clinical Judgment     Pain:  Pt c/o 0/10 pain prior to evaluation. Pt c/o 0/10 pain post evaluation.      After treatment:   [ ]            Patient left in no apparent distress sitting up in chair  [X]            Patient left in no apparent distress in bed  [X]            Call bell left within reach  [X]            Nursing notified  [ ]            Caregiver present  [ ]            Bed alarm activated      COMMUNICATION/EDUCATION:   [X]            SLP educated pt with regard to compensatory swallow strategies and                  aspiration/reflux precautions including: small bites/sips,                  alternate liquids/solids, decrease feeding rate, HOB > 45 with all po, and                             upright in bed at 30 degrees after po for at least 45 minutes. Also reviewed MBS results and diet recs with verbalized understanding. No family at bedside. [X]            Patient/family have participated as able in goal setting and plan of care.      Thank you for this referral.     Ernie Duong M.S. CCC-SLP/L  Speech-Language Pathologist

## 2017-05-15 NOTE — CDMP QUERY
The following is documented in  the initial  MD notes in the ER,    \"  Pt is a nursing home,  pt that was recently dx with pneumonia. \"    Please clarify if pt has completed  treatment for  this or not  ?          Thank you,    Eugenio Shah   RN,  BSN, CCDS

## 2017-05-15 NOTE — PROGRESS NOTES
CM met with pt this date . Alysa Allen She was not able to gave accurate information and appeared confused. Fermín Ludwig she was in [place]", had difficulty recalling her goddaughter's name ( was visiting at bedside). . CM will contact family member to gather additional information

## 2017-05-15 NOTE — PROGRESS NOTES
Hospitalist Progress Note    Patient: Marta Figueroa MRN: [id number]  CSN: [id number]    YOB: 1928  Age: 80 y.o. Sex: female    DOA: 5/12/2017 LOS:  LOS: 3 days        Transfused 2 units prbc with appropriate increase in hct. Patient reports that she felt poorly overnight but unable to voice specific complaints. At my interview she denies pain anywhere. Assessment/Plan     1. Acute hypoxic respiratory failure, Sats 79% per EMS, improved with O2  2. Acute on chronic diastolic HF exacerbation. On 40mg PO daily of lasix as outpatient. Gentle diuresis, strict i/o  3. NSTEMI, plan for medical management given advanced age/frailty/co-morbidities. Has been maintained on BB, asa, statin and long-acting nitrates in outpatient setting. EKG unchanged from prior studies with non-specific ST abnormalities. 4. Historically low/normal LV function (50%) by echo (2/17/17), no WMAs, mild mitral regurg and aortic regurg. 5. CKD 4 - family request conservative tx, no HD per nephrology notes. .  6. PAD, high grade bilat carotid artery stenosis  7. Anemia of chronic disease  8. HTN  9. Dyslipidemia on statin  10. CVA hx with residual R sided weakness on asa, statin. 11. 2ndary hyperparathyroidism of ckd  12. mild oropharyngeal dysphagia - ST recs noted. 13. Full code. SNF resident. Palliative care consulted. Pt and ot.      Additional Notes:      Case discussed with:  [x]Patient  []Family  [x]Nursing  []Case Management  DVT Prophylaxis:  []Lovenox  []Hep SQ  []SCDs  []Coumadin   []On Heparin gtt    Vital signs/Intake and Output:  Visit Vitals    /69 (BP 1 Location: Left arm, BP Patient Position: At rest)    Pulse 72    Temp 97.5 °F (36.4 °C)    Resp 18    Ht 5' 6\" (1.676 m)    Wt 62.1 kg (136 lb 14.4 oz)    SpO2 100%    BMI 22.1 kg/m2     Current Shift:  05/15 0701 - 05/15 1900  In: 1020 [P.O.:1020]  Out: -   Last three shifts:  05/13 1901 - 05/15 0700  In: 240 [P.O.:240]  Out: 2000 [XVUNR:0923]    Awake alert and oriented x 1-2  Ncat. perrl  RRR  Decreased bs at bases  Soft nt nd nabs. No edema. No focal deficit  No rash      Medications Reviewed      Labs: Results:       Chemistry Recent Labs      05/15/17   0410  05/14/17   0300  05/13/17   0330   GLU  104*  89  89   NA  137  138  137   K  4.7  4.6  4.5   CL  104  103  104   CO2  24  25  25   BUN  46*  50*  50*   CREA  2.62*  2.92*  3.07*   CA  9.1  8.3*  8.4*   AGAP  9  10  8   BUCR  18  17  16      CBC w/Diff Recent Labs      05/15/17   0410  05/14/17   0300  05/13/17   0330   WBC  8.9  6.4  6.3   RBC  3.63*  2.22*  2.23*   HGB  11.1*  6.7*  6.9*   HCT  32.6*  20.5*  20.4*   PLT  264  249  229   GRANS  76*   --    --    LYMPH  17*   --    --    EOS  0   --    --       Cardiac Enzymes No results for input(s): CPK, CKND1, VIRGILIO in the last 72 hours. No lab exists for component: CKRMB, TROIP   Coagulation No results for input(s): PTP, INR, APTT in the last 72 hours. No lab exists for component: INREXT, INREXT    Lipid Panel Lab Results   Component Value Date/Time    Cholesterol, total 148 02/25/2017 09:40 AM    HDL Cholesterol 52 02/25/2017 09:40 AM    LDL, calculated 82.2 02/25/2017 09:40 AM    VLDL, calculated 13.8 02/25/2017 09:40 AM    Triglyceride 69 02/25/2017 09:40 AM    CHOL/HDL Ratio 2.8 02/25/2017 09:40 AM      BNP No results for input(s): BNPP in the last 72 hours. Liver Enzymes No results for input(s): TP, ALB, TBIL, AP, SGOT, GPT in the last 72 hours.     No lab exists for component: DBIL   Thyroid Studies Lab Results   Component Value Date/Time    TSH 3.09 02/24/2017 11:54 PM        Procedures/imaging: see electronic medical records for all procedures/Xrays and details which were not copied into this note but were reviewed prior to creation of Plan

## 2017-05-15 NOTE — PROGRESS NOTES
[facility]  -  Progress Note      Patient: Sukh Lopez MRN: [id number]  SSN: [id number]    YOB: 1928  Age: 80 y.o. Sex: female      Admit Date: 5/12/2017    Assessment:     Hospital Problems  Date Reviewed: 5/12/2017          Codes Class Noted POA    Non-STEMI (non-ST elevated myocardial infarction) ([facility] ICD-10-CM: I21.4  ICD-9-CM: 410.70  5/12/2017 Unknown        CHF exacerbation ([facility] ICD-10-CM: I50.9  ICD-9-CM: 428.0  5/12/2017 Unknown        Acute on chronic diastolic congestive heart failure ([facility] ICD-10-CM: I50.33  ICD-9-CM: 428.33, 428.0  5/12/2017 Unknown        Stage 4 chronic kidney disease ([facility] ICD-10-CM: N18.4  ICD-9-CM: 585.4  5/12/2017 Unknown        CHF (congestive heart failure) ([facility] ICD-10-CM: I50.9  ICD-9-CM: 428.0  5/12/2017 Unknown        ACS (acute coronary syndrome) ([facility] ICD-10-CM: I24.9  ICD-9-CM: 411.1  2/21/2016 Yes            -Acute hypoxic respiratory failure, Sats 79% per EMS, improved with O2  -Acute on chronic diastolic HF exacerbation, BNP >70k, has had significantly elevated BNPs in the past with HF exacerbations as well as chronic kidney disease. CXR consistent with heart failure given pulmonary vascular congestions and pleural effusions. On 40mg PO daily of lasix as outpatient. -NSTEMI, plan for medical management given advanced age/frailty/co-morbidities. Has been maintained on BB, asa, statin and long-acting nitrates in outpatient setting.  EKG unchanged from prior studies with non-specific ST abnormalities.  -Historically low/normal LV function (50%) by echo (2/17/17), no WMAs, mild mitral regurge and aortic regurg.  -CKD, stage IV, advised to follow up with nephrology for possible dialysis initiation   -PVD, high grade bilat carotid artery stenosis  -Anemia of chronic disease  -HTN  -HLD  -CVA, remote, some residual R sided weakness  -GOUT  -Advanced age  -Full code  -SNF resident    Plan:     Stable and improved  Will transition to oral diuretics and monitor  Continue with all other meds. ?back to SNF soon    Subjective:     No new complaints. Patient had some anxiety last evening but none today. No chest pain or shortness of breath.     Objective:      Patient Vitals for the past 8 hrs:   Temp Pulse Resp BP SpO2   05/15/17 0751 97.5 °F (36.4 °C) 81 18 156/71 100 %   05/15/17 0400 98.1 °F (36.7 °C) 95 18 172/72 100 %         Patient Vitals for the past 96 hrs:   Weight   05/15/17 0400 62.1 kg (136 lb 14.4 oz)   05/14/17 0332 62.4 kg (137 lb 8 oz)   05/13/17 0500 61.4 kg (135 lb 6.4 oz)   05/12/17 0841 59 kg (130 lb)         Intake/Output Summary (Last 24 hours) at 05/15/17 0930  Last data filed at 05/15/17 0833   Gross per 24 hour   Intake              540 ml   Output             1700 ml   Net            -1160 ml       Physical Exam:  General:  alert, cooperative, no distress, appears stated age  Neck:  no JVD  Lungs:  clear to auscultation bilaterally  Heart:  regular rate and rhythm, S1, S2 normal, no murmur, click, rub or gallop  Abdomen:  abdomen is soft without significant tenderness, masses, organomegaly or guarding  Extremities:  extremities normal, atraumatic, no cyanosis or edema    Data Review:     Labs: Results:       Chemistry Recent Labs      05/15/17   0410  05/14/17   0300  05/13/17   0330   GLU  104*  89  89   NA  137  138  137   K  4.7  4.6  4.5   CL  104  103  104   CO2  24  25  25   BUN  46*  50*  50*   CREA  2.62*  2.92*  3.07*   CA  9.1  8.3*  8.4*   MG  2.2   --    --    AGAP  9  10  8   BUCR  18  17  16      CBC w/Diff Recent Labs      05/15/17   0410  05/14/17   0300  05/13/17   0330  05/12/17   1000   WBC  8.9  6.4  6.3  9.9   RBC  3.63*  2.22*  2.23*  2.83*   HGB  11.1*  6.7*  6.9*  8.5*   HCT  32.6*  20.5*  20.4*  26.3*   PLT  264  249  229  290   GRANS  76*   --    --   85*   LYMPH  17*   --    --   9*   EOS  0   --    --   0      Cardiac Enzymes No results found for: CPK, CKMMB, CKMB, RCK3, CKMBT, CKNDX, CKND1, VIRGILIO, TROPT, TROIQ, ELIZABETH, TROPT, TNIPOC, BNP, BNPP   Coagulation No results for input(s): PTP, INR, APTT in the last 72 hours. No lab exists for component: INREXT    Lipid Panel Lab Results   Component Value Date/Time    Cholesterol, total 148 02/25/2017 09:40 AM    HDL Cholesterol 52 02/25/2017 09:40 AM    LDL, calculated 82.2 02/25/2017 09:40 AM    VLDL, calculated 13.8 02/25/2017 09:40 AM    Triglyceride 69 02/25/2017 09:40 AM    CHOL/HDL Ratio 2.8 02/25/2017 09:40 AM      BNP No results found for: BNP, BNPP, XBNPT   Liver Enzymes No results for input(s): TP, ALB, TBIL, AP, SGOT, GPT in the last 72 hours.     No lab exists for component: DBIL   Digoxin    Thyroid Studies Lab Results   Component Value Date/Time    TSH 3.09 02/24/2017 11:54 PM

## 2017-05-15 NOTE — PROGRESS NOTES
Dysphagia evaluation completed with recs of soft solid and thin liquids, meds in puree. D/w RN. Full report to follow.      Thank you for this referral.    Marisol Aponte M.S. CCC-SLP/L  Speech-Language Pathologist

## 2017-05-15 NOTE — INTERDISCIPLINARY ROUNDS
IDR/SLIDR Summary          Patient: Dheeraj Epstein MRN: [id number]    Age: 80 y.o. YOB: 1928 Room/Bed: 219/   Admit Diagnosis: Non-STEMI (non-ST elevated myocardial infarction) ([facility]  CHF exacerbation (HCC)  CHF (congestive heart failure) (HCC)  Principal Diagnosis: <principal problem not specified>   Goals: No SOB  Readmission: NO  Quality Measure: CHF  VTE Prophylaxis: Chemical  Influenza Vaccine screening completed? NO  Pneumococcal Vaccine screening completed? NO  Mobility needs: Yes, No   Nutrition plan:No  Consults: P. T    Financial concerns:No  Escalated to CM? NO  RRAT Score: 40   Interventions:Home Health  Testing due for pt today?  NO  LOS: 3 days Expected length of stay 5 days  Discharge plan: SNF   PCP: Lupe Frye MD  Transportation needs: Yes    Days before discharge:longer than expected LOS  Discharge disposition: SNF    Signed:     Isaiah Jim RN  5/15/2017  8:23 AM

## 2017-05-15 NOTE — ROUTINE PROCESS
Pt c/o SOB, became tachypneic breathing at 40 RPM, but O2 sat fluctuating between 92-98%, also auscultated wet lung sounds. Called MD and informed him of patient's status. Breathing treatment provided in addition to STAT dose of IV Furosemide 20 mg. Pt bathed and repositioned in bed. Appears to be feeling better already. Will continue to monitor patient.

## 2017-05-16 LAB
ABO + RH BLD: NORMAL
ANION GAP BLD CALC-SCNC: 8 MMOL/L (ref 3–18)
BASOPHILS # BLD AUTO: 0 K/UL (ref 0–0.1)
BASOPHILS # BLD: 1 % (ref 0–2)
BLD PROD TYP BPU: NORMAL
BLD PROD TYP BPU: NORMAL
BLOOD GROUP ANTIBODIES SERPL: NORMAL
BPU ID: NORMAL
BPU ID: NORMAL
BUN SERPL-MCNC: 46 MG/DL (ref 7–18)
BUN/CREAT SERPL: 19 (ref 12–20)
CALCIUM SERPL-MCNC: 8.7 MG/DL (ref 8.5–10.1)
CALLED TO:,BCALL1: NORMAL
CHLORIDE SERPL-SCNC: 104 MMOL/L (ref 100–108)
CO2 SERPL-SCNC: 24 MMOL/L (ref 21–32)
CREAT SERPL-MCNC: 2.48 MG/DL (ref 0.6–1.3)
CROSSMATCH RESULT,%XM: NORMAL
CROSSMATCH RESULT,%XM: NORMAL
DIFFERENTIAL METHOD BLD: ABNORMAL
EOSINOPHIL # BLD: 0.3 K/UL (ref 0–0.4)
EOSINOPHIL NFR BLD: 4 % (ref 0–5)
ERYTHROCYTE [DISTWIDTH] IN BLOOD BY AUTOMATED COUNT: 15.1 % (ref 11.6–14.5)
GLUCOSE SERPL-MCNC: 93 MG/DL (ref 74–99)
HCT VFR BLD AUTO: 29.2 % (ref 35–45)
HGB BLD-MCNC: 10 G/DL (ref 12–16)
LYMPHOCYTES # BLD AUTO: 24 % (ref 21–52)
LYMPHOCYTES # BLD: 1.8 K/UL (ref 0.9–3.6)
MCH RBC QN AUTO: 30.8 PG (ref 24–34)
MCHC RBC AUTO-ENTMCNC: 34.2 G/DL (ref 31–37)
MCV RBC AUTO: 89.8 FL (ref 74–97)
MONOCYTES # BLD: 0.9 K/UL (ref 0.05–1.2)
MONOCYTES NFR BLD AUTO: 12 % (ref 3–10)
NEUTS SEG # BLD: 4.2 K/UL (ref 1.8–8)
NEUTS SEG NFR BLD AUTO: 59 % (ref 40–73)
PLATELET # BLD AUTO: 257 K/UL (ref 135–420)
PMV BLD AUTO: 9.9 FL (ref 9.2–11.8)
POTASSIUM SERPL-SCNC: 4.6 MMOL/L (ref 3.5–5.5)
RBC # BLD AUTO: 3.25 M/UL (ref 4.2–5.3)
SODIUM SERPL-SCNC: 136 MMOL/L (ref 136–145)
SPECIMEN EXP DATE BLD: NORMAL
STATUS OF UNIT,%ST: NORMAL
STATUS OF UNIT,%ST: NORMAL
UNIT DIVISION, %UDIV: 0
UNIT DIVISION, %UDIV: 0
WBC # BLD AUTO: 7.2 K/UL (ref 4.6–13.2)

## 2017-05-16 PROCEDURE — 74011250637 HC RX REV CODE- 250/637: Performed by: PHYSICIAN ASSISTANT

## 2017-05-16 PROCEDURE — 80048 BASIC METABOLIC PNL TOTAL CA: CPT | Performed by: HOSPITALIST

## 2017-05-16 PROCEDURE — 36415 COLL VENOUS BLD VENIPUNCTURE: CPT | Performed by: HOSPITALIST

## 2017-05-16 PROCEDURE — 65660000000 HC RM CCU STEPDOWN

## 2017-05-16 PROCEDURE — 77010033678 HC OXYGEN DAILY

## 2017-05-16 PROCEDURE — 74011250636 HC RX REV CODE- 250/636: Performed by: INTERNAL MEDICINE

## 2017-05-16 PROCEDURE — 74011250637 HC RX REV CODE- 250/637: Performed by: EMERGENCY MEDICINE

## 2017-05-16 PROCEDURE — 94760 N-INVAS EAR/PLS OXIMETRY 1: CPT

## 2017-05-16 PROCEDURE — 85025 COMPLETE CBC W/AUTO DIFF WBC: CPT | Performed by: HOSPITALIST

## 2017-05-16 PROCEDURE — 97535 SELF CARE MNGMENT TRAINING: CPT

## 2017-05-16 PROCEDURE — 97165 OT EVAL LOW COMPLEX 30 MIN: CPT

## 2017-05-16 PROCEDURE — 97530 THERAPEUTIC ACTIVITIES: CPT

## 2017-05-16 PROCEDURE — 74011250637 HC RX REV CODE- 250/637: Performed by: INTERNAL MEDICINE

## 2017-05-16 PROCEDURE — 97162 PT EVAL MOD COMPLEX 30 MIN: CPT

## 2017-05-16 RX ADMIN — HEPARIN SODIUM 5000 UNITS: 5000 INJECTION, SOLUTION INTRAVENOUS; SUBCUTANEOUS at 23:45

## 2017-05-16 RX ADMIN — Medication 10 ML: at 06:28

## 2017-05-16 RX ADMIN — FUROSEMIDE 40 MG: 40 TABLET ORAL at 09:38

## 2017-05-16 RX ADMIN — Medication 10 ML: at 23:45

## 2017-05-16 RX ADMIN — ISOSORBIDE DINITRATE 20 MG: 20 TABLET ORAL at 23:45

## 2017-05-16 RX ADMIN — ISOSORBIDE DINITRATE 20 MG: 20 TABLET ORAL at 09:38

## 2017-05-16 RX ADMIN — DOCUSATE SODIUM 100 MG: 100 CAPSULE, LIQUID FILLED ORAL at 09:38

## 2017-05-16 RX ADMIN — DOCUSATE SODIUM 100 MG: 100 CAPSULE, LIQUID FILLED ORAL at 18:19

## 2017-05-16 RX ADMIN — ISOSORBIDE DINITRATE 20 MG: 20 TABLET ORAL at 15:55

## 2017-05-16 RX ADMIN — SIMVASTATIN 20 MG: 20 TABLET, FILM COATED ORAL at 23:45

## 2017-05-16 RX ADMIN — ALLOPURINOL 50 MG: 100 TABLET ORAL at 09:39

## 2017-05-16 RX ADMIN — CARVEDILOL 6.25 MG: 6.25 TABLET, FILM COATED ORAL at 18:19

## 2017-05-16 RX ADMIN — HEPARIN SODIUM 5000 UNITS: 5000 INJECTION, SOLUTION INTRAVENOUS; SUBCUTANEOUS at 06:23

## 2017-05-16 RX ADMIN — FERROUS SULFATE TAB 325 MG (65 MG ELEMENTAL FE) 325 MG: 325 (65 FE) TAB at 09:39

## 2017-05-16 RX ADMIN — Medication 10 ML: at 15:56

## 2017-05-16 RX ADMIN — HEPARIN SODIUM 5000 UNITS: 5000 INJECTION, SOLUTION INTRAVENOUS; SUBCUTANEOUS at 14:05

## 2017-05-16 RX ADMIN — CARVEDILOL 6.25 MG: 6.25 TABLET, FILM COATED ORAL at 09:39

## 2017-05-16 RX ADMIN — ASPIRIN 81 MG CHEWABLE TABLET 81 MG: 81 TABLET CHEWABLE at 09:38

## 2017-05-16 NOTE — PROGRESS NOTES
Problem: Mobility Impaired (Adult and Pediatric)  Goal: *Acute Goals and Plan of Care (Insert Text)  STGs to be addressed within 3 days:  1. Bed mobility: Supine to sit to supine S with HR for meals. 2. Activity tolerance: Tolerate up in chair 1-2 hrs for ADLs. 3. Transfers: Sit to stand to chair S with LRAD for ADLs. LTGs to be addressed within 7 days:  1. Standing/Ambulation Balance: Increase to Good with LRAD for safe transfers and gait. 2. Ambulation: Ambulate > 15 ft. S with LRAD for home mobility. 3. Patient Education: Independent with HEP for home safety. Outcome: Progressing Towards Goal  PHYSICAL THERAPY EVALUATION     Patient: Maikol Arita (80 y.o. female)  Date: 5/16/2017  Primary Diagnosis: Non-STEMI (non-ST elevated myocardial infarction) (HCC)  CHF exacerbation (HCC)  CHF (congestive heart failure) ([facility]  Precautions:   Fall      ASSESSMENT :  Based on the objective data described below, the patient presents to PT with decreased functional mobility with regard to bed mobility, transfers, gait, and overall tolerance for activity. Patient alert, confused, oriented to self and place only. Patient required mod A for bed mobility, transitioned into standing mod A x 2 persons with RW, vc's for maintaining upright posture. Patient was noted to have had a BM, required total assist for hygiene. Unable to initiate ambulation due to poor standing balance. Assisted patient back to bed, positioned for comfort. Patient would benefit from PT to address above impairments and assist with discharge planning. Patient will benefit from skilled intervention to address the above impairments.   Patients rehabilitation potential is considered to be Fair  Factors which may influence rehabilitation potential include:   [ ]         None noted  [X]         Mental ability/status  [X]         Medical condition  [ ]         Home/family situation and support systems  [X]         Safety awareness  [ ] Pain tolerance/management  [ ]         Other:        PLAN :  Recommendations and Planned Interventions:  [X]           Bed Mobility Training             [X]    Neuromuscular Re-Education  [X]           Transfer Training                   [ ]    Orthotic/Prosthetic Training  [X]           Gait Training                          [ ]    Modalities  [X]           Therapeutic Exercises          [ ]    Edema Management/Control  [X]           Therapeutic Activities            [X]    Patient and Family Training/Education  [ ]           Other (comment):     Frequency/Duration: Patient will be followed by physical therapy daily x 4-7 x week to address goals. Discharge Recommendations: Hemant Winston  Further Equipment Recommendations for Discharge: rolling walker       SUBJECTIVE:   Patient stated It's 1996. Josias Guardado      OBJECTIVE DATA SUMMARY:       Past Medical History:   Diagnosis Date    Breast cancer ([facility] 1/17/14     right breast    Cardiac echocardiogram 02/17/2017     EF 50%. No WMA. Mod conc LVH. Indeterminate diastolic fx. Mod LAE. Mild MR. Mild AI. Mild PI. No signifciant valvular heart disease.  Cardiovascular lower extremity arterial testing 12/19/2014     Mod tibio-peroneal arterial disease bilaterally. R YANCY 1.24.  L YANCY 0.74. Bilateral sm vessel disease.  Carotid duplex 11/19/2014     Severe 70% or greater bilateral ICA stenosis. LICA dissection suggested.  Chronic renal insufficiency      CVA (cerebral vascular accident) ([facility] 2003     with slight Right sided weakness    Edema      Glaucoma      Gout flare      Hyperlipidemia      Hypertension      Lump of right breast      URI (upper respiratory infection)       Past Surgical History:   Procedure Laterality Date    HX APPENDECTOMY        HX CYST INCISION AND DRAINAGE         PT DOES NOT REMEMBER EXACT DATES, TUCKER BREAST DONE MORE THAN 20 YEARS AGO. BENIGN FINDINGS PER PATIENT.     HX GYN         JUSTIN/BSO    HX HYSTERECTOMY  HX MASTECTOMY   1/17/2014     RIGHT PARTIAL MASTECTOMY performed by Massimo Anderson MD at [facility] MA[facility] OR     Barriers to Learning/Limitations: None  Compensate with: N/A  Prior Level of Function/Home Situation:   Home Situation  Home Environment: Skilled nursing facility  One/Two Story Residence: One story  Living Alone: No  Support Systems: Skilled nursing facility, Child(phu)  Patient Expects to be Discharged to[de-identified] Skilled nursing facility  Current DME Used/Available at Home:  (Unknown)  Critical Behavior:  Neurologic State: Alert  Psychosocial  Patient Behaviors: Calm; Cooperative;Confused  Purposeful Interaction: Yes  Pt Identified Daily Priority: Clinical issues (comment)  Caritas Process: Establish trust;Teaching/learning; Attend basic human needs (teaching daughter in law)  Caring Interventions: Therapeutic modalities; Reassure  Reassure: Therapeutic listening;Support family; Acceptance; Informing;Caring rounds  Therapeutic Modalities: Intentional therapeutic touch  Strength:    Strength: Generally decreased, functional (B LE)  Tone & Sensation:   Tone: Normal (B LE)  Sensation: Intact (B LE intact to LT)   Range Of Motion:  AROM: Within functional limits (B LE)  Functional Mobility:  Bed Mobility:  Rolling: Moderate assistance  Supine to Sit: Moderate assistance  Sit to Supine: Minimum assistance; Additional time (with RW)  Scooting: Minimum assistance; Additional time (with RW)  Transfers:  Sit to Stand: Moderate assistance;Assist x2; Additional time  Stand to Sit: Moderate assistance;Assist x2; Additional time  Balance:   Sitting: Impaired; With support  Sitting - Static: Good (unsupported)  Sitting - Dynamic: Fair (occasional)  Standing: Impaired;Pull to stand; With support (with RW)  Standing - Static: Fair;Constant support (with RW)  Standing - Dynamic : Poor (with RW)  Ambulation/Gait Training:  Ambulation - Level of Assistance:  (N/A-unable due to poor standing balance)  Pain:  Pain Scale 1: Numeric (0 - 10)  Pain Intensity 1: 0  Activity Tolerance:   Fair  Please refer to the flowsheet for vital signs taken during this treatment. After treatment:   [ ] Patient left in no apparent distress sitting up in chair  [ ] Patient left sitting on EOB  [X] Patient left in no apparent distress in bed  [ ] Patient declined to be OOB at this time due to   [X] Call bell left within reach  [X] Nursing notified(JOAQUIM Garcia)  [ ] Caregiver present  [ ] Bed alarm activated      COMMUNICATION/EDUCATION:   [X]         Fall prevention education was provided and the patient/caregiver indicated understanding. [X]         Patient/family have participated as able in goal setting and plan of care. [ ]         Patient/family agree to work toward stated goals and plan of care. [ ]         Patient understands intent and goals of therapy, but is neutral about his/her participation. [ ]         Patient is unable to participate in goal setting and plan of care. Thank you for this referral.  Rain Mittal, PT   Time Calculation: 25 mins      G-codes:  Mobility  Current  CL= 60-79%   Goal  CI= 1-19%. The severity rating is based on the Level of Assistance required for Functional Mobility and ADLs.      Eval Complexity: History: HIGH Complexity :3+ comorbidities / personal factors will impact the outcome/ POC Exam:HIGH Complexity : 4+ Standardized tests and measures addressing body structure, function, activity limitation and / or participation in recreation  Presentation: MEDIUM Complexity : Evolving with changing characteristics Overall Complexity:MEDIUM

## 2017-05-16 NOTE — PROGRESS NOTES
Dr Judy Astorga updated with DCP to return to [facility] upon dc & plan is to transfer tomorrow if stable.

## 2017-05-16 NOTE — PROGRESS NOTES
RENAL DAILY PROGRESS NOTE    Patient: Ayaz Wells               Sex: female          DOA: 5/12/2017  8:46 AM        YOB: 1928      Age:  80 y.o.        LOS:  LOS: 4 days     Subjective: Ayaz Wells is a 80 y.o.  who presents with Non-STEMI (non-ST elevated myocardial infarction) ([facility]  CHF exacerbation (HCC)  CHF (congestive heart failure) ([facility] Asked to evaluate for crf stage 4.hx of htn, chf,anemia.   Chief complains: Patient denies nausea, vomiting, chest pain, dizziness, or headache.  - Reviewed last 24 hrs events     Current Facility-Administered Medications   Medication Dose Route Frequency    epoetin daysi (EPOGEN;PROCRIT) injection 6,000 Units  6,000 Units SubCUTAneous Q TUE, THU & SAT    furosemide (LASIX) tablet 40 mg  40 mg Oral DAILY    carvedilol (COREG) tablet 6.25 mg  6.25 mg Oral BID WITH MEALS    hydrALAZINE (APRESOLINE) 20 mg/mL injection 10 mg  10 mg IntraVENous Q6H PRN    allopurinol (ZYLOPRIM) tablet 50 mg  50 mg Oral DAILY    0.9% sodium chloride infusion 250 mL  250 mL IntraVENous PRN    aspirin chewable tablet 81 mg  81 mg Oral DAILY    acetaminophen (TYLENOL) tablet 325 mg  325 mg Oral Q4H PRN    bisacodyl (DULCOLAX) suppository 10 mg  10 mg Rectal DAILY PRN    docusate sodium (COLACE) capsule 100 mg  100 mg Oral BID    ferrous sulfate tablet 325 mg  325 mg Oral ACB    simvastatin (ZOCOR) tablet 20 mg  20 mg Oral QHS    isosorbide dinitrate (ISORDIL) tablet 20 mg  20 mg Oral TID    sodium chloride (NS) flush 5-10 mL  5-10 mL IntraVENous Q8H    sodium chloride (NS) flush 5-10 mL  5-10 mL IntraVENous PRN    heparin (porcine) injection 5,000 Units  5,000 Units SubCUTAneous Q8H       Objective:     Visit Vitals    /56 (BP 1 Location: Left arm, BP Patient Position: At rest)    Pulse 76    Temp 98.6 °F (37 °C)    Resp 20    Ht 5' 6\" (1.676 m)    Wt 62.6 kg (138 lb)    SpO2 98%    BMI 22.27 kg/m2       Intake/Output Summary (Last 24 hours) at 05/16/17 1407  Last data filed at 05/16/17 0151   Gross per 24 hour   Intake              320 ml   Output             1000 ml   Net             -680 ml       Physical Examination:     GEN: AAO X 3, NAD  RS: Chest is bilateral equal, no wheezing / rales / crackles  CVS: S1-S2 heard, RRR, No S3 / murmur  Abdomen: Soft, Non tender, Not distended, Positive bowel sounds, no organomegaly, no CVA / supra pubic tenderness  Extremities: No edema, no cyanosis, skin is warm on touch  CNS: Awake & follows commands, CN II-XII are grossly intact. HEENT: Head is atraumatic, PERRLA, conjunctiva pink & non icteric. No JVD or carotid bruit   Psychiatric: Normal mood, affect, judgement & memory    Musculoskeletal: No gross joints or bone deformities   Lymph Node: No palpable cervical, axillary or groin lymphadenopathy. Data Review:      Labs:     Hematology:   Recent Labs      05/16/17   0210  05/15/17   0410  05/14/17   0300   WBC  7.2  8.9  6.4   HGB  10.0*  11.1*  6.7*   HCT  29.2*  32.6*  20.5*     Chemistry:   Recent Labs      05/16/17   0210  05/15/17   0410  05/14/17   0300   BUN  46*  46*  50*   CREA  2.48*  2.62*  2.92*   CA  8.7  9.1  8.3*   K  4.6  4.7  4.6   NA  136  137  138   CL  104  104  103   CO2  24  24  25   GLU  93  104*  89        Images:    XR (Most Recent). CXR reviewed by me and compared with previous CXR   Results from Hospital Encounter encounter on 05/12/17   XR CHEST PORT   Narrative Portable chest    History: Shortness of breath since last night. Comparison: Chest radiograph 2/25/2017    Findings: The cardiomediastinal silhouette is borderline considering technique. The  pulmonary vasculature is prominent. Calcified atherosclerotic plaque is present  in the aorta. Linear opacities are present throughout the lungs.  Opacities are  present at the lung bases with silhouetting of the medial right hemidiaphragm  and the left hemidiaphragm with meniscus at the left costophrenic angle.  No  acute osseous abnormality. Degenerative changes are redemonstrated at the  shoulders and within the thoracic spine. Impression Impression:    Central vascular congestion with likely interstitial edema and pleural effusions  associated with basilar consolidations, likely compressive atelectasis. Thank you for this referral.         CT (Most Recent)   Results from [place] encounter on 02/23/17   CT ABD PELV WO CONT   Narrative CT Of The Abdomen And Pelvis Without Contrast    CPT CODE 21366,85612    CLINICAL HISTORY: Chronic renal insufficiency, CVA and hypertension. Breast  cancer. Presenting with shortness of breath, and left flank pain. TECHNIQUE: 5 mm helical MDCT scan was obtained of the abdomen and pelvis. Sagittal and coronal images created from original data set. All CT scans at  this facility are performed using dose optimization techniques as appropriate to  a performed exam, to include automated exposure control, adjustment of the mA  and/or kV according to patient's size (including appropriate matching for site  specific examinations), or use of iterative reconstruction technique. COMPARISON: Portable chest x-ray today. FINDINGS:  view demonstrates normal bowel gas pattern. Dense barium in the  rectum. CT Of The Abdomen: Lung bases: Dense opacity basilar segments left lower lobe  medially. Hazy densities right lung base posteriorly. Marked calcifications of  the tracheobronchial tree. Small effusions layering posteriorly. Larger  subpulmonic component on the left than the right. Heart and pericardium: Cardiomegaly. Left ventricular dilatation. Mild coronary  artery calcifications. CHEST WALL: Coarse calcifications inferiorly at the right breast. No mass is  identified. Liver: No focal lesions. Artifact traversing the liver because the patient was  scanned with her arms at her sides. Spleen: Normal.    Gallbladder: Nondistended.  No calcified stones. Pancreas: Normal.    Adrenal glands: Mild thickening bilaterally. Kidneys: No hydronephrosis or nephrolithiasis. There is a hypodense lesion at  the interpolar region right kidney anteriorly measuring 22 mm and water  attenuation (39). Retroperitoneum: Moderate burden calcified plaque at aortoiliac vessels. No  lymphadenopathy. The bowel: Stomach and duodenum and small bowel are normal. Appendix not  identified but there is no inflammatory stranding around the base of the cecum. .    CT Of The Pelvis: Peritoneal space: No free fluid. GYN: Prior hysterectomy. No adnexal masses. Urinary bladder: Normal.    Bony skeleton: Multiple levels severe disc space narrowing at the lumbar spine. Multiple levels marked spinal stenosis due to ligamentous and facet hypertrophy,  disc disease. No acute bony abnormalities. .         Impression IMPRESSION:    No kidney stones or secondary signs of recently passed stone. Small effusions. Dependent densities at the lung bases, likely passive  atelectasis. Cannot exclude infection. Right renal cyst.    Significant degenerative changes of the lumbar spine. EKG Results for orders placed or performed in visit on 03/07/17   AMB POC EKG ROUTINE W/ 12 LEADS, INTER & REP     Status: None    Narrative    EKG: unchanged from previous tracings, normal sinus rhythm, nonspecific ST and T waves changes, left axis deviation, mild IVCD        I have personally reviewed the old medical records and patient's labs    Plan / Recommendation:      1. crf stage 4,discussed with pt and her son. they want conservative treatment. no dialysis  2.anemia,nl spep,upep,light chains ratio is nl.received transfusion  3. chf ,compensated,on lasix    D/w Dr. Marlo Montano MD  Nephrology  5/16/2017

## 2017-05-16 NOTE — PROGRESS NOTES
Problem: Self Care Deficits Care Plan (Adult)  Goal: *Acute Goals and Plan of Care (Insert Text)  Occupational Therapy Goals  Initiated 5/16/2017 within 7 day(s). 1. Patient will perform grooming with modified independence   2. Patient will perform upper body dressing with supervision/set-up. 3. Patient will perform lower body dressing with minimal assistance/contact guard assist.  4. Patient will perform BSC transfers with minimal assistance/contact guard assist.  5. Patient will perform all aspects of toileting with minimal assistance/contact guard assist.  6. Patient will participate in upper extremity therapeutic exercise/activities with supervision/set-up in preparation for toileting tasks  Outcome: Progressing Towards Goal  OCCUPATIONAL THERAPY EVALUATION     Patient: Ayaz Wells (80 y.o. female)  Date: 5/16/2017  Primary Diagnosis: Non-STEMI (non-ST elevated myocardial infarction) ([facility] [address]  CHF exacerbation (HCC)  CHF (congestive heart failure) ([facility] [address]        Precautions:   Fall      ASSESSMENT :  Based on the objective data described below, the patient needed mod A during bed mobility. Patient has fair sitting balance. Patient needed mod A x2 to stand to perform toileting tasks with additional time, secondary to incontinence in bed. Patient needed total assistance during toileting tasks. Patient needed mod A to doff/sonja new gown sitting on EOB. Patient has decreased BUE AROM and strength. Patient needed min A x2 to return to supine. Patient left in no distress; nursing notified. Patient will benefit from skilled intervention to address the above impairments.   Patients rehabilitation potential is considered to be Good  Factors which may influence rehabilitation potential include:   [ ]             None noted  [ ]             Mental ability/status  [X]             Medical condition  [ ]             Home/family situation and support systems  [ ]             Safety awareness  [ ]             Pain tolerance/management  [ ]             Other:        PLAN :  Recommendations and Planned Interventions:  [X]               Self Care Training                  [X]        Therapeutic Activities  [X]               Functional Mobility Training    [ ]        Cognitive Retraining  [X]               Therapeutic Exercises           [X]        Endurance Activities  [X]               Balance Training                   [ ]        Neuromuscular Re-Education  [ ]               Visual/Perceptual Training     [X]   Home Safety Training  [X]               Patient Education                 [ ]        Family Training/Education  [ ]               Other (comment):     Frequency/Duration: Patient will be followed by occupational therapy 1-2 times per day/4-7 days per week to address goals. Discharge Recommendations: Hemant Winston  Further Equipment Recommendations for Discharge: TBD       PATIENT COMPLEXITY      Eval Complexity: History: LOW Complexity : Brief history review ; Examination: MEDIUM Complexity : 3-5 performance deficits relating to physical, cognitive , or psychosocial skils that result in activity limitations and / or participation restrictions; Decision Making:MEDIUM Complexity : Patient may present with comorbidities that affect occupational performnce. Miniml to moderate modification of tasks or assistance (eg, physical or verbal ) with assesment(s) is necessary to enable patient to complete evaluation  Assessment: Low Complexity       G-CODES:      Self Care  Current  CL= 60-79%   Goal  CK= 40-59%. The severity rating is based on the Level of Assistance required for Functional Mobility and ADLs. SUBJECTIVE:   Patient stated I'm going again.  (referring to bowel movement sitting at EOB)      OBJECTIVE DATA SUMMARY:       Past Medical History:   Diagnosis Date    Breast cancer ([facility] [address] 1/17/14     right breast    Cardiac echocardiogram 02/17/2017     EF 50%. No WMA. Mod conc LVH. Indeterminate diastolic fx. Mod LAE. Mild MR. Mild AI. Mild PI. No signifciant valvular heart disease.  Cardiovascular lower extremity arterial testing 12/19/2014     Mod tibio-peroneal arterial disease bilaterally. R YANCY 1.24.  L YANCY 0.74. Bilateral sm vessel disease.  Carotid duplex 11/19/2014     Severe 70% or greater bilateral ICA stenosis. LICA dissection suggested.  Chronic renal insufficiency      CVA (cerebral vascular accident) ([facility] [address] 2003     with slight Right sided weakness    Edema      Glaucoma      Gout flare      Hyperlipidemia      Hypertension      Lump of right breast      URI (upper respiratory infection)       Past Surgical History:   Procedure Laterality Date    HX APPENDECTOMY        HX CYST INCISION AND DRAINAGE         PT DOES NOT REMEMBER EXACT DATES, TUCKER BREAST DONE MORE THAN 20 YEARS AGO. BENIGN FINDINGS PER PATIENT.  HX GYN         JUSTIN/BSO    HX HYSTERECTOMY        HX MASTECTOMY   1/17/2014     RIGHT PARTIAL MASTECTOMY performed by Samia Thomas MD at [facility]     Prior Level of Function/Home Situation: Patient reported she was mod I in basic self care tasks and functional mobility PTA, but patient is not a good historian, secondary to cognition. Home Situation  Home Environment: Skilled nursing facility  One/Two Story Residence: One story  Living Alone: No  Support Systems: Child(phu), Skilled nursing facility, Family member(s)  Patient Expects to be Discharged to[de-identified] Skilled nursing facility  [X]  Right hand dominant          [ ]  Left hand dominant  Cognitive/Behavioral Status:  Neurologic State: Alert  Orientation Level: Oriented to person and place, but disoriented to time and situation   Cognition: Follows commands     Skin: No skin changes noted     Edema: No edema noted     Vision/Perceptual:       Acuity: Within Defined Limits       Coordination:  Coordination: Within functional limits (BUEs)       Balance:  Sitting: Impaired; With support  Sitting - Static: Good (unsupported)  Sitting - Dynamic: Fair (occasional)  Standing: Impaired; With support  Standing - Static: Fair     Strength:  Strength: Generally decreased, functional (BUEs 3+/5)     Tone & Sensation:  Tone: Normal (BUEs)  Sensation: Intact (BUEs)     Range of Motion:  AROM: Generally decreased, functional (BUEs)     Functional Mobility and Transfers for ADLs:  Bed Mobility:  Supine to Sit: Moderate assistance  Sit to Supine: Minimum assistance;Assist x2  Scooting: Minimum assistance  Transfers:  Sit to Stand: Moderate assistance;Assist x2     ADL Assessment:(clicnial judgement based on functional mobility)  Feeding: Contact guard assistance;Setup     Oral Facial Hygiene/Grooming: Minimum assistance;Setup     Bathing: Maximum assistance     Upper Body Dressing: Moderate assistance     Lower Body Dressing: Maximum assistance     Toileting: Total assistance     Pain:  Pt reports pain in toes, but did not rate with a number      Activity Tolerance:   Fair/poor     Please refer to the flowsheet for vital signs taken during this treatment. After treatment:   [ ] Patient left in no apparent distress sitting up in chair  [X] Patient left in no apparent distress in bed  [X] Call bell left within reach  [X] Nursing notified  [ ] Caregiver present  [ ] Bed alarm activated      COMMUNICATION/EDUCATION:   [ ] Home safety education was provided and the patient/caregiver indicated understanding. [X] Patient/family have participated as able in goal setting and plan of care. [X] Patient/family agree to work toward stated goals and plan of care. [ ] Patient understands intent and goals of therapy, but is neutral about his/her participation. [ ] Patient is unable to participate in goal setting and plan of care.      Thank you for this referral.  Chen Mays OTR/L  Time Calculation: 26 mins

## 2017-05-16 NOTE — PROGRESS NOTES
Bedside and Verbal shift change report given to  Jackson Martino RN (oncoming nurse) by Jacob Bashir RN (offgoing nurse). Report included the following information SBAR, Kardex, MAR and Recent Results. SITUATION:    Code Status: Full Code  Reason for Admission: Non-STEMI (non-ST elevated myocardial infarction) ([facility]  CHF exacerbation ([facility]   CHF (congestive heart failure) ([facility]    [facility] day: 4   Problem List:       Hospital Problems  Date Reviewed: 5/12/2017          Codes Class Noted POA    Non-STEMI (non-ST elevated myocardial infarction) ([facility] ICD-10-CM: I21.4  ICD-9-CM: 410.70  5/12/2017 Unknown        CHF exacerbation ([facility] ICD-10-CM: I50.9  ICD-9-CM: 428.0  5/12/2017 Unknown        Acute on chronic diastolic congestive heart failure ([facility] ICD-10-CM: I50.33  ICD-9-CM: 428.33, 428.0  5/12/2017 Unknown        Stage 4 chronic kidney disease ([facility] ICD-10-CM: N18.4  ICD-9-CM: 585.4  5/12/2017 Unknown        CHF (congestive heart failure) ([facility] ICD-10-CM: I50.9  ICD-9-CM: 428.0  5/12/2017 Unknown        ACS (acute coronary syndrome) ([facility] ICD-10-CM: I24.9  ICD-9-CM: 411.1  2/21/2016 Yes              BACKGROUND:    Past Medical History:   Past Medical History:   Diagnosis Date    Breast cancer ([facility] 1/17/14    right breast    Cardiac echocardiogram 02/17/2017    EF 50%. No WMA. Mod conc LVH. Indeterminate diastolic fx. Mod LAE. Mild MR. Mild AI. Mild PI. No signifciant valvular heart disease.  Cardiovascular lower extremity arterial testing 12/19/2014    Mod tibio-peroneal arterial disease bilaterally. R YANCY 1.24.  L YANCY 0.74. Bilateral sm vessel disease.  Carotid duplex 11/19/2014    Severe 70% or greater bilateral ICA stenosis. LICA dissection suggested.     Chronic renal insufficiency     CVA (cerebral vascular accident) ([facility] [address] 2003    with slight Right sided weakness    Edema     Glaucoma     Gout flare     Hyperlipidemia     Hypertension     Lump of right breast     URI (upper respiratory infection)          Patient taking anticoagulants Yes    ASSESSMENT:    Changes in Assessment Throughout Shift: N/A     Patient has Central Line: no Reasons if yes: N/A   Patient has Jang Cath: no Reasons if yes: N/A      Last Vitals:     Vitals:    05/15/17 1114 05/15/17 1507 05/15/17 1904 05/16/17 0000   BP: 163/69 166/72 162/53 169/72   Pulse: 72 70 71 73   Resp: 18 18 20 18   Temp: 97.5 °F (36.4 °C) 97.1 °F (36.2 °C) 97.5 °F (36.4 °C) 98.2 °F (36.8 °C)   SpO2: 100% 100% 100% 91%   Weight:       Height:            IV and DRAINS (will only show if present)   Peripheral IV 05/14/17 Left Wrist-Site Assessment: Clean, dry, & intact  Peripheral IV 05/12/17 Left Antecubital-Site Assessment: Clean, dry, & intact     WOUND (if present)   Wound Type:  none   Dressing present Dressing Present : Yes, Changed   Wound Concerns/Notes:  none     PAIN    Pain Assessment    Pain Intensity 1: 0 (05/15/17 1600)    Pain Location 1: Leg    Pain Intervention(s) 1: Declines    Patient Stated Pain Goal: 0  o Interventions for Pain:  none  o Intervention effective: no and N/A  o Time of last intervention: N/A   o Reassessment Completed: no and N/A      Last 3 Weights:  Last 3 Recorded Weights in this Encounter    05/13/17 0500 05/14/17 0332 05/15/17 0400   Weight: 61.4 kg (135 lb 6.4 oz) 62.4 kg (137 lb 8 oz) 62.1 kg (136 lb 14.4 oz)     Weight change:      INTAKE/OUPUT    Current Shift:      Last three shifts: 05/14 0701 - 05/15 1900  In: 1500 [P.O.:1500]  Out: 2250 [Urine:2250]     LAB RESULTS     Recent Labs      05/15/17   0410  05/14/17   0300  05/13/17   0330   WBC  8.9  6.4  6.3   HGB  11.1*  6.7*  6.9*   HCT  32.6*  20.5*  20.4*   PLT  264  249  229        Recent Labs      05/15/17   0410  05/14/17   0300  05/13/17   0330   NA  137  138  137   K  4.7  4.6  4.5   GLU  104*  89  89   BUN  46*  50*  50*   CREA  2.62*  2.92*  3.07*   CA  9.1  8.3*  8.4*   MG  2.2   --    --        RECOMMENDATIONS AND DISCHARGE PLANNING     1. Pending tests/procedures/ Plan of Care or Other Needs:      2. Discharge plan for patient and Needs/Barriers:     3. Estimated Discharge Date: TBD Posted on Whiteboard in Patients Room: yes      4. The patient's care plan was reviewed with the oncoming nurse. \"HEALS\" SAFETY CHECK      Fall Risk    Total Score: 4    Safety Measures: Safety Measures: Bed/Chair-Wheels locked, Bed/Chair alarm on, Bed in low position, Call light within reach, Family at bedside    A safety check occurred in the patient's room between off going nurse and oncoming nurse listed above. The safety check included the below items  Area Items   H  High Alert Medications - Verify all high alert medication drips (heparin, PCA, etc.)   E  Equipment - Suction is set up for ALL patients (with eric)  - Red plugs utilized for all equipment (IV pumps, etc.)  - WOWs wiped down at end of shift.  - Room stocked with oxygen, suction, and other unit-specific supplies   A  Alarms - Bed alarm is set for fall risk patients  - Ensure chair alarm is in place and activated if patient is up in a chair   L  Lines - Check IV for any infiltration  - Jang bag is empty if patient has a Jang   - Tubing and IV bags are labeled   S  Safety   - Room is clean, patient is clean, and equipment is clean. - Hallways are clear from equipment besides carts. - Fall bracelet on for fall risk patients  - Ensure room is clear and free of clutter  - Suction is set up for ALL patients (with eric)  - Hallways are clear from equipment besides carts.    - Isolation precautions followed, supplies available outside room, sign posted     Melvi Wilkinson RN

## 2017-05-16 NOTE — WOUND CARE
Physical Exam   Room 219: wound assessment  Wound Gluteal fold / cleft Posterior (Active) Moisture Associated Skin Damage POA   DRESSING STATUS Intact 5/16/2017 11:52 AM   DRESSING TYPE Silicone 0/37/7077 33:33 AM   Non-Pressure Injury Partial thickness (epider/derm) 5/16/2017 11:52 AM   Condition of Base Epithelializing 5/16/2017 11:52 AM   Condition of Edges Closed 5/16/2017 11:52 AM   Epithelialization (%) 100 5/16/2017 11:52 AM   Drainage Amount  None 5/16/2017 11:52 AM   Wound Odor None 5/16/2017 11:52 AM   Periwound Skin Condition Intact 5/16/2017 11:52 AM   Dressing Type Applied Silicone 3/61/9749 03:69 AM   Procedure Tolerated Well 5/16/2017 11:52 AM   Number of days:3   Pericare provided for formed BM. Pt agreeable to continue current treatment. Wound care education provided to pt at this time. Will turn over care to nursing staff at this time.   Michelle Pinon BSN, RN, [facility], [address]

## 2017-05-16 NOTE — PROGRESS NOTES
Care Management Interventions  Palliative Care Consult (Criteria: CHF and RRAT>21): Yes  Mode of Transport at Discharge:  (likely stetcher transport needed)  Transition of Care Consult (CM Consult): SNF (from [facility])  Discharge Durable Medical Equipment: No  Physical Therapy Consult: Yes  Occupational Therapy Consult: Yes  Speech Therapy Consult: Yes  Current Support Network: Lives Alone (usually lives alone but POA [facility] states family wants NHP  )  Plan discussed with Pt/Family/Caregiver: Yes (pt appears confused / will contact [facility])  Freedom of Choice Offered: Yes ([address] requests pt to return to [facility] at [facility] )  Discharge Location  Discharge Placement: Skilled nursing facility [facility])   MPOA - daughter in law Leticia Chen - [phone]  DASHA Trujillo Granados - [phone] or [phone]    Reviewed chart . DW Dr Olivia Max x2. Pt confused & no visitors in room . [facility] who states that \"my mother is really the POA ,I am just on the advance directive\". She deferred DCP decisions to her mother Mark Benedict. I called [facility] via phone who identified herself as pt's POA for \"legal & medical\" & states she is the pt's daughter in law. She states the pt can no longer live alone & since she has to work she needs NHP for the pt. She states pt is from [facility] where she was getting PT & OT,but that all the paperwork has been submitted for a Medicaid application thru the  there & it is pending. She is of the understanding that pt can return there at dc. Called [facility] & spoke with Jacki Foster RN in Admissions. She confirmed that the pt is from there & can return upon dc. Informed her that I will enter pt in cclink today for review with possible transfer tomorrow.     Total time spent = est 35 min

## 2017-05-16 NOTE — PROGRESS NOTES
Hospitalist Progress Note    Patient: Barbara Miranda MRN: [id number]  CSN: [id number]    YOB: 1928  Age: 80 y.o. Sex: female    DOA: 5/12/2017 LOS:  LOS: 4 days        Patient was in [facility] pta. Patient reports she feels better today. Denies chest pain or nausea. Assessment/Plan     1. Hypoxia at admission - sats 79% per EMS, improved with O2. Recently completed tx for HCAP in SNF. 2. Acute on chronic diastolic HF exacerbation. On 40mg PO daily of lasix as outpatient. Gentle diuresis, strict i/o  3. NSTEMI, plan for medical management given advanced age/frailty/co-morbidities. Has been maintained on BB, asa, statin and long-acting nitrates in outpatient setting. EKG unchanged from prior studies with non-specific ST abnormalities. 4. Historically low/normal LV function (50%) by echo (2/17/17), no WMAs, mild mitral regurg and aortic regurg. 5. CKD 4 - family request conservative tx, no HD per nephrology notes. .  6. PAD, high grade bilat carotid artery stenosis  7. Anemia of chronic disease  8. HTN  9. Dyslipidemia on statin  10. CVA hx with residual R sided weakness on asa, statin. 11. 2ndary hyperparathyroidism of ckd  12. mild oropharyngeal dysphagia - ST recs noted. 13. moisture associated skin damage to  gluteal fold /  cleft posterior -   POA - wound care recs noted. 14. Full code. Anticipate return to [facility] in am if stable. Additional Notes:      Case discussed with:  [x]Patient  []Family  [x]Nursing  [x]Case Management  DVT Prophylaxis:  []Lovenox  []Hep SQ  []SCDs  []Coumadin   []On Heparin gtt    Vital signs/Intake and Output:  Visit Vitals    /57 (BP 1 Location: Left arm)    Pulse 66    Temp 97.6 °F (36.4 °C)    Resp 20    Ht 5' 6\" (1.676 m)    Wt 62.6 kg (138 lb)    SpO2 98%    BMI 22.27 kg/m2     Current Shift:     Last three shifts:  05/14 1901 - 05/16 0700  In: 1340 [P.O.:1340]  Out: 1950 [Urine:1950]    Awake alert and oriented x 1-2  Ncat. perrl  RRR  Decreased bs at bases  Soft nt nd nabs. No edema. No focal deficit  moisture associated skin damage to  gluteal fold /  cleft posterior -   POA      Medications Reviewed      Labs: Results:       Chemistry Recent Labs      05/16/17   0210  05/15/17   0410  05/14/17   0300   GLU  93  104*  89   NA  136  137  138   K  4.6  4.7  4.6   CL  104  104  103   CO2  24  24  25   BUN  46*  46*  50*   CREA  2.48*  2.62*  2.92*   CA  8.7  9.1  8.3*   AGAP  8  9  10   BUCR  19  18  17      CBC w/Diff Recent Labs      05/16/17   0210  05/15/17   0410  05/14/17   0300   WBC  7.2  8.9  6.4   RBC  3.25*  3.63*  2.22*   HGB  10.0*  11.1*  6.7*   HCT  29.2*  32.6*  20.5*   PLT  257  264  249   GRANS  59  76*   --    LYMPH  24  17*   --    EOS  4  0   --       Cardiac Enzymes No results for input(s): CPK, CKND1, VIRGILIO in the last 72 hours. No lab exists for component: CKRMB, TROIP   Coagulation No results for input(s): PTP, INR, APTT in the last 72 hours. No lab exists for component: INREXT, INREXT    Lipid Panel Lab Results   Component Value Date/Time    Cholesterol, total 148 02/25/2017 09:40 AM    HDL Cholesterol 52 02/25/2017 09:40 AM    LDL, calculated 82.2 02/25/2017 09:40 AM    VLDL, calculated 13.8 02/25/2017 09:40 AM    Triglyceride 69 02/25/2017 09:40 AM    CHOL/HDL Ratio 2.8 02/25/2017 09:40 AM      BNP No results for input(s): BNPP in the last 72 hours. Liver Enzymes No results for input(s): TP, ALB, TBIL, AP, SGOT, GPT in the last 72 hours.     No lab exists for component: DBIL   Thyroid Studies Lab Results   Component Value Date/Time    TSH 3.09 02/24/2017 11:54 PM        Procedures/imaging: see electronic medical records for all procedures/Xrays and details which were not copied into this note but were reviewed prior to creation of Plan

## 2017-05-16 NOTE — PROGRESS NOTES
[facility]  -  Progress Note      Patient: Giulia Muse MRN: [id number]  SSN: [id number]    YOB: 1928  Age: 80 y.o. Sex: female      Admit Date: 5/12/2017    Hospital Problem List:     Hospital Problems  Date Reviewed: 5/12/2017          Codes Class Noted POA    Non-STEMI (non-ST elevated myocardial infarction) ([facility] ICD-10-CM: I21.4  ICD-9-CM: 410.70  5/12/2017 Unknown        CHF exacerbation ([facility] ICD-10-CM: I50.9  ICD-9-CM: 428.0  5/12/2017 Unknown        Acute on chronic diastolic congestive heart failure ([facility] ICD-10-CM: I50.33  ICD-9-CM: 428.33, 428.0  5/12/2017 Unknown        Stage 4 chronic kidney disease ([facility] ICD-10-CM: N18.4  ICD-9-CM: 585.4  5/12/2017 Unknown        CHF (congestive heart failure) ([facility] ICD-10-CM: I50.9  ICD-9-CM: 428.0  5/12/2017 Unknown        ACS (acute coronary syndrome) ([facility] ICD-10-CM: I24.9  ICD-9-CM: 411.1  2/21/2016 Yes            -Acute hypoxic respiratory failure, Sats 79% per EMS, improved with O2  -Acute on chronic diastolic HF exacerbation, BNP >70k, has had significantly elevated BNPs in the past with HF exacerbations as well as chronic kidney disease. CXR consistent with heart failure given pulmonary vascular congestions and pleural effusions. On 40mg PO daily of lasix as outpatient. -NSTEMI, plan for medical management given advanced age/frailty/co-morbidities. Has been maintained on BB, asa, statin and long-acting nitrates in outpatient setting. EKG unchanged from prior studies with non-specific ST abnormalities.  -Historically low/normal LV function (50%) by echo (2/17/17), no WMAs, mild mitral regurge and aortic regurg.  -CKD, stage IV, advised to follow up with nephrology for possible dialysis initiation   -PVD, high grade bilat carotid artery stenosis  -Anemia of chronic disease  -HTN  -HLD  -CVA, remote, some residual R sided weakness  -GOUT  -Advanced age  -Full code  -SNF resident    Plan:     -Dispo planning in progress. -Diuresed well. Appears euvolemic. Transitioned back to PO lasix. Continue.  -Continue asa, statin, BB, and long-acting nitrate.   -Patient to follow up with our office on June 6th at 9:20am with Dr. Erika Lima. Subjective:     Pleasant and talkative. Confused. Objective:      Patient Vitals for the past 8 hrs:   Temp Pulse Resp BP SpO2   05/16/17 1107 98.6 °F (37 °C) 76 20 162/56 98 %   05/16/17 0813 98.8 °F (37.1 °C) 82 20 178/78 98 %         Patient Vitals for the past 96 hrs:   Weight   05/16/17 0423 62.6 kg (138 lb)   05/15/17 0400 62.1 kg (136 lb 14.4 oz)   05/14/17 0332 62.4 kg (137 lb 8 oz)   05/13/17 0500 61.4 kg (135 lb 6.4 oz)         Intake/Output Summary (Last 24 hours) at 05/16/17 1249  Last data filed at 05/16/17 0151   Gross per 24 hour   Intake              320 ml   Output             1000 ml   Net             -680 ml       Physical Exam:  General: awake and alert but confused, oriented to self only  Neck:  supple  Lungs:  Clear to auscultation bilaterally   Heart:  Reg rate and rhythm  Abdomen: soft, thin, non-tender  Extremities: no LE edema, atraumatic    Data Review:     Labs: Results:       Chemistry Recent Labs      05/16/17   0210  05/15/17   0410  05/14/17   0300   GLU  93  104*  89   NA  136  137  138   K  4.6  4.7  4.6   CL  104  104  103   CO2  24  24  25   BUN  46*  46*  50*   CREA  2.48*  2.62*  2.92*   CA  8.7  9.1  8.3*   MG   --   2.2   --    AGAP  8  9  10   BUCR  19  18  17      CBC w/Diff Recent Labs      05/16/17   0210  05/15/17   0410  05/14/17   0300   WBC  7.2  8.9  6.4   RBC  3.25*  3.63*  2.22*   HGB  10.0*  11.1*  6.7*   HCT  29.2*  32.6*  20.5*   PLT  257  264  249   GRANS  59  76*   --    LYMPH  24  17*   --    EOS  4  0   --       Cardiac Enzymes No results found for: CPK, CKMMB, CKMB, RCK3, CKMBT, CKNDX, CKND1, VIRGILIO, TROPT, TROIQ, ELIZABETH, TROPT, TNIPOC, BNP, BNPP   Coagulation No results for input(s): PTP, INR, APTT in the last 72 hours.     No lab exists for component: INREXT    Lipid Panel Lab Results   Component Value Date/Time    Cholesterol, total 148 02/25/2017 09:40 AM    HDL Cholesterol 52 02/25/2017 09:40 AM    LDL, calculated 82.2 02/25/2017 09:40 AM    VLDL, calculated 13.8 02/25/2017 09:40 AM    Triglyceride 69 02/25/2017 09:40 AM    CHOL/HDL Ratio 2.8 02/25/2017 09:40 AM      BNP No results found for: BNP, BNPP, XBNPT   Liver Enzymes No results for input(s): TP, ALB, TBIL, AP, SGOT, GPT in the last 72 hours.     No lab exists for component: DBIL   Digoxin    Thyroid Studies Lab Results   Component Value Date/Time    TSH 3.09 02/24/2017 11:54 PM            Signed By: Brigid Dakins, PA     May 16, 2017

## 2017-05-16 NOTE — INTERDISCIPLINARY ROUNDS
IDR/SLIDR Summary          Patient: Barbara Miranda MRN: [id number]    Age: 80 y.o. YOB: 1928 Room/Bed: 219/   Admit Diagnosis: Non-STEMI (non-ST elevated myocardial infarction) ([facility]  CHF exacerbation (HCC)  CHF (congestive heart failure) (HCC)  Principal Diagnosis: <principal problem not specified>   Goals: No SOB  Readmission: NO  Quality Measure: CHF  VTE Prophylaxis: Chemical  Influenza Vaccine screening completed? NO  Pneumococcal Vaccine screening completed? NO  Mobility needs: Yes, No   Nutrition plan:No  Consults: P. T    Financial concerns:No  Escalated to CM? NO  RRAT Score: 40   Interventions:Home Health  Testing due for pt today?  NO  LOS: 4 days Expected length of stay 5 days  Discharge plan: SNF   PCP: Sallie Galicia MD  Transportation needs: Yes    Days before discharge:longer than expected LOS  Discharge disposition: SNF    Signed:     Theresa Polk RN  5/16/2017  8:23 AM

## 2017-05-17 VITALS
BODY MASS INDEX: 22.16 KG/M2 | HEIGHT: 66 IN | SYSTOLIC BLOOD PRESSURE: 151 MMHG | HEART RATE: 71 BPM | TEMPERATURE: 98.6 F | OXYGEN SATURATION: 99 % | RESPIRATION RATE: 16 BRPM | DIASTOLIC BLOOD PRESSURE: 73 MMHG | WEIGHT: 137.9 LBS

## 2017-05-17 LAB
ANION GAP BLD CALC-SCNC: 10 MMOL/L (ref 3–18)
BASOPHILS # BLD AUTO: 0 K/UL (ref 0–0.1)
BASOPHILS # BLD: 0 % (ref 0–2)
BUN SERPL-MCNC: 45 MG/DL (ref 7–18)
BUN/CREAT SERPL: 18 (ref 12–20)
CALCIUM SERPL-MCNC: 8.9 MG/DL (ref 8.5–10.1)
CHLORIDE SERPL-SCNC: 103 MMOL/L (ref 100–108)
CO2 SERPL-SCNC: 24 MMOL/L (ref 21–32)
CREAT SERPL-MCNC: 2.49 MG/DL (ref 0.6–1.3)
DIFFERENTIAL METHOD BLD: ABNORMAL
EOSINOPHIL # BLD: 0.4 K/UL (ref 0–0.4)
EOSINOPHIL NFR BLD: 5 % (ref 0–5)
ERYTHROCYTE [DISTWIDTH] IN BLOOD BY AUTOMATED COUNT: 14.8 % (ref 11.6–14.5)
GLUCOSE SERPL-MCNC: 93 MG/DL (ref 74–99)
HCT VFR BLD AUTO: 29.2 % (ref 35–45)
HGB BLD-MCNC: 9.9 G/DL (ref 12–16)
LYMPHOCYTES # BLD AUTO: 25 % (ref 21–52)
LYMPHOCYTES # BLD: 1.7 K/UL (ref 0.9–3.6)
MCH RBC QN AUTO: 30.7 PG (ref 24–34)
MCHC RBC AUTO-ENTMCNC: 33.9 G/DL (ref 31–37)
MCV RBC AUTO: 90.4 FL (ref 74–97)
MONOCYTES # BLD: 0.9 K/UL (ref 0.05–1.2)
MONOCYTES NFR BLD AUTO: 12 % (ref 3–10)
NEUTS SEG # BLD: 4 K/UL (ref 1.8–8)
NEUTS SEG NFR BLD AUTO: 58 % (ref 40–73)
PLATELET # BLD AUTO: 266 K/UL (ref 135–420)
PMV BLD AUTO: 9.8 FL (ref 9.2–11.8)
POTASSIUM SERPL-SCNC: 4.4 MMOL/L (ref 3.5–5.5)
RBC # BLD AUTO: 3.23 M/UL (ref 4.2–5.3)
SODIUM SERPL-SCNC: 137 MMOL/L (ref 136–145)
WBC # BLD AUTO: 7 K/UL (ref 4.6–13.2)

## 2017-05-17 PROCEDURE — 74011250637 HC RX REV CODE- 250/637: Performed by: PHYSICIAN ASSISTANT

## 2017-05-17 PROCEDURE — 74011250637 HC RX REV CODE- 250/637: Performed by: HOSPITALIST

## 2017-05-17 PROCEDURE — 36415 COLL VENOUS BLD VENIPUNCTURE: CPT | Performed by: HOSPITALIST

## 2017-05-17 PROCEDURE — 74011250637 HC RX REV CODE- 250/637: Performed by: EMERGENCY MEDICINE

## 2017-05-17 PROCEDURE — 80048 BASIC METABOLIC PNL TOTAL CA: CPT | Performed by: HOSPITALIST

## 2017-05-17 PROCEDURE — 74011250637 HC RX REV CODE- 250/637: Performed by: INTERNAL MEDICINE

## 2017-05-17 PROCEDURE — 85025 COMPLETE CBC W/AUTO DIFF WBC: CPT | Performed by: HOSPITALIST

## 2017-05-17 RX ORDER — ALLOPURINOL 100 MG/1
50 TABLET ORAL DAILY
Qty: 30 TAB | Refills: 2 | Status: SHIPPED
Start: 2017-05-17

## 2017-05-17 RX ORDER — ISOSORBIDE DINITRATE 20 MG/1
30 TABLET ORAL 3 TIMES DAILY
Status: DISCONTINUED | OUTPATIENT
Start: 2017-05-17 | End: 2017-05-17 | Stop reason: HOSPADM

## 2017-05-17 RX ORDER — ISOSORBIDE DINITRATE 30 MG/1
30 TABLET ORAL 3 TIMES DAILY
Qty: 30 TAB | Refills: 2 | Status: SHIPPED
Start: 2017-05-17

## 2017-05-17 RX ORDER — AMLODIPINE BESYLATE 5 MG/1
5 TABLET ORAL DAILY
Qty: 30 TAB | Refills: 2 | Status: ON HOLD
Start: 2017-05-17 | End: 2018-01-01

## 2017-05-17 RX ORDER — AMLODIPINE BESYLATE 5 MG/1
5 TABLET ORAL DAILY
Status: DISCONTINUED | OUTPATIENT
Start: 2017-05-17 | End: 2017-05-17 | Stop reason: HOSPADM

## 2017-05-17 RX ORDER — ISOSORBIDE DINITRATE 20 MG/1
10 TABLET ORAL
Status: COMPLETED | OUTPATIENT
Start: 2017-05-17 | End: 2017-05-17

## 2017-05-17 RX ORDER — CARVEDILOL 6.25 MG/1
6.25 TABLET ORAL 2 TIMES DAILY WITH MEALS
Qty: 30 TAB | Refills: 2 | Status: SHIPPED
Start: 2017-05-17

## 2017-05-17 RX ADMIN — FUROSEMIDE 40 MG: 40 TABLET ORAL at 10:19

## 2017-05-17 RX ADMIN — ISOSORBIDE DINITRATE 10 MG: 20 TABLET ORAL at 10:22

## 2017-05-17 RX ADMIN — ALLOPURINOL 50 MG: 100 TABLET ORAL at 10:18

## 2017-05-17 RX ADMIN — FERROUS SULFATE TAB 325 MG (65 MG ELEMENTAL FE) 325 MG: 325 (65 FE) TAB at 10:20

## 2017-05-17 RX ADMIN — DOCUSATE SODIUM 100 MG: 100 CAPSULE, LIQUID FILLED ORAL at 10:17

## 2017-05-17 RX ADMIN — CARVEDILOL 6.25 MG: 6.25 TABLET, FILM COATED ORAL at 10:20

## 2017-05-17 RX ADMIN — ASPIRIN 81 MG CHEWABLE TABLET 81 MG: 81 TABLET CHEWABLE at 10:20

## 2017-05-17 RX ADMIN — AMLODIPINE BESYLATE 5 MG: 5 TABLET ORAL at 10:17

## 2017-05-17 NOTE — PROGRESS NOTES
Bedside and Verbal shift change report given to  PAPA Santos (oncoming nurse) by Ursula Olson (offgoing nurse). Report included the following information SBAR, Kardex, MAR and Recent Results. SITUATION:    Code Status: Full Code  Reason for Admission: Non-STEMI [facility]non-ST elevated [facility]  [facility]ation [facility]   CHF (congestive heart failure) ([facility]    [facility] day: 4   Problem List:       Hospital Problems  Date Reviewed: 5/12/2017          Codes Class Noted POA    Non-STEMI (non-ST elevated myocardial infarction) ([facility] ICD-10-CM: I21.4  ICD-9-CM: 410.70  5/12/2017 Unknown        CHF exacerbation ([facility] ICD-10-CM: I50.9  ICD-9-CM: 428.0  5/12/2017 Unknown        Acute on chronic diastolic congestive heart failure ([facility] ICD-10-CM: I50.33  ICD-9-CM: 428.33, 428.0  5/12/2017 Unknown        Stage 4 chronic kidney disease ([facility] ICD-10-CM: N18.4  ICD-9-CM: 585.4  5/12/2017 Unknown        CHF (congestive heart failure) ([facility] ICD-10-CM: I50.9  ICD-9-CM: 428.0  5/12/2017 Unknown        ACS (acute coronary syndrome) ([facility] ICD-10-CM: I24.9  ICD-9-CM: 411.1  2/21/2016 Yes              BACKGROUND:    Past Medical History:   Past Medical History:   Diagnosis Date    Breast cancer ([facility] 1/17/14    right breast    Cardiac echocardiogram 02/17/2017    EF 50%. No WMA. Mod conc LVH. Indeterminate diastolic fx. Mod LAE. Mild MR. Mild AI. Mild PI. No signifciant valvular heart disease.  Cardiovascular lower extremity arterial testing 12/19/2014    Mod tibio-peroneal arterial disease bilaterally. R YANCY 1.24.  L YANCY 0.74. Bilateral sm vessel disease.  Carotid duplex 11/19/2014    Severe 70% or greater bilateral ICA stenosis. LICA dissection suggested.     Chronic renal insufficiency     CVA (cerebral vascular accident) ([facility] 2003    with slight Right sided weakness    Edema     Glaucoma     Gout flare     Hyperlipidemia     Hypertension     Lump of right breast     URI (upper respiratory infection)          Patient taking anticoagulants Yes    ASSESSMENT:    Changes in Assessment Throughout Shift: N/A     Patient has Central Line: no Reasons if yes: N/A   Patient has Jang Cath: no Reasons if yes: N/A      Last Vitals:     Vitals:    05/16/17 0423 05/16/17 0813 05/16/17 1107 05/16/17 1447   BP: 169/72 178/78 162/56 167/57   Pulse: 91 82 76 66   Resp: 20 20 20 20   Temp: 98.4 °F (36.9 °C) 98.8 °F (37.1 °C) 98.6 °F (37 °C) 97.6 °F (36.4 °C)   SpO2: 99% 98% 98% 98%   Weight: 62.6 kg (138 lb)      Height:            IV and DRAINS (will only show if present)   Peripheral IV 05/14/17 Left Wrist-Site Assessment: Clean, dry, & intact  Peripheral IV 05/12/17 Left Antecubital-Site Assessment: Clean, dry, & intact     WOUND (if present)   Wound Type:  none   Dressing present Dressing Present : Yes   Wound Concerns/Notes:  none     PAIN    Pain Assessment    Pain Intensity 1: 0 (05/16/17 1600)    Pain Location 1: Leg    Pain Intervention(s) 1: Declines    Patient Stated Pain Goal: 0  o Interventions for Pain:  none  o Intervention effective: no and N/A  o Time of last intervention: N/A   o Reassessment Completed: no and N/A      Last 3 Weights:  Last 3 Recorded Weights in this Encounter    05/14/17 0332 05/15/17 0400 05/16/17 0423   Weight: 62.4 kg (137 lb 8 oz) 62.1 kg (136 lb 14.4 oz) 62.6 kg (138 lb)     Weight change: 0.499 kg (1 lb 1.6 oz)     INTAKE/OUPUT    Current Shift:      Last three shifts: 05/15 0701 - 05/16 1900  In: 1340 [P.O.:1340]  Out: 1000 [Urine:1000]     LAB RESULTS     Recent Labs      05/16/17   0210  05/15/17   0410  05/14/17   0300   WBC  7.2  8.9  6.4   HGB  10.0*  11.1*  6.7*   HCT  29.2*  32.6*  20.5*   PLT  257  264  249        Recent Labs      05/16/17   0210  05/15/17   0410  05/14/17   0300   NA  136  137  138   K  4.6  4.7  4.6   GLU  93  104*  89   BUN  46*  46*  50*   CREA  2.48*  2.62*  2.92*   CA  8.7  9.1  8.3*   MG   --   2.2   -- RECOMMENDATIONS AND DISCHARGE PLANNING     1. Pending tests/procedures/ Plan of Care or Other Needs:      2. Discharge plan for patient and Needs/Barriers:     3. Estimated Discharge Date: TBD Posted on Whiteboard in Patients Room: yes      4. The patient's care plan was reviewed with the oncoming nurse. \"HEALS\" SAFETY CHECK      Fall Risk    Total Score: 4    Safety Measures: Safety Measures: Bed/Chair-Wheels locked, Bed in low position, Bed/Chair alarm on, Call light within reach, Family at bedside, Gripper socks, Side rails X 3    A safety check occurred in the patient's room between off going nurse and oncoming nurse listed above. The safety check included the below items  Area Items   H  High Alert Medications - Verify all high alert medication drips (heparin, PCA, etc.)   E  Equipment - Suction is set up for ALL patients (with eric)  - Red plugs utilized for all equipment (IV pumps, etc.)  - WOWs wiped down at end of shift.  - Room stocked with oxygen, suction, and other unit-specific supplies   A  Alarms - Bed alarm is set for fall risk patients  - Ensure chair alarm is in place and activated if patient is up in a chair   L  Lines - Check IV for any infiltration  - Jang bag is empty if patient has a Jang   - Tubing and IV bags are labeled   S  Safety   - Room is clean, patient is clean, and equipment is clean. - Hallways are clear from equipment besides carts. - Fall bracelet on for fall risk patients  - Ensure room is clear and free of clutter  - Suction is set up for ALL patients (with eric)  - Hallways are clear from equipment besides carts.    - Isolation precautions followed, supplies available outside room, sign posted     Ursula Olson

## 2017-05-17 NOTE — DISCHARGE SUMMARY
Discharge Summary    Patient: Kishan Gomez MRN: [id number]  CSN: [id number]    YOB: 1928  Age: 80 y.o.   Sex: female    DOA: 5/12/2017 LOS:  LOS: 5 days   Discharge Date:      Admission Diagnoses: Non-STEMI (non-ST elevated myocardial infarction) ([place] [address]  CHF exacerbation ([place] [address].)  CHF (congestive heart failure) ([place] [address].)    Discharge Diagnoses:    Problem List as of 5/17/2017  Date Reviewed: 5/12/2017          Codes Class Noted - Resolved    Non-STEMI (non-ST elevated myocardial infarction) ([place] [address] ICD-10-CM: I21.4  ICD-9-CM: 410.70  5/12/2017 - Present        CHF exacerbation ([place] [address] ICD-10-CM: I50.9  ICD-9-CM: 428.0  5/12/2017 - Present        Acute on chronic diastolic congestive heart failure ([place] [address] ICD-10-CM: I50.33  ICD-9-CM: 428.33, 428.0  5/12/2017 - Present        Stage 4 chronic kidney disease ([place] [address].) ICD-10-CM: N18.4  ICD-9-CM: 585.4  5/12/2017 - Present        CHF (congestive heart failure) ([place] [address] ICD-10-CM: I50.9  ICD-9-CM: 428.0  5/12/2017 - Present        Acute diastolic heart failure ([place] [address].) ICD-10-CM: I50.31  ICD-9-CM: 428.31  2/24/2017 - Present        Altered mental state ICD-10-CM: R41.82  ICD-9-CM: 780.97  2/23/2017 - Present        Acute coronary syndromes ([facility] ICD-10-CM: I24.9  ICD-9-CM: 411.1  2/23/2017 - Present        Chest pain ICD-10-CM: R07.9  ICD-9-CM: 786.50  2/15/2017 - Present        Diastolic CHF, acute on chronic (HCC) ICD-10-CM: I50.33  ICD-9-CM: 428.33, 428.0  10/24/2016 - Present        NSTEMI (non-ST elevated myocardial infarction) ([facility] ICD-10-CM: I21.4  ICD-9-CM: 410.70  10/22/2016 - Present        ACS (acute coronary syndrome) ([facility] ICD-10-CM: I24.9  ICD-9-CM: 411.1  2/21/2016 - Present        Primary osteoarthritis involving multiple joints ICD-10-CM: M15.0  ICD-9-CM: 715.09  1/18/2016 - Present        Hypertensive renal sclerosis with hypertension ICD-10-CM: I12.9  ICD-9-CM: 403.90, 587  5/4/2015 - Present        Fatigue ICD-10-CM: R53.83  ICD-9-CM: 780.79  3/27/2015 - Present        Carotid stenosis ICD-10-CM: I65.29  ICD-9-CM: 433.10  11/20/2014 - Present        CRF (chronic renal failure) ICD-10-CM: N18.9  ICD-9-CM: 585.9  8/5/2014 - Present        Cellulitis ICD-10-CM: L03.90  ICD-9-CM: 682.9  3/12/2014 - Present        Psoriasis ICD-10-CM: L40.9  ICD-9-CM: 696.1  3/12/2014 - Present        Breast cancer ([facility] ICD-10-CM: C50.919  ICD-9-CM: 174.9  2/18/2014 - Present        Breast mass in female ICD-10-CM: N63  ICD-9-CM: 611.72  6/25/2013 - Present        Lump of right breast ICD-10-CM: N63  ICD-9-CM: 611.72  Unknown - Present        Chronic renal failure ICD-10-CM: N18.9  ICD-9-CM: 585.9  7/18/2012 - Present        Leg pain ICD-10-CM: M79.606  ICD-9-CM: 729.5  7/18/2012 - Present        Gout attack ICD-10-CM: M10.9  ICD-9-CM: 274.01  10/28/2011 - Present        Cerebral thrombosis with cerebral infarction [facility]) ICD-10-CM: I63.30  ICD-9-CM: 434.01  9/22/2011 - Present        Long term (current) use of anticoagulants ICD-10-CM: Z79.01  ICD-9-CM: V58.61  9/22/2011 - Present        Hyperlipidemia ICD-10-CM: E78.5  ICD-9-CM: 272.4  Unknown - Present        Glaucoma ICD-10-CM: H40.9  ICD-9-CM: 365.9  Unknown - Present        CVA (cerebral vascular accident) ([facility] ICD-10-CM: I63.9  ICD-9-CM: 434.91  Unknown - Present            Reason for Admission  80 y.o. female with PMH of Chronic diastolic heart failure, HTN, old CVA, Gout , Chronic renal failure -4 , who presented with acute on chronic diastolic heart failure. Patient was a challenging historian , her daughter Azeem Check in room with her provided history to admitting physician. Patient was brought to ED via EMS for sudden development of SOB, in ED she was in moderate distress and she was hypoxic with o2 sats at 79% , she was placed on NRFM with improvement in her sat. At our interview, she was on 2 L NC and satting around 100 % . Her troponin was elevated , this was second set .  ED consulted cardiology and she was recommended for admission. She denied chest pains, palpitations . Breathing improved with treatment she received in ED. Discharge Condition: good    PHYSICAL EXAM at discharge. Visit Vitals    /78 (BP 1 Location: Left arm, BP Patient Position: At rest)    Pulse 77    Temp 99.1 °F (37.3 °C)    Resp 16    Ht 5' 6\" (1.676 m)    Wt 62.6 kg (137 lb 14.4 oz)    SpO2 98%    BMI 22.26 kg/m2     Awake alert and oriented x 2  [place]cat. perrl  RRR  CTA B.L. No crackles, no wheeze. Soft nt nd nabs. No edema. No focal deficit  moisture associated skin damage to  gluteal fold /  cleft [facility]:   1. Hypoxia at admission - sats 79% per EMS, improved with O2. Recently completed tx for HCAP in SNF. 2. Acute on chronic diastolic HF exacerbation resolving s/p iv lasix, transitioned back to outpatient dose 40mg PO daily of lasix. csi followed. 3. NSTEMI, plan for medical management given advanced age/frailty/co-morbidities. Has been maintained on BB, asa, statin and long-acting nitrates in outpatient setting. EKG unchanged from prior studies with non-specific ST abnormalities. 4. Historically low/normal LV function (50%) by echo (2/17/17), no WMAs, mild mitral regurg and aortic regurg. 5. CKD 4 - family request conservative tx, no HD per nephrology notes. .  6. PAD, high grade bilat carotid artery stenosis - needs serial imaging as outpatient. 7. Anemia of chronic disease  8. Hypertension - improved control with addition of coreg, norvasc, isordil. 9. Dyslipidemia on statin  10. CVA hx with residual R sided weakness on asa, statin. 11. 2ndary hyperparathyroidism of ckd  12. mild oropharyngeal dysphagia - ST recs soft solid with thin liquids; aspiration precautions; HOB >45 during po intake, remain >30 for 30-45 minutes after po. Small bites/sips; alternate liquid/solid with slow feeding rate. Oral care TID. Meds in puree.    13. Anemia requiring transfusion - transfused 2 units 5/13 with appropriate increase in hct. 14. moisture associated skin damage to  gluteal fold /  cleft posterior -   POA - wound care recs silicone dressing every 2 days and prn. 15. Dementia appears to be at baseline  16. Full code. return to [facility]. Patient and family agree; all questions answered to the best of my ability. Consults: Cardiology Dr. Holly Kline  Nephrology Dr. Eliz Stovall    Significant Diagnostic Studies: labs:   Recent Results (from the past 24 hour(s))   CBC WITH AUTOMATED DIFF    Collection Time: 05/17/17  3:50 AM   Result Value Ref Range    WBC 7.0 4.6 - 13.2 K/uL    RBC 3.23 (L) 4.20 - 5.30 M/uL    HGB 9.9 (L) 12.0 - 16.0 g/dL    HCT 29.2 (L) 35.0 - 45.0 %    MCV 90.4 74.0 - 97.0 FL    MCH 30.7 24.0 - 34.0 PG    MCHC 33.9 31.0 - 37.0 g/dL    RDW 14.8 (H) 11.6 - 14.5 %    PLATELET 121 365 - 921 K/uL    MPV 9.8 9.2 - 11.8 FL    NEUTROPHILS 58 40 - 73 %    LYMPHOCYTES 25 21 - 52 %    MONOCYTES 12 (H) 3 - 10 %    EOSINOPHILS 5 0 - 5 %    BASOPHILS 0 0 - 2 %    ABS. NEUTROPHILS 4.0 1.8 - 8.0 K/UL    ABS. LYMPHOCYTES 1.7 0.9 - 3.6 K/UL    ABS. MONOCYTES 0.9 0.05 - 1.2 K/UL    ABS. EOSINOPHILS 0.4 0.0 - 0.4 K/UL    ABS. BASOPHILS 0.0 0.0 - 0.1 K/UL    DF AUTOMATED     METABOLIC PANEL, BASIC    Collection Time: 05/17/17  3:50 AM   Result Value Ref Range    Sodium 137 136 - 145 mmol/L    Potassium 4.4 3.5 - 5.5 mmol/L    Chloride 103 100 - 108 mmol/L    CO2 24 21 - 32 mmol/L    Anion gap 10 3.0 - 18 mmol/L    Glucose 93 74 - 99 mg/dL    BUN 45 (H) 7.0 - 18 MG/DL    Creatinine 2.49 (H) 0.6 - 1.3 MG/DL    BUN/Creatinine ratio 18 12 - 20      GFR est AA 22 (L) >60 ml/min/1.73m2    GFR est non-AA 18 (L) >60 ml/min/1.73m2    Calcium 8.9 8.5 - 10.1 MG/DL     IMAGING  XR Results (most recent):    Results from Hospital Encounter encounter on 05/12/17   XR CHEST PORT   Narrative Portable chest    History: Shortness of breath since last night. Comparison: Chest radiograph 2/25/2017    Findings:       The cardiomediastinal silhouette is borderline considering technique. The  pulmonary vasculature is prominent. Calcified atherosclerotic plaque is present  in the aorta. Linear opacities are present throughout the lungs. Opacities are  present at the lung bases with silhouetting of the medial right hemidiaphragm  and the left hemidiaphragm with meniscus at the left costophrenic angle. No  acute osseous abnormality. Degenerative changes are redemonstrated at the  shoulders and within the thoracic spine. Impression Impression:    Central vascular congestion with likely interstitial edema and pleural effusions  associated with basilar consolidations, likely compressive atelectasis.     Thank you for this referral.      EKG Results     Procedure 720 Value Units Date/Time    SCANNED CARDIAC RHYTHM STRIP [[id number]] Collected:  05/16/17 0824    Order Status:  Completed Updated:  05/16/17 0836    EKG, 12 LEAD, SUBSEQUENT [[id number]] Collected:  05/13/17 1014    Order Status:  Completed Updated:  05/13/17 1159     Ventricular Rate 66 BPM      Atrial Rate 66 BPM      P-R Interval 138 ms      QRS Duration 118 ms      Q-T Interval 484 ms      QTC Calculation (Bezet) 507 ms      Calculated P Axis -20 degrees      Calculated R Axis -63 degrees      Calculated T Axis -81 degrees      Diagnosis --     Normal sinus rhythm  Left anterior fascicular block  Left ventricular hypertrophy with QRS widening and repolarization abnormality  Prolonged QT  Abnormal ECG    Confirmed by Natalia Robbins MD, Mamadou Iniguez ([id number]) on 5/13/2017 11:59:28 AM      EKG, 12 LEAD, INITIAL [[id number]] Collected:  05/12/17 0849    Order Status:  Completed Updated:  05/12/17 0956     Ventricular Rate 100 BPM      Atrial Rate 100 BPM      P-R Interval 156 ms      QRS Duration 116 ms      Q-T Interval 346 ms      QTC Calculation (Bezet) 446 ms      Calculated P Axis 68 degrees      Calculated R Axis -41 degrees      Calculated T Axis 108 degrees      Diagnosis --     Normal sinus rhythm  Possible Left atrial enlargement  Left axis deviation  Left ventricular hypertrophy with QRS widening and repolarization abnormality  Abnormal ECG  When compared with ECG of 23-FEB-2017 18:02,  No significant change was found  Confirmed by Erika Molina MD ([id number]) on 5/12/2017 9:55:57 AM          Discharge Medications:     Current Discharge Medication List      START taking these medications    Details   amLODIPine (NORVASC) 5 mg tablet Take 1 Tab by mouth daily. Qty: 30 Tab, Refills: 2      carvedilol (COREG) 6.25 mg tablet Take 1 Tab by mouth two (2) times daily (with meals). Qty: 30 Tab, Refills: 2      isosorbide dinitrate (ISORDIL) 30 mg tablet Take 1 Tab by mouth three (3) times daily. Qty: 30 Tab, Refills: 2         CONTINUE these medications which have CHANGED    Details   allopurinol (ZYLOPRIM) 100 mg tablet Take 0.5 Tabs by mouth daily. Qty: 30 Tab, Refills: 2         CONTINUE these medications which have NOT CHANGED    Details   ascorbic acid, vitamin C, (VITAMIN C) 250 mg tablet Take  by mouth. amino acids-protein hydrolys (PRO-STAT AWC)  gram-kcal/30 mL liqd Take  by mouth two (2) times a day. Menthol-Zinc Oxide (RISAMINE) 0.44-20.6 % oint Apply  to affected area two (2) times a day. Indications: SKIN IRRITATION      docusate sodium (COLACE) 100 mg capsule Take 1 Cap by mouth two (2) times a day for 90 days. Qty: 30 Cap, Refills: 0      furosemide (LASIX) 40 mg tablet Take 1 Tab by mouth daily. Qty: 30 Tab, Refills: 0      polyethylene glycol (MIRALAX) 17 gram packet Take 1 Packet by mouth daily. Qty: 30 Packet, Refills: 0      therapeutic multivitamin (THERAGRAN) tablet Take 1 Tab by mouth daily. Qty: 30 Tab, Refills: 0      bisacodyl (DULCOLAX) 10 mg suppository Insert 10 mg into rectum as needed. Qty: 30 Suppository, Refills: 0      simvastatin (ZOCOR) 20 mg tablet Take 1 Tab by mouth nightly.   Qty: 30 Tab, Refills: 0      albuterol (PROVENTIL HFA, VENTOLIN HFA, PROAIR HFA) 90 mcg/actuation inhaler Take 1-2 Puffs by inhalation every four (4) hours as needed for Wheezing. Qty: 1 Inhaler, Refills: 0      inhalational spacing device 1 Each by Does Not Apply route as needed. Qty: 1 Device, Refills: 0      aspirin delayed-release 81 mg tablet Take 1 Tab by mouth daily. Qty: 100 Tab, Refills: prn      ferrous sulfate 325 mg (65 mg iron) tablet Take 325 mg by mouth Daily (before breakfast). cyanocobalamin (VITAMIN B-12) 1,000 mcg tablet Take 1,000 mcg by mouth daily. acetaminophen (TYLENOL) 325 mg tablet Take 2 Tabs by mouth every four (4) hours as needed. Qty: 30 Tab, Refills: 0      cholecalciferol, VITAMIN D3, (VITAMIN D3) 5,000 unit tab tablet Take 1 Tab by mouth daily. Qty: 100 Tab, Refills: 0         STOP taking these medications       guaiFENesin-dextromethorphan SR (HUMIBID DM) 600-30 mg per tablet Comments:   Reason for Stopping:         traMADol (ULTRAM) 50 mg tablet Comments:   Reason for Stopping:               Activity: PT/OT Eval and Treat. Fall precautions. Diet: cardiac. Soft solid with thin liquids. Aspiration precautions. HOB >45 during po intake, remain >30 for 30-45 minutes after po. Small bites/sips; alternate liquid/solid with slow feeding rate. Oral care TID. Meds in puree. Wound Care: silicone dressing every 2 days and prn to gluteal fold wound. Follow-up:   1. [facility] doctor upon arrival.  2. June 6th at 9:20am with Dr. Sigifredo Matta as scheduled.     Minutes spent on discharge: greater than 30

## 2017-05-17 NOTE — PROGRESS NOTES
VM left for Yee Peck BS [address] re; pt ready for discharge awaiting call back to confirm can accept this afternoon-    Spoke to DASHA Hilliard , informed of plan to return by stretcher this date. She was in agreement. Nurse Ilia Velazquez & pt  Informed of transport time of 1: 00 PM this date. Addendum: Keyon Lewis confirmed pt can return to facility this date- aware of transport time.

## 2017-05-17 NOTE — INTERDISCIPLINARY ROUNDS
IDR/SLIDR Summary          Patient: Dheeraj Epstein MRN: [id number]    Age: 80 y.o. YOB: 1928 Room/Bed: 219/   Admit Diagnosis: Non-STEMI (non-ST elevated myocardial infarction) ([facility]  CHF exacerbation (HCC)  CHF (congestive heart failure) (HCC)  Principal Diagnosis: <principal problem not specified>   Goals: No SOB  Readmission: NO  Quality Measure: CHF  VTE Prophylaxis: Chemical  Influenza Vaccine screening completed? NO  Pneumococcal Vaccine screening completed? NO  Mobility needs: Yes, No   Nutrition plan:No  Consults: P. T    Financial concerns:No  Escalated to CM? NO  RRAT Score: 40   Interventions:Home Health  Testing due for pt today?  NO  LOS: 5 days Expected length of stay 5 days  Discharge plan: SNF   PCP: Lupe Frye MD  Transportation needs: Yes    Days before discharge:longer than expected LOS  Discharge disposition: SNF    Signed:     Isaiah Jim RN  5/17/2017  8:23 AM

## 2017-05-17 NOTE — ROUTINE PROCESS
Bedside and Verbal shift change report given to AVA Serrano RN (oncoming nurse) by Stan Sanchez RN   (offgoing nurse). Report included the following information SBAR, Kardex, Intake/Output and MAR.

## 2017-05-17 NOTE — PROGRESS NOTES
RENAL DAILY PROGRESS NOTE    Patient: Barbara Miranda               Sex: female          DOA: 5/12/2017  8:46 AM        YOB: 1928      Age:  80 y.o.        LOS:  LOS: 5 days     Subjective: Barbara Miranda is a 80 y.o.  who presents with Non-STEMI (non-ST elevated myocardial infarction) ([facility] [address]  CHF exacerbation (HCC)  CHF (congestive heart failure) ([address] Asked to evaluate for crf stage 4.hx of htn, chf,anemia. Chief complains: Patient denies nausea, vomiting, chest pain, dizziness, or headache.  - Reviewed last 24 hrs events     Current Facility-Administered Medications   Medication Dose Route Frequency    amLODIPine (NORVASC) tablet 5 mg  5 mg Oral DAILY    isosorbide dinitrate (ISORDIL) tablet 30 mg  30 mg Oral TID    epoetin daysi (EPOGEN;PROCRIT) injection 6,000 Units  6,000 Units SubCUTAneous Q TUE, THU & SAT    furosemide (LASIX) tablet 40 mg  40 mg Oral DAILY    carvedilol (COREG) tablet 6.25 mg  6.25 mg Oral BID WITH MEALS    hydrALAZINE (APRESOLINE) 20 mg/mL injection 10 mg  10 mg IntraVENous Q6H PRN    allopurinol (ZYLOPRIM) tablet 50 mg  50 mg Oral DAILY    0.9% sodium chloride infusion 250 mL  250 mL IntraVENous PRN    aspirin chewable tablet 81 mg  81 mg Oral DAILY    acetaminophen (TYLENOL) tablet 325 mg  325 mg Oral Q4H PRN    bisacodyl (DULCOLAX) suppository 10 mg  10 mg Rectal DAILY PRN    docusate sodium (COLACE) capsule 100 mg  100 mg Oral BID    ferrous sulfate tablet 325 mg  325 mg Oral ACB    simvastatin (ZOCOR) tablet 20 mg  20 mg Oral QHS    sodium chloride (NS) flush 5-10 mL  5-10 mL IntraVENous Q8H    sodium chloride (NS) flush 5-10 mL  5-10 mL IntraVENous PRN    heparin (porcine) injection 5,000 Units  5,000 Units SubCUTAneous Q8H     Current Outpatient Prescriptions   Medication Sig    allopurinol (ZYLOPRIM) 100 mg tablet Take 0.5 Tabs by mouth daily.  amLODIPine (NORVASC) 5 mg tablet Take 1 Tab by mouth daily.     carvedilol (COREG) 6.25 mg tablet Take 1 Tab by mouth two (2) times daily (with meals).  isosorbide dinitrate (ISORDIL) 30 mg tablet Take 1 Tab by mouth three (3) times daily.  ascorbic acid, vitamin C, (VITAMIN C) 250 mg tablet Take  by mouth.  amino acids-protein hydrolys (PRO-STAT AWC)  gram-kcal/30 mL liqd Take  by mouth two (2) times a day.  Menthol-Zinc Oxide (RISAMINE) 0.44-20.6 % oint Apply  to affected area two (2) times a day. Indications: SKIN IRRITATION    docusate sodium (COLACE) 100 mg capsule Take 1 Cap by mouth two (2) times a day for 90 days.  furosemide (LASIX) 40 mg tablet Take 1 Tab by mouth daily.  polyethylene glycol (MIRALAX) 17 gram packet Take 1 Packet by mouth daily.  therapeutic multivitamin (THERAGRAN) tablet Take 1 Tab by mouth daily.  bisacodyl (DULCOLAX) 10 mg suppository Insert 10 mg into rectum as needed.  simvastatin (ZOCOR) 20 mg tablet Take 1 Tab by mouth nightly.  albuterol (PROVENTIL HFA, VENTOLIN HFA, PROAIR HFA) 90 mcg/actuation inhaler Take 1-2 Puffs by inhalation every four (4) hours as needed for Wheezing.  inhalational spacing device 1 Each by Does Not Apply route as needed.  aspirin delayed-release 81 mg tablet Take 1 Tab by mouth daily.  ferrous sulfate 325 mg (65 mg iron) tablet Take 325 mg by mouth Daily (before breakfast).  cyanocobalamin (VITAMIN B-12) 1,000 mcg tablet Take 1,000 mcg by mouth daily.  acetaminophen (TYLENOL) 325 mg tablet Take 2 Tabs by mouth every four (4) hours as needed.  cholecalciferol, VITAMIN D3, (VITAMIN D3) 5,000 unit tab tablet Take 1 Tab by mouth daily.        Objective:     Visit Vitals    /73 (BP 1 Location: Left arm, BP Patient Position: At rest)    Pulse 71    Temp 98.6 °F (37 °C)    Resp 16    Ht 5' 6\" (1.676 m)    Wt 62.6 kg (137 lb 14.4 oz)    SpO2 99%    BMI 22.26 kg/m2       Intake/Output Summary (Last 24 hours) at 05/17/17 1323  Last data filed at 05/17/17 1314   Gross per 24 hour Intake              480 ml   Output                1 ml   Net              479 ml       Physical Examination:     GEN: AAO X 3, NAD  RS: Chest is bilateral equal, no wheezing / rales / crackles  CVS: S1-S2 heard, RRR, No S3 / murmur  Abdomen: Soft, Non tender, Not distended, Positive bowel sounds, no organomegaly, no CVA / supra pubic tenderness  Extremities: No edema, no cyanosis, skin is warm on touch  CNS: Awake & follows commands, CN II-XII are grossly intact. HEENT: Head is atraumatic, PERRLA, conjunctiva pink & non icteric. No JVD or carotid bruit   Psychiatric: Normal mood, affect, judgement & memory    Musculoskeletal: No gross joints or bone deformities   Lymph Node: No palpable cervical, axillary or groin lymphadenopathy. Data Review:      Labs:     Hematology:   Recent Labs      05/17/17   0350  05/16/17   0210  05/15/17   0410   WBC  7.0  7.2  8.9   HGB  9.9*  10.0*  11.1*   HCT  29.2*  29.2*  32.6*     Chemistry:   Recent Labs      05/17/17   0350  05/16/17   0210  05/15/17   0410   BUN  45*  46*  46*   CREA  2.49*  2.48*  2.62*   CA  8.9  8.7  9.1   K  4.4  4.6  4.7   NA  137  136  137   CL  103  104  104   CO2  24  24  24   GLU  93  93  104*        Images:    XR (Most Recent). CXR reviewed by me and compared with previous CXR   Results from Hospital Encounter encounter on 05/12/17   XR CHEST PORT   Narrative Portable chest    History: Shortness of breath since last night. Comparison: Chest radiograph 2/25/2017    Findings: The cardiomediastinal silhouette is borderline considering technique. The  pulmonary vasculature is prominent. Calcified atherosclerotic plaque is present  in the aorta. Linear opacities are present throughout the lungs. Opacities are  present at the lung bases with silhouetting of the medial right hemidiaphragm  and the left hemidiaphragm with meniscus at the left costophrenic angle. No  acute osseous abnormality.  Degenerative changes are redemonstrated at the  shoulders and within the thoracic spine. Impression Impression:    Central vascular congestion with likely interstitial edema and pleural effusions  associated with basilar consolidations, likely compressive atelectasis. Thank you for this referral.         CT (Most Recent)   Results from [place] encounter on 02/23/17   CT ABD PELV WO CONT   Narrative CT Of The Abdomen And Pelvis Without Contrast    CPT CODE 96970,58833    CLINICAL HISTORY: Chronic renal insufficiency, CVA and hypertension. Breast  cancer. Presenting with shortness of breath, and left flank pain. TECHNIQUE: 5 mm helical MDCT scan was obtained of the abdomen and pelvis. Sagittal and coronal images created from original data set. All CT scans at  this facility are performed using dose optimization techniques as appropriate to  a performed exam, to include automated exposure control, adjustment of the mA  and/or kV according to patient's size (including appropriate matching for site  specific examinations), or use of iterative reconstruction technique. COMPARISON: Portable chest x-ray today. FINDINGS:  view demonstrates normal bowel gas pattern. Dense barium in the  rectum. CT Of The Abdomen: Lung bases: Dense opacity basilar segments left lower lobe  medially. Hazy densities right lung base posteriorly. Marked calcifications of  the tracheobronchial tree. Small effusions layering posteriorly. Larger  subpulmonic component on the left than the right. Heart and pericardium: Cardiomegaly. Left ventricular dilatation. Mild coronary  artery calcifications. CHEST WALL: Coarse calcifications inferiorly at the right breast. No mass is  identified. Liver: No focal lesions. Artifact traversing the liver because the patient was  scanned with her arms at her sides. Spleen: Normal.    Gallbladder: Nondistended. No calcified stones.     Pancreas: Normal.    Adrenal glands: Mild thickening bilaterally. Kidneys: No hydronephrosis or nephrolithiasis. There is a hypodense lesion at  the interpolar region right kidney anteriorly measuring 22 mm and water  attenuation (39). Retroperitoneum: Moderate burden calcified plaque at aortoiliac vessels. No  lymphadenopathy. The bowel: Stomach and duodenum and small bowel are normal. Appendix not  identified but there is no inflammatory stranding around the base of the cecum. .    CT Of The Pelvis: Peritoneal space: No free fluid. GYN: Prior hysterectomy. No adnexal masses. Urinary bladder: Normal.    Bony skeleton: Multiple levels severe disc space narrowing at the lumbar spine. Multiple levels marked spinal stenosis due to ligamentous and facet hypertrophy,  disc disease. No acute bony abnormalities. .         Impression IMPRESSION:    No kidney stones or secondary signs of recently passed stone. Small effusions. Dependent densities at the lung bases, likely passive  atelectasis. Cannot exclude infection. Right renal cyst.    Significant degenerative changes of the lumbar spine. EKG Results for orders placed or performed in visit on 03/07/17   AMB POC EKG ROUTINE W/ 12 LEADS, INTER & REP     Status: None    Narrative    EKG: unchanged from previous tracings, normal sinus rhythm, nonspecific ST and T waves changes, left axis deviation, mild IVCD        I have personally reviewed the old medical records and patient's labs    Plan / Recommendation:      1. crf stage 4,discussed with pt and her son. they want conservative treatment. no dialysis  2.anemia,nl spep,upep,light chains ratio is nl.was transfused  3. chf ,compensated,on lasix    D/w Dr. Adalberto Bray MD  Nephrology  5/17/2017

## 2017-05-17 NOTE — PROGRESS NOTES
C/ [address]. Patient A/OX1-2. No s/s of pain or discomfort. Call light within reach. 1120  Called report to Keith Dobson at [phone].    1300  Patient discharged in stable condition. IV removed and gauze placed to site. Transported to facility by EMT. All personal belongings properly packed,wrapped and sent with patient.

## 2017-05-19 ENCOUNTER — NURSE NAVIGATOR (OUTPATIENT)
Dept: OTHER | Age: 82
End: 2017-05-19

## 2017-05-26 ENCOUNTER — PATIENT OUTREACH (OUTPATIENT)
Dept: CASE MANAGEMENT | Age: 82
End: 2017-05-26

## 2017-05-26 NOTE — PROGRESS NOTES
Community Care Team Documentation for Patient in [facility]     Patient discharged from [facility] to [facility], on 5/17/2017. [facility] Discharge diagnosis:  NSTEMI, CHF, A/CHF. SNF Attending Provider:  Shon Ferrari. Griselda Gregg MD    Anticipated discharge date from SNF: n/a, patient is resident of facility (LTC)      PCP : Shelby Melendrez MD    Nurse Navigator: n/a    Patient referred via high risk team:     High Risk            35       Total Score        3 Relationship with PCP    2 Patient Living Status    9 More than 1 Admission in calendar year    21 Charlson Comorbidity Score        Criteria that do not apply:    Patient Length of Stay > 5    Patient Insurance is Medicare, Medicaid or Self Pay      Patient is LTC at [facility]. High risk for readmission 2nd debility, CHF, stage 3 kidney failure. Requested facility to continue daily weights for HF best practice. Also request pharmacy review to assist with polypharmacy and renal dosed meds. Patient has AMD which identify her as FULL CODE, signed 2/2016. Request facility to address if possible with patient/family reassessing if FULL CODE is still preferred. Will follow along with facility weekly for the next 30 days. Plan of care:   1. Daily weights as best practice for HF: facility is aware of request  2. Pharmacy review to optimize renal dosing of polypharmacy: facility aware of request  3.  AMD: review with patient/family if current AMD are still patient's goals: facility aware of request.

## 2017-06-01 ENCOUNTER — PATIENT OUTREACH (OUTPATIENT)
Dept: CASE MANAGEMENT | Age: 82
End: 2017-06-01

## 2017-06-01 NOTE — PROGRESS NOTES
Community Care Team Documentation for Patient in [facility]      Patient discharged from [facility] to [facility], on 5/17/2017. [facility] Discharge diagnosis: NSTEMI, CHF, A/CHF.     SNF Attending Provider: Temitope Houston. Rodo Shepherd MD     Anticipated discharge date from SNF: n/a, patient is resident of facility (LTC)        PCP : Vish Cage MD     Nurse Navigator: n/a     Patient referred via high risk team:      High Risk       35     Daily weights being performed and weight is stable    Follow for 30 day SHAD.

## 2017-06-08 ENCOUNTER — PATIENT OUTREACH (OUTPATIENT)
Dept: CASE MANAGEMENT | Age: 82
End: 2017-06-08

## 2017-06-08 NOTE — PROGRESS NOTES
Community Care Team Documentation for Patient in [address]  Patient discharged from [facility] to [facility], on 5/17/2017. [facility] Discharge diagnosis: NSTEMI, CHF, A/CHF.      SNF Attending Provider: Guido Khan MD      Anticipated discharge date from SNF: n/a, patient is resident of facility (LTC)  Tona Denver      PCP : Tom Mack MD      Nurse Navigator: n/a      Patient referred via high risk team:       High Risk       35      Daily weights being performed and weight is stable, reports pharm review was completed. Unable to confirm if any changes have been made. Has developed 2 small monica coccyx decubs, followed by wound care, may need to change mattress.      Follow for 30 day SHAD.

## 2017-06-15 ENCOUNTER — PATIENT OUTREACH (OUTPATIENT)
Dept: CASE MANAGEMENT | Age: 82
End: 2017-06-15

## 2017-06-16 NOTE — PROGRESS NOTES
Community Care Team Documentation for Patient in [address]  Patient discharged from [facility] to [facility], on 5/17/2017. [facility] Discharge diagnosis: NSTEMI, CHF, A/CHF.      SNF Attending Provider: Anders Alford. Jeffery Chapin MD      Anticipated discharge date from SNF: n/a, patient is resident of facility (LTC)  Suellen Rinne      PCP : Lupe Frye MD      Nurse Navigator: n/a      Patient referred via high risk team:       High Risk       35       Daily weights being performed and weight is stable. 30 day SHAD completed. No ED visits or hospitalization. Patient to continue LTC at facility.  No further f/u needed.

## 2017-06-23 ENCOUNTER — HOSPITAL ENCOUNTER (OUTPATIENT)
Dept: LAB | Age: 82
Discharge: HOME | End: 2017-06-23
Payer: COMMERCIAL

## 2017-06-23 LAB
ANION GAP BLD CALC-SCNC: 7 MMOL/L (ref 3–18)
BASOPHILS # BLD AUTO: 0 K/UL (ref 0–0.1)
BASOPHILS # BLD: 1 % (ref 0–2)
BUN SERPL-MCNC: 50 MG/DL (ref 7–18)
BUN/CREAT SERPL: 16 (ref 12–20)
CALCIUM SERPL-MCNC: 8.8 MG/DL (ref 8.5–10.1)
CHLORIDE SERPL-SCNC: 108 MMOL/L (ref 100–108)
CO2 SERPL-SCNC: 27 MMOL/L (ref 21–32)
CREAT SERPL-MCNC: 3.04 MG/DL (ref 0.6–1.3)
DIFFERENTIAL METHOD BLD: ABNORMAL
EOSINOPHIL # BLD: 0.1 K/UL (ref 0–0.4)
EOSINOPHIL NFR BLD: 3 % (ref 0–5)
ERYTHROCYTE [DISTWIDTH] IN BLOOD BY AUTOMATED COUNT: 15.6 % (ref 11.6–14.5)
GLUCOSE SERPL-MCNC: 124 MG/DL (ref 74–99)
HCT VFR BLD AUTO: 27.1 % (ref 35–45)
HGB BLD-MCNC: 8.7 G/DL (ref 12–16)
LYMPHOCYTES # BLD AUTO: 40 % (ref 21–52)
LYMPHOCYTES # BLD: 1.5 K/UL (ref 0.9–3.6)
MAGNESIUM SERPL-MCNC: 2.5 MG/DL (ref 1.6–2.6)
MCH RBC QN AUTO: 30.3 PG (ref 24–34)
MCHC RBC AUTO-ENTMCNC: 32.1 G/DL (ref 31–37)
MCV RBC AUTO: 94.4 FL (ref 74–97)
MONOCYTES # BLD: 0.4 K/UL (ref 0.05–1.2)
MONOCYTES NFR BLD AUTO: 10 % (ref 3–10)
NEUTS SEG # BLD: 1.7 K/UL (ref 1.8–8)
NEUTS SEG NFR BLD AUTO: 46 % (ref 40–73)
PLATELET # BLD AUTO: 170 K/UL (ref 135–420)
PMV BLD AUTO: 10.3 FL (ref 9.2–11.8)
POTASSIUM SERPL-SCNC: 4.8 MMOL/L (ref 3.5–5.5)
RBC # BLD AUTO: 2.87 M/UL (ref 4.2–5.3)
SODIUM SERPL-SCNC: 142 MMOL/L (ref 136–145)
WBC # BLD AUTO: 3.8 K/UL (ref 4.6–13.2)

## 2017-06-23 PROCEDURE — 85025 COMPLETE CBC W/AUTO DIFF WBC: CPT | Performed by: PHYSICIAN ASSISTANT

## 2017-06-23 PROCEDURE — 80048 BASIC METABOLIC PNL TOTAL CA: CPT | Performed by: PHYSICIAN ASSISTANT

## 2017-06-23 PROCEDURE — 83735 ASSAY OF MAGNESIUM: CPT | Performed by: PHYSICIAN ASSISTANT

## 2017-06-27 ENCOUNTER — HOSPITAL ENCOUNTER (OUTPATIENT)
Dept: LAB | Age: 82
Discharge: HOME | End: 2017-06-27
Payer: COMMERCIAL

## 2017-06-27 LAB
ANION GAP BLD CALC-SCNC: 9 MMOL/L (ref 3–18)
BASOPHILS # BLD AUTO: 0 K/UL (ref 0–0.06)
BASOPHILS # BLD: 1 % (ref 0–2)
BUN SERPL-MCNC: 54 MG/DL (ref 7–18)
BUN/CREAT SERPL: 18 (ref 12–20)
CALCIUM SERPL-MCNC: 8.6 MG/DL (ref 8.5–10.1)
CHLORIDE SERPL-SCNC: 107 MMOL/L (ref 100–108)
CO2 SERPL-SCNC: 23 MMOL/L (ref 21–32)
CREAT SERPL-MCNC: 3.05 MG/DL (ref 0.6–1.3)
DIFFERENTIAL METHOD BLD: ABNORMAL
EOSINOPHIL # BLD: 0.2 K/UL (ref 0–0.4)
EOSINOPHIL NFR BLD: 5 % (ref 0–5)
ERYTHROCYTE [DISTWIDTH] IN BLOOD BY AUTOMATED COUNT: 15.4 % (ref 11.6–14.5)
GLUCOSE SERPL-MCNC: 84 MG/DL (ref 74–99)
HCT VFR BLD AUTO: 27.3 % (ref 35–45)
HGB BLD-MCNC: 8.7 G/DL (ref 12–16)
LYMPHOCYTES # BLD AUTO: 39 % (ref 21–52)
LYMPHOCYTES # BLD: 1.7 K/UL (ref 0.9–3.6)
MAGNESIUM SERPL-MCNC: 2.5 MG/DL (ref 1.6–2.6)
MCH RBC QN AUTO: 30.4 PG (ref 24–34)
MCHC RBC AUTO-ENTMCNC: 31.9 G/DL (ref 31–37)
MCV RBC AUTO: 95.5 FL (ref 74–97)
MONOCYTES # BLD: 0.5 K/UL (ref 0.05–1.2)
MONOCYTES NFR BLD AUTO: 11 % (ref 3–10)
NEUTS SEG # BLD: 2 K/UL (ref 1.8–8)
NEUTS SEG NFR BLD AUTO: 44 % (ref 40–73)
PLATELET # BLD AUTO: 155 K/UL (ref 135–420)
PMV BLD AUTO: 10 FL (ref 9.2–11.8)
POTASSIUM SERPL-SCNC: 5.2 MMOL/L (ref 3.5–5.5)
RBC # BLD AUTO: 2.86 M/UL (ref 4.2–5.3)
SODIUM SERPL-SCNC: 139 MMOL/L (ref 136–145)
WBC # BLD AUTO: 4.5 K/UL (ref 4.6–13.2)

## 2017-06-27 PROCEDURE — 85025 COMPLETE CBC W/AUTO DIFF WBC: CPT | Performed by: FAMILY MEDICINE

## 2017-06-27 PROCEDURE — 80048 BASIC METABOLIC PNL TOTAL CA: CPT | Performed by: FAMILY MEDICINE

## 2017-06-27 PROCEDURE — 83735 ASSAY OF MAGNESIUM: CPT | Performed by: FAMILY MEDICINE

## 2017-07-07 ENCOUNTER — HOSPITAL ENCOUNTER (OUTPATIENT)
Dept: LAB | Age: 82
Discharge: HOME | End: 2017-07-07
Payer: MEDICARE

## 2017-07-07 LAB
APPEARANCE UR: CLEAR
BACTERIA URNS QL MICRO: ABNORMAL /HPF
BILIRUB UR QL: NEGATIVE
COLOR UR: YELLOW
EPITH CASTS URNS QL MICRO: ABNORMAL /LPF (ref 0–5)
GLUCOSE UR STRIP.AUTO-MCNC: NEGATIVE MG/DL
HGB UR QL STRIP: NEGATIVE
KETONES UR QL STRIP.AUTO: NEGATIVE MG/DL
LEUKOCYTE ESTERASE UR QL STRIP.AUTO: NEGATIVE
NITRITE UR QL STRIP.AUTO: NEGATIVE
PH UR STRIP: 5.5 [PH] (ref 5–8)
PROT UR STRIP-MCNC: 300 MG/DL
RBC #/AREA URNS HPF: ABNORMAL /HPF (ref 0–5)
SP GR UR REFRACTOMETRY: 1.02 (ref 1–1.03)
UROBILINOGEN UR QL STRIP.AUTO: 0.2 EU/DL (ref 0.2–1)
WBC URNS QL MICRO: ABNORMAL /HPF (ref 0–4)

## 2017-07-07 PROCEDURE — 81001 URINALYSIS AUTO W/SCOPE: CPT | Performed by: FAMILY MEDICINE

## 2017-07-07 PROCEDURE — 87086 URINE CULTURE/COLONY COUNT: CPT | Performed by: FAMILY MEDICINE

## 2017-07-08 LAB
BACTERIA SPEC CULT: NORMAL
SERVICE CMNT-IMP: NORMAL

## 2017-07-16 ENCOUNTER — HOSPITAL ENCOUNTER (OUTPATIENT)
Dept: LAB | Age: 82
Discharge: HOME | End: 2017-07-16
Payer: COMMERCIAL

## 2017-07-16 PROCEDURE — 82565 ASSAY OF CREATININE: CPT | Performed by: FAMILY MEDICINE

## 2017-07-16 PROCEDURE — 87493 C DIFF AMPLIFIED PROBE: CPT | Performed by: FAMILY MEDICINE

## 2017-07-16 PROCEDURE — 85025 COMPLETE CBC W/AUTO DIFF WBC: CPT | Performed by: FAMILY MEDICINE

## 2017-07-17 LAB
BACTERIA SPEC CULT: ABNORMAL
BACTERIA SPEC CULT: ABNORMAL
BASOPHILS # BLD AUTO: 0 K/UL (ref 0–0.06)
BASOPHILS # BLD: 0 % (ref 0–3)
CREAT SERPL-MCNC: 3.11 MG/DL (ref 0.6–1.3)
DIFFERENTIAL METHOD BLD: ABNORMAL
EOSINOPHIL # BLD: 0.1 K/UL (ref 0–0.4)
EOSINOPHIL NFR BLD: 2 % (ref 0–5)
ERYTHROCYTE [DISTWIDTH] IN BLOOD BY AUTOMATED COUNT: 14.9 % (ref 11.6–14.5)
HCT VFR BLD AUTO: 30.9 % (ref 35–45)
HGB BLD-MCNC: 10 G/DL (ref 12–16)
LYMPHOCYTES # BLD AUTO: 31 % (ref 20–51)
LYMPHOCYTES # BLD: 1.5 K/UL (ref 0.8–3.5)
MCH RBC QN AUTO: 31 PG (ref 24–34)
MCHC RBC AUTO-ENTMCNC: 32.4 G/DL (ref 31–37)
MCV RBC AUTO: 95.7 FL (ref 74–97)
MONOCYTES # BLD: 0.6 K/UL (ref 0–1)
MONOCYTES NFR BLD AUTO: 12 % (ref 2–9)
NEUTS SEG # BLD: 2.6 K/UL (ref 1.8–8)
NEUTS SEG NFR BLD AUTO: 55 % (ref 42–75)
PLATELET # BLD AUTO: 203 K/UL (ref 135–420)
PMV BLD AUTO: 10.5 FL (ref 9.2–11.8)
RBC # BLD AUTO: 3.23 M/UL (ref 4.2–5.3)
SERVICE CMNT-IMP: ABNORMAL
WBC # BLD AUTO: 4.8 K/UL (ref 4.6–13.2)

## 2017-08-28 ENCOUNTER — HOSPITAL ENCOUNTER (OUTPATIENT)
Dept: LAB | Age: 82
Discharge: HOME | End: 2017-08-28
Payer: COMMERCIAL

## 2017-08-28 ENCOUNTER — HOSPITAL ENCOUNTER (OUTPATIENT)
Dept: LAB | Age: 82
Discharge: HOME | End: 2017-08-28

## 2017-08-28 LAB
ALBUMIN SERPL-MCNC: 2.9 G/DL (ref 3.4–5)
ALBUMIN/GLOB SERPL: 1.1 {RATIO} (ref 0.8–1.7)
ALP SERPL-CCNC: 47 U/L (ref 45–117)
ALT SERPL-CCNC: 16 U/L (ref 13–56)
ANION GAP SERPL CALC-SCNC: 8 MMOL/L (ref 3–18)
AST SERPL-CCNC: 13 U/L (ref 15–37)
BILIRUB SERPL-MCNC: 0.2 MG/DL (ref 0.2–1)
BUN SERPL-MCNC: 80 MG/DL (ref 7–18)
BUN/CREAT SERPL: 23 (ref 12–20)
CALCIUM SERPL-MCNC: 8.7 MG/DL (ref 8.5–10.1)
CHLORIDE SERPL-SCNC: 103 MMOL/L (ref 100–108)
CO2 SERPL-SCNC: 26 MMOL/L (ref 21–32)
CREAT SERPL-MCNC: 3.54 MG/DL (ref 0.6–1.3)
GLOBULIN SER CALC-MCNC: 2.7 G/DL (ref 2–4)
GLUCOSE SERPL-MCNC: 88 MG/DL (ref 74–99)
POTASSIUM SERPL-SCNC: 5.2 MMOL/L (ref 3.5–5.5)
PROT SERPL-MCNC: 5.6 G/DL (ref 6.4–8.2)
SODIUM SERPL-SCNC: 137 MMOL/L (ref 136–145)

## 2017-08-28 PROCEDURE — 80053 COMPREHEN METABOLIC PANEL: CPT | Performed by: FAMILY MEDICINE

## 2017-09-08 ENCOUNTER — HOSPITAL ENCOUNTER (OUTPATIENT)
Dept: LAB | Age: 82
Discharge: HOME | End: 2017-09-08
Payer: MEDICARE

## 2017-09-08 ENCOUNTER — HOSPITAL ENCOUNTER (OUTPATIENT)
Dept: LAB | Age: 82
Discharge: HOME | End: 2017-09-08

## 2017-09-08 LAB
BACTERIA SPEC CULT: ABNORMAL
BACTERIA SPEC CULT: ABNORMAL
SERVICE CMNT-IMP: ABNORMAL

## 2017-09-08 PROCEDURE — 87493 C DIFF AMPLIFIED PROBE: CPT | Performed by: FAMILY MEDICINE

## 2017-09-26 ENCOUNTER — HOSPITAL ENCOUNTER (OUTPATIENT)
Dept: LAB | Age: 82
Discharge: HOME | End: 2017-09-26
Payer: MEDICARE

## 2017-09-26 ENCOUNTER — HOSPITAL ENCOUNTER (OUTPATIENT)
Dept: LAB | Age: 82
Discharge: HOME | End: 2017-09-26

## 2017-09-26 LAB
ALBUMIN SERPL-MCNC: 3 G/DL (ref 3.4–5)
ALBUMIN/GLOB SERPL: 1.1 {RATIO} (ref 0.8–1.7)
ALP SERPL-CCNC: 47 U/L (ref 45–117)
ALT SERPL-CCNC: 17 U/L (ref 13–56)
ANION GAP SERPL CALC-SCNC: 11 MMOL/L (ref 3–18)
AST SERPL-CCNC: 9 U/L (ref 15–37)
BILIRUB SERPL-MCNC: 0.3 MG/DL (ref 0.2–1)
BUN SERPL-MCNC: 85 MG/DL (ref 7–18)
BUN/CREAT SERPL: 23 (ref 12–20)
CALCIUM SERPL-MCNC: 8.8 MG/DL (ref 8.5–10.1)
CHLORIDE SERPL-SCNC: 107 MMOL/L (ref 100–108)
CO2 SERPL-SCNC: 21 MMOL/L (ref 21–32)
CREAT SERPL-MCNC: 3.71 MG/DL (ref 0.6–1.3)
GLOBULIN SER CALC-MCNC: 2.7 G/DL (ref 2–4)
GLUCOSE SERPL-MCNC: 80 MG/DL (ref 74–99)
POTASSIUM SERPL-SCNC: 5.5 MMOL/L (ref 3.5–5.5)
PROT SERPL-MCNC: 5.7 G/DL (ref 6.4–8.2)
SODIUM SERPL-SCNC: 139 MMOL/L (ref 136–145)

## 2017-09-26 PROCEDURE — 80053 COMPREHEN METABOLIC PANEL: CPT | Performed by: FAMILY MEDICINE

## 2017-09-30 ENCOUNTER — HOSPITAL ENCOUNTER (OUTPATIENT)
Dept: LAB | Age: 82
Discharge: HOME | End: 2017-09-30
Payer: COMMERCIAL

## 2017-09-30 LAB
APPEARANCE UR: ABNORMAL
BACTERIA URNS QL MICRO: ABNORMAL /HPF
BILIRUB UR QL: NEGATIVE
CAOX CRY URNS QL MICRO: ABNORMAL
COLOR UR: YELLOW
EPITH CASTS URNS QL MICRO: ABNORMAL /LPF (ref 0–5)
GLUCOSE UR STRIP.AUTO-MCNC: NEGATIVE MG/DL
HGB UR QL STRIP: ABNORMAL
KETONES UR QL STRIP.AUTO: NEGATIVE MG/DL
LEUKOCYTE ESTERASE UR QL STRIP.AUTO: ABNORMAL
MUCOUS THREADS URNS QL MICRO: ABNORMAL /LPF
NITRITE UR QL STRIP.AUTO: NEGATIVE
PH UR STRIP: >8.5 [PH] (ref 5–8)
PROT UR STRIP-MCNC: 300 MG/DL
RBC #/AREA URNS HPF: ABNORMAL /HPF (ref 0–5)
SP GR UR REFRACTOMETRY: 1.01 (ref 1–1.03)
UROBILINOGEN UR QL STRIP.AUTO: 0.2 EU/DL (ref 0.2–1)
WBC URNS QL MICRO: ABNORMAL /HPF (ref 0–4)

## 2017-09-30 PROCEDURE — 87086 URINE CULTURE/COLONY COUNT: CPT | Performed by: FAMILY MEDICINE

## 2017-09-30 PROCEDURE — 87186 SC STD MICRODIL/AGAR DIL: CPT | Performed by: FAMILY MEDICINE

## 2017-09-30 PROCEDURE — 81001 URINALYSIS AUTO W/SCOPE: CPT | Performed by: FAMILY MEDICINE

## 2017-09-30 PROCEDURE — 87077 CULTURE AEROBIC IDENTIFY: CPT | Performed by: FAMILY MEDICINE

## 2017-10-03 LAB
BACTERIA SPEC CULT: ABNORMAL
SERVICE CMNT-IMP: ABNORMAL

## 2017-10-08 ENCOUNTER — HOSPITAL ENCOUNTER (OUTPATIENT)
Dept: LAB | Age: 82
Discharge: HOME | End: 2017-10-08

## 2017-11-25 ENCOUNTER — HOSPITAL ENCOUNTER (OUTPATIENT)
Dept: LAB | Age: 82
Discharge: HOME | End: 2017-11-25
Payer: MEDICARE

## 2017-11-25 ENCOUNTER — HOSPITAL ENCOUNTER (OUTPATIENT)
Dept: LAB | Age: 82
Discharge: HOME | End: 2017-11-25

## 2017-11-25 LAB
ANION GAP SERPL CALC-SCNC: 9 MMOL/L (ref 3–18)
BUN SERPL-MCNC: 101 MG/DL (ref 7–18)
BUN/CREAT SERPL: 21 (ref 12–20)
CALCIUM SERPL-MCNC: 8.3 MG/DL (ref 8.5–10.1)
CHLORIDE SERPL-SCNC: 106 MMOL/L (ref 100–108)
CO2 SERPL-SCNC: 22 MMOL/L (ref 21–32)
CREAT SERPL-MCNC: 4.88 MG/DL (ref 0.6–1.3)
GLUCOSE SERPL-MCNC: 82 MG/DL (ref 74–99)
POTASSIUM SERPL-SCNC: 5.9 MMOL/L (ref 3.5–5.5)
SODIUM SERPL-SCNC: 137 MMOL/L (ref 136–145)

## 2017-11-25 PROCEDURE — 80048 BASIC METABOLIC PNL TOTAL CA: CPT | Performed by: FAMILY MEDICINE

## 2017-11-30 ENCOUNTER — HOSPITAL ENCOUNTER (OUTPATIENT)
Dept: LAB | Age: 82
Discharge: HOME | End: 2017-11-30
Payer: MEDICARE

## 2017-11-30 LAB
APPEARANCE UR: CLEAR
BACTERIA URNS QL MICRO: ABNORMAL /HPF
BILIRUB UR QL: NEGATIVE
COLOR UR: YELLOW
EPITH CASTS URNS QL MICRO: ABNORMAL /LPF (ref 0–5)
GLUCOSE UR STRIP.AUTO-MCNC: NEGATIVE MG/DL
HGB UR QL STRIP: NEGATIVE
KETONES UR QL STRIP.AUTO: NEGATIVE MG/DL
LEUKOCYTE ESTERASE UR QL STRIP.AUTO: ABNORMAL
NITRITE UR QL STRIP.AUTO: NEGATIVE
PH UR STRIP: >8.5 [PH] (ref 5–8)
PROT UR STRIP-MCNC: 100 MG/DL
RBC #/AREA URNS HPF: NEGATIVE /HPF (ref 0–5)
SP GR UR REFRACTOMETRY: 1.01 (ref 1–1.03)
UROBILINOGEN UR QL STRIP.AUTO: 0.2 EU/DL (ref 0.2–1)
WBC URNS QL MICRO: ABNORMAL /HPF (ref 0–4)

## 2017-11-30 PROCEDURE — 87077 CULTURE AEROBIC IDENTIFY: CPT | Performed by: FAMILY MEDICINE

## 2017-11-30 PROCEDURE — 81001 URINALYSIS AUTO W/SCOPE: CPT | Performed by: FAMILY MEDICINE

## 2017-11-30 PROCEDURE — 87186 SC STD MICRODIL/AGAR DIL: CPT | Performed by: FAMILY MEDICINE

## 2017-11-30 PROCEDURE — 87086 URINE CULTURE/COLONY COUNT: CPT | Performed by: FAMILY MEDICINE

## 2017-12-03 LAB
BACTERIA SPEC CULT: ABNORMAL
BACTERIA SPEC CULT: ABNORMAL
SERVICE CMNT-IMP: ABNORMAL

## 2018-01-01 ENCOUNTER — HOSPITAL ENCOUNTER (OUTPATIENT)
Dept: LAB | Age: 83
Discharge: HOME | End: 2018-09-21
Payer: MEDICARE

## 2018-01-01 ENCOUNTER — DOCUMENTATION ONLY (OUTPATIENT)
Dept: PALLATIVE CARE | Age: 83
End: 2018-01-01

## 2018-01-01 ENCOUNTER — APPOINTMENT (OUTPATIENT)
Dept: GENERAL RADIOLOGY | Age: 83
DRG: 155 | End: 2018-01-01
Attending: PHYSICIAN ASSISTANT
Payer: MEDICARE

## 2018-01-01 ENCOUNTER — HOSPITAL ENCOUNTER (OUTPATIENT)
Dept: LAB | Age: 83
Discharge: HOME | End: 2018-09-04

## 2018-01-01 ENCOUNTER — APPOINTMENT (OUTPATIENT)
Dept: CT IMAGING | Age: 83
DRG: 155 | End: 2018-01-01
Attending: PHYSICIAN ASSISTANT
Payer: MEDICARE

## 2018-01-01 ENCOUNTER — HOSPITAL ENCOUNTER (OUTPATIENT)
Dept: LAB | Age: 83
Discharge: HOME | End: 2018-10-25

## 2018-01-01 ENCOUNTER — HOSPITAL ENCOUNTER (OUTPATIENT)
Dept: LAB | Age: 83
Discharge: HOME | End: 2018-05-24
Payer: MEDICARE

## 2018-01-01 ENCOUNTER — HOSPITAL ENCOUNTER (INPATIENT)
Age: 83
LOS: 1 days | Discharge: HOME | DRG: 155 | End: 2018-11-23
Attending: EMERGENCY MEDICINE | Admitting: FAMILY MEDICINE
Payer: MEDICARE

## 2018-01-01 ENCOUNTER — APPOINTMENT (OUTPATIENT)
Dept: CT IMAGING | Age: 83
DRG: 155 | End: 2018-01-01
Attending: STUDENT IN AN ORGANIZED HEALTH CARE EDUCATION/TRAINING PROGRAM
Payer: MEDICARE

## 2018-01-01 ENCOUNTER — HOSPITAL ENCOUNTER (OUTPATIENT)
Dept: LAB | Age: 83
Discharge: HOME | End: 2018-10-23

## 2018-01-01 ENCOUNTER — HOSPITAL ENCOUNTER (OUTPATIENT)
Dept: LAB | Age: 83
Discharge: HOME | End: 2018-12-26
Payer: MEDICARE

## 2018-01-01 ENCOUNTER — PATIENT OUTREACH (OUTPATIENT)
Dept: CASE MANAGEMENT | Age: 83
End: 2018-01-01

## 2018-01-01 ENCOUNTER — HOSPITAL ENCOUNTER (OUTPATIENT)
Dept: LAB | Age: 83
Discharge: HOME | End: 2018-04-24
Payer: MEDICARE

## 2018-01-01 ENCOUNTER — HOSPITAL ENCOUNTER (OUTPATIENT)
Dept: LAB | Age: 83
Discharge: HOME | End: 2018-05-28
Payer: MEDICARE

## 2018-01-01 ENCOUNTER — HOSPITAL ENCOUNTER (OUTPATIENT)
Dept: LAB | Age: 83
Discharge: HOME | End: 2018-08-27
Payer: MEDICARE

## 2018-01-01 ENCOUNTER — HOSPITAL ENCOUNTER (OUTPATIENT)
Dept: LAB | Age: 83
Discharge: HOME | End: 2018-03-31
Payer: MEDICARE

## 2018-01-01 ENCOUNTER — HOSPITAL ENCOUNTER (OUTPATIENT)
Dept: LAB | Age: 83
Discharge: HOME | End: 2018-08-22
Payer: MEDICARE

## 2018-01-01 ENCOUNTER — HOSPITAL ENCOUNTER (OUTPATIENT)
Dept: LAB | Age: 83
Discharge: HOME | End: 2018-06-23
Payer: MEDICARE

## 2018-01-01 ENCOUNTER — HOSPITAL ENCOUNTER (OUTPATIENT)
Dept: LAB | Age: 83
Discharge: HOME | End: 2018-09-03
Payer: MEDICARE

## 2018-01-01 VITALS
HEART RATE: 86 BPM | SYSTOLIC BLOOD PRESSURE: 150 MMHG | HEIGHT: 66 IN | TEMPERATURE: 97.1 F | RESPIRATION RATE: 15 BRPM | DIASTOLIC BLOOD PRESSURE: 46 MMHG | OXYGEN SATURATION: 97 % | WEIGHT: 123 LBS | BODY MASS INDEX: 19.77 KG/M2

## 2018-01-01 DIAGNOSIS — K11.20 SIALADENITIS: Primary | ICD-10-CM

## 2018-01-01 LAB
ALBUMIN SERPL-MCNC: 3 G/DL (ref 3.4–5)
ALBUMIN SERPL-MCNC: 3.1 G/DL (ref 3.4–5)
ALBUMIN SERPL-MCNC: 3.1 G/DL (ref 3.4–5)
ALBUMIN SERPL-MCNC: 3.2 G/DL (ref 3.4–5)
ALBUMIN SERPL-MCNC: 3.3 G/DL (ref 3.4–5)
ALBUMIN SERPL-MCNC: 3.4 G/DL (ref 3.4–5)
ALBUMIN SERPL-MCNC: 3.4 G/DL (ref 3.4–5)
ALBUMIN/GLOB SERPL: 1 {RATIO} (ref 0.8–1.7)
ALBUMIN/GLOB SERPL: 1.3 {RATIO} (ref 0.8–1.7)
ALBUMIN/GLOB SERPL: 1.4 {RATIO} (ref 0.8–1.7)
ALBUMIN/GLOB SERPL: 1.5 {RATIO} (ref 0.8–1.7)
ALBUMIN/GLOB SERPL: 1.5 {RATIO} (ref 0.8–1.7)
ALP SERPL-CCNC: 43 U/L (ref 45–117)
ALP SERPL-CCNC: 44 U/L (ref 45–117)
ALP SERPL-CCNC: 45 U/L (ref 45–117)
ALP SERPL-CCNC: 46 U/L (ref 45–117)
ALP SERPL-CCNC: 47 U/L (ref 45–117)
ALP SERPL-CCNC: 48 U/L (ref 45–117)
ALP SERPL-CCNC: 48 U/L (ref 45–117)
ALP SERPL-CCNC: 50 U/L (ref 45–117)
ALP SERPL-CCNC: 52 U/L (ref 45–117)
ALP SERPL-CCNC: 53 U/L (ref 45–117)
ALP SERPL-CCNC: 57 U/L (ref 45–117)
ALT SERPL-CCNC: 14 U/L (ref 13–56)
ALT SERPL-CCNC: 14 U/L (ref 13–56)
ALT SERPL-CCNC: 16 U/L (ref 13–56)
ALT SERPL-CCNC: 17 U/L (ref 13–56)
ALT SERPL-CCNC: 20 U/L (ref 13–56)
ALT SERPL-CCNC: 21 U/L (ref 13–56)
ALT SERPL-CCNC: 25 U/L (ref 13–56)
ANION GAP SERPL CALC-SCNC: 10 MMOL/L (ref 3–18)
ANION GAP SERPL CALC-SCNC: 12 MMOL/L (ref 3–18)
ANION GAP SERPL CALC-SCNC: 13 MMOL/L (ref 3–18)
ANION GAP SERPL CALC-SCNC: 13 MMOL/L (ref 3–18)
ANION GAP SERPL CALC-SCNC: 15 MMOL/L (ref 3–18)
ANION GAP SERPL CALC-SCNC: 6 MMOL/L (ref 3–18)
ANION GAP SERPL CALC-SCNC: 6 MMOL/L (ref 3–18)
ANION GAP SERPL CALC-SCNC: 9 MMOL/L (ref 3–18)
APPEARANCE UR: ABNORMAL
AST SERPL-CCNC: 10 U/L (ref 15–37)
AST SERPL-CCNC: 12 U/L (ref 15–37)
AST SERPL-CCNC: 12 U/L (ref 15–37)
AST SERPL-CCNC: 15 U/L (ref 15–37)
AST SERPL-CCNC: 6 U/L (ref 15–37)
AST SERPL-CCNC: 7 U/L (ref 15–37)
AST SERPL-CCNC: 8 U/L (ref 15–37)
AST SERPL-CCNC: 9 U/L (ref 15–37)
AST SERPL-CCNC: 9 U/L (ref 15–37)
ATRIAL RATE: 84 BPM
ATRIAL RATE: 86 BPM
BACTERIA SPEC CULT: ABNORMAL
BACTERIA SPEC CULT: NORMAL
BACTERIA SPEC CULT: NORMAL
BACTERIA URNS QL MICRO: ABNORMAL /HPF
BASOPHILS # BLD: 0 K/UL (ref 0–0.1)
BASOPHILS NFR BLD: 0 % (ref 0–2)
BILIRUB SERPL-MCNC: 0.1 MG/DL (ref 0.2–1)
BILIRUB SERPL-MCNC: 0.2 MG/DL (ref 0.2–1)
BILIRUB SERPL-MCNC: 0.3 MG/DL (ref 0.2–1)
BILIRUB UR QL: NEGATIVE
BUN SERPL-MCNC: 102 MG/DL (ref 7–18)
BUN SERPL-MCNC: 104 MG/DL (ref 7–18)
BUN SERPL-MCNC: 108 MG/DL (ref 7–18)
BUN SERPL-MCNC: 68 MG/DL (ref 7–18)
BUN SERPL-MCNC: 71 MG/DL (ref 7–18)
BUN SERPL-MCNC: 73 MG/DL (ref 7–18)
BUN SERPL-MCNC: 80 MG/DL (ref 7–18)
BUN SERPL-MCNC: 83 MG/DL (ref 7–18)
BUN SERPL-MCNC: 83 MG/DL (ref 7–18)
BUN SERPL-MCNC: 88 MG/DL (ref 7–18)
BUN SERPL-MCNC: 88 MG/DL (ref 7–18)
BUN SERPL-MCNC: 91 MG/DL (ref 7–18)
BUN SERPL-MCNC: 96 MG/DL (ref 7–18)
BUN SERPL-MCNC: 98 MG/DL (ref 7–18)
BUN SERPL-MCNC: 99 MG/DL (ref 7–18)
BUN/CREAT SERPL: 13 (ref 12–20)
BUN/CREAT SERPL: 14 (ref 12–20)
BUN/CREAT SERPL: 14 (ref 12–20)
BUN/CREAT SERPL: 15 (ref 12–20)
BUN/CREAT SERPL: 15 (ref 12–20)
BUN/CREAT SERPL: 16 (ref 12–20)
BUN/CREAT SERPL: 17 (ref 12–20)
BUN/CREAT SERPL: 18 (ref 12–20)
BUN/CREAT SERPL: 18 (ref 12–20)
BUN/CREAT SERPL: 20 (ref 12–20)
CALCIUM SERPL-MCNC: 8.1 MG/DL (ref 8.5–10.1)
CALCIUM SERPL-MCNC: 8.4 MG/DL (ref 8.5–10.1)
CALCIUM SERPL-MCNC: 8.6 MG/DL (ref 8.5–10.1)
CALCIUM SERPL-MCNC: 8.7 MG/DL (ref 8.5–10.1)
CALCIUM SERPL-MCNC: 8.7 MG/DL (ref 8.5–10.1)
CALCIUM SERPL-MCNC: 8.8 MG/DL (ref 8.5–10.1)
CALCIUM SERPL-MCNC: 8.9 MG/DL (ref 8.5–10.1)
CALCIUM SERPL-MCNC: 9.1 MG/DL (ref 8.5–10.1)
CALCIUM SERPL-MCNC: 9.3 MG/DL (ref 8.5–10.1)
CALCIUM SERPL-MCNC: 9.8 MG/DL (ref 8.5–10.1)
CALCIUM SERPL-MCNC: 9.8 MG/DL (ref 8.5–10.1)
CALCULATED P AXIS, ECG09: 78 DEGREES
CALCULATED P AXIS, ECG09: 83 DEGREES
CALCULATED R AXIS, ECG10: -58 DEGREES
CALCULATED R AXIS, ECG10: -59 DEGREES
CALCULATED T AXIS, ECG11: 117 DEGREES
CALCULATED T AXIS, ECG11: 118 DEGREES
CHLORIDE SERPL-SCNC: 104 MMOL/L (ref 100–108)
CHLORIDE SERPL-SCNC: 106 MMOL/L (ref 100–108)
CHLORIDE SERPL-SCNC: 108 MMOL/L (ref 100–108)
CHLORIDE SERPL-SCNC: 108 MMOL/L (ref 100–108)
CHLORIDE SERPL-SCNC: 109 MMOL/L (ref 100–108)
CHLORIDE SERPL-SCNC: 110 MMOL/L (ref 100–108)
CHLORIDE SERPL-SCNC: 111 MMOL/L (ref 100–108)
CHLORIDE SERPL-SCNC: 112 MMOL/L (ref 100–108)
CHLORIDE SERPL-SCNC: 113 MMOL/L (ref 100–108)
CHLORIDE SERPL-SCNC: 114 MMOL/L (ref 100–108)
CO2 SERPL-SCNC: 17 MMOL/L (ref 21–32)
CO2 SERPL-SCNC: 18 MMOL/L (ref 21–32)
CO2 SERPL-SCNC: 19 MMOL/L (ref 21–32)
CO2 SERPL-SCNC: 19 MMOL/L (ref 21–32)
CO2 SERPL-SCNC: 20 MMOL/L (ref 21–32)
CO2 SERPL-SCNC: 21 MMOL/L (ref 21–32)
CO2 SERPL-SCNC: 22 MMOL/L (ref 21–32)
CO2 SERPL-SCNC: 23 MMOL/L (ref 21–32)
CO2 SERPL-SCNC: 25 MMOL/L (ref 21–32)
COLOR UR: YELLOW
CREAT SERPL-MCNC: 5.04 MG/DL (ref 0.6–1.3)
CREAT SERPL-MCNC: 5.13 MG/DL (ref 0.6–1.3)
CREAT SERPL-MCNC: 5.18 MG/DL (ref 0.6–1.3)
CREAT SERPL-MCNC: 5.26 MG/DL (ref 0.6–1.3)
CREAT SERPL-MCNC: 5.27 MG/DL (ref 0.6–1.3)
CREAT SERPL-MCNC: 5.27 MG/DL (ref 0.6–1.3)
CREAT SERPL-MCNC: 5.37 MG/DL (ref 0.6–1.3)
CREAT SERPL-MCNC: 5.45 MG/DL (ref 0.6–1.3)
CREAT SERPL-MCNC: 5.54 MG/DL (ref 0.6–1.3)
CREAT SERPL-MCNC: 5.61 MG/DL (ref 0.6–1.3)
CREAT SERPL-MCNC: 5.7 MG/DL (ref 0.6–1.3)
CREAT SERPL-MCNC: 5.76 MG/DL (ref 0.6–1.3)
CREAT SERPL-MCNC: 5.98 MG/DL (ref 0.6–1.3)
CREAT SERPL-MCNC: 6 MG/DL (ref 0.6–1.3)
CREAT SERPL-MCNC: 6.07 MG/DL (ref 0.6–1.3)
DIAGNOSIS, 93000: NORMAL
DIAGNOSIS, 93000: NORMAL
DIFFERENTIAL METHOD BLD: ABNORMAL
EOSINOPHIL # BLD: 0 K/UL (ref 0–0.4)
EOSINOPHIL NFR BLD: 0 % (ref 0–5)
EPITH CASTS URNS QL MICRO: ABNORMAL /LPF (ref 0–5)
ERYTHROCYTE [DISTWIDTH] IN BLOOD BY AUTOMATED COUNT: 13.6 % (ref 11.6–14.5)
GLOBULIN SER CALC-MCNC: 2.2 G/DL (ref 2–4)
GLOBULIN SER CALC-MCNC: 2.2 G/DL (ref 2–4)
GLOBULIN SER CALC-MCNC: 2.3 G/DL (ref 2–4)
GLOBULIN SER CALC-MCNC: 2.3 G/DL (ref 2–4)
GLOBULIN SER CALC-MCNC: 2.4 G/DL (ref 2–4)
GLOBULIN SER CALC-MCNC: 2.5 G/DL (ref 2–4)
GLOBULIN SER CALC-MCNC: 2.5 G/DL (ref 2–4)
GLOBULIN SER CALC-MCNC: 2.6 G/DL (ref 2–4)
GLOBULIN SER CALC-MCNC: 3.4 G/DL (ref 2–4)
GLUCOSE BLD STRIP.AUTO-MCNC: 155 MG/DL (ref 70–110)
GLUCOSE BLD STRIP.AUTO-MCNC: 169 MG/DL (ref 70–110)
GLUCOSE SERPL-MCNC: 102 MG/DL (ref 74–99)
GLUCOSE SERPL-MCNC: 126 MG/DL (ref 74–99)
GLUCOSE SERPL-MCNC: 140 MG/DL (ref 74–99)
GLUCOSE SERPL-MCNC: 85 MG/DL (ref 74–99)
GLUCOSE SERPL-MCNC: 86 MG/DL (ref 74–99)
GLUCOSE SERPL-MCNC: 88 MG/DL (ref 74–99)
GLUCOSE SERPL-MCNC: 88 MG/DL (ref 74–99)
GLUCOSE SERPL-MCNC: 89 MG/DL (ref 74–99)
GLUCOSE SERPL-MCNC: 90 MG/DL (ref 74–99)
GLUCOSE SERPL-MCNC: 94 MG/DL (ref 74–99)
GLUCOSE SERPL-MCNC: 97 MG/DL (ref 74–99)
GLUCOSE SERPL-MCNC: 98 MG/DL (ref 74–99)
GLUCOSE SERPL-MCNC: 98 MG/DL (ref 74–99)
GLUCOSE UR STRIP.AUTO-MCNC: NEGATIVE MG/DL
HCT VFR BLD AUTO: 28 % (ref 35–45)
HETEROPH AB SER QL: NEGATIVE
HGB BLD-MCNC: 9.1 G/DL (ref 12–16)
HGB UR QL STRIP: ABNORMAL
KETONES UR QL STRIP.AUTO: NEGATIVE MG/DL
LACTATE BLD-SCNC: 0.73 MMOL/L (ref 0.4–2)
LEUKOCYTE ESTERASE UR QL STRIP.AUTO: ABNORMAL
LYMPHOCYTES # BLD: 1.4 K/UL (ref 0.9–3.6)
LYMPHOCYTES NFR BLD: 15 % (ref 21–52)
MCH RBC QN AUTO: 32.3 PG (ref 24–34)
MCHC RBC AUTO-ENTMCNC: 32.5 G/DL (ref 31–37)
MCV RBC AUTO: 99.3 FL (ref 74–97)
MONOCYTES # BLD: 0.8 K/UL (ref 0.05–1.2)
MONOCYTES NFR BLD: 9 % (ref 3–10)
NEUTS SEG # BLD: 6.9 K/UL (ref 1.8–8)
NEUTS SEG NFR BLD: 76 % (ref 40–73)
NITRITE UR QL STRIP.AUTO: POSITIVE
P-R INTERVAL, ECG05: 170 MS
P-R INTERVAL, ECG05: 174 MS
PH UR STRIP: 7.5 [PH] (ref 5–8)
PLATELET # BLD AUTO: 217 K/UL (ref 135–420)
PMV BLD AUTO: 9.3 FL (ref 9.2–11.8)
POTASSIUM SERPL-SCNC: 3.8 MMOL/L (ref 3.5–5.5)
POTASSIUM SERPL-SCNC: 3.9 MMOL/L (ref 3.5–5.5)
POTASSIUM SERPL-SCNC: 4.6 MMOL/L (ref 3.5–5.5)
POTASSIUM SERPL-SCNC: 5.1 MMOL/L (ref 3.5–5.5)
POTASSIUM SERPL-SCNC: 5.2 MMOL/L (ref 3.5–5.5)
POTASSIUM SERPL-SCNC: 5.4 MMOL/L (ref 3.5–5.5)
POTASSIUM SERPL-SCNC: 5.6 MMOL/L (ref 3.5–5.5)
POTASSIUM SERPL-SCNC: 5.7 MMOL/L (ref 3.5–5.5)
POTASSIUM SERPL-SCNC: 5.8 MMOL/L (ref 3.5–5.5)
POTASSIUM SERPL-SCNC: 5.8 MMOL/L (ref 3.5–5.5)
POTASSIUM SERPL-SCNC: 5.9 MMOL/L (ref 3.5–5.5)
POTASSIUM SERPL-SCNC: 6 MMOL/L (ref 3.5–5.5)
POTASSIUM SERPL-SCNC: 6.1 MMOL/L (ref 3.5–5.5)
PROT SERPL-MCNC: 5.3 G/DL (ref 6.4–8.2)
PROT SERPL-MCNC: 5.4 G/DL (ref 6.4–8.2)
PROT SERPL-MCNC: 5.4 G/DL (ref 6.4–8.2)
PROT SERPL-MCNC: 5.5 G/DL (ref 6.4–8.2)
PROT SERPL-MCNC: 5.7 G/DL (ref 6.4–8.2)
PROT SERPL-MCNC: 5.8 G/DL (ref 6.4–8.2)
PROT SERPL-MCNC: 5.9 G/DL (ref 6.4–8.2)
PROT SERPL-MCNC: 5.9 G/DL (ref 6.4–8.2)
PROT SERPL-MCNC: 6.8 G/DL (ref 6.4–8.2)
PROT UR STRIP-MCNC: 300 MG/DL
Q-T INTERVAL, ECG07: 356 MS
Q-T INTERVAL, ECG07: 360 MS
QRS DURATION, ECG06: 116 MS
QRS DURATION, ECG06: 120 MS
QTC CALCULATION (BEZET), ECG08: 420 MS
QTC CALCULATION (BEZET), ECG08: 430 MS
RBC # BLD AUTO: 2.82 M/UL (ref 4.2–5.3)
RBC #/AREA URNS HPF: ABNORMAL /HPF (ref 0–5)
SERVICE CMNT-IMP: ABNORMAL
SERVICE CMNT-IMP: ABNORMAL
SERVICE CMNT-IMP: NORMAL
SERVICE CMNT-IMP: NORMAL
SODIUM SERPL-SCNC: 138 MMOL/L (ref 136–145)
SODIUM SERPL-SCNC: 138 MMOL/L (ref 136–145)
SODIUM SERPL-SCNC: 140 MMOL/L (ref 136–145)
SODIUM SERPL-SCNC: 141 MMOL/L (ref 136–145)
SODIUM SERPL-SCNC: 142 MMOL/L (ref 136–145)
SODIUM SERPL-SCNC: 142 MMOL/L (ref 136–145)
SODIUM SERPL-SCNC: 143 MMOL/L (ref 136–145)
SODIUM SERPL-SCNC: 145 MMOL/L (ref 136–145)
SP GR UR REFRACTOMETRY: 1.01 (ref 1–1.03)
URATE SERPL-MCNC: 4.2 MG/DL (ref 2.6–7.2)
UROBILINOGEN UR QL STRIP.AUTO: 0.2 EU/DL (ref 0.2–1)
VENTRICULAR RATE, ECG03: 84 BPM
VENTRICULAR RATE, ECG03: 86 BPM
WBC # BLD AUTO: 9.1 K/UL (ref 4.6–13.2)
WBC URNS QL MICRO: ABNORMAL /HPF (ref 0–4)

## 2018-01-01 PROCEDURE — 87186 SC STD MICRODIL/AGAR DIL: CPT

## 2018-01-01 PROCEDURE — 74011250636 HC RX REV CODE- 250/636: Performed by: PHYSICIAN ASSISTANT

## 2018-01-01 PROCEDURE — 81001 URINALYSIS AUTO W/SCOPE: CPT

## 2018-01-01 PROCEDURE — 86308 HETEROPHILE ANTIBODY SCREEN: CPT

## 2018-01-01 PROCEDURE — 87077 CULTURE AEROBIC IDENTIFY: CPT

## 2018-01-01 PROCEDURE — 99284 EMERGENCY DEPT VISIT MOD MDM: CPT

## 2018-01-01 PROCEDURE — 74011000250 HC RX REV CODE- 250: Performed by: STUDENT IN AN ORGANIZED HEALTH CARE EDUCATION/TRAINING PROGRAM

## 2018-01-01 PROCEDURE — 80048 BASIC METABOLIC PNL TOTAL CA: CPT | Performed by: FAMILY MEDICINE

## 2018-01-01 PROCEDURE — 80053 COMPREHEN METABOLIC PANEL: CPT | Performed by: FAMILY MEDICINE

## 2018-01-01 PROCEDURE — 84550 ASSAY OF BLOOD/URIC ACID: CPT

## 2018-01-01 PROCEDURE — 87086 URINE CULTURE/COLONY COUNT: CPT

## 2018-01-01 PROCEDURE — C1751 CATH, INF, PER/CENT/MIDLINE: HCPCS

## 2018-01-01 PROCEDURE — 74011000258 HC RX REV CODE- 258: Performed by: STUDENT IN AN ORGANIZED HEALTH CARE EDUCATION/TRAINING PROGRAM

## 2018-01-01 PROCEDURE — 97530 THERAPEUTIC ACTIVITIES: CPT

## 2018-01-01 PROCEDURE — 74011250636 HC RX REV CODE- 250/636: Performed by: STUDENT IN AN ORGANIZED HEALTH CARE EDUCATION/TRAINING PROGRAM

## 2018-01-01 PROCEDURE — 97535 SELF CARE MNGMENT TRAINING: CPT

## 2018-01-01 PROCEDURE — 85025 COMPLETE CBC W/AUTO DIFF WBC: CPT

## 2018-01-01 PROCEDURE — 80053 COMPREHEN METABOLIC PANEL: CPT

## 2018-01-01 PROCEDURE — 87040 BLOOD CULTURE FOR BACTERIA: CPT

## 2018-01-01 PROCEDURE — 70450 CT HEAD/BRAIN W/O DYE: CPT

## 2018-01-01 PROCEDURE — 97161 PT EVAL LOW COMPLEX 20 MIN: CPT

## 2018-01-01 PROCEDURE — 83605 ASSAY OF LACTIC ACID: CPT

## 2018-01-01 PROCEDURE — 97166 OT EVAL MOD COMPLEX 45 MIN: CPT

## 2018-01-01 PROCEDURE — 71045 X-RAY EXAM CHEST 1 VIEW: CPT

## 2018-01-01 PROCEDURE — 65660000000 HC RM CCU STEPDOWN

## 2018-01-01 PROCEDURE — 80048 BASIC METABOLIC PNL TOTAL CA: CPT

## 2018-01-01 PROCEDURE — 74011250636 HC RX REV CODE- 250/636: Performed by: EMERGENCY MEDICINE

## 2018-01-01 PROCEDURE — 70490 CT SOFT TISSUE NECK W/O DYE: CPT

## 2018-01-01 PROCEDURE — 74011000258 HC RX REV CODE- 258: Performed by: PHYSICIAN ASSISTANT

## 2018-01-01 PROCEDURE — 74011250637 HC RX REV CODE- 250/637: Performed by: STUDENT IN AN ORGANIZED HEALTH CARE EDUCATION/TRAINING PROGRAM

## 2018-01-01 PROCEDURE — 93005 ELECTROCARDIOGRAM TRACING: CPT

## 2018-01-01 PROCEDURE — 74011000250 HC RX REV CODE- 250: Performed by: PHYSICIAN ASSISTANT

## 2018-01-01 PROCEDURE — 74011250637 HC RX REV CODE- 250/637: Performed by: EMERGENCY MEDICINE

## 2018-01-01 PROCEDURE — 92610 EVALUATE SWALLOWING FUNCTION: CPT

## 2018-01-01 PROCEDURE — 82962 GLUCOSE BLOOD TEST: CPT

## 2018-01-01 PROCEDURE — 96365 THER/PROPH/DIAG IV INF INIT: CPT

## 2018-01-01 PROCEDURE — 74011000258 HC RX REV CODE- 258: Performed by: EMERGENCY MEDICINE

## 2018-01-01 PROCEDURE — 77030038269 HC DRN EXT URIN PURWCK BARD -A

## 2018-01-01 PROCEDURE — 75810000137 HC PLCMT CENT VENOUS CATH

## 2018-01-01 RX ORDER — DEXAMETHASONE SODIUM PHOSPHATE 4 MG/ML
4 INJECTION, SOLUTION INTRA-ARTICULAR; INTRALESIONAL; INTRAMUSCULAR; INTRAVENOUS; SOFT TISSUE ONCE
Status: COMPLETED | OUTPATIENT
Start: 2018-01-01 | End: 2018-01-01

## 2018-01-01 RX ORDER — ATORVASTATIN CALCIUM 40 MG/1
80 TABLET, FILM COATED ORAL ONCE
Status: DISCONTINUED | OUTPATIENT
Start: 2018-01-01 | End: 2018-01-01

## 2018-01-01 RX ORDER — MAGNESIUM HYDROXIDE 400 MG/5ML
30 SUSPENSION, ORAL (FINAL DOSE FORM) ORAL DAILY PRN
COMMUNITY
End: 2019-01-01

## 2018-01-01 RX ORDER — DICLOFENAC SODIUM 10 MG/G
2 GEL TOPICAL 4 TIMES DAILY
COMMUNITY

## 2018-01-01 RX ORDER — ASCORBIC ACID 250 MG
500 TABLET ORAL DAILY
Qty: 30 TAB | Refills: 1 | Status: SHIPPED | OUTPATIENT
Start: 2018-01-01

## 2018-01-01 RX ORDER — LABETALOL HCL 20 MG/4 ML
5 SYRINGE (ML) INTRAVENOUS
Status: DISCONTINUED | OUTPATIENT
Start: 2018-01-01 | End: 2018-01-01

## 2018-01-01 RX ORDER — ATORVASTATIN CALCIUM 40 MG/1
80 TABLET, FILM COATED ORAL
Status: CANCELLED | OUTPATIENT
Start: 2018-01-01

## 2018-01-01 RX ORDER — SODIUM CHLORIDE 0.9 % (FLUSH) 0.9 %
5-10 SYRINGE (ML) INJECTION AS NEEDED
Status: DISCONTINUED | OUTPATIENT
Start: 2018-01-01 | End: 2018-01-01 | Stop reason: HOSPADM

## 2018-01-01 RX ORDER — LABETALOL HCL 20 MG/4 ML
20 SYRINGE (ML) INTRAVENOUS
Status: DISCONTINUED | OUTPATIENT
Start: 2018-01-01 | End: 2018-01-01

## 2018-01-01 RX ORDER — DEXTROSE MONOHYDRATE AND SODIUM CHLORIDE 5; .225 G/100ML; G/100ML
50 INJECTION, SOLUTION INTRAVENOUS CONTINUOUS
Status: DISCONTINUED | OUTPATIENT
Start: 2018-01-01 | End: 2018-01-01

## 2018-01-01 RX ORDER — DEXTROSE MONOHYDRATE AND SODIUM CHLORIDE 5; .45 G/100ML; G/100ML
75 INJECTION, SOLUTION INTRAVENOUS CONTINUOUS
Status: DISCONTINUED | OUTPATIENT
Start: 2018-01-01 | End: 2018-01-01 | Stop reason: HOSPADM

## 2018-01-01 RX ORDER — OXYCODONE HYDROCHLORIDE 5 MG/1
2.5 CAPSULE ORAL
COMMUNITY

## 2018-01-01 RX ORDER — LIDOCAINE 40 MG/G
1 CREAM TOPICAL
COMMUNITY

## 2018-01-01 RX ORDER — DEXAMETHASONE SODIUM PHOSPHATE 4 MG/ML
9 INJECTION, SOLUTION INTRA-ARTICULAR; INTRALESIONAL; INTRAMUSCULAR; INTRAVENOUS; SOFT TISSUE
Status: COMPLETED | OUTPATIENT
Start: 2018-01-01 | End: 2018-01-01

## 2018-01-01 RX ORDER — AMLODIPINE BESYLATE 10 MG/1
10 TABLET ORAL DAILY
Status: DISCONTINUED | OUTPATIENT
Start: 2018-01-01 | End: 2018-01-01 | Stop reason: HOSPADM

## 2018-01-01 RX ORDER — LABETALOL HCL 20 MG/4 ML
20 SYRINGE (ML) INTRAVENOUS ONCE
Status: COMPLETED | OUTPATIENT
Start: 2018-01-01 | End: 2018-01-01

## 2018-01-01 RX ORDER — SODIUM CHLORIDE 0.9 % (FLUSH) 0.9 %
5-10 SYRINGE (ML) INJECTION EVERY 8 HOURS
Status: DISCONTINUED | OUTPATIENT
Start: 2018-01-01 | End: 2018-01-01 | Stop reason: HOSPADM

## 2018-01-01 RX ORDER — LORAZEPAM 0.5 MG/1
0.5 TABLET ORAL
COMMUNITY

## 2018-01-01 RX ORDER — BISMUTH SUBSALICYLATE 262 MG
1 TABLET,CHEWABLE ORAL DAILY
COMMUNITY

## 2018-01-01 RX ORDER — POLYETHYLENE GLYCOL 3350 17 G/17G
17 POWDER, FOR SOLUTION ORAL DAILY
COMMUNITY

## 2018-01-01 RX ORDER — SODIUM CHLORIDE 9 MG/ML
100 INJECTION, SOLUTION INTRAVENOUS CONTINUOUS
Status: DISCONTINUED | OUTPATIENT
Start: 2018-01-01 | End: 2018-01-01

## 2018-01-01 RX ORDER — ALBUTEROL SULFATE 90 UG/1
AEROSOL, METERED RESPIRATORY (INHALATION)
COMMUNITY

## 2018-01-01 RX ORDER — GABAPENTIN 300 MG/1
300 CAPSULE ORAL
COMMUNITY

## 2018-01-01 RX ORDER — AMLODIPINE BESYLATE 10 MG/1
10 TABLET ORAL DAILY
COMMUNITY

## 2018-01-01 RX ORDER — HYDRALAZINE HYDROCHLORIDE 10 MG/1
10 TABLET, FILM COATED ORAL 3 TIMES DAILY
COMMUNITY

## 2018-01-01 RX ORDER — DOCUSATE SODIUM 100 MG/1
100 CAPSULE, LIQUID FILLED ORAL 2 TIMES DAILY
COMMUNITY

## 2018-01-01 RX ORDER — LABETALOL HCL 20 MG/4 ML
10 SYRINGE (ML) INTRAVENOUS
Status: DISCONTINUED | OUTPATIENT
Start: 2018-01-01 | End: 2018-01-01 | Stop reason: HOSPADM

## 2018-01-01 RX ORDER — DEXAMETHASONE 4 MG/1
4 TABLET ORAL 2 TIMES DAILY WITH MEALS
Qty: 4 TAB | Refills: 0 | Status: SHIPPED | OUTPATIENT
Start: 2018-01-01 | End: 2018-01-01

## 2018-01-01 RX ORDER — CLINDAMYCIN HYDROCHLORIDE 300 MG/1
600 CAPSULE ORAL 3 TIMES DAILY
Qty: 54 CAP | Refills: 0 | Status: SHIPPED | OUTPATIENT
Start: 2018-01-01 | End: 2019-01-01

## 2018-01-01 RX ORDER — HYDRALAZINE HYDROCHLORIDE 25 MG/1
25 TABLET, FILM COATED ORAL 3 TIMES DAILY
COMMUNITY

## 2018-01-01 RX ADMIN — DEXTROSE MONOHYDRATE AND SODIUM CHLORIDE 50 ML/HR: 5; .45 INJECTION, SOLUTION INTRAVENOUS at 05:31

## 2018-01-01 RX ADMIN — SODIUM CHLORIDE 100 ML/HR: 900 INJECTION, SOLUTION INTRAVENOUS at 20:36

## 2018-01-01 RX ADMIN — NITROGLYCERIN 1 INCH: 20 OINTMENT TOPICAL at 22:50

## 2018-01-01 RX ADMIN — LABETALOL HYDROCHLORIDE 10 MG: 5 INJECTION, SOLUTION INTRAVENOUS at 17:27

## 2018-01-01 RX ADMIN — DEXAMETHASONE SODIUM PHOSPHATE 4 MG: 4 INJECTION, SOLUTION INTRAMUSCULAR; INTRAVENOUS at 09:55

## 2018-01-01 RX ADMIN — CEFTRIAXONE SODIUM 1 G: 1 INJECTION, POWDER, FOR SOLUTION INTRAMUSCULAR; INTRAVENOUS at 06:42

## 2018-01-01 RX ADMIN — SODIUM CHLORIDE 600 MG: 9 INJECTION, SOLUTION INTRAVENOUS at 20:36

## 2018-01-01 RX ADMIN — AMLODIPINE BESYLATE 10 MG: 10 TABLET ORAL at 16:28

## 2018-01-01 RX ADMIN — DEXAMETHASONE SODIUM PHOSPHATE 9 MG: 4 INJECTION, SOLUTION INTRA-ARTICULAR; INTRALESIONAL; INTRAMUSCULAR; INTRAVENOUS; SOFT TISSUE at 20:35

## 2018-01-01 RX ADMIN — Medication 10 ML: at 05:22

## 2018-01-01 RX ADMIN — SODIUM CHLORIDE 600 MG: 900 INJECTION, SOLUTION INTRAVENOUS at 09:56

## 2018-01-01 RX ADMIN — LABETALOL HYDROCHLORIDE 20 MG: 5 INJECTION, SOLUTION INTRAVENOUS at 05:22

## 2018-01-01 RX ADMIN — CEFTRIAXONE SODIUM 250 MG: 250 INJECTION, POWDER, FOR SOLUTION INTRAMUSCULAR; INTRAVENOUS at 21:22

## 2018-01-01 RX ADMIN — DEXTROSE MONOHYDRATE AND SODIUM CHLORIDE 50 ML/HR: 5; .225 INJECTION, SOLUTION INTRAVENOUS at 04:09

## 2018-01-01 RX ADMIN — Medication 10 ML: at 17:27

## 2018-01-01 RX ADMIN — SODIUM CHLORIDE 600 MG: 900 INJECTION, SOLUTION INTRAVENOUS at 16:28

## 2018-01-24 ENCOUNTER — HOSPITAL ENCOUNTER (OUTPATIENT)
Dept: LAB | Age: 83
Discharge: HOME | End: 2018-01-24
Payer: MEDICARE

## 2018-01-24 LAB
ALBUMIN SERPL-MCNC: 3.1 G/DL (ref 3.4–5)
ALBUMIN/GLOB SERPL: 1.1 {RATIO} (ref 0.8–1.7)
ALP SERPL-CCNC: 47 U/L (ref 45–117)
ALT SERPL-CCNC: 13 U/L (ref 13–56)
ANION GAP SERPL CALC-SCNC: 11 MMOL/L (ref 3–18)
AST SERPL-CCNC: 9 U/L (ref 15–37)
BILIRUB SERPL-MCNC: 0.2 MG/DL (ref 0.2–1)
BUN SERPL-MCNC: 76 MG/DL (ref 7–18)
BUN/CREAT SERPL: 16 (ref 12–20)
CALCIUM SERPL-MCNC: 8.6 MG/DL (ref 8.5–10.1)
CHLORIDE SERPL-SCNC: 107 MMOL/L (ref 100–108)
CO2 SERPL-SCNC: 23 MMOL/L (ref 21–32)
CREAT SERPL-MCNC: 4.88 MG/DL (ref 0.6–1.3)
GLOBULIN SER CALC-MCNC: 2.7 G/DL (ref 2–4)
GLUCOSE SERPL-MCNC: 82 MG/DL (ref 74–99)
POTASSIUM SERPL-SCNC: 5.3 MMOL/L (ref 3.5–5.5)
PROT SERPL-MCNC: 5.8 G/DL (ref 6.4–8.2)
SODIUM SERPL-SCNC: 141 MMOL/L (ref 136–145)

## 2018-01-24 PROCEDURE — 80053 COMPREHEN METABOLIC PANEL: CPT | Performed by: FAMILY MEDICINE

## 2018-02-23 ENCOUNTER — HOSPITAL ENCOUNTER (OUTPATIENT)
Dept: LAB | Age: 83
Discharge: HOME | End: 2018-02-23
Payer: MEDICARE

## 2018-02-23 LAB
ALBUMIN SERPL-MCNC: 3 G/DL (ref 3.4–5)
ALBUMIN/GLOB SERPL: 1.2 {RATIO} (ref 0.8–1.7)
ALP SERPL-CCNC: 54 U/L (ref 45–117)
ALT SERPL-CCNC: 18 U/L (ref 13–56)
ANION GAP SERPL CALC-SCNC: 10 MMOL/L (ref 3–18)
AST SERPL-CCNC: 9 U/L (ref 15–37)
BILIRUB SERPL-MCNC: 0.2 MG/DL (ref 0.2–1)
BUN SERPL-MCNC: 78 MG/DL (ref 7–18)
BUN/CREAT SERPL: 16 (ref 12–20)
CALCIUM SERPL-MCNC: 8.6 MG/DL (ref 8.5–10.1)
CHLORIDE SERPL-SCNC: 108 MMOL/L (ref 100–108)
CO2 SERPL-SCNC: 21 MMOL/L (ref 21–32)
CREAT SERPL-MCNC: 4.98 MG/DL (ref 0.6–1.3)
GLOBULIN SER CALC-MCNC: 2.5 G/DL (ref 2–4)
GLUCOSE SERPL-MCNC: 122 MG/DL (ref 74–99)
POTASSIUM SERPL-SCNC: 5.8 MMOL/L (ref 3.5–5.5)
PROT SERPL-MCNC: 5.5 G/DL (ref 6.4–8.2)
SODIUM SERPL-SCNC: 139 MMOL/L (ref 136–145)

## 2018-02-23 PROCEDURE — 80053 COMPREHEN METABOLIC PANEL: CPT | Performed by: FAMILY MEDICINE

## 2018-02-27 ENCOUNTER — HOSPITAL ENCOUNTER (OUTPATIENT)
Dept: LAB | Age: 83
Discharge: HOME | End: 2018-02-27
Payer: MEDICARE

## 2018-02-27 LAB
ALBUMIN SERPL-MCNC: 3.1 G/DL (ref 3.4–5)
ALBUMIN/GLOB SERPL: 1.2 {RATIO} (ref 0.8–1.7)
ALP SERPL-CCNC: 52 U/L (ref 45–117)
ALT SERPL-CCNC: 16 U/L (ref 13–56)
ANION GAP SERPL CALC-SCNC: 9 MMOL/L (ref 3–18)
AST SERPL-CCNC: 11 U/L (ref 15–37)
BILIRUB SERPL-MCNC: 0.3 MG/DL (ref 0.2–1)
BUN SERPL-MCNC: 77 MG/DL (ref 7–18)
BUN/CREAT SERPL: 15 (ref 12–20)
CALCIUM SERPL-MCNC: 8.9 MG/DL (ref 8.5–10.1)
CHLORIDE SERPL-SCNC: 107 MMOL/L (ref 100–108)
CO2 SERPL-SCNC: 23 MMOL/L (ref 21–32)
CREAT SERPL-MCNC: 5.18 MG/DL (ref 0.6–1.3)
GLOBULIN SER CALC-MCNC: 2.6 G/DL (ref 2–4)
GLUCOSE SERPL-MCNC: 86 MG/DL (ref 74–99)
POTASSIUM SERPL-SCNC: 5.5 MMOL/L (ref 3.5–5.5)
PROT SERPL-MCNC: 5.7 G/DL (ref 6.4–8.2)
SODIUM SERPL-SCNC: 139 MMOL/L (ref 136–145)

## 2018-02-27 PROCEDURE — 80053 COMPREHEN METABOLIC PANEL: CPT | Performed by: FAMILY MEDICINE

## 2018-06-15 NOTE — PROGRESS NOTES
[facility]: [phone] ([phone]  [facility]: [phone]   [facility]: [phone]    Patient Name: Stu Graves  YOB: 1928    Date of Initial Consult: 6/14/18  Reason for Consult: care decisions   Requesting Provider: Dr. Tika Larkin  Primary Care Physician: Nona Johnson MD      SUMMARY:   Stu Graves is a 80y.o. year old with a past history of NSTEMI, CHF, CKD, CVA, breast ca, glaucoma, gout who is a longterm resident at [facility]. Current medical issues leading to Palliative Medicine involvement include: 79 y/o long term care resident with multiple chronic medical conditions, including CHF and CKD. Palliative medicine is consulted to discuss goals of care and care decisions for this chronically ill woman. PALLIATIVE DIAGNOSES:   1. ACP/goals of care discussion  2. CHF  3. CKD  4. debility       PLAN:   1. ACP/goals of care discussions: Met with patient at bedside along with Mela Bauman NP. She was seated in her wheelchair next to her bed. She had a blanket draped over her head and shoulders and complained of being cold. She appeared comfortable. She said she was not feeling well, but could not give specifics. She was alert and pleasant. She was able to answer some simple questions but could not understand complex medical information. She told us \"I'm not getting better. \" but did not know why and was not aware of her CKD. She was not able to give the name of her daughter. The patient has an AMD in the chart that was completed in 2016. Her daughter Nas Hunter was named as DASHA. I contacted Ms. Thompson by phone and left a message to arrange a family conference. 2. CHF: diastolic, history of NSTEMI  3. CKD: rising creatinine, plan to address goals of care with daughter  4. Debility: wheelchair bound  5. Initial consult note routed to primary continuity provider  6.  Communicated plan of care with: Palliative IDT    GOALS OF CARE:  Patient/Health Care Proxy Stated Goals:  (to be discussed)      TREATMENT PREFERENCES:   Code Status: Full   Advance Care Planning:  Advance Care Planning 6/15/2018   Patient's Healthcare Decision Maker is: Named in scanned ACP document   Primary Decision Maker Name Vanna    Primary Decision Maker Phone Number [phone]   Primary Decision Maker Relationship to Patient Adult child   Secondary Decision Maker Name -   Secondary Decision [address] Phone Number -   Secondary Decision Maker Relationship to Patient -   Confirm Advance Directive Yes, on file   Patient Would Like to Complete Advance Directive -       Medical Interventions: Full interventions   Other Instructions: to be discussed        Other:  As far as possible, the palliative care team has discussed with patient / health care proxy about goals of care / treatment preferences for patient. HISTORY:     History obtained from:  chart    CHIEF COMPLAINT: goals of care discussion    HPI/SUBJECTIVE:    The patient is:   [x] Verbal and participatory  [] Non-participatory due to:       Clinical Pain Assessment (nonverbal scale for nonverbal patients): Clinical Pain Assessment  Severity: 0          Duration: for how long has pt been experiencing pain (e.g., 2 days, 1 month, years)  Frequency: how often pain is an issue (e.g., several times per day, once every few days, constant)     FUNCTIONAL ASSESSMENT:     Palliative Performance Scale (PPS):  PPS: 40            PSYCHOSOCIAL/SPIRITUAL SCREENING:      Any spiritual / Congregational concerns:  [] Yes /  [x] No    Caregiver Burnout:  [] Yes /  [] No /  [x] No Caregiver Present      Anticipatory grief assessment:   [] Normal  / [] Maladaptive        REVIEW OF SYSTEMS:     Positive and pertinent negative findings in ROS are noted above in HPI.   The following systems were [x] reviewed / [] unable to be reviewed as noted in HPI  Other findings are noted below.  Systems: constitutional, ears/nose/mouth/throat, respiratory, gastrointestinal, genitourinary, musculoskeletal, integumentary, neurologic, psychiatric, endocrine. Positive findings noted below. Modified ESAS Completed by: provider           Pain: 0           Dyspnea: 0                    PHYSICAL EXAM:     Wt Readings from Last 3 Encounters:   05/17/17 62.6 kg (137 lb 14.4 oz)   03/07/17 61.2 kg (135 lb)   02/28/17 64.6 kg (142 lb 6.6 oz)                         Constitutional: elderly black woman in wheelchair by her bed  Eyes: pupils equal, anicteric  ENMT: no nasal discharge, moist mucous membranes  Cardiovascular: extremities warm  Respiratory: breathing not labored, symmetric    Musculoskeletal: no deformity, no tenderness to palpation  Skin: warm, dry  Neurologic: alert, answers simple questions, following commands, moving all extremities  :       HISTORY:     Active Problems:    * No active hospital problems. *    Past Medical History:   Diagnosis Date    Breast cancer ([facility] [address] 1/17/14    right breast    Cardiac echocardiogram 02/17/2017    EF 50%. No WMA. Mod conc LVH. Indeterminate diastolic fx. Mod LAE. Mild MR. Mild AI. Mild PI. No signifciant valvular heart disease.  Cardiovascular lower extremity arterial testing 12/19/2014    Mod tibio-peroneal arterial disease bilaterally. R YANCY 1.24.  L YANCY 0.74. Bilateral sm vessel disease.  Carotid duplex 11/19/2014    Severe 70% or greater bilateral ICA stenosis. LICA dissection suggested.  Chronic renal insufficiency     CVA (cerebral vascular accident) ([facility] [address] 2003    with slight Right sided weakness    Edema     Glaucoma     Gout flare     Hyperlipidemia     Hypertension     Lump of right breast     URI (upper respiratory infection)       Past Surgical History:   Procedure Laterality Date    HX APPENDECTOMY      HX CYST INCISION AND DRAINAGE      PT DOES NOT REMEMBER EXACT DATES, TUCKER BREAST DONE MORE THAN 20 YEARS AGO.  BENIGN FINDINGS PER PATIENT.  HX GYN      JUSTIN/BSO    HX HYSTERECTOMY      HX MASTECTOMY  1/17/2014    RIGHT PARTIAL MASTECTOMY performed by Bard Smooth MD at [facility] MAIN OR      Family History   Problem Relation Age of Onset    Hypertension Mother     Hypertension Brother     Diabetes Brother      History reviewed, no pertinent family history. Social History   Substance Use Topics    Smoking status: Never Smoker    Smokeless tobacco: Never Used    Alcohol use No     No Known Allergies        LAB AND IMAGING FINDINGS:     Lab Results   Component Value Date/Time    WBC 4.8 07/16/2017 01:00 PM    HGB 10.0 (L) 07/16/2017 01:00 PM    PLATELET 142 26/11/2212 01:00 PM     Lab Results   Component Value Date/Time    Sodium 142 05/28/2018 01:30 AM    Potassium 4.6 05/28/2018 01:30 AM    Chloride 108 05/28/2018 01:30 AM    CO2 21 05/28/2018 01:30 AM    BUN 88 (H) 05/28/2018 01:30 AM    Creatinine 5.18 (H) 05/28/2018 01:30 AM    Calcium 8.7 05/28/2018 01:30 AM    Magnesium 2.5 06/27/2017 03:00 AM    Phosphorus 4.0 02/27/2017 03:36 AM      Lab Results   Component Value Date/Time    AST (SGOT) 9 (L) 05/24/2018 12:41 AM    Alk.  phosphatase 47 05/24/2018 12:41 AM    Protein, total 5.5 (L) 05/24/2018 12:41 AM    Albumin 3.2 (L) 05/24/2018 12:41 AM    Globulin 2.3 05/24/2018 12:41 AM     Lab Results   Component Value Date/Time    INR 1.3 (H) 02/24/2017 11:54 PM    Prothrombin time 15.8 (H) 02/24/2017 11:54 PM    aPTT 122.4 (H) 02/25/2017 09:40 AM      Lab Results   Component Value Date/Time    Iron 60 04/24/2017 11:05 AM    TIBC 161 (L) 04/24/2017 11:05 AM    Iron % saturation 37 04/24/2017 11:05 AM    Ferritin 99 04/24/2017 11:05 AM      No results found for: PH, PCO2, PO2  No components found for: [place]   Lab Results   Component Value Date/Time    CK 41 02/23/2017 11:55 PM    CK - MB <1.0 02/23/2017 11:55 PM              Total time: 30  Counseling / coordination time, spent as noted above: 25  > 50% counseling / coordination: yes with patient, nursing staff, left VM for daughter    Prolonged service was provided for  []30 min   []75 min in face to face time in the presence of the patient, spent as noted above. Time Start:   Time End:   Note: this can only be billed with 84628 (initial) or 20309 (follow up). If multiple start / stop times, list each separately.

## 2018-06-29 NOTE — PROGRESS NOTES
[facility]: [phone]  [facility] MELENDEZ[facility]: [phone]   [facility]: [phone]    Patient Name: Irene Winters  YOB: 1928    Date of Follow up: 6/29/18  Reason for Consult: care decisions   Requesting Provider: Dr. Artis Thompson      SUMMARY:   Irene Winters is a 80y.o. year old with a past history of NSTEMI, CHF, CKD, CVA, breast ca, glaucoma, gout who is a longterm resident at [facility]. Current medical issues leading to Palliative Medicine involvement include: 79 y/o long term care resident with multiple chronic medical conditions, including CHF and CKD. Palliative medicine is consulted to discuss goals of care and care decisions for this chronically ill woman. PALLIATIVE DIAGNOSES:   1. ACP/goals of care discussion  2. CHF  3. CKD  4. debility       PLAN:   1. ACP/goals of care discussions: Met with patient at bedside along with Ari Babcock NP. She was lying in bed under multiple blankets. She alerted to voice and was pleasant and talkative today. She continues to complain of pain to the legs, she c/o pain to the right leg in particular today. She is requesting her topical pain relief agent. She was able to answer our questions and could hold a simple back and forth conversation, however I do not believe that she could make complex medical decisions for herself. She did admit to \"not knowing where I am\" at times. She has completed an AMD in the past naming her granddaughter Michelle Sahu as primary MPOA. After leaving several messages for Michelle I received a call back from [employer], the patient's daughter in law and mother to Son. I spoke to Leonidas Becerra via phone and described the role of palliative care. She tells me that she makes the decisions for Ms. Ngoc Rico and that her daughter had given our messages to her.   I explained the difference between medical and financial MPOA, however Sutton did not seem to understand that Ms. Esteban Munroe, when able, had named her granddaughter as medical decision maker. I offered a family meeting in which she and hopefully her granddaughters could participate. She tells me that she will be out of town and asks that I call back to arrange the meeting after the 4th of July week. At this time Ms. Esteban Munroe remains full code with full medical interventions. 2. CHF: diastolic, history of NSTEMI  3. CKD: rising creatinine, plan to address goals of care with family  4. Debility: wheelchair bound  5. Symptom management: The patient often c/o pain the the LE, and her daughter in law Concetta echoed this, stating that she needs to see the podiatrist, but that the treatment is difficult d/t pain. The pt currently takes tylenol 500 mg PO BID scheduled, could consider increasing to TID. PRN pain medications are tylenol and tramadol, would like to see how often these are being given and if they have been effective. It is reasonable to premedicate the patient prior to her podiatry appointment, which I discussed with Concetta. 6. Initial consult note routed to primary continuity provider  7.  Communicated plan of care with: Palliative IDT    GOALS OF CARE:  Patient/Health Care Proxy Stated Goals: Prolong life      TREATMENT PREFERENCES:   Code Status: Full   Advance Care Planning:  Advance Care Planning 6/15/2018   Patient's Healthcare Decision Maker is: Named in scanned ACP document   Primary Decision Maker Name Michelle Sahu    Primary Decision Maker Phone Number [phone]   Primary Decision Maker Relationship to Patient Adult child   Secondary Decision Maker Name -   Secondary Decision [address] [place] [address] Phone Number -   Secondary Decision Maker Relationship to Patient -   Confirm Advance Directive Yes, on file   Patient Would Like to Complete Advance Directive -       Medical Interventions: Full interventions   Other Instructions: to be discussed        Other:  As far as possible, the palliative care team has discussed with patient / health care proxy about goals of care / treatment preferences for patient. HISTORY:     History obtained from:  chart    CHIEF COMPLAINT: goals of care discussion    HPI/SUBJECTIVE:    The patient is:   [x] Verbal and participatory  [] Non-participatory due to:       Clinical Pain Assessment (nonverbal scale for nonverbal patients): Clinical Pain Assessment  Severity: 2     Activity (Movement): Lying quietly, normal position    Duration: for how long has pt been experiencing pain (e.g., 2 days, 1 month, years)  Frequency: how often pain is an issue (e.g., several times per day, once every few days, constant)     FUNCTIONAL ASSESSMENT:     Palliative Performance Scale (PPS):  PPS: 40            PSYCHOSOCIAL/SPIRITUAL SCREENING:      Any spiritual / Rastafarian concerns:  [] Yes /  [x] No    Caregiver Burnout:  [] Yes /  [] No /  [x] No Caregiver Present      Anticipatory grief assessment:   [] Normal  / [] Maladaptive        REVIEW OF SYSTEMS:     Positive and pertinent negative findings in ROS are noted above in HPI. The following systems were [x] reviewed / [] unable to be reviewed as noted in HPI  Other findings are noted below. Systems: constitutional, ears/nose/mouth/throat, respiratory, gastrointestinal, genitourinary, musculoskeletal, integumentary, neurologic, psychiatric, endocrine. Positive findings noted below.   Modified ESAS Completed by: provider           Pain: 2           Dyspnea: 0                    PHYSICAL EXAM:     Wt Readings from Last 3 Encounters:   05/17/17 62.6 kg (137 lb 14.4 oz)   03/07/17 61.2 kg (135 lb)   02/28/17 64.6 kg (142 lb 6.6 oz)                         Constitutional: elderly black woman in bed under multiple blankets  Eyes: pupils equal, anicteric  ENMT: no nasal discharge, moist mucous membranes  Cardiovascular: extremities warm  Respiratory: breathing not labored, symmetric  Musculoskeletal: no deformity, no tenderness to palpation  Skin: warm, dry  Neurologic: alert, answers simple questions, following commands, moving all extremities         HISTORY:       Past Medical History:   Diagnosis Date    Breast cancer ([address] 1/17/14    right breast    Cardiac echocardiogram 02/17/2017    EF 50%. No WMA. Mod conc LVH. Indeterminate diastolic fx. Mod LAE. Mild MR. Mild AI. Mild PI. No signifciant valvular heart disease.  Cardiovascular lower extremity arterial testing 12/19/2014    Mod tibio-peroneal arterial disease bilaterally. R YANCY 1.24.  L YANCY 0.74. Bilateral sm vessel disease.  Carotid duplex 11/19/2014    Severe 70% or greater bilateral ICA stenosis. LICA dissection suggested.  Chronic renal insufficiency     CVA (cerebral vascular accident) ([address] 2003    with slight Right sided weakness    Edema     Glaucoma     Gout flare     Hyperlipidemia     Hypertension     Lump of right breast     URI (upper respiratory infection)       Past Surgical History:   Procedure Laterality Date    HX APPENDECTOMY      HX CYST INCISION AND DRAINAGE      PT DOES NOT REMEMBER EXACT DATES, TUCKER BREAST DONE MORE THAN 20 YEARS AGO. BENIGN FINDINGS PER PATIENT.  HX GYN      JUSTIN/BSO    HX HYSTERECTOMY      HX MASTECTOMY  1/17/2014    RIGHT PARTIAL MASTECTOMY performed by Kiko Luis MD at [facility] MAIN OR      Family History   Problem Relation Age of Onset    Hypertension Mother     Hypertension Brother     Diabetes Brother      History reviewed, no pertinent family history.   Social History   Substance Use Topics    Smoking status: Never Smoker    Smokeless tobacco: Never Used    Alcohol use No     No Known Allergies        LAB AND IMAGING FINDINGS:     Lab Results   Component Value Date/Time    WBC 4.8 07/16/2017 01:00 PM    HGB 10.0 (L) 07/16/2017 01:00 PM    PLATELET 620 18/54/7852 01:00 PM     Lab Results   Component Value Date/Time    Sodium 141 06/23/2018 06:15 AM    Potassium 5.9 (H) 06/23/2018 06:15 AM Chloride 111 (H) 06/23/2018 06:15 AM    CO2 21 06/23/2018 06:15 AM    BUN 98 (H) 06/23/2018 06:15 AM    Creatinine 5.54 (H) 06/23/2018 06:15 AM    Calcium 8.6 06/23/2018 06:15 AM    Magnesium 2.5 06/27/2017 03:00 AM    Phosphorus 4.0 02/27/2017 03:36 AM      Lab Results   Component Value Date/Time    AST (SGOT) 6 (L) 06/23/2018 06:15 AM    Alk. phosphatase 44 (L) 06/23/2018 06:15 AM    Protein, total 5.4 (L) 06/23/2018 06:15 AM    Albumin 3.2 (L) 06/23/2018 06:15 AM    Globulin 2.2 06/23/2018 06:15 AM     Lab Results   Component Value Date/Time    INR 1.3 (H) 02/24/2017 11:54 PM    Prothrombin time 15.8 (H) 02/24/2017 11:54 PM    aPTT 122.4 (H) 02/25/2017 09:40 AM      Lab Results   Component Value Date/Time    Iron 60 04/24/2017 11:05 AM    TIBC 161 (L) 04/24/2017 11:05 AM    Iron % saturation 37 04/24/2017 11:05 AM    Ferritin 99 04/24/2017 11:05 AM      No results found for: PH, PCO2, PO2  No components found for: [place]   Lab Results   Component Value Date/Time    CK 41 02/23/2017 11:55 PM    CK - MB <1.0 02/23/2017 11:55 PM              Total time: 25  Counseling / coordination time, spent as noted above: 20  > 50% counseling / coordination: yes with patient, daughter in law via phone  Prolonged service was provided for  []30 min   []75 min in face to face time in the presence of the patient, spent as noted above. Time Start:   Time End:   Note: this can only be billed with 59434 (initial) or 57427 (follow up). If multiple start / stop times, list each separately.

## 2018-07-19 NOTE — PROGRESS NOTES
[facility]: [phone])  [facility]: [phone]   [facility]: [phone]    Patient Name: Samir Looney  YOB: 1928    Date of Follow up: 7/19/18  Reason for Consult: care decisions   Requesting Provider: Dr. Isaac Born      SUMMARY:   Samir Looney is a 80y.o. year old with a past history of NSTEMI, CHF, CKD, CVA, breast ca, glaucoma, gout who is a longterm resident at [facility]. Current medical issues leading to Palliative Medicine involvement include: 81 y/o long term care resident with multiple chronic medical conditions, including CHF and CKD. Palliative medicine is consulted to discuss goals of care and care decisions for this chronically ill woman. PALLIATIVE DIAGNOSES:   1. ACP/goals of care discussion  2. CHF  3. CKD  4. debility       PLAN:   1. ACP/goals of care discussions: Met with patient at bedside along with Berna Mcdowell NP. She was seated in the wheelchair at bedside. She is alert and pleasant. She continues to complain of pain to the legs and feet. She is requesting her topical pain relief agent. She was talkative today and was able to respond to most questions appropriately, however does get confused. She has limited insight into her illness and tells us that she would like to walk. She is not able to process complex medical information in order to make medical decisions for herself. She has completed an AMD in the past naming her granddaughter Michelle Sahu as primary MPOA. The patient's daughter in law Ivonne Pineda is also very involved in the patient's care and helps her daughters with the medical decision making for Ms. Spring Pantoja. Ms. Spring Pantoja had a care plan meeting today and Ivonne Pineda attended the meeting via phone. After the care plan meeting Ivonne Pineda agreed to speak to Marquez Zambrano via phone. We discussed the patient's chronic medical issues, including her CKD.   We explained that if her health were to worsen that she may require dialysis. Yaneth Jones tells us that she does not believe her mother in law would tolerate or want dialysis. We also discussed code status and  Yaneth Jones recalls Ms. Bonilla Young saying that she would not want resuscitation. We encouraged her to speak with her daughter (the primary MPOA) and agreed to meet in person next Wednesday July 25 at 2 PM. The patient will remain full code until a DDNR is signed by the [facility]. 2. CHF: diastolic, EF 25%, NSTEMI 6134  3. CKD: stage 4, most recent creatinine >5. Per previous nephrology notes the patient does not want dialysis, prefers conservative treatment. As the patient cannot make complex medical decisions at this time this was discussed with Yaneth Jones. 4. Debility: wheelchair bound, discussed how disease process may cause fatigue, continue to encourage participation in activities outside of room  5. Symptom management: The patient often c/o pain to the LE. The pt currently takes tylenol 500 mg PO BID scheduled, could consider increasing to TID. PRN pain medications are tylenol and tramadol. Topical pain relief also seems to be helpful to the patient. The patient is also c/o constipation today, encouraged use of PRN suppository. 6. Initial consult note routed to primary continuity provider  7. Communicated plan of care with: Palliative IDT    GOALS OF CARE:  Patient/Health Care Proxy Stated Goals:  Other (comment) (Quality of life)      TREATMENT PREFERENCES:   Code Status: Full   Advance Care Planning:  Advance Care Planning 6/15/2018   Patient's Healthcare Decision Maker is: Named in scanned ACP document   Primary Decision Maker Name Michelle Sahu    Primary Decision Maker Phone Number [phone]   Primary Decision Maker Relationship to Patient Adult child   Secondary Decision Maker Name -   Secondary Decision Maker Phone Number -   Secondary Decision Maker Relationship to Patient -   Confirm Advance Directive Yes, on file Patient Would Like to Complete Advance Directive -       Medical Interventions: Full interventions   Other Instructions: to be discussed        Other:  As far as possible, the palliative care team has discussed with patient / health care proxy about goals of care / treatment preferences for patient. HISTORY:     History obtained from:  chart    CHIEF COMPLAINT: goals of care discussion    HPI/SUBJECTIVE:    The patient is:   [x] Verbal and participatory  [] Non-participatory due to:       Clinical Pain Assessment (nonverbal scale for nonverbal patients): Clinical Pain Assessment  Severity: 2  Location: bl knees, feet          Duration: for how long has pt been experiencing pain (e.g., 2 days, 1 month, years)  Frequency: how often pain is an issue (e.g., several times per day, once every few days, constant)     FUNCTIONAL ASSESSMENT:     Palliative Performance Scale (PPS):  PPS: 40            PSYCHOSOCIAL/SPIRITUAL SCREENING:      Any spiritual / Sikh concerns:  [] Yes /  [x] No    Caregiver Burnout:  [] Yes /  [] No /  [x] No Caregiver Present      Anticipatory grief assessment:   [] Normal  / [] Maladaptive        REVIEW OF SYSTEMS:     Positive and pertinent negative findings in ROS are noted above in HPI. The following systems were [x] reviewed / [] unable to be reviewed as noted in HPI  Other findings are noted below. Systems: constitutional, ears/nose/mouth/throat, respiratory, gastrointestinal, genitourinary, musculoskeletal, integumentary, neurologic, psychiatric, endocrine. Positive findings noted below.   Modified ESAS Completed by: provider           Pain: 2           Dyspnea: 0     Constipation: Yes              PHYSICAL EXAM:     Wt Readings from Last 3 Encounters:   05/17/17 62.6 kg (137 lb 14.4 oz)   03/07/17 61.2 kg (135 lb)   02/28/17 64.6 kg (142 lb 6.6 oz)                         Constitutional: elderly black woman in her wheelchair, alert and pleasant  Eyes: pupils equal, anicteric  ENMT: no nasal discharge, moist mucous membranes  Cardiovascular: extremities warm, RRR, no edema  Respiratory: breathing not labored, symmetric, CTA  Musculoskeletal: no deformity, no tenderness to palpation  Skin: warm, dry, darkened areas to the 2nd toes on both feet, no open wounds or odor  Neurologic: alert, answers simple questions, following commands, moving all extremities         HISTORY:       Past Medical History:   Diagnosis Date    Breast cancer ([facility] [address] 1/17/14    right breast    Cardiac echocardiogram 02/17/2017    EF 50%. No WMA. Mod conc LVH. Indeterminate diastolic fx. Mod LAE. Mild MR. Mild AI. Mild PI. No signifciant valvular heart disease.  Cardiovascular lower extremity arterial testing 12/19/2014    Mod tibio-peroneal arterial disease bilaterally. R YANCY 1.24.  L YANCY 0.74. Bilateral sm vessel disease.  Carotid duplex 11/19/2014    Severe 70% or greater bilateral ICA stenosis. LICA dissection suggested.  Chronic renal insufficiency     CVA (cerebral vascular accident) ([facility] [address] 2003    with slight Right sided weakness    Edema     Glaucoma     Gout flare     Hyperlipidemia     Hypertension     Lump of right breast     URI (upper respiratory infection)       Past Surgical History:   Procedure Laterality Date    HX APPENDECTOMY      HX CYST INCISION AND DRAINAGE      PT DOES NOT REMEMBER EXACT DATES, TUCKER BREAST DONE MORE THAN 20 YEARS AGO. BENIGN FINDINGS PER PATIENT.  HX GYN      JUSTIN/BSO    HX HYSTERECTOMY      HX MASTECTOMY  1/17/2014    RIGHT PARTIAL MASTECTOMY performed by Cristofer Shemran MD at [facility] MAIN OR      Family History   Problem Relation Age of Onset    Hypertension Mother     Hypertension Brother     Diabetes Brother      History reviewed, no pertinent family history.   Social History   Substance Use Topics    Smoking status: Never Smoker    Smokeless tobacco: Never Used    Alcohol use No     No Known Allergies        LAB AND IMAGING FINDINGS:     Lab Results   Component Value Date/Time    WBC 4.8 07/16/2017 01:00 PM    HGB 10.0 (L) 07/16/2017 01:00 PM    PLATELET 447 64/11/6150 01:00 PM     Lab Results   Component Value Date/Time    Sodium 141 06/23/2018 06:15 AM    Potassium 5.9 (H) 06/23/2018 06:15 AM    Chloride 111 (H) 06/23/2018 06:15 AM    CO2 21 06/23/2018 06:15 AM    BUN 98 (H) 06/23/2018 06:15 AM    Creatinine 5.54 (H) 06/23/2018 06:15 AM    Calcium 8.6 06/23/2018 06:15 AM    Magnesium 2.5 06/27/2017 03:00 AM    Phosphorus 4.0 02/27/2017 03:36 AM      Lab Results   Component Value Date/Time    AST (SGOT) 6 (L) 06/23/2018 06:15 AM    Alk. phosphatase 44 (L) 06/23/2018 06:15 AM    Protein, total 5.4 (L) 06/23/2018 06:15 AM    Albumin 3.2 (L) 06/23/2018 06:15 AM    Globulin 2.2 06/23/2018 06:15 AM     Lab Results   Component Value Date/Time    INR 1.3 (H) 02/24/2017 11:54 PM    Prothrombin time 15.8 (H) 02/24/2017 11:54 PM    aPTT 122.4 (H) 02/25/2017 09:40 AM      Lab Results   Component Value Date/Time    Iron 60 04/24/2017 11:05 AM    TIBC 161 (L) 04/24/2017 11:05 AM    Iron % saturation 37 04/24/2017 11:05 AM    Ferritin 99 04/24/2017 11:05 AM      No results found for: PH, PCO2, PO2  No components found for: [place]   Lab Results   Component Value Date/Time    CK 41 02/23/2017 11:55 PM    CK - MB <1.0 02/23/2017 11:55 PM              Total time: 35  Counseling / coordination time, spent as noted above: 30  > 50% counseling / coordination: yes with patient, daughter in law via phone, staff  Prolonged service was provided for  []30 min   []75 min in face to face time in the presence of the patient, spent as noted above. Time Start:   Time End:   Note: this can only be billed with 71160 (initial) or 71272 (follow up). If multiple start / stop times, list each separately.

## 2018-07-26 NOTE — PROGRESS NOTES
[facility]: [phone]  [facility]: [phone]   [facility]: [phone]    Patient Name: Ashley Webster  YOB: 1928    Date of Follow up: 7/25/18  Reason for Consult: care decisions   Requesting Provider: Dr. Edy Oliveros      SUMMARY:   Ashley Webster is a [address] year old with a past history of NSTEMI, CHF, CKD, CVA, breast ca, glaucoma, gout who is a longterm resident at [facility]. Current medical issues leading to Palliative Medicine involvement include: 81 y/o long term care resident with multiple chronic medical conditions, including CHF and CKD. Palliative medicine is consulted to discuss goals of care and care decisions for this chronically ill woman. PALLIATIVE DIAGNOSES:   1. ACP/goals of care discussion  2. CHF  3. CKD  4. debility       PLAN:   1. ACP/goals of care discussions: Met with patient at bedside along with Kyleigh Reyes NP. She was seated in the wheelchair at bedside. She is alert and eating a bag of chips. Her daughter in law Jeanna Erazo is at bedside. Although the patient is alert and able to hold a simple conversation she does not seem to have insight into her illness and cannot process complex medical information in order to make medical decisions for herself. Her daughter in law Jeanna Erazo is very involved in her care. We discussed with Jeanna Erazo today and reviewed the benefits and burdens of continued aggressive treatment versus comfort measures. Jeanna Erazo feels that the patient would not benefit from aggressive treatments including dialysis and CPR. She believes that the patient would want to focus on symptom management and quality of life at this time. We reviewed the patient's previously completed AMD with Jeanna Erazo. The AMD names Michelle Small as primary MPOA and Crystal Small as secondary MPOA. Michelle and Romana are the patient's granddaughters and Josette's daughters. Maya Myers confirms that she and her daughters make medical decisions together. We discussed the POST form and Maya Myers will bring it home to discuss with her daughters and to have Michelle sign. We instructed her to either bring the form back to the facility or to email it once complete. The patient will remain a  full code with full aggressive measures until the POST form has been completed. 2. CHF: diastolic, EF 36%, NSTEMI 3975  3. CKD: stage 4, most recent creatinine >5. Per previous nephrology notes the patient does not want dialysis, prefers conservative treatment. As the patient cannot make complex medical decisions at this time this was discussed with Maya Myers. 4. Debility: wheelchair bound, discussed how disease process may cause fatigue, continue to encourage participation in activities outside of room  5. Symptom management: The patient often c/o pain to the LE. The pt currently takes tylenol 500 mg PO BID scheduled, could consider increasing to TID. PRN pain medications are tylenol and tramadol. Topical pain relief also seems to be helpful to the patient. 6. Initial consult note routed to primary continuity provider  7.  Communicated plan of care with: Palliative IDT    GOALS OF CARE:  Patient/Health Care Proxy Stated Goals: Comfort      TREATMENT PREFERENCES:   Code Status: Full   Advance Care Planning:  Advance Care Planning 7/26/2018   Patient's Healthcare Decision Maker is: Named in scanned ACP document   Primary Decision Maker Name Michelle Sahu    Primary Decision Maker Phone Number [phone]   Primary Decision Maker Relationship to Patient Other relative   Secondary Decision Maker Name [address] [place] [address] Phone Number [phone]   Secondary Decision Maker Relationship to Patient Other relative   Confirm Advance Directive Yes, on file   Patient Would Like to Complete Advance Directive -       Medical Interventions: Full interventions (until discussed with MPOA and POST signed)           Other:  As far as possible, the palliative care team has discussed with patient / health care proxy about goals of care / treatment preferences for patient. HISTORY:     History obtained from:  chart    CHIEF COMPLAINT: goals of care discussion    HPI/SUBJECTIVE:    The patient is:   [x] Verbal and participatory  [] Non-participatory due to:       Clinical Pain Assessment (nonverbal scale for nonverbal patients): Clinical Pain Assessment  Severity: 2  Location: BL knees          Duration: for how long has pt been experiencing pain (e.g., 2 days, 1 month, years)  Frequency: how often pain is an issue (e.g., several times per day, once every few days, constant)     FUNCTIONAL ASSESSMENT:     Palliative Performance Scale (PPS):  PPS: 40            PSYCHOSOCIAL/SPIRITUAL SCREENING:      Any spiritual / Spiritism concerns:  [] Yes /  [x] No    Caregiver Burnout:  [] Yes /  [x] No /  [] No Caregiver Present      Anticipatory grief assessment:   [x] Normal  / [] Maladaptive        REVIEW OF SYSTEMS:     Positive and pertinent negative findings in ROS are noted above in HPI. The following systems were [x] reviewed / [] unable to be reviewed as noted in HPI  Other findings are noted below. Systems: constitutional, ears/nose/mouth/throat, respiratory, gastrointestinal, genitourinary, musculoskeletal, integumentary, neurologic, psychiatric, endocrine. Positive findings noted below.   Modified ESAS Completed by: provider   Fatigue: 2       Pain: 2           Dyspnea: 0                    PHYSICAL EXAM:     Wt Readings from Last 3 Encounters:   05/17/17 62.6 kg (137 lb 14.4 oz)   03/07/17 61.2 kg (135 lb)   02/28/17 64.6 kg (142 lb 6.6 oz)                         Constitutional: elderly black woman in her wheelchair, alert and pleasant  Eyes: pupils equal, anicteric  ENMT: no nasal discharge, moist mucous membranes  Cardiovascular: extremities warm,  no edema  Respiratory: breathing not labored, symmetric  Musculoskeletal: no deformity, no tenderness to palpation  Skin: warm, dry  Neurologic: alert, answers simple questions, following commands, moving all extremities         HISTORY:       Past Medical History:   Diagnosis Date    Breast cancer ([facility] [address] 1/17/14    right breast    Cardiac echocardiogram 02/17/2017    EF 50%. No WMA. Mod conc LVH. Indeterminate diastolic fx. Mod LAE. Mild MR. Mild AI. Mild PI. No signifciant valvular heart disease.  Cardiovascular lower extremity arterial testing 12/19/2014    Mod tibio-peroneal arterial disease bilaterally. R YANCY 1.24.  L YANCY 0.74. Bilateral sm vessel disease.  Carotid duplex 11/19/2014    Severe 70% or greater bilateral ICA stenosis. LICA dissection suggested.  Chronic renal insufficiency     CVA (cerebral vascular accident) ([facility] [address] 2003    with slight Right sided weakness    Edema     Glaucoma     Gout flare     Hyperlipidemia     Hypertension     Lump of right breast     URI (upper respiratory infection)       Past Surgical History:   Procedure Laterality Date    HX APPENDECTOMY      HX CYST INCISION AND DRAINAGE      PT DOES NOT REMEMBER EXACT DATES, TUCKER BREAST DONE MORE THAN 20 YEARS AGO. BENIGN FINDINGS PER PATIENT.  HX GYN      JUSTIN/BSO    HX HYSTERECTOMY      HX MASTECTOMY  1/17/2014    RIGHT PARTIAL MASTECTOMY performed by Tod Guerrero MD at [facility] MAIN OR      Family History   Problem Relation Age of Onset    Hypertension Mother     Hypertension Brother     Diabetes Brother      History reviewed, no pertinent family history.   Social History   Substance Use Topics    Smoking status: Never Smoker    Smokeless tobacco: Never Used    Alcohol use No     No Known Allergies        LAB AND IMAGING FINDINGS:     Lab Results   Component Value Date/Time    WBC 4.8 07/16/2017 01:00 PM    HGB 10.0 (L) 07/16/2017 01:00 PM    PLATELET 754 63/97/6909 01:00 PM     Lab Results   Component Value Date/Time    Sodium 141 06/23/2018 06:15 AM    Potassium 5.9 (H) 06/23/2018 06:15 AM    Chloride 111 (H) 06/23/2018 06:15 AM    CO2 21 06/23/2018 06:15 AM    BUN 98 (H) 06/23/2018 06:15 AM    Creatinine 5.54 (H) 06/23/2018 06:15 AM    Calcium 8.6 06/23/2018 06:15 AM    Magnesium 2.5 06/27/2017 03:00 AM    Phosphorus 4.0 02/27/2017 03:36 AM      Lab Results   Component Value Date/Time    AST (SGOT) 6 (L) 06/23/2018 06:15 AM    Alk. phosphatase 44 (L) 06/23/2018 06:15 AM    Protein, total 5.4 (L) 06/23/2018 06:15 AM    Albumin 3.2 (L) 06/23/2018 06:15 AM    Globulin 2.2 06/23/2018 06:15 AM     Lab Results   Component Value Date/Time    INR 1.3 (H) 02/24/2017 11:54 PM    Prothrombin time 15.8 (H) 02/24/2017 11:54 PM    aPTT 122.4 (H) 02/25/2017 09:40 AM      Lab Results   Component Value Date/Time    Iron 60 04/24/2017 11:05 AM    TIBC 161 (L) 04/24/2017 11:05 AM    Iron % saturation 37 04/24/2017 11:05 AM    Ferritin 99 04/24/2017 11:05 AM      No results found for: PH, PCO2, PO2  No components found for: [place]   Lab Results   Component Value Date/Time    CK 41 02/23/2017 11:55 PM    CK - MB <1.0 02/23/2017 11:55 PM              Total time: 35  Counseling / coordination time, spent as noted above: 30  > 50% counseling / coordination: yes with patient, daughter in law  Prolonged service was provided for  []30 min   []75 min in face to face time in the presence of the patient, spent as noted above. Time Start:   Time End:   Note: this can only be billed with 16355 (initial) or 23554 (follow up). If multiple start / stop times, list each separately.

## 2018-08-10 NOTE — PROGRESS NOTES
[facility]: [phone])  [facility]: [phone]   [facility]: [phone]    Patient Name: Delta So  YOB: 1928    Date of Follow up: 8/10/2018  Reason for Consult: care decisions   Requesting Provider: Dr. Socorro Haskins      SUMMARY:   Delta So is a 80y.o. year old with a past history of NSTEMI, CHF, CKD, CVA, breast ca, glaucoma, gout who is a long term resident at [facility]. Current medical issues leading to Palliative Medicine involvement include: 79 y/o long term care resident with multiple chronic medical conditions, including CHF and CKD. Palliative medicine is consulted to discuss goals of care and care decisions for this chronically ill woman. PALLIATIVE DIAGNOSES:   1. ACP/goals of care discussion  2. CHF  3. CKD  4. debility       PLAN:   8/10/2018 patient seen as follow up today. She is in bed, alert a little confused but pleasantly so, not unusual for Osvaldo Pham. Her daughter in law Nicolás Lakhani return the POST form signed ( please see decision below shared with Anna Ask who is the family representative) Family wishes to focus on a more comfort directed approach allowing Osvaldo Pham to have the best quality of life for as long as possible. They would also like to change her goals of care to DNR/DNI. POST form as been signed by Sarahi Carney who is Cherelle's grand daughter and Josette's daughter. Goals of care DNR/DNI; Care decisions comfort measures but with continuation of current medications and treatments in the facility, no escalation to the hospital unless a fall dictates or symptoms can not be relieved at facility. Osvaldo Pham remains chronically ill and debilitated requiring assistance with ADL's but is stable and enjoys sitting in her w/c and watching TV.  Along with the family's care decisions, understanding Osvaldo Pham has CKD stage IV with advanced age they do not wish for dialysis if the need would arise. Symptoms today she had no complaints of pain, leg / knee pain has been a concern but tells us she does have relief  with topical creams. Have encouraged staff to assess of pain and use topicals when needed and per direction. Ms Paulino Diaz who is very active in [facility] care has expressed  To several of the nursing staff she thought she was  medical power of . We have discussed with her, the documents which are available in her chart and in EMR  ( she has not produced others) are an AMD naming Michelle and a Durable Power of  for financial naming Alayne Frederick. (We asked Risk to review and they concur the Durable is for financial only. ). Legally Michelle Sahu is MPOA. Please note Michelle Sahu's signature was on the POST form which is a legal document. It should also be noted Paulino Diaz voiced agreement to Ms. Fredy French NP and I she agrees with the DNR/DNI and comfort measures     7/26/2018  1. ACP/goals of care discussions: Met with patient at bedside along with Maria Elena Merritt NP. She was seated in the wheelchair at bedside. She is alert and eating a bag of chips. Her daughter in [facility] is at bedside. Although the patient is alert and able to hold a simple conversation she does not seem to have insight into her illness and cannot process complex medical information in order to make medical decisions for herself. Her daughter in [facility] is very involved in her care. We discussed with Deja Mack today and reviewed the benefits and burdens of continued aggressive treatment versus comfort measures. Deja Mack feels that the patient would not benefit from aggressive treatments including dialysis and CPR. She believes that the patient would want to focus on symptom management and quality of life at this time. We reviewed the patient's previously completed AMD with Deja Mack. The AMD names Michelle Small as primary MPOA and Crystal Small as secondary MPOA.   Michelle and Crystal are the patient's granddaughters and Josette's daughters. Bette Norman confirms that she and her daughters make medical decisions together. We discussed the POST form and Bette Norman will bring it home to discuss with her daughters and to have Michelle sign. We instructed her to either bring the form back to the facility or to email it once complete. Initial consult note routed to primary continuity provider  2. Communicated plan of care with: Palliative IDT    GOALS OF CARE:  Patient/Health Care Proxy Stated Goals: Comfort      TREATMENT PREFERENCES:   Code Status: DNR/DNI   Advance Care Planning:  Advance Care Planning 7/26/2018   Patient's Healthcare Decision Maker is: Named in scanned ACP document   Primary Decision Maker Name [id number] [facility]    Primary Decision Maker Phone Number [phone]   Primary Decision Maker Relationship to Patient Other relative   Secondary Decision Maker Name [address] [facility] Phone Number [phone]   Secondary Decision Maker Relationship to Patient Other relative   Confirm Advance Directive Yes, on file   Patient Would Like to Complete Advance Directive -       Medical Interventions: Comfort measures     Artificially Administered Nutrition: No feeding tube     Other:  As far as possible, the palliative care team has discussed with patient / health care proxy about goals of care / treatment preferences for patient.      HISTORY:     History obtained from:  chart    CHIEF COMPLAINT: goals of care discussion    HPI/SUBJECTIVE:    The patient is:   [x] Verbal and participatory but limited due to her confusion   [] Non-participatory due to:       Clinical Pain Assessment (nonverbal scale for nonverbal patients): Clinical Pain Assessment  Severity: 0          Duration: for how long has pt been experiencing pain (e.g., 2 days, 1 month, years)  Frequency: how often pain is an issue (e.g., several times per day, once every few days, constant)     FUNCTIONAL ASSESSMENT: Palliative Performance Scale (PPS):  PPS: 30      ECOG Status : Completely disabled     PSYCHOSOCIAL/SPIRITUAL SCREENING:      Any spiritual / Restorationism concerns:  [] Yes /  [x] No    Caregiver Burnout:  [] Yes /  [x] No /  [] No Caregiver Present      Anticipatory grief assessment:   [x] Normal  / [] Maladaptive        REVIEW OF SYSTEMS:     Positive and pertinent negative findings in ROS are noted above in HPI. The following systems were [x] reviewed / [] unable to be reviewed as noted in HPI  Other findings are noted below. Systems: constitutional, ears/nose/mouth/throat, respiratory, gastrointestinal, genitourinary, musculoskeletal, integumentary, neurologic, psychiatric, endocrine. Positive findings noted below. Modified ESAS Completed by: provider   Fatigue: 6       Pain: 0     Nausea: 0   Anorexia: 4 Dyspnea: 0     Constipation: No              PHYSICAL EXAM:     Wt Readings from Last 3 Encounters:   05/17/17 62.6 kg (137 lb 14.4 oz)   03/07/17 61.2 kg (135 lb)   02/28/17 64.6 kg (142 lb 6.6 oz)                         Constitutional: elderly frail appearing female who was awake in bed this am, smiling in NAD   Eyes: pupils equal, anicteric  ENMT: no nasal discharge, moist mucous membranes  Cardiovascular: extremities warm  Respiratory: breathing not labored, symmetric  Skin: warm, dry  Neurologic: alert, confused on day time, this am, does follow commands, very pleasant and talkative          HISTORY:       Past Medical History:   Diagnosis Date    Breast cancer ([facility] [address] 1/17/14    right breast    Cardiac echocardiogram 02/17/2017    EF 50%. No WMA. Mod conc LVH. Indeterminate diastolic fx. Mod LAE. Mild MR. Mild AI. Mild PI. No signifciant valvular heart disease.  Cardiovascular lower extremity arterial testing 12/19/2014    Mod tibio-peroneal arterial disease bilaterally. R YANCY 1.24.  L YANCY 0.74. Bilateral sm vessel disease.     Carotid duplex 11/19/2014    Severe 70% or greater bilateral ICA stenosis. LICA dissection suggested.  Chronic renal insufficiency     CVA (cerebral vascular accident) ([facility] [address] 2003    with slight Right sided weakness    Edema     Glaucoma     Gout flare     Hyperlipidemia     Hypertension     Lump of right breast     URI (upper respiratory infection)       Past Surgical History:   Procedure Laterality Date    HX APPENDECTOMY      HX CYST INCISION AND DRAINAGE      PT DOES NOT REMEMBER EXACT DATES, TUCKER BREAST DONE MORE THAN 20 YEARS AGO. BENIGN FINDINGS PER PATIENT.  HX GYN      JUSTIN/BSO    HX HYSTERECTOMY      HX MASTECTOMY  1/17/2014    RIGHT PARTIAL MASTECTOMY performed by Mary Millard MD at [facility] MAIN OR      Family History   Problem Relation Age of Onset    Hypertension Mother     Hypertension Brother     Diabetes Brother      History reviewed, no pertinent family history. Social History   Substance Use Topics    Smoking status: Never Smoker    Smokeless tobacco: Never Used    Alcohol use No     No Known Allergies        LAB AND IMAGING FINDINGS:     Lab Results   Component Value Date/Time    WBC 4.8 07/16/2017 01:00 PM    HGB 10.0 (L) 07/16/2017 01:00 PM    PLATELET 495 35/44/5604 01:00 PM     Lab Results   Component Value Date/Time    Sodium 141 06/23/2018 06:15 AM    Potassium 5.9 (H) 06/23/2018 06:15 AM    Chloride 111 (H) 06/23/2018 06:15 AM    CO2 21 06/23/2018 06:15 AM    BUN 98 (H) 06/23/2018 06:15 AM    Creatinine 5.54 (H) 06/23/2018 06:15 AM    Calcium 8.6 06/23/2018 06:15 AM    Magnesium 2.5 06/27/2017 03:00 AM    Phosphorus 4.0 02/27/2017 03:36 AM      Lab Results   Component Value Date/Time    AST (SGOT) 6 (L) 06/23/2018 06:15 AM    Alk.  phosphatase 44 (L) 06/23/2018 06:15 AM    Protein, total 5.4 (L) 06/23/2018 06:15 AM    Albumin 3.2 (L) 06/23/2018 06:15 AM    Globulin 2.2 06/23/2018 06:15 AM     Lab Results   Component Value Date/Time    INR 1.3 (H) 02/24/2017 11:54 PM    Prothrombin time 15.8 (H) 02/24/2017 11:54 PM aPTT 122.4 (H) 02/25/2017 09:40 AM      Lab Results   Component Value Date/Time    Iron 60 04/24/2017 11:05 AM    TIBC 161 (L) 04/24/2017 11:05 AM    Iron % saturation 37 04/24/2017 11:05 AM    Ferritin 99 04/24/2017 11:05 AM      No results found for: PH, PCO2, PO2  No components found for: [place]   Lab Results   Component Value Date/Time    CK 41 02/23/2017 11:55 PM    CK - MB <1.0 02/23/2017 11:55 PM              Total time: 25  Counseling / coordination time, spent as noted above: 20  > 50% counseling / coordination: yes   Prolonged service was provided for  []30 min   []75 min in face to face time in the presence of the patient, spent as noted above. Time Start:   Time End:   Note: this can only be billed with 67408 (initial) or 42685 (follow up). If multiple start / stop times, list each separately.

## 2018-09-10 NOTE — PROGRESS NOTES
[facility]: [phone]  [facility]: [phone]   [facility]: [phone]    Patient Name: Mulu Rebolledo  YOB: 1928    Date of Follow up: 9/10/2018  Reason for Consult: care decisions   Requesting Provider: Dr. Antonio Sahu      SUMMARY:   Mulu Rebolledo is a 80y.o. year old with a past history of NSTEMI, CHF, CKD, CVA, breast ca, glaucoma, gout who is a long term resident at [facility]. Current medical issues leading to Palliative Medicine involvement include: 81 y/o long term care resident with multiple chronic medical conditions, including CHF and CKD. Palliative medicine is consulted to discuss goals of care and care decisions for this chronically ill woman. PALLIATIVE DIAGNOSES:   1. ACP/goals of care discussion  2. CHF  3. CKD  4. debility       PLAN:   9/10/2018:  Goals of care remain unchanged, see below. Patient seen in follow up. Continues to c/o leg pain. 8/10/2018 patient seen as follow up today. She is in bed, alert a little confused but pleasantly so, not unusual for Lazaro Lora. Her daughter in law Sherry Barkley return the POST form signed ( please see decision below shared with Talon Osorio who is the family representative) Family wishes to focus on a more comfort directed approach allowing Lazaro Lora to have the best quality of life for as long as possible. They would also like to change her goals of care to DNR/DNI. POST form has been signed by Anika Looney who is Cherelle's grand daughter and Josette's daughter. Goals of care DNR/DNI; Care decisions comfort measures but with continuation of current medications and treatments in the facility, no escalation to the hospital unless a fall dictates or symptoms can not be relieved at facility. Lazaro Lora remains chronically ill and debilitated requiring assistance with ADL's but is stable and enjoys sitting in her w/c and watching TV.  Along with the family's care decisions, understanding Robby Recio has CKD stage IV with advanced age they do not wish for dialysis if the need would arise. 7/26/2018  1. ACP/goals of care discussions: Met with patient at bedside along with Issa Adam NP. She was seated in the wheelchair at bedside. She is alert and eating a bag of chips. Her daughter in law Omi Clemons is at bedside. Although the patient is alert and able to hold a simple conversation she does not seem to have insight into her illness and cannot process complex medical information in order to make medical decisions for herself. Her daughter in law Omi Clemons is very involved in her care. We discussed with Omi Clemons today and reviewed the benefits and burdens of continued aggressive treatment versus comfort measures. mOi Clemons feels that the patient would not benefit from aggressive treatments including dialysis and CPR. She believes that the patient would want to focus on symptom management and quality of life at this time. We reviewed the patient's previously completed AMD with Omi Clemons. The AMD names Michelle Small as primary MPOA and Crystal Small as secondary MPOA. Michelle and Romana are the patient's granddaughters and Josette's daughters. Omi Clemons confirms that she and her daughters make medical decisions together. We discussed the POST form and Omi Clemons will bring it home to discuss with her daughters and to have Michelle sign. We instructed her to either bring the form back to the facility or to email it once complete. Initial consult note routed to primary continuity provider    2. Symptoms she had no complaints of pain, leg / knee pain has been a concern but tells us she does have relief  with topical creams. Have encouraged staff to assess of pain and use topicals when needed and per direction. Orders reviewed: She has scheduled tylenol and PRN Ultram. Gabapentin 300mg at hs.  3. CHF: Stable; medically managed. 4. CKD: Creat was 5.04 on 9/4/18.  Previously decided by family members to not pursue dialysis. 5. Debility: Bed to wheelchair bound, non-ambulatory. 6. Communicated plan of care with: Palliative IDT    GOALS OF CARE:  Patient/Health Care Proxy Stated Goals: Comfort      TREATMENT PREFERENCES:   Code Status: DNR/DNI   Advance Care Planning:  Advance Care Planning 7/26/2018   Patient's Healthcare Decision Maker is: Named in scanned ACP document   Primary Decision Maker Name Vanna    Primary Decision Maker Phone Number [phone]   Primary Decision Maker Relationship to Patient Other relative   Secondary Decision Maker Name Zacarias DORSEY [place] Phone Number [phone]   Secondary Decision Maker Relationship to Patient Other relative   Confirm Advance Directive Yes, on file   Patient Would Like to Complete Advance Directive -       Medical Interventions: Comfort measures     Artificially Administered Nutrition: No feeding tube     Other:  As far as possible, the palliative care team has discussed with patient / health care proxy about goals of care / treatment preferences for patient.      HISTORY:     History obtained from:  chart    CHIEF COMPLAINT: goals of care discussion    HPI/SUBJECTIVE:    The patient is:   [x] Verbal and participatory but limited due to her confusion   [] Non-participatory due to:       Clinical Pain Assessment (nonverbal scale for nonverbal patients): Clinical Pain Assessment  Severity: 1     Activity (Movement): Lying quietly, normal position    Duration: for how long has pt been experiencing pain (e.g., 2 days, 1 month, years)  Frequency: how often pain is an issue (e.g., several times per day, once every few days, constant)     FUNCTIONAL ASSESSMENT:     Palliative Performance Scale (PPS):  PPS: 40            PSYCHOSOCIAL/SPIRITUAL SCREENING:      Any spiritual / Synagogue concerns:  [] Yes /  [x] No    Caregiver Burnout:  [] Yes /  [x] No /  [] No Caregiver Present      Anticipatory grief assessment: [x] Normal  / [] Maladaptive        REVIEW OF SYSTEMS:     Positive and pertinent negative findings in ROS are noted above in HPI. The following systems were [x] reviewed / [] unable to be reviewed as noted in HPI  Other findings are noted below. Systems: constitutional, ears/nose/mouth/throat, respiratory, gastrointestinal, genitourinary, musculoskeletal, integumentary, neurologic, psychiatric, endocrine. Positive findings noted below. Modified ESAS Completed by: provider           Pain: 1     Nausea: 0   Anorexia: 0       Constipation: Yes              PHYSICAL EXAM:     Wt Readings from Last 3 Encounters:   05/17/17 62.6 kg (137 lb 14.4 oz)   03/07/17 61.2 kg (135 lb)   02/28/17 64.6 kg (142 lb 6.6 oz)                         Constitutional: elderly frail appearing female who was awake in bed this am, smiling in NAD   Eyes: pupils equal, anicteric  ENMT: no nasal discharge, moist mucous membranes  Cardiovascular: extremities warm  Respiratory: breathing not labored, symmetric  Skin: warm, dry  Neurologic: alert, confused and forgetful this am, does follow commands, very pleasant and talkative          HISTORY:       Past Medical History:   Diagnosis Date    Breast cancer ([facility] [address] 1/17/14    right breast    Cardiac echocardiogram 02/17/2017    EF 50%. No WMA. Mod conc LVH. Indeterminate diastolic fx. Mod LAE. Mild MR. Mild AI. Mild PI. No signifciant valvular heart disease.  Cardiovascular lower extremity arterial testing 12/19/2014    Mod tibio-peroneal arterial disease bilaterally. R YANCY 1.24.  L YANCY 0.74. Bilateral sm vessel disease.  Carotid duplex 11/19/2014    Severe 70% or greater bilateral ICA stenosis. LICA dissection suggested.     Chronic renal insufficiency     CVA (cerebral vascular accident) ([facility] [address] 2003    with slight Right sided weakness    Edema     Glaucoma     Gout flare     Hyperlipidemia     Hypertension     Lump of right breast     URI (upper respiratory infection) Past Surgical History:   Procedure Laterality Date    HX APPENDECTOMY      HX CYST INCISION AND DRAINAGE      PT DOES NOT REMEMBER EXACT DATES, TUCKER BREAST DONE MORE THAN 20 YEARS AGO. BENIGN FINDINGS PER PATIENT.  HX GYN      JUSTIN/BSO    HX HYSTERECTOMY      HX MASTECTOMY  1/17/2014    RIGHT PARTIAL MASTECTOMY performed by Love Jiménez MD at [facility] MAIN OR      Family History   Problem Relation Age of Onset    Hypertension Mother     Hypertension Brother     Diabetes Brother      History reviewed, no pertinent family history. Social History   Substance Use Topics    Smoking status: Never Smoker    Smokeless tobacco: Never Used    Alcohol use No     No Known Allergies        LAB AND IMAGING FINDINGS:     Lab Results   Component Value Date/Time    WBC 4.8 07/16/2017 01:00 PM    HGB 10.0 (L) 07/16/2017 01:00 PM    PLATELET 837 87/49/4380 01:00 PM     Lab Results   Component Value Date/Time    Sodium 145 09/04/2018 03:30 AM    Potassium 3.9 09/04/2018 03:30 AM    Chloride 111 (H) 09/04/2018 03:30 AM    CO2 21 09/04/2018 03:30 AM    BUN 71 (H) 09/04/2018 03:30 AM    Creatinine 5.04 (H) 09/04/2018 03:30 AM    Calcium 8.7 09/04/2018 03:30 AM    Magnesium 2.5 06/27/2017 03:00 AM    Phosphorus 4.0 02/27/2017 03:36 AM      Lab Results   Component Value Date/Time    AST (SGOT) 9 (L) 09/04/2018 03:30 AM    Alk.  phosphatase 52 09/04/2018 03:30 AM    Protein, total 5.3 (L) 09/04/2018 03:30 AM    Albumin 3.0 (L) 09/04/2018 03:30 AM    Globulin 2.3 09/04/2018 03:30 AM     Lab Results   Component Value Date/Time    INR 1.3 (H) 02/24/2017 11:54 PM    Prothrombin time 15.8 (H) 02/24/2017 11:54 PM    aPTT 122.4 (H) 02/25/2017 09:40 AM      Lab Results   Component Value Date/Time    Iron 60 04/24/2017 11:05 AM    TIBC 161 (L) 04/24/2017 11:05 AM    Iron % saturation 37 04/24/2017 11:05 AM    Ferritin 99 04/24/2017 11:05 AM      No results found for: PH, PCO2, PO2  No components found for: [place]   Lab Results Component Value Date/Time    CK 41 02/23/2017 11:55 PM    CK - MB <1.0 02/23/2017 11:55 PM              Total time: 15  Counseling / coordination time, spent as noted above: 10  > 50% counseling / coordination: yes with patient and nurse. Prolonged service was provided for  []30 min   []75 min in face to face time in the presence of the patient, spent as noted above. Time Start:   Time End:   Note: this can only be billed with 98914 (initial) or 89336 (follow up). If multiple start / stop times, list each separately.

## 2018-11-22 PROBLEM — N39.0 UTI (URINARY TRACT INFECTION): Status: ACTIVE | Noted: 2018-01-01

## 2018-11-22 PROBLEM — K11.20 SIALOADENITIS: Status: ACTIVE | Noted: 2018-01-01

## 2018-11-22 PROBLEM — L02.91 PHLEGMON: Status: ACTIVE | Noted: 2018-01-01

## 2018-11-22 NOTE — ED PROVIDER NOTES
EMERGENCY DEPARTMENT HISTORY AND PHYSICAL EXAM 
 
Date: 11/22/2018 Patient Name: Shayla Schmidt History of Presenting Illness Chief Complaint Patient presents with  Sore Throat  Neck Pain History Provided By: patient and medic Chief Complaint: neck pain Duration: few hrs Timing:  acute Location: R anterior neck Quality: throbbing Severity:moderate Modifying Factors: none Associated Symptoms: sore throat, fever Additional History (Context): Shayla Schmidt is a 80 y.o. female with PMH CVA, htn, chronic renal failure not on dialysis, and hyperlipidemia who presents by medic from [facility] with complaints of a low grade fever, voice change, and neck swelling that began a few hrs ago. Pt denies any trouble swallowing. No other complaints reported at this time. PCP: Ashley Ramos MD 
 
Current Facility-Administered Medications Medication Dose Route Frequency Provider Last Rate Last Dose  clindamycin phosphate (CLEOCIN) 600 mg in 0.9% sodium chloride 100 mL IVPB  600 mg IntraVENous NOW [place], PA-C      
 dexamethasone (DECADRON) 4 mg/mL injection 9 mg  9 mg IntraVENous NOW [place], [place] Current Outpatient Medications Medication Sig Dispense Refill  allopurinol (ZYLOPRIM) 100 mg tablet Take 0.5 Tabs by mouth daily. 30 Tab 2  
 amLODIPine (NORVASC) 5 mg tablet Take 1 Tab by mouth daily. 30 Tab 2  carvedilol (COREG) 6.25 mg tablet Take 1 Tab by mouth two (2) times daily (with meals). 30 Tab 2  
 isosorbide dinitrate (ISORDIL) 30 mg tablet Take 1 Tab by mouth three (3) times daily. 30 Tab 2  
 ascorbic acid, vitamin C, (VITAMIN C) 250 mg tablet Take  by mouth.  amino acids-protein hydrolys (PRO-STAT AWC)  gram-kcal/30 mL liqd Take  by mouth two (2) times a day.  Menthol-Zinc Oxide (RISAMINE) 0.44-20.6 % oint Apply  to affected area two (2) times a day.  Indications: SKIN IRRITATION    
  furosemide (LASIX) 40 mg tablet Take 1 Tab by mouth daily. 30 Tab 0  
 polyethylene glycol (MIRALAX) 17 gram packet Take 1 Packet by mouth daily. 30 Packet 0  
 therapeutic multivitamin (THERAGRAN) tablet Take 1 Tab by mouth daily. 30 Tab 0  
 bisacodyl (DULCOLAX) 10 mg suppository Insert 10 mg into rectum as needed. 30 Suppository 0  
 simvastatin (ZOCOR) 20 mg tablet Take 1 Tab by mouth nightly. 30 Tab 0  
 albuterol (PROVENTIL HFA, VENTOLIN HFA, PROAIR HFA) 90 mcg/actuation inhaler Take 1-2 Puffs by inhalation every four (4) hours as needed for Wheezing. 1 Inhaler 0  
 inhalational spacing device 1 Each by Does Not Apply route as needed. 1 Device 0  
 aspirin delayed-release 81 mg tablet Take 1 Tab by mouth daily. 100 Tab prn  ferrous sulfate 325 mg (65 mg iron) tablet Take 325 mg by mouth Daily (before breakfast).  cyanocobalamin (VITAMIN B-12) 1,000 mcg tablet Take 1,000 mcg by mouth daily.  acetaminophen (TYLENOL) 325 mg tablet Take 2 Tabs by mouth every four (4) hours as needed. 30 Tab 0  cholecalciferol, VITAMIN D3, (VITAMIN D3) 5,000 unit tab tablet Take 1 Tab by mouth daily. 100 Tab 0 Past History Past Medical History: 
Past Medical History:  
Diagnosis Date  Breast cancer ([facility] [address] 1/17/14  
 right breast  
 Cardiac echocardiogram 02/17/2017 EF 50%. No WMA. Mod conc LVH. Indeterminate diastolic fx. Mod LAE. Mild MR. Mild AI. Mild PI. No signifciant valvular heart disease.  Cardiovascular lower extremity arterial testing 12/19/2014 Mod tibio-peroneal arterial disease bilaterally. R YANCY 1.24.  L YANCY 0.74. Bilateral sm vessel disease.  Carotid duplex 11/19/2014 Severe 70% or greater bilateral ICA stenosis. LICA dissection suggested.  Chronic renal insufficiency  CVA (cerebral vascular accident) ([facility] [address] 2003  
 with slight Right sided weakness  Edema  Glaucoma  Gout flare  Hyperlipidemia  Hypertension  Lump of right breast   
 URI (upper respiratory infection) Past Surgical History: 
Past Surgical History:  
Procedure Laterality Date  HX APPENDECTOMY  HX CYST INCISION AND DRAINAGE    
 PT DOES NOT REMEMBER EXACT DATES, TUCKER BREAST DONE MORE THAN 20 YEARS AGO. BENIGN FINDINGS PER PATIENT.  HX GYN    
 JUSTIN/BSO  HX HYSTERECTOMY Family History: 
Family History Problem Relation Age of Onset  Hypertension Mother  Hypertension Brother  Diabetes Brother Social History: 
Social History Tobacco Use  Smoking status: Never Smoker  Smokeless tobacco: Never Used Substance Use Topics  Alcohol use: No  
 Drug use: No  
 
 
Allergies: 
No Known Allergies Review of Systems Review of Systems Constitutional: Negative. Negative for chills and fever. HENT: Positive for facial swelling and sore throat. Negative for congestion, ear pain and rhinorrhea. Eyes: Negative. Negative for pain and redness. Respiratory: Negative. Negative for cough, shortness of breath, wheezing and stridor. Cardiovascular: Negative. Negative for chest pain and leg swelling. Gastrointestinal: Negative. Negative for abdominal pain, constipation, diarrhea, nausea and vomiting. Genitourinary: Negative. Negative for dysuria and frequency. Musculoskeletal: Positive for neck pain. Negative for back pain. Skin: Negative. Negative for rash and wound. Neurological: Negative. Negative for dizziness, seizures, syncope and headaches. All other systems reviewed and are negative. All Other Systems Negative Physical Exam  
 
Vitals:  
 11/22/18 1721 11/22/18 1900 BP: 193/88 Pulse: 76 Resp: 16 Temp: 98.8 °F (37.1 °C) SpO2: 99% 98% Physical Exam  
Constitutional: She appears well-developed and well-nourished. She appears distressed. HENT:  
Head: Normocephalic and atraumatic.   
Nose: Nose normal.  
 Oropharynx slightly erythematous with few scattered exudates, able to clear secretions. No trismus or drooling noted. Eyes: Conjunctivae are normal. Right eye exhibits no discharge. Left eye exhibits no discharge. No scleral icterus. Neck: Normal range of motion. Neck supple. R anterior cervical lymphadenopathy noted, hard and TTP Cardiovascular: Normal rate, regular rhythm and normal heart sounds. Exam reveals no gallop and no friction rub. No murmur heard. Pulmonary/Chest: Effort normal and breath sounds normal. No stridor. No respiratory distress. She has no wheezes. She has no rales. Abdominal: Soft. There is no tenderness. Musculoskeletal: Normal range of motion. Lymphadenopathy:  
  She has cervical adenopathy. Neurological: She is alert. Oriented to person and place, not time Skin: Skin is warm and dry. No rash noted. She is not diaphoretic. No erythema. Psychiatric: She has a normal mood and affect. Her behavior is normal. Thought content normal.  
Nursing note and vitals reviewed. Diagnostic Study Results Labs - Recent Results (from the past 12 hour(s)) MONONUCLEOSIS SCREEN Collection Time: 11/22/18  7:04 PM  
Result Value Ref Range Mononucleosis screen NEGATIVE     
CBC WITH AUTOMATED DIFF Collection Time: 11/22/18  7:04 PM  
Result Value Ref Range WBC 9.1 4.6 - 13.2 K/uL  
 RBC 2.82 (L) 4.20 - 5.30 M/uL HGB 9.1 (L) 12.0 - 16.0 g/dL HCT 28.0 (L) 35.0 - 45.0 % MCV 99.3 (H) 74.0 - 97.0 FL  
 MCH 32.3 24.0 - 34.0 PG  
 MCHC 32.5 31.0 - 37.0 g/dL  
 RDW 13.6 11.6 - 14.5 % PLATELET 989 062 - 239 K/uL MPV 9.3 9.2 - 11.8 FL  
 NEUTROPHILS 76 (H) 40 - 73 % LYMPHOCYTES 15 (L) 21 - 52 % MONOCYTES 9 3 - 10 % EOSINOPHILS 0 0 - 5 % BASOPHILS 0 0 - 2 %  
 ABS. NEUTROPHILS 6.9 1.8 - 8.0 K/UL  
 ABS. LYMPHOCYTES 1.4 0.9 - 3.6 K/UL  
 ABS. MONOCYTES 0.8 0.05 - 1.2 K/UL  
 ABS. EOSINOPHILS 0.0 0.0 - 0.4 K/UL ABS. BASOPHILS 0.0 0.0 - 0.1 K/UL  
 DF AUTOMATED METABOLIC PANEL, COMPREHENSIVE Collection Time: 11/22/18  7:04 PM  
Result Value Ref Range Sodium 138 136 - 145 mmol/L Potassium 5.7 (H) 3.5 - 5.5 mmol/L Chloride 104 100 - 108 mmol/L  
 CO2 19 (L) 21 - 32 mmol/L Anion gap 15 3.0 - 18 mmol/L Glucose 126 (H) 74 - 99 mg/dL BUN 96 (H) 7.0 - 18 MG/DL Creatinine 6.07 (H) 0.6 - 1.3 MG/DL  
 BUN/Creatinine ratio 16 12 - 20 GFR est AA 8 (L) >60 ml/min/1.73m2 GFR est non-AA 7 (L) >60 ml/min/1.73m2 Calcium 9.8 8.5 - 10.1 MG/DL Bilirubin, total 0.3 0.2 - 1.0 MG/DL  
 ALT (SGPT) 21 13 - 56 U/L  
 AST (SGOT) 15 15 - 37 U/L Alk. phosphatase 57 45 - 117 U/L Protein, total 6.8 6.4 - 8.2 g/dL Albumin 3.4 3.4 - 5.0 g/dL Globulin 3.4 2.0 - 4.0 g/dL A-G Ratio 1.0 0.8 - 1.7 POC LACTIC ACID Collection Time: 11/22/18  7:10 PM  
Result Value Ref Range Lactic Acid (POC) 0.73 0.40 - 2.00 mmol/L  
URINALYSIS W/ RFLX MICROSCOPIC Collection Time: 11/22/18  7:15 PM  
Result Value Ref Range Color YELLOW Appearance CLOUDY Specific gravity 1.014 1.005 - 1.030    
 pH (UA) 7.5 5.0 - 8.0 Protein 300 (A) NEG mg/dL Glucose NEGATIVE  NEG mg/dL Ketone NEGATIVE  NEG mg/dL Bilirubin NEGATIVE  NEG Blood TRACE (A) NEG Urobilinogen 0.2 0.2 - 1.0 EU/dL Nitrites POSITIVE (A) NEG Leukocyte Esterase LARGE (A) NEG URINE MICROSCOPIC ONLY Collection Time: 11/22/18  7:15 PM  
Result Value Ref Range WBC 21 to 35 0 - 4 /hpf  
 RBC 0 to 3 0 - 5 /hpf Epithelial cells FEW 0 - 5 /lpf Bacteria 4+ (A) NEG /hpf Radiologic Studies -  
CT NECK SOFT TISSUE WO CONT Final Result CT Results  (Last 48 hours)  
          
 11/22/18 1657  CT NECK SOFT TISSUE WO CONT Final result Impression:  IMPRESSION:  
   
1. Asymmetric mild enlargement of right submandibular gland with surrounding fatty stranding. Several small calcifications likely in the Conor's duct as  
sialolithiasis. This raises possibility of sialadenitis. 2. Suboptimal study due to lack of IV contrast. Additional neck findings are  
poorly seen in detail and could be associated or separate  
inflammatory/infectious process as below and recommend ENT consult: - Thickening of epiglottis.  
-Subtle hypodense lesion at the posterior right peritonsillar pillar concerning  
subtle abscess?  
-Subtle soft tissue fullness in right supraglottic/hypoglottic region with  
airway deviation to the left also seen with no other discrete lesion seen. 3. No definite lymphadenopathy. Thank you for your referral.   
   
  
 Narrative:  CT soft tissue neck without contrast  
   
CPT CODE: 77594 HISTORY: Right-sided gland swelling and, fever and sore throat COMPARISON: None. TECHNIQUE: Helical axial scan to the neck from the skull base to the lung apices  
is obtained without IV contrast administration. All CT scans at this facility performed using dose optimization techniques as  
appreciated to a performed exam, to include automated exposure control,  
adjustment of the mA and or KU according to patient size (including appropriate  
matching for site specific examination), or use of iterative reconstruction  
technique. FINDINGS:   
   
The study is suboptimal due to lack of IV contrast. Subtle pathology can be  
under detected. Salivary glands: The bilateral parotid glands appear unremarkable. There is mild  
asymmetric enlargement of right submandibular gland compared to the left  
containing several tiny punctated calcifications (1-3 mm), some likely in the Conor's duct. Moderate surrounding fatty stranding also present. Thyroid glands: Normal.  
   
Oral and nasal pharynx: Normal.  
   
Pharyngeal and laryngeal spaces: There is thickening of epiglottis noted. There is soft tissue fullness in right supraglottic and hypoglottic regions with mass  
effect and airway deviation to the left. There is effacement of right  
aryepiglottic fold. There is a subtle and ill-defined hypodense area identified  
in the right peritonsillar pillar and inferior posterior parapharyngeal region,  
grossly measures 1.7 x 2.0 x 1.9 cm on axial #52 and coronal #67. No significant  
airway narrowing but mild airway deviation to the left also seen. Sinuses: Unremarkable. Lymph nodes: There is no evidence of cervical lymphadenopathy identified. A few  
nonpathologic small lymph nodes are identified at bilateral cervical regions as  
nonspecific finding. Soft tissue plan: Moderate fatty stranding in the right-sided neck noted. No  
discrete soft tissue mass seen otherwise. Skull base, lung apices and superior mediastinum: There is a 3 mm subpleural  
nodule at the lateral right upper lobe on #22, series 3. There is a medially  
adjacent 3 mm nodule on #23 and 2 mm on #24 and 2 mm on #14. 2 mm possibly  
partially calcified nodule at lateral anterior left apex on #35. Mild emphysema. Osseous structures: Moderate facet disease in C-spine. CXR Results  (Last 48 hours) None Medical Decision Making I am the first provider for this patient. I reviewed the vital signs, available nursing notes, past medical history, past surgical history, family history and social history. Vital Signs-Reviewed the patient's vital signs. Records Reviewed: Eugenia Cosme PA-C 5:43 PM  
 
Procedures: 
Procedures Provider Notes (Medical Decision Making): Impression: epiglottitis vs sialadenitis 6:54 PM Pt is currently seeing [facility] as her PCP. I have spoken with the PFM resident, Dr. Nima Ching and discussed the exam findings concerning for an ENT source of the pt's sx.  Will call PFM on call doctor again if admission is needed. Eugenia Doyle PA-C  
 
7:29 PM ENT concerning for possible epiglottitis. Dr. Gildardo Sher, night ED attending who has also seen and evaluated the pt. ENT on call is also consulted and will review the CT images then give further recommendation. Pt will need to be admitted. Will go ahead an consult PFM. 7:28 PM Spoke with Dr. Jose Fay, PFM resident who agrees with the plan for admission. Paperwork from the Nursing home indicates the pt is a DNR status and the PFM resident confirmed this. Dr. Cullen Snyder, ENT on call is coming to the ED to evaluate the pt and will recommend admission status at that time. Also recommends IV clindamycin and decadron. She plans to be at the ED within 30 minutes to evaluate the pt.  
 
7:37 PM Spoke with PFM attending, Dr. Anshu Lazcano who recommends consulting him again after ENT has evaluated the pt and made their recommended. 8:06 PM Dr. Cullen Snyder, ENT, has arrived to the ED. Dr. Gildardo Sher is also at bedside attempting to start a central line for IV access as the nursing staff has been unsuccessful in starting a peripheral line. Feli Domingo PA-C  
 
8:30 PM Dr. Cullen Snyder has seen and evaluated the pt. She does not believe the pt's sx are suggestive of epiglottitis, given the physical exam findings. Airway is stable. She believes this is likely sialadenitis. Will obtain labs and plan to speak with PFM resident. Pt is turned over to Dr. Gildardo Sher who agrees to assume care of this pt at this time. Discussed this case with the doctor who agrees with the plan to dispo the pt pending results of labs and re-evaluation. Eugenia Cosme PA-C  
 
MED RECONCILIATION: 
Current Facility-Administered Medications Medication Dose Route Frequency  clindamycin phosphate (CLEOCIN) 600 mg in 0.9% sodium chloride 100 mL IVPB  600 mg IntraVENous NOW  dexamethasone (DECADRON) 4 mg/mL injection 9 mg  9 mg IntraVENous NOW Current Outpatient Medications Medication Sig  
 allopurinol (ZYLOPRIM) 100 mg tablet Take 0.5 Tabs by mouth daily.  amLODIPine (NORVASC) 5 mg tablet Take 1 Tab by mouth daily.  carvedilol (COREG) 6.25 mg tablet Take 1 Tab by mouth two (2) times daily (with meals).  isosorbide dinitrate (ISORDIL) 30 mg tablet Take 1 Tab by mouth three (3) times daily.  ascorbic acid, vitamin C, (VITAMIN C) 250 mg tablet Take  by mouth.  amino acids-protein hydrolys (PRO-STAT AWC)  gram-kcal/30 mL liqd Take  by mouth two (2) times a day.  Menthol-Zinc Oxide (RISAMINE) 0.44-20.6 % oint Apply  to affected area two (2) times a day. Indications: SKIN IRRITATION  furosemide (LASIX) 40 mg tablet Take 1 Tab by mouth daily.  polyethylene glycol (MIRALAX) 17 gram packet Take 1 Packet by mouth daily.  therapeutic multivitamin (THERAGRAN) tablet Take 1 Tab by mouth daily.  bisacodyl (DULCOLAX) 10 mg suppository Insert 10 mg into rectum as needed.  simvastatin (ZOCOR) 20 mg tablet Take 1 Tab by mouth nightly.  albuterol (PROVENTIL HFA, VENTOLIN HFA, PROAIR HFA) 90 mcg/actuation inhaler Take 1-2 Puffs by inhalation every four (4) hours as needed for Wheezing.  inhalational spacing device 1 Each by Does Not Apply route as needed.  aspirin delayed-release 81 mg tablet Take 1 Tab by mouth daily.  ferrous sulfate 325 mg (65 mg iron) tablet Take 325 mg by mouth Daily (before breakfast).  cyanocobalamin (VITAMIN B-12) 1,000 mcg tablet Take 1,000 mcg by mouth daily.  acetaminophen (TYLENOL) 325 mg tablet Take 2 Tabs by mouth every four (4) hours as needed.  cholecalciferol, VITAMIN D3, (VITAMIN D3) 5,000 unit tab tablet Take 1 Tab by mouth daily. Disposition: TBD Diagnosis Clinical Impression:  sialadenitis

## 2018-11-23 PROBLEM — I63.9 ISCHEMIC STROKE (HCC): Status: ACTIVE | Noted: 2018-01-01

## 2018-11-23 PROBLEM — E87.5 HYPERKALEMIA: Status: ACTIVE | Noted: 2018-01-01

## 2018-11-23 NOTE — PROGRESS NOTES
conducted an initial consultation and Spiritual Assessment for Sofia Rai, who is a 80 y.o.,female. Patients Primary Language is: [place]. According to the patients EMR Mormonism Affiliation is: Sikh.  
 
The reason the Patient came to the hospital is:  
Patient Active Problem List  
 Diagnosis Date Noted  Ischemic stroke ([address] 11/23/2018  Hyperkalemia 11/23/2018  Sialoadenitis 11/22/2018  Phlegmon 11/22/2018  UTI (urinary tract infection) 11/22/2018  Non-STEMI (non-ST elevated myocardial infarction) ([address] 05/12/2017  CHF exacerbation ([address] 05/12/2017  Acute on chronic diastolic congestive heart failure ([address] 05/12/2017  Stage 4 chronic kidney disease ([address] 05/12/2017  CHF (congestive heart failure) ([address] 05/12/2017  Acute diastolic heart failure ([address] 02/24/2017  Altered mental state 02/23/2017  Acute coronary syndromes ([address] 02/23/2017  Chest pain 02/15/2017  Diastolic CHF, acute on chronic ([address] 10/24/2016  
 NSTEMI (non-ST elevated myocardial infarction) ([address] 10/22/2016  ACS (acute coronary syndrome) ([address] 02/21/2016  Primary osteoarthritis involving multiple joints 01/18/2016  Hypertensive renal sclerosis with hypertension 05/04/2015  Fatigue 03/27/2015  Carotid stenosis 11/20/2014  CRF (chronic renal failure) 08/05/2014  Cellulitis 03/12/2014  Psoriasis 03/12/2014  Breast cancer ([address] 02/18/2014  Breast mass in female 06/25/2013  Lump of right breast   
 Chronic renal failure 07/18/2012  Leg pain 07/18/2012  Gout attack 10/28/2011  Cerebral thrombosis with cerebral infarction ([address] 09/22/2011  Long term (current) use of anticoagulants 09/22/2011  Hyperlipidemia  Glaucoma  CVA (cerebral vascular accident) ([address] The  provided the following Interventions: 
Initiated a relationship of care and support. Explored issues of kelly, belief, spirituality and Hoahaoism/ritual needs while hospitalized. Listened empathically. Provided chaplaincy education. Provided information about Spiritual Care Services. Offered prayer and assurance of continued prayers on patient's behalf. Chart reviewed. The following outcomes where achieved: 
Patient shared limited information about both their medical narrative and spiritual journey/beliefs.  confirmed Patient's Buddhism Affiliation. Patient processed feeling about current hospitalization. Patient expressed gratitude for 's visit. Assessment: 
Patient does not have any Hoahaoism/cultural needs that will affect patients preferences in health care. There are no spiritual or Hoahaoism issues which require intervention at this time. Plan: 
Chaplains will continue to follow and will provide pastoral care on an as needed/requested basis.  recommends bedside caregivers page  on duty if patient shows signs of acute spiritual or emotional distress. xavi Boyd Spiritual [facility]  
[phone]

## 2018-11-23 NOTE — ROUTINE PROCESS
Stroke Education provided to patient and caregiver / friend and the following topics were discussed 1. Patients personal risk factors for stroke are hypertension, hyperlipidemia and prior stroke 2. Warning signs of Stroke: * Sudden numbness or weakness of the face, arm or leg, especially on one side of The body * Sudden confusion, trouble speaking or understanding * Sudden trouble seeing in one or both eyes * Sudden trouble walking, dizziness, loss of balance or coordination * Sudden severe headache with no known cause 3. Importance of activation Emergency Medical Services ( 9-1-1 ) immediately if experience any warning signs of stroke. 4. Be sure and schedule a follow-up appointment with your primary care doctor or any specialists as instructed. 5. You must take medicine every day to treat your risk factors for stroke. Be sure to take your medicines exactly as your doctor tells you: no more, no less. Know what your medicines are for , what they do. Anti-thrombotics /anticoagulants can help prevent strokes. You are taking the following medicine(s)  see mar 6. Smoking and second-hand smoke greatly increase your risk of stroke, cardiovascular disease and death. Smoking history never 7. Information provided was [facility] Stroke Education [facility] 8. Documentation of teaching completed in Patient Education Activity and on Care Plan with teaching response noted? yes

## 2018-11-23 NOTE — CONSULTS
S: 81 y/o woman with right submandibular gland swelling and sore throat with CT, neck/maxillofacial concerning for epiglottis thickening and asymmetric fullness in right hypopharynx and larynx. ENT asked to assist in evaluating airway via direct visualization. Pt is resting comfortably in bed with normal sats on room air. O: 
Visit Vitals /88 (BP 1 Location: Left arm, BP Patient Position: At rest) Pulse 76 Temp 98.8 °F (37.1 °C) Resp 16 SpO2 98% Gen: elderly woman appears stated age, right femoral line just placed Oral exam: Dry mucus membranes, missing teeth Nasopharyngoscopy: Dry nasal mucus membranes, pale edematous mucosa of right tanya/hypopharynx without airway obstruction. Right AE fold with some secretion pooling. Bilateral vocal cords moving. No masses or airway mucosal lesions noted Neck: right submandibular fullness that is mildly tender to palpation, LAD not appreciated on left A/p: 81 y/o woman with right submandibular sialoadenitis likely secondary to dehydration. Associated edema is the likely cause of the soft tissue fullness on the right in the tanya/hypopharynx. The right AE fold fullness is due to pooled secretions; however, bilat true vocal cords are moving. Recommendations: 
*Antibiotics with renal dosing as applicable for sialoadenitis. Would recommend Clindamycin or Unsayn/Augmentin with completion of 10 day course (PO is fine). *Would recommend Prednisone burst to treat sialoadenitis as well as possible right hypopharyngeal phlegmon (incompletely eval'd due to lack of contrast on CT, however based on lack of erythema, I do not think an abscess is currently present). *Pt could be managed as inpatient or outpatient from airway perspective. I defer to the ER primary team for ultimate dispo based on other medical and/or social factors. Thank you for the consult. Obi Mckeon MD 
ENT

## 2018-11-23 NOTE — ROUTINE PROCESS
Bedside and Verbal shift change report given to [employer] (oncoming nurse) by HERO Salomon RN (offgoing nurse). Report given with SBAR, Kardex, MAR and Recent Results.

## 2018-11-23 NOTE — PROGRESS NOTES
NUTRITION Nursing Referral: MST, Pressure Injury RECOMMENDATIONS / PLAN:  
 
- Add supplements: Magic Cup TID. - Monitor and encourage po intake. - Continue RD inpatient monitoring and evaluation. NUTRITION INTERVENTIONS & DIAGNOSIS:  
 
[x] Meals/snacks: modify composition, per SLP [x] Medical food supplement therapy: initiate Nutrition Diagnosis: Unintended weight loss related to inadequate energy intake as evidenced by 14 lb, 10% weight loss x > year. ASSESSMENT:  
 
Pt ate most of liquid lunch and reports fair appetite. Average po intake adequate to meet patients estimated nutritional needs:   [] Yes     [] No   [x] Unable to determine at this time Diet: DIET FULL LIQUID Food Allergies: NKFA Current Appetite:   [] Good     [x] Fair     [] Poor     [] Other: 
Appetite/meal intake prior to admission:   [] Good     [] Fair     [] Poor     [x] Other: unknown Feeding Limitations:  [x] Swallowing difficulty: SLP following    [] Chewing difficulty    [] Other: 
Current Meal Intake: No data found. BM: 11/22 Skin Integrity: heel callus, ankle DTI, buttocks stage II pressure injury Edema:   [x] No     [] Yes Pertinent Medications: Reviewed Recent Labs  
  11/23/18 
0322 11/22/18 
1904  138  
K 5.6* 5.7*  
* 104 CO2 18* 19* * 126* BUN 91* 96* CREA 5.61* 6.07* CA 9.8 9.8 ALB  --  3.4 SGOT  --  15 ALT  --  21 Intake/Output Summary (Last 24 hours) at 11/23/2018 1459 Last data filed at 11/23/2018 9994 Gross per 24 hour Intake 10 ml Output  Net 10 ml Anthropometrics: 
Ht Readings from Last 1 Encounters:  
11/23/18 5' 6\" (1.676 m) Last 3 Recorded Weights in this Encounter 11/23/18 0232 Weight: 55.8 kg (123 lb) Body mass index is 19.85 kg/m². Weight History: 14 lb, 10% weight loss x > year Weight Metrics 11/23/2018 5/17/2017 3/7/2017 2/28/2017 2/20/2017 2/15/2017 1/27/2017 Weight 123 lb 137 lb 14.4 oz 135 lb 142 lb 6.6 oz 142 lb 3.2 oz - 146 lb BMI 19.85 kg/m2 22.26 kg/m2 18.83 kg/m2 19.86 kg/m2 - 19.83 kg/m2 20.36 kg/m2 Admitting Diagnosis: Phlegmon Sialoadenitis UTI (urinary tract infection) Pertinent PMHx: breast cancer, CVA, HLD, HTN Education Needs:        [x] None identified  [] Identified - Not appropriate at this time  []  Identified and addressed - refer to education log Learning Limitations:   [] None identified  [x] Identified: altered mentation Cultural, Oriental orthodox & ethnic food preferences:  [x] None identified    [] Identified and addressed ESTIMATED NUTRITION NEEDS:  
 
Calories: 9454-7562 kcal (30-35 kcal/kg) based on  [x] Actual BW 56 kg     [] IBW Protein: 67-84 gm (1.2-1.5 gm/kg) based on  [x] Actual BW      [] IBW Fluid: 1 mL/kcal 
  
MONITORING & EVALUATION:  
 
Nutrition Goal(s): 1. Po intake of meals will meet >75% of patient estimated nutritional needs within the next 7 days. Outcome:  [] Met/Ongoing    []  Not Met    [x] New/Initial Goal  
 
Monitoring:   [x] Food and beverage intake   [x] Diet order   [x] Nutrition-focused physical findings   [x] Treatment/therapy   [] Weight   [] Enteral nutrition intake Previous Recommendations (for follow-up assessments only):     []   Implemented       []   Not Implemented (RD to address)      [] No Longer Appropriate     [] No Recommendation Made Discharge Planning: cardiac diet, consistency per SLP [x] Participated in care planning, discharge planning, & interdisciplinary rounds as appropriate Manpreet Caal RD, [address]  Pager: [phone]

## 2018-11-23 NOTE — PROGRESS NOTES
[facility] Stroke Education Booklet and Stroke Education provided to patient, nursing home and past medical records and the following topics were discussed 1. Patients personal risk factors for stroke are hypertension, hyperlipidemia, diabetes mellitus and prior stroke 2. Warning signs of Stroke: * Sudden numbness or weakness of the face, arm or leg, especially on one side of The body * Sudden confusion, trouble speaking or understanding * Sudden trouble seeing in one or both eyes * Sudden trouble walking, dizziness, loss of balance or coordination * Sudden severe headache with no known cause 3. Importance of activation Emergency Medical Services ( 9-1-1 ) immediately if  you experience any warning signs of stroke. 4. Be sure and schedule a follow-up appointment with your primary care doctor or any specialists as instructed. 5. You must take medicine every day to treat your risk factors for stroke. Be sure to take your medicines exactly as your doctor tells you: no more, no less. Know what your medicines are for , what they do. Anti-thrombotics /anticoagulants can help prevent strokes. You are taking the following medicine(s)  See [facility] 6. Smoking and second-hand smoke greatly increase your risk of stroke, cardiovascular disease and death. Smoking history never

## 2018-11-23 NOTE — PROGRESS NOTES
ARU/IPR REFERRAL CONTACT NOTE [id number] [facility] RE: Crow Quiroga Referral received to review this patient's case for admission to [id number] [place] [address] for Physical Rehabilitation. Current status reviewed.  When appropriate, will need PT/OT/ST evaluation/treatment on this patient to complete the pre-admission evaluation.  
Pt currently npo. Neuro consulted. Will continue to follow. Thank you for this referral.  Should you have any questions please do not hesitate to call. Sincerely, DARREN Downey PTA for [facility]iz Admissions Liaison [facility] 
[phone]

## 2018-11-23 NOTE — H&P
Admission History and Physical 
[facility] Patient: Lilly Ramos MRN: [id number]  CSN: [id number] YOB: 1928  Age: 80 y.o. Sex: female DOA: 11/22/2018 HPI:  
 
Lilly Ramos is a 80 y.o. female with PMH of CHF, CVA, HTN, CKD stage 4 not on HD, HLD, hx NSTEMI, Hx of breast cancer presenting with neck mass and sore throat. Patient was brought to ED by EMS. She came from [address] [place] [address] for initial complaints of swallowing difficulty, eye droop, voice changes reported by nurse during after hour call with PFM. Was instructed to take aspirin and report to ED for evaluation. Patient not reporting any significant sore throat or shortness of breath. She does report dysuria and hematuria that has gone on for about a week. She has noted the hematuria infrequently. In the ED patient had CT neck done which showed mild asymmetric enlargement of right submandibular glad and possible sialednitis, thickening of epiglottis. No airway narrowing but showed airway deviation to the left. Patient given ceftriaxone x1  ENT, Dr. Perry Frames consulted. Recommend clindamycin and decadron. Nasopharyngoscopy preformed. WBC wnl, lactic acid wnl, Hgb 9.1, K 5.7. UA positive for nitrites, leukocyte esterase, 21-35 wbc and 4+ bacteria. CXR clear. Review of Systems Constitutional: Negative for fever, chills HENT: Negative for sore throat. Eyes: Positive for visual loss Respiratory: Negative for cough Cardiovascular: Negative for chest pain Gastrointestinal: Positive for nausea and vomiting. Genitourinary: Positive for dysuria and hematuria. Musculoskeletal: Negative for myalgias and joint pain. Skin: Negative for itching and rash. Neurological: Negative for headaches. Past Medical History:  
Diagnosis Date  Breast cancer ([facility] [address] 1/17/14  
 right breast  
 Cardiac echocardiogram 02/17/2017 EF 50%. No WMA. Mod conc LVH. Indeterminate diastolic fx. Mod LAE. Mild MR. Mild AI. Mild PI. No signifciant valvular heart disease.  Cardiovascular lower extremity arterial testing 12/19/2014 Mod tibio-peroneal arterial disease bilaterally. R YANCY 1.24.  L YANCY 0.74. Bilateral sm vessel disease.  Carotid duplex 11/19/2014 Severe 70% or greater bilateral ICA stenosis. LICA dissection suggested.  Chronic renal insufficiency  CVA (cerebral vascular accident) ([facility] [address] 2003  
 with slight Right sided weakness  Edema  Glaucoma  Gout flare  Hyperlipidemia  Hypertension  Lump of right breast   
 URI (upper respiratory infection) Past Surgical History:  
Procedure Laterality Date  HX APPENDECTOMY  HX CYST INCISION AND DRAINAGE    
 PT DOES NOT REMEMBER EXACT DATES, TUCKER BREAST DONE MORE THAN 20 YEARS AGO. BENIGN FINDINGS PER PATIENT.  HX GYN    
 JUSTIN/BSO  HX HYSTERECTOMY Family History Problem Relation Age of Onset  Hypertension Mother  Hypertension Brother  Diabetes Brother Social History Socioeconomic History  Marital status:  Spouse name: Not on file  Number of children: Not on file  Years of education: Not on file  Highest education level: Not on file Tobacco Use  Smoking status: Never Smoker  Smokeless tobacco: Never Used Substance and Sexual Activity  Alcohol use: No  
 Drug use: No  
 Sexual activity: Not Currently No Known Allergies Prior to Admission Medications Prescriptions Last Dose Informant Patient Reported? Taking? Menthol-Zinc Oxide (RISAMINE) 0.44-20.6 % oint   Yes No  
Sig: Apply  to affected area two (2) times a day. Indications: SKIN IRRITATION  
acetaminophen (TYLENOL) 325 mg tablet   No No  
Sig: Take 2 Tabs by mouth every four (4) hours as needed.   
albuterol (PROVENTIL HFA, VENTOLIN HFA, PROAIR HFA) 90 mcg/actuation inhaler   No No  
 Sig: Take 1-2 Puffs by inhalation every four (4) hours as needed for Wheezing. allopurinol (ZYLOPRIM) 100 mg tablet   No No  
Sig: Take 0.5 Tabs by mouth daily. amLODIPine (NORVASC) 5 mg tablet   No No  
Sig: Take 1 Tab by mouth daily. amino acids-protein hydrolys (PRO-STAT AWC)  gram-kcal/30 mL liqd   Yes No  
Sig: Take  by mouth two (2) times a day. ascorbic acid, vitamin C, (VITAMIN C) 250 mg tablet   Yes No  
Sig: Take  by mouth. aspirin delayed-release 81 mg tablet   No No  
Sig: Take 1 Tab by mouth daily. bisacodyl (DULCOLAX) 10 mg suppository   No No  
Sig: Insert 10 mg into rectum as needed. carvedilol (COREG) 6.25 mg tablet   No No  
Sig: Take 1 Tab by mouth two (2) times daily (with meals). cholecalciferol, VITAMIN D3, (VITAMIN D3) 5,000 unit tab tablet   No No  
Sig: Take 1 Tab by mouth daily. cyanocobalamin (VITAMIN B-12) 1,000 mcg tablet   Yes No  
Sig: Take 1,000 mcg by mouth daily. ferrous sulfate 325 mg (65 mg iron) tablet   Yes No  
Sig: Take 325 mg by mouth Daily (before breakfast). furosemide (LASIX) 40 mg tablet   No No  
Sig: Take 1 Tab by mouth daily. inhalational spacing device   No No  
Si Each by Does Not Apply route as needed. isosorbide dinitrate (ISORDIL) 30 mg tablet   No No  
Sig: Take 1 Tab by mouth three (3) times daily. polyethylene glycol (MIRALAX) 17 gram packet   No No  
Sig: Take 1 Packet by mouth daily. simvastatin (ZOCOR) 20 mg tablet   No No  
Sig: Take 1 Tab by mouth nightly. therapeutic multivitamin (THERAGRAN) tablet   No No  
Sig: Take 1 Tab by mouth daily. Facility-Administered Medications: None Physical Exam:  
 
Patient Vitals for the past 24 hrs: 
 Temp Pulse Resp BP SpO2  
18 2100  72 14 175/61 98 % 18 1900     98 % 18 1721 98.8 °F (37.1 °C) 76 16 193/88 99 % Physical Exam: 
General:  Awake, drowsy, answering questions but poor historian, oriented to name, place, but states its 1978 HEENT: pupils, equal, round, reactive to light, patient not able to follow exam for EOM, enlarged, tender, right submandibular gland, no cervical lymphadenopathy on the left, examination of oropharynx limited to pain, see Senior Residents note regarding findings CV:  RRR, no murmurs, rubs, or gallops. No carotid bruits. RESP:  Unlabored breathing. CTAB. ABD:  Soft, nontender, nondistended. Normoactive bowel sounds. No hepatosplenomegaly. No suprapubic tenderness. Neuro: weakness in upper extremities R>L, sensation intact throughout, CN excluding extraocular movements intact, finger to nose intact Extremities: lower extremities without edema, no tenderness to palpation, dorsalis pedis pulses 1+ b/l IMAGING: Ct Neck Soft Tissue Wo Cont Result Date: 11/22/2018 IMPRESSION: 1. Asymmetric mild enlargement of right submandibular gland with surrounding fatty stranding. Several small calcifications likely in the Conor's duct as sialolithiasis. This raises possibility of sialadenitis. 2. Suboptimal study due to lack of IV contrast. Additional neck findings are poorly seen in detail and could be associated or separate inflammatory/infectious process as below and recommend ENT consult: - Thickening of epiglottis. -Subtle hypodense lesion at the posterior right peritonsillar pillar concerning subtle abscess? -Subtle soft tissue fullness in right supraglottic/hypoglottic region with airway deviation to the left also seen with no other discrete lesion seen. 3. No definite lymphadenopathy. Thank you for your referral.   
 
 
Recent Results (from the past 24 hour(s)) MONONUCLEOSIS SCREEN Collection Time: 11/22/18  7:04 PM  
Result Value Ref Range Mononucleosis screen NEGATIVE     
CBC WITH AUTOMATED DIFF Collection Time: 11/22/18  7:04 PM  
Result Value Ref Range WBC 9.1 4.6 - 13.2 K/uL  
 RBC 2.82 (L) 4.20 - 5.30 M/uL HGB 9.1 (L) 12.0 - 16.0 g/dL HCT 28.0 (L) 35.0 - 45.0 % MCV 99.3 (H) 74.0 - 97.0 FL  
 MCH 32.3 24.0 - 34.0 PG  
 MCHC 32.5 31.0 - 37.0 g/dL  
 RDW 13.6 11.6 - 14.5 % PLATELET 444 890 - 705 K/uL MPV 9.3 9.2 - 11.8 FL  
 NEUTROPHILS 76 (H) 40 - 73 % LYMPHOCYTES 15 (L) 21 - 52 % MONOCYTES 9 3 - 10 % EOSINOPHILS 0 0 - 5 % BASOPHILS 0 0 - 2 %  
 ABS. NEUTROPHILS 6.9 1.8 - 8.0 K/UL  
 ABS. LYMPHOCYTES 1.4 0.9 - 3.6 K/UL  
 ABS. MONOCYTES 0.8 0.05 - 1.2 K/UL  
 ABS. EOSINOPHILS 0.0 0.0 - 0.4 K/UL  
 ABS. BASOPHILS 0.0 0.0 - 0.1 K/UL  
 DF AUTOMATED METABOLIC PANEL, COMPREHENSIVE Collection Time: 11/22/18  7:04 PM  
Result Value Ref Range Sodium 138 136 - 145 mmol/L Potassium 5.7 (H) 3.5 - 5.5 mmol/L Chloride 104 100 - 108 mmol/L  
 CO2 19 (L) 21 - 32 mmol/L Anion gap 15 3.0 - 18 mmol/L Glucose 126 (H) 74 - 99 mg/dL BUN 96 (H) 7.0 - 18 MG/DL Creatinine 6.07 (H) 0.6 - 1.3 MG/DL  
 BUN/Creatinine ratio 16 12 - 20 GFR est AA 8 (L) >60 ml/min/1.73m2 GFR est non-AA 7 (L) >60 ml/min/1.73m2 Calcium 9.8 8.5 - 10.1 MG/DL Bilirubin, total 0.3 0.2 - 1.0 MG/DL  
 ALT (SGPT) 21 13 - 56 U/L  
 AST (SGOT) 15 15 - 37 U/L Alk. phosphatase 57 45 - 117 U/L Protein, total 6.8 6.4 - 8.2 g/dL Albumin 3.4 3.4 - 5.0 g/dL Globulin 3.4 2.0 - 4.0 g/dL A-G Ratio 1.0 0.8 - 1.7 POC LACTIC ACID Collection Time: 11/22/18  7:10 PM  
Result Value Ref Range Lactic Acid (POC) 0.73 0.40 - 2.00 mmol/L  
URINALYSIS W/ RFLX MICROSCOPIC Collection Time: 11/22/18  7:15 PM  
Result Value Ref Range Color YELLOW Appearance CLOUDY Specific gravity 1.014 1.005 - 1.030    
 pH (UA) 7.5 5.0 - 8.0 Protein 300 (A) NEG mg/dL Glucose NEGATIVE  NEG mg/dL Ketone NEGATIVE  NEG mg/dL Bilirubin NEGATIVE  NEG Blood TRACE (A) NEG Urobilinogen 0.2 0.2 - 1.0 EU/dL Nitrites POSITIVE (A) NEG Leukocyte Esterase LARGE (A) NEG URINE MICROSCOPIC ONLY  Collection Time: 11/22/18  7:15 PM  
 Result Value Ref Range WBC 21 to 35 0 - 4 /hpf  
 RBC 0 to 3 0 - 5 /hpf Epithelial cells FEW 0 - 5 /lpf Bacteria 4+ (A) NEG /hpf Assessment/Plan:  
80 y.o. female with PMH CHF,CVA, HTN, CKD stage 4 not on HD, HLD, hx NSTEMI, Hx of breast cancer presenting with neck mass and sore throat. TIA vs CVA Patient presents with R>L upper extremity weakness. She was also experiencing facial droop and speech difficulty per nursing home report this afternoon. CT head at [facility] with no clearly acute findings. Mild left greater than right periventricular white matter low-attenuation, likely chronic microvascular ischemic change. Left anterior basal ganglion remote lacunar infarct. Unclear timing of symptoms for TPA candidacy. Not a candidate for CTA and or candidate for endovascular intervention due to renal disease. Spoke with family Rubens Rojas) who is listed in outpatient notes as POA. Discussed patients status and updates. She had not seen the patient recently but noted that she has been generally weak for sometime but not necessarily more so on the right side. Michelle also mentions other family members saw the patient this week and noted she looked better and was up and out of bed more. Michelle mentions POA is Ashanti Napoles ([phone]). I attempted to call Ashanti Napoles however she did not answer the call.  
- admit to tele 
- received aspirin 325 mg at [facility] 
- will hold plavix at this  
- teleneuro recommends -180 due to patients older age and higher risk for bleed - D5 1/2 NS at 50 mL/hr 
- atorvastatin 80 mg when cleared for PO  
- aspiration precautions 
- NPO until SPL eval 
- HgbA1C 
- Lipid panel  
- PT, INR 
- daily CBC, BMP  
- PT, OT, CM 
- speak with nurse from [facility] regarding symptoms Sialdenitis/neck mass CT findings of thickened epiglottis concerning for possible epiglottitis. No airway narrowing noted on study but there is leftward airway deviation. ENT evaluated patient at [place]. Started patient on IV clindamycin and decadron. CT also noted enlarged right submandibular gland concerning for possible sialadenitis. Direct visualization of airway with nasopharyngoscopy showed  dry nasal mucus membranes, pale edematous mucosa of right tanya/hypopharynx without airway obstruction. Right AE fold with some secretion pooling. Bilateral vocal cords moving. No masses or airway mucosal lesions noted. Mono screen negative.   
- NPO 
- continue clindamycin 600 mg IV q8h for 2 days,  mg q8h for 8 days - warm compresses - 9 mg dexamethasone given in ED, will continue decadron 4 mg/day for 2 additional days (3 days total of steroids) 
- monitor for s/s of airway compromise 
- supplemental oxygen as needed - Fu Bcx - Tylenol for pain  
- daily CBC, BMP 
- ENT consult if concern for surgical management Hyperkalemia 5.7 on admission. In setting of CKD not on HD.  
- repeat BMP  
 
UTI  
UA positive for nitrites, LE, WBCs, and 4+ bacteria. Ucx taken in ED.  
- received ceftriaxone in ED  
- FU UCx CKD Cr on admission of 6.07. BUN elevated 96. Baseline Cr 5-5.5 
- renal dosing of medications  
- avoid nephrotoxic medications - trend renal indices Chronic Diastolic CHF Echo done 2/2017 with EF of 50%. No wall motion abnormalities. Showed left ventricular concentric hypertrophy. - hold furosemide 40 mg daily HTN 
- labetalol 10 mg IV q15 min PRN for BP greater than 180/100, hold if SBP less than 150 
- hold amlodipine 10 mg daily  
- hold home hydralazine 
- hold coreg 6.25 mg daily  
- hold isosorbide dinitrite Anemia Hgb 9.1 on admission baseline appears to be around 7-8. Likely in setting of CKD  
- daily CBC  
-hold home iron 325 mg daily HLD 
- atorvastatin 80 mg daily  
- continue aspirin 81 mg daily Sacral Decubitus Ulcer 
- stage 2  
- covered with mepilex padding Gout 
- hold allopurinol 100 mg 1/2 tab every day - check uric acid level Chronic pain - Tylenol 650 mg q4 prn, topical lidocaine, diclofenac for knee pain Diet: NPO 
DVT Prophylaxis: SCDs Code Status: DNR Point of Contact: Basim Castellanos ([phone]), Michelle [phone] Disposition and anticipated LOS: 2 Midnights Arnie Herbert MD PGY-1 
[facility] PFM  
 
 
 
  
Senior Resident History and Physical 
[facility] 
  
HPI:  
  
80 y.o. female with a PMH of CKD Stage 4,HTN, Hx of Breast Cancer, MI  now admitted with Acute Left Neck swelling that acutely worsened today with difficulty swallowing, facial asymmetry, and hoarse voice today at lunch that became more apparent to aide at 1500 at [facility]. 
  
PCP on call referred patient to ED emergently and patient received 325 ASA prior to EMS arrival per verbal order. 
  
On Arrival to ED, PCP on call spoke to Jaydon Ceballos Dr regarding concerns of stroke. ENT examined patient and did not see airway compromise on exam, but recommended steroid burst and anti-biotics such as clindamycin. ED team reported to PCP that clinically not concerned with stroke. Carmelo swallowing difficulty due to acute sialadenitis. 
  
Patient is comfort care status per Nursing home notes by outpatient providers. History limited by cognitive impairment. 
  
ED Course: ENT C/s with Nasopharyngoscopy, CBC, BMP with Cr of 6(GFR:7)CT Neck Contrast, Rocephin for UTO, Clindamycin 
  
HPI, ROS, PMH, PSH, Family Hx, Social Hx, home medications, and allergies as above in intern H&P. I agree with history as above. Additional comments to the subjective history include: 
  
Physical Exam:  
  
Visit Vitals /61 Pulse 72 Temp 98.8 °F (37.1 °C) Resp 14 SpO2 98%  
  
  
General:  WD, WN, NAD, AO to self and place([facility]), but stated year is 1, HEENT:  NCAT. Conjunctiva pink, sclera anicteric. PERRL. EOMI. Pharynx dry, Ulcerated and swollen sublingual Duct and gland(R>L) with erythema. Dry mucous membranes. Thyroid not enlarged, no nodules. Right tender submandibular mass at anterior border of SCM. Patient could nto tolerate extensive oral exa,. No cervical, supraclavicular, or submandibular lymphadenopathy. CV:  RRR, no mumurs. PMI not displaced. RESP:  Unlabored breathing. CTAB, no wheeze, rales, or rhonchi. ABD:  Soft, nontender, nondistended. Normoactive bowel sounds. No hepatosplenomegaly. No suprapubic tenderness. MS:  No joint deformity or instability. No atrophy. Neuro:  Rigth arm weakness and decreased, Left eye ptosis, no facial droop. A+Ox2. Ext:  No edema. 2+ radial and dp pulses bilaterally. Onychomychisis with hypertrophic nails. Skin:  Stage 2 Left Sacral Ulcer 1cm. Good turgor. 
  
Radiology: 
Ct Neck Soft Tissue Wo Cont 
  
Ct Head Wo Cont 
  
Result Date: 11/23/2018 IMPRESSION: 1. No clearly acute findings. Of note, MRI is more sensitive in the detection of acute infarct. 2. Mild left greater than right periventricular white matter low-attenuation, likely chronic microvascular ischemic change. -Left anterior basal ganglion remote lacunar infarct. Findings called to Dr. Leigh Lopez greater at 42-30-72-51 hours, 11/23/2018 
  
Ct Neck Soft Tissue Wo Cont 
  
Result Date: 11/22/2018 IMPRESSION: 1. Asymmetric mild enlargement of right submandibular gland with surrounding fatty stranding. Several small calcifications likely in the Conor's duct as sialolithiasis. This raises possibility of sialadenitis.  2. Suboptimal study due to lack of IV contrast. Additional neck findings are poorly seen in detail and could be associated or separate inflammatory/infectious process as below and recommend ENT consult: - Thickening of epiglottis. -Subtle hypodense lesion at the posterior right peritonsillar pillar concerning subtle abscess? -Subtle soft tissue fullness in right supraglottic/hypoglottic region with airway deviation to the left also seen with no other discrete lesion seen. 3. No definite lymphadenopathy. Thank you for your referral.   
  
Xr Chest Port 
  
Result Date: 11/22/2018 Impression:  No radiographic evidence of acute cardiopulmonary process.  
  
Labs and imaging reviewed with notable K of  
  
Assessment/Plan:  
80 y.o. female with a PMH of CKD Stage 4,HTN, Hx of Breast Cancer  now admitted with Acute Left Neck swelling that acutely worsened today with difficulty swallowing, facial asymmetry, and hoarse voice, 
  
Sub-Acute Ischemic Stroke with hx ofr CVA in 2014 CT Read shows left anterior basal ganglion lacunar infarct and corresponds to Tele-neurology assesment 
-Code Stroke initiated for Left eye Ptosis, decreased rioght  squeeze, and weakness of right upper extremity EKG: NSR, no ST changes or ischemia.  
-Not a candidate for TPA given unclear time period of onset. Not a candiate for CTA  given CKD4 and not a candidate for endovascular intervention.  
-Spoke to Family who noted weakness, but unable to declare if new focal deficits. Second POA called, but no answer. 
-Admit to Telemetry 
-Teleneuro in agreement with ASA. Not a candidate for TPA, but recommended BP goal of 180-160. 
-Neuro Consult in the morning 
-[place] Avoid Plavix due to comorbidities and goals of care 
-NPO until SPL eval 
-Atorvastatin 80mg QHS once cleared for PO 
-Lipid panel, PT/INR, CBC, and BMP 
[email or web address] 
-Labetalol PRN for SBP >180 or DBP>100 
-PT/OT 
-Stroke Scale checks per protocol 
-Hold PO Meds 
  
Right Neck Mass with Sialadenitis ENT evaluated patient with concern for airway, but cleared for transfer and treatment with PO or oral anti-biotics. DDX:Acute sialadenitis due to poor po intake(stroke vs. UTI) vs. Sjogren''s Syndrome vs. Salivary gland tumor vs. Sarcoidosis vs. HIV vs. Malnutrition 
-Continue Clindamycin IV 600mg Q8H for  Staph aureus coverage with plan to wean to [id number] [facility] - Decadron 4 mg for 3 days  
-Obtain culture of sublingual pus 
-Warm compresses TID 
-Nutrition Consult 
-FU Blood Cultures 
  
HTN Last Echo: 2/2017:EF 50%. NO WMA, Moderate concentric hypertrophy. No comment on diastolic dysfunction. 
-Hold home meds(Spt2nfr 
-Restart when cleared for PO 
  
Anemia: Hb:9.1 today(BAseline 7-8) -daily CBC 
  
Hyperkalemia: Baseline at 5.8 EKG: no T wave changes 
-S/P Fluids uin ED. Repeat BMP shows K of 5.7 
-Will hold inuslin/D50 challnege due to CKD and risk of hypoglycemia 
-Continue IVF 
  
Generalized Weakness-Acute on chronic noted by family in last week 
-Addressed with UTI, IV fluids, potassium monitoring, Speech eval, and Sialadenitis treatment to encouraged PO intake 
  
CKD Stage 4(GFR 7baseline) BUN:96 
-Noted on Comfort Measures per Outpatient Nursing Home visits -Avoid Nephrotoxic Medications 
-D/C NS  
-Gentle IVF @ 1/2 Maintenance 
  
Symptomatic UTI 
S/p 250mg Ceftriaxone UA POS wit nitrites, leuks, and Pyuria 
-Start IV Ceftriaxone daily until tolerating PO 
-FU Urine Culture 
  
CODE Status: DNR/DNI 
-Obtain POA paperwork from 
  
  
*Please see intern note above for additional chronic disease mgmt. 
  
Melani Morrissey MD PGY-2 
11/22/2018, 9:27 PM

## 2018-11-23 NOTE — DISCHARGE SUMMARY
Discharge Summary [address] Patient: Rubina Fox Age: 80 y.o. Sex: female  : 1928 MRN: [id number] DOA: 2018 Discharge Date: 2018 Alfredito Morales MD     
PCP: Reyna Ordoñez MD     
 
================================================================ Reason for Admission:  
Phlegmon Sialoadenitis UTI (urinary tract infection) Discharge Diagnoses:  
Sialoadenitis UTI Phlegmon Important notes to PCP/ follow-up studies and evaluations - Mental status, neurological status - Medication reconcilliation Pending labs and studies: 
Blood culture Urine Culture Operative Procedures:  
None Discharge Medications:    
Current Discharge Medication List  
  
START taking these medications Details  
clindamycin (CLEOCIN) 300 mg capsule Take 2 Caps by mouth three (3) times daily. Qty: 54 Cap, Refills: 0  
  
dexamethasone (DECADRON) 4 mg tablet Take 4 mg by mouth two (2) times daily (with meals) for 4 doses. Qty: 4 Tab, Refills: 0 CONTINUE these medications which have CHANGED Details  
ascorbic acid, vitamin C, (VITAMIN C) 250 mg tablet Take 2 Tabs by mouth daily. Qty: 30 Tab, Refills: 1 CONTINUE these medications which have NOT CHANGED Details  
diclofenac (VOLTAREN) 1 % gel Apply 2 g to affected area four (4) times daily. gabapentin (NEURONTIN) 300 mg capsule Take 300 mg by mouth nightly. !! hydrALAZINE (APRESOLINE) 25 mg tablet Take 25 mg by mouth three (3) times daily. !! hydrALAZINE (APRESOLINE) 10 mg tablet Take 10 mg by mouth three (3) times daily. PRN for SBP>180 Lactobacillus acidophilus (BACID) cap Take 2 Caps by mouth two (2) times a day. lidocaine (XYLOCAINE) 4 % topical cream Apply 1 Each to affected area. LORazepam (ATIVAN) 0.5 mg tablet Take 0.5 mg by mouth every eight (8) hours as needed for Anxiety. magnesium hydroxide (CIFUENTES MILK OF MAGNESIA) 400 mg/5 mL suspension Take 30 mL by mouth daily as needed for Constipation. oxyCODONE (OXYIR) 5 mg capsule Take 2.5 mg by mouth every eight (8) hours as needed for Pain.  
  
polyethylene glycol (MIRALAX) 17 gram/dose powder Take 17 g by mouth daily. multivitamin (ONE A DAY) tablet Take 1 Tab by mouth daily. allopurinol (ZYLOPRIM) 100 mg tablet Take 0.5 Tabs by mouth daily. Qty: 30 Tab, Refills: 2  
  
carvedilol (COREG) 6.25 mg tablet Take 1 Tab by mouth two (2) times daily (with meals). Qty: 30 Tab, Refills: 2  
  
isosorbide dinitrate (ISORDIL) 30 mg tablet Take 1 Tab by mouth three (3) times daily. Qty: 30 Tab, Refills: 2  
  
furosemide (LASIX) 40 mg tablet Take 1 Tab by mouth daily. Qty: 30 Tab, Refills: 0  
  
bisacodyl (DULCOLAX) 10 mg suppository Insert 10 mg into rectum as needed. Qty: 30 Suppository, Refills: 0  
  
simvastatin (ZOCOR) 20 mg tablet Take 1 Tab by mouth nightly. Qty: 30 Tab, Refills: 0  
  
inhalational spacing device 1 Each by Does Not Apply route as needed. Qty: 1 Device, Refills: 0  
  
aspirin delayed-release 81 mg tablet Take 1 Tab by mouth daily. Qty: 100 Tab, Refills: prn  
  
ferrous sulfate 325 mg (65 mg iron) tablet Take 325 mg by mouth Daily (before breakfast). cyanocobalamin (VITAMIN B-12) 1,000 mcg tablet Take 1,000 mcg by mouth daily. acetaminophen (TYLENOL) 325 mg tablet Take 2 Tabs by mouth every four (4) hours as needed. Qty: 30 Tab, Refills: 0  
  
cholecalciferol, VITAMIN D3, (VITAMIN D3) 5,000 unit tab tablet Take 1 Tab by mouth daily. Qty: 100 Tab, Refills: 0  
  
 !! - Potential duplicate medications found. Please discuss with provider. Disposition: SNF Consultants:   
Neurology Brief Hospital Course (including pertinent history and physical findings) Sharon Young is a 80 y.o. female with PMH of CHF, CVA, HTN, CKD stage 4 not on HD, HLD, hx NSTEMI, Hx of breast cancer presenting with neck mass and sore throat.   
  
Patient was brought to ED by EMS. She came from [address] [place] [address] for initial complaints of swallowing difficulty, eye droop, voice changes reported by nurse during after hour call with PFM. Was instructed to take aspirin and report to ED for evaluation. Patient not reporting any significant sore throat or shortness of breath. She does report dysuria and hematuria that has gone on for about a week. She has noted the hematuria infrequently.  
  
In the ED patient had CT neck done which showed mild asymmetric enlargement of right submandibular glad and possible sialednitis, thickening of epiglottis. No airway narrowing but showed airway deviation to the left. Patient given ceftriaxone x1  ENT, Dr. Marylen Louder consulted. Recommend clindamycin and decadron. Nasopharyngoscopy preformed. WBC wnl, lactic acid wnl, Hgb 9.1, K 5.7. UA positive for nitrites, leukocyte esterase, 21-35 wbc and 4+ bacteria. CXR clear. An emergent femoral central venous line was placed. On the floor, patient was evaluated by Speech therapy and passed the swallow evaluation with a recommendation of Full Liquid diet. She was alert and oriented to person and place. Her neurological examination was at baseline. She was evaluated by Neurology for concern for acute stroke vs. Subacute stroke. CT head was unremarkable for an acute process, and her exam was not consistent with acute neurological changes. Due to her severe kidney disease, and DNR status, there was no benefit of going through a diagnostic MRI for further evaluation. The patient tolerated her lunch well and was amenable to discharge home. On the day of discharge, patient was hemodynamically stable, tolerating PO intake, with baseline neurologic status. Central venous line was removed with minimal bleeding. Discharge Medications: - Clindamycin 600mg PO TID x 9 days (for a 10 day course) - Decadron 4mg BID PO x 4 doses (for a 3 day steroid burst) - Aspirin 81mg QD Summarized key findings and results (labs, imaging studies, ECHO, cardiac cath, endoscopies, etc): CT Head: 1. No clearly acute findings. Of note, MRI is more sensitive in the detection of 
acute infarct. 2. Mild left greater than right periventricular white matter low-attenuation, 
likely chronic microvascular ischemic change. 
-Left anterior basal ganglion remote lacunar infarct. CXR: 
No radiographic evidence of acute cardiopulmonary process. CT Neck: 1. Asymmetric mild enlargement of right submandibular gland with surrounding 
fatty stranding. Several small calcifications likely in the Conor's duct as 
sialolithiasis. This raises possibility of sialadenitis. 2. Suboptimal study due to lack of IV contrast. Additional neck findings are 
poorly seen in detail and could be associated or separate 
inflammatory/infectious process as below and recommend ENT consult: - Thickening of epiglottis. 
-Subtle hypodense lesion at the posterior right peritonsillar pillar concerning 
subtle abscess? 
-Subtle soft tissue fullness in right supraglottic/hypoglottic region with 
airway deviation to the left also seen with no other discrete lesion seen. 3. No definite lymphadenopathy. BMP: hyperkalemia at baseline, decreased kidney function Functional status and cognitive function:   
Ambulates with Wheelchair only Status: alert, cooperative, no distress, appears stated age Diet: Full Liquid Diet Code status and advanced care plan: DNR [address] of Contact: Freya Swartz ([phone]) Patient Education:  Patient was educated on the following topics prior to discharge: safety at home, the proper use of medications. Follow-up:  
Follow-up Information Follow up With Specialties Details Why Contact Info Benjamin Troy MD Family Practice Go in 1 week Follow up with your PCP within 1 week of discharge. Please bring all of your medications with you [address] [facility] [address] 
[phone] 
  
  
 
 
 
================================================================ Romeo Warren MD

## 2018-11-23 NOTE — PROGRESS NOTES
MEWS score 3 due to b/p 217/88. MD pagemary kay made aware. Order received for prn labetalol 5mg for b/p 180/100.

## 2018-11-23 NOTE — ROUTINE PROCESS
Report called to [facility], talked  To Ms. Roberto Kirby LPN As part of the discharge instructions, medications already given today were discussed with the patient. The next dose due of all ordered meds was highlighted as part of the medication discharge instructions. Discussed with the patient the importance of taking medications as directed, as well as the side effects and adverse reactions to medications ordered.

## 2018-11-23 NOTE — PROGRESS NOTES
DISCHARGE PLANNING: 
· Pt will return to [facility] · Pt is a long term care patient at [place], [place]. · Pt's family notified that pt will be discharged today back to Nursing facility · [facility] admissions Coordinator contacted regarding this pt being returned to date · Medical transport set-up for this pt with [facility].

## 2018-11-23 NOTE — PROGRESS NOTES
Problem: Mobility Impaired (Adult and Pediatric) Goal: *Acute Goals and Plan of Care (Insert Text) Physical Therapy Goals Initiated 11/23/2018 and to be accomplished within 3 day(s) 1. Patient will move from supine to sit and sit to supine , scoot up and down and roll side to side in bed with minimal assistance. 2.  Patient will transfer from bed to chair and chair to bed with minimal assistance/contact guard assist using the least restrictive device. 3.  Patient will perform sit to stand with minimal assistance. 4.  Patient will demo good sitting balance in order to improve safety during functional tasks. physical Therapy EVALUATION Patient: Rubina Fox (80 y.o. female) Date: 11/23/2018 Primary Diagnosis: Phlegmon Sialoadenitis UTI (urinary tract infection) Precautions: Fall ASSESSMENT : 
Patient is 81yo F admitted to hospital for UTI and presents today alert and agreeable to therapy and was supine in bed upon arrival and was agreeable to therapy. Patient performed supine objective assessment and transferred to EOB with additional time and MaxA. Patient sat EOB several mins and performed TE listed below. Patient educated on role and goals of therapy and progression next session and is agreeable to standing and attempts at transfer next session, though patient confused. Patient transferred to supine in bed and was left resting with call bell by her side. Patient will be placed on 3 day trial due to decreased carryover of learning and participation with therapy. Patient will benefit from skilled intervention to address the above impairments. Patients rehabilitation potential is considered to be Good Factors which may influence rehabilitation potential include:  
[]         None noted 
[x]         Mental ability/status [x]         Medical condition 
[x]         Home/family situation and support systems 
[x]         Safety awareness [x]         Pain tolerance/management []         Other: PLAN : 
Recommendations and Planned Interventions: 
[x]           Bed Mobility Training             [x]    Neuromuscular Re-Education 
[x]           Transfer Training                   []    Orthotic/Prosthetic Training 
[x]           Gait Training                          []    Modalities [x]           Therapeutic Exercises          []    Edema Management/Control 
[x]           Therapeutic Activities            [x]    Patient and Family Training/Education 
[]           Other (comment): Frequency/Duration: Patient will be followed by physical therapy 1-2 times per day/4-7 days per week to address goals. Discharge Recommendations: LTC Further Equipment Recommendations for Discharge: N/A  
 
G-CODES Mobility [id number] Current  CL= 60-79%   Goal  CJ= 20-39%. The severity rating is based on the Level of Assistance required for Functional Mobility and ADLs. 
 
 
 
G-CODES Eval Complexity: History: MEDIUM  Complexity : 1-2 comorbidities / personal factors will impact the outcome/ POC Exam:LOW Complexity : 1-2 Standardized tests and measures addressing body structure, function, activity limitation and / or participation in recreation  Presentation: LOW Complexity : Stable, uncomplicated  Clinical Decision Making:Low Complexity   Overall Complexity:LOW SUBJECTIVE:  
Patient stated I need to keep my blanket on. OBJECTIVE DATA SUMMARY:  
 
Past Medical History:  
Diagnosis Date  Breast cancer ([facility] [address] 1/17/14  
 right breast  
 Cardiac echocardiogram 02/17/2017 EF 50%. No WMA. Mod conc LVH. Indeterminate diastolic fx. Mod LAE. Mild MR. Mild AI. Mild PI. No signifciant valvular heart disease.  Cardiovascular lower extremity arterial testing 12/19/2014 Mod tibio-peroneal arterial disease bilaterally. R YANCY 1.24.  L YANCY 0.74. Bilateral sm vessel disease.  Carotid duplex 11/19/2014 Severe 70% or greater bilateral ICA stenosis. LICA dissection suggested.  Chronic renal insufficiency  CVA (cerebral vascular accident) ([facility] [address] 2003  
 with slight Right sided weakness  Edema  Glaucoma  Gout flare  Hyperlipidemia  Hypertension  Lump of right breast   
 URI (upper respiratory infection) Past Surgical History:  
Procedure Laterality Date  HX APPENDECTOMY  HX CYST INCISION AND DRAINAGE    
 PT DOES NOT REMEMBER EXACT DATES, TUCKER BREAST DONE MORE THAN 20 YEARS AGO. BENIGN FINDINGS PER PATIENT.  HX GYN    
 JUSTIN/BSO  HX HYSTERECTOMY Barriers to Learning/Limitations: yes;  cognitive and altered mental status (i.e.Sedation, Confusion) Compensate with: Visual Cues, Verbal Cues and Tactile Cues Prior Level of Function/Home Situation: Patient is resident at [facility] and reports that she was not getting OOB every day. Patient occasionally gets into [place] [address] with nursing and PT assist.  
Home Situation Home Environment: Skilled nursing facility Care Facility Name: [facility] One/Two Story Residence: One story Living Alone: No 
Support Systems: /, Skilled nursing facility Patient Expects to be Discharged to[de-identified] Skilled nursing facility Current DME Used/Available at Home: [address] chair, Hospital bed, Raised toilet seat, Walker, Wheelchair Tub or Shower Type: (Pt receives a sponge bath at the nursing home. )Critical Behavior: 
Neurologic State: Alert Orientation Level: Oriented to person;Oriented to place Cognition: Follows commands Safety/Judgement: Fall prevention Strength:   
Strength: Generally decreased, functional(BLE) Tone & Sensation:  
Tone: Normal(BLE) Sensation: Intact(BLE) Range Of Motion: 
AROM: Within functional limits(BLE) Functional Mobility: 
Bed Mobility: 
 Supine to Sit: Maximum assistance Sit to Supine: Maximum assistance Transfers: 
Sit to Stand: Moderate assistance Stand to Sit: Minimum assistance Balance:  
Sitting: Intact; With support Standing: ImpairedTherapeutic Exercises:  
 
 
EXERCISE Sets Reps Active Active Assist  
Passive Self ROM Comments Ankle Pumps 1 5  [x] [] [] [] bilaterally Quad Sets/Glut Sets   [] [] [] [] Hamstring Sets   [] [] [] [] Short Arc Quads   [] [] [] [] Heel Slides   [] [] [] [] Straight Leg Raises   [] [] [] [] Hip Abd/Add   [] [] [] [] Long Arc Quads 1 5 [x] [] [] [] bilaterally Seated Marching   [] [] [] []   
Standing Marching   [] [] [] []   
   [] [] [] []   
 
 
Pain: 
Pt reports 0/10 pain or discomfort prior to treatment.   
Pt reports 0/10 pain or discomfort post treatment. Activity Tolerance:  
Patient tolerated activity fair and was left resting with call bell by her side. Please refer to the flowsheet for vital signs taken during this treatment. After treatment:  
[]         Patient left in no apparent distress sitting up in chair 
[x]         Patient left in no apparent distress in bed 
[x]         Call bell left within reach 
[]         Nursing notified 
[]         Caregiver present 
[]         Bed alarm activated 
[]         SCDs in place to B LE 
  
COMMUNICATION/EDUCATION:  
[x]         Fall prevention education was provided and the patient/caregiver indicated understanding. [x]         Patient/family have participated as able in goal setting and plan of care. [x]         Patient/family agree to work toward stated goals and plan of care. []         Patient understands intent and goals of therapy, but is neutral about his/her participation. []         Patient is unable to participate in goal setting and plan of care. Thank you for this referral. 
Thom Duff, PT Time Calculation: 23 mins

## 2018-11-23 NOTE — ED NOTES
8:12 PM rec'd pt in s/o, no iv access after numerous attempts by 3 diff rn's. ent en route, poss airway compromise, to place urgent cvl Procedure Note - Central Line Placement:  
 
Performed by myself Immediately prior to the procedure, the patient was reevaluated and found suitable for the planned procedure and any planned medications. Immediately prior to the procedure a time out was called to verify the correct patient, procedure, equipment, staff, and marking as appropriate. Area was cleansed with Chlorprep and anesthetized with 3mLs of 1% lidocaine. Prepped and draped in sterile fashion. Landmarks identified. 18 gauge needle with triple lumen catheter was inserted into pt's Femoral Vein without ultrasound guidance. Line sutured in place; sterile dressing applied. Position: Trendelenburg Number of attempts: 2, 1st attempt hit rt fem artery. Pressure applied x 5 min. Min swelling, not enlarging, no bleeding. Estimated blood loss: 3 The procedure took 16-30 minutes, and pt tolerated well. 
 
9:21 PM d/w dr Pallavi De Anda, to admit

## 2018-11-23 NOTE — DISCHARGE INSTRUCTIONS
INSTRUCTIONS:  -  Take Clindamycin 600mg three times a day for 9 days  - Take Decadron 4mg Twice a day for 2 days  - Take aspirin 81mg daily for stroke prevention  - If your urinary symptoms worsen, call your doctor IMMEDIATELY     Salivary Gland Infection: Care Instructions  Your Care Instructions  Patient {[id number]}  MyChart Activation    Thank you for requesting access to [facility]. Please follow the instructions below to securely access and download your online medical record. [facility] allows you to send messages to your doctor, view your test results, renew your prescriptions, schedule appointments, and more. How Do I Sign Up? 1. In your internet browser, go to [email or web address]  2. Click on the First Time User? Click Here link in the Sign In box. You will be redirect to the New Member Sign Up page. 3. Enter your [employer] [facility] Access Code exactly as it appears below. You will not need to use this code after youve completed the sign-up process. If you do not sign up before the expiration date, you must request a new code. [facility] [facility] Access Code: [email or web address]  Expires: 2019  5:11 PM (This is the date your [facility] [facility] access code will )    4. Enter the last four digits of your Social Security Number (xxxx) and Date of Birth (mm/dd/yyyy) as indicated and click Submit. You will be taken to the next sign-up page. 5. Create a [facility] [facility] ID. This will be your [facility] login ID and cannot be changed, so think of one that is secure and easy to remember. 6. Create a [facility] [facility] password. You can change your password at any time. 7. Enter your Password Reset Question and Answer. This can be used at a later time if you forget your password. 8. Enter your e-mail address. You will receive e-mail notification when new information is available in [address] 9. Click Sign Up. You can now view and download portions of your medical record.   10. Click the Download Summary menu link to download a portable copy of your medical information. Additional Information    If you have questions, please visit the Frequently Asked Questions section of the [facility] website at [email or web address] Remember, [employer] is NOT to be used for urgent needs. For medical emergencies, dial 911. As part of the discharge instructions, medications already given today were discussed with the patient. The next dose due of all ordered meds was highlighted as part of the medication discharge instructions. Discussed with the patient the importance of taking medications as directed, as well as the side effects and adverse reactions to medications ordered. DISCHARGE SUMMARY from Nurse    PATIENT INSTRUCTIONS:    After general anesthesia or intravenous sedation, for 24 hours or while taking prescription Narcotics:  · Limit your activities  · Do not drive and operate hazardous machinery  · Do not make important personal or business decisions  · Do  not drink alcoholic beverages  · If you have not urinated within 8 hours after discharge, please contact your surgeon on call. Report the following to your surgeon:  · Excessive pain, swelling, redness or odor of or around the surgical area  · Temperature over 100.5  · Nausea and vomiting lasting longer than 4 hours or if unable to take medications  · Any signs of decreased circulation or nerve impairment to extremity: change in color, persistent  numbness, tingling, coldness or increase pain  · Any questions    What to do at Home:  Recommended activity: Activity as tolerated,     If you experience any of the following symptoms shortness of breath, chest pain, increased swelling in right side of neck, temp 101 or above, please follow up with ED or Primary Md. *  Please give a list of your current medications to your Primary Care Provider.     *  Please update this list whenever your medications are discontinued, doses are      changed, or new medications (including over-the-counter products) are added. *  Please carry medication information at all times in case of emergency situations. These are general instructions for a healthy lifestyle:    No smoking/ No tobacco products/ Avoid exposure to second hand smoke  Surgeon General's Warning:  Quitting smoking now greatly reduces serious risk to your health. Obesity, smoking, and sedentary lifestyle greatly increases your risk for illness    A healthy diet, regular physical exercise & weight monitoring are important for maintaining a healthy lifestyle    You may be retaining fluid if you have a history of heart failure or if you experience any of the following symptoms:  Weight gain of 3 pounds or more overnight or 5 pounds in a week, increased swelling in our hands or feet or shortness of breath while lying flat in bed. Please call your doctor as soon as you notice any of these symptoms; do not wait until your next office visit. Recognize signs and symptoms of STROKE:    F-face looks uneven    A-arms unable to move or move unevenly    S-speech slurred or non-existent    T-time-call 911 as soon as signs and symptoms begin-DO NOT go       Back to bed or wait to see if you get better-TIME IS BRAIN. Warning Signs of HEART ATTACK     Call 911 if you have these symptoms:   Chest discomfort. Most heart attacks involve discomfort in the center of the chest that lasts more than a few minutes, or that goes away and comes back. It can feel like uncomfortable pressure, squeezing, fullness, or pain.  Discomfort in other areas of the upper body. Symptoms can include pain or discomfort in one or both arms, the back, neck, jaw, or stomach.  Shortness of breath with or without chest discomfort.  Other signs may include breaking out in a cold sweat, nausea, or lightheadedness. Don't wait more than five minutes to call 911 - MINUTES MATTER! Fast action can save your life.  Calling 911 is almost always the fastest way to get lifesaving treatment. Emergency Medical Services staff can begin treatment when they arrive -- up to an hour sooner than if someone gets to the hospital by car. The discharge information has been reviewed with the guardian. The guardian verbalized understanding. Discharge medications reviewed with the guardian and appropriate educational materials and side effects teaching were provided. ___________________________________________________________________________________________________________________________________  Salivary glands make saliva, or spit. An infection in these glands can make the glands swell and hurt. An infection can happen when bacteria gets into the gland. This is more common in people who have diabetes, poor tooth care, or stones in these glands. Bacteria can build up and cause an infection if you don't get enough fluids. It can also happen if the flow of saliva gets blocked by a small stone in the gland. A virus can also cause an infection. Your care depends on the cause. If the problem is caused by bacteria, your doctor may prescribe antibiotics. Home treatment may help. You can drink more fluids or suck on sugar-free lemon drops to increase the flow of saliva. Follow-up care is a key part of your treatment and safety. Be sure to make and go to all appointments, and call your doctor if you are having problems. It's also a good idea to know your test results and keep a list of the medicines you take. How can you care for yourself at home? · If your doctor prescribed antibiotics, take them as directed. Do not stop taking them just because you feel better. You need to take the full course of antibiotics. · Take an over-the-counter pain medicine if needed, such as acetaminophen (Tylenol), ibuprofen (Advil, Motrin), or naproxen (Aleve). Be safe with medicines. Read and follow all instructions on the label.   · Do not take two or more pain medicines at the same time unless the doctor told you to. Many pain medicines have acetaminophen, which is Tylenol. Too much acetaminophen (Tylenol) can be harmful. · Drink plenty of fluids, enough so that your urine is light yellow or clear like water. If you have kidney, heart, or liver disease and have to limit fluids, talk with your doctor before you increase the amount of fluids you drink. · Put an ice or heat pack (whichever feels better) on the swollen jaw for 10 to 20 minutes at a time. Put a thin cloth between the ice or heat pack and your skin. · Suck on ice chips or ice treats such as sugar-free flavored ice pops. Eat soft foods that do not have to be chewed much. · Use sugar-free gum or candies such as lemon drops. They increase saliva. · Avoid over-the-counter medicines that can give you a dry mouth. These medicines include antihistamines, such as diphenhydramine (Benadryl) or chlorpheniramine (Chlor-Trimeton). · Gently massage the infected gland. When should you call for help? Call your doctor now or seek immediate medical care if:    · You have symptoms of infection, such as:  ? Increased pain, swelling, warmth, or redness. ? Red streaks leading from the area. ? Pus draining from the area. ? A fever.     · You have new pain, or your pain is worse.    Watch closely for changes in your health, and be sure to contact your doctor if:    · You are not getting better as expected. Where can you learn more? Go to http://bc-lexi.info/. Enter F122 in the search box to learn more about \"Salivary Gland Infection: Care Instructions. \"  Current as of: March 28, 2018  Content Version: 11.8  © 2665-8062 [employer]. Care instructions adapted under license by [employer] (which disclaims liability or warranty for this information).  If you have questions about a medical condition or this instruction, always ask your healthcare professional. Mary Monzon disclaims any warranty or liability for your use of this information. Learning About Stroke Rehabilitation  What is stroke rehabilitation? Stroke rehabilitation (rehab) is training and therapy to help you relearn to do everyday things you have not been able to do since your stroke. The focus will depend on how the stroke has affected your ability to do the things you want and need to do. Rehab begins in the hospital. It starts as soon as you are able. You will have a team of doctors, nurses, and therapists to help you relearn daily activities. This can include eating, bathing, and dressing. You also may need help to learn how to walk or talk again. If the stroke damaged your memory, you will learn ways to improve it. You will get better faster if you begin rehab soon after the stroke. But even with rehab, you may not be able to do all the things you could before the stroke. You may recover the most in the first few weeks or months after your stroke. But you can keep getting better for years. It just may happen more slowly. And it may take a long time and a lot of hard work. Don't give up hope. After the hospital, you may go to a rehab facility or a nursing home for a while. Or you may go home. Wherever you go, keep working on your rehab and do a little every day. It's going to be important for you to get the support you need. Let your loved ones help you. Involve them in your treatment. Talk to others who have had a stroke, and find out how they handled ups and downs. How can stroke rehab specialists help? Your stroke rehab team will include doctors and nurses who specialize in stroke rehab, as well as other professionals. Each team member will help you in specific ways. The team may include the following professionals. Rehab doctor   A rehab doctor is a specialist in charge of your rehab program. He or she may also work on special problems, such as muscle cramps and spasms.   Rehab nurses   Rehab nurses can help you relearn basic activities of daily life. For example, they can help you learn how to: Take care of your health, including a schedule for medicine. Get from your bed to a wheelchair. Bathe. Control your bowels or bladder. Physical therapist   A stroke often takes away a person's ability to move in certain ways. A physical therapist helps you get back as much movement, balance, and coordination as possible. Physical therapy usually includes exercises. The exercises can help you get back your ability to walk and move as much as possible. It's important to practice these exercises over and over again. Your therapist may also help you learn to use a wheelchair or walker. And he or she may teach you how to use stairs safely. Occupational therapist   An occupational therapist helps you practice daily tasks like eating, bathing, dressing, and writing. For example, he or she may help you learn how to:  Prepare meals and clean your house. Drive your car. Use tools and devices that can help if you no longer have full use of both hands. For example, velcro can replace buttons on clothing. Get grab bars for your bathroom. Make your home safe if you have strength, balance, or vision problems. Speech therapist   A speech therapist can help you relearn how to talk or find new ways to express yourself. Swallowing is sometimes a problem after a stroke. This therapist can help you improve your ability to swallow. A speech therapist can also help you work on reading and writing skills. Psychologist or counselor   Emotions like fear, anxiety, anger, sadness, frustration, and grief are common after a stroke. A psychologist or counselor can help you deal with your emotions. They can also help you get treatment if you have depression. Vocational counselor   Stroke can leave you with disabilities that make it hard to do your job. A vocational counselor can help you return to your job or find a new one.  He or she can help you:  Identify your current skills and prepare a new resume. Search for a job. Understand the laws that protect disabled workers. Recreational therapist   A recreational therapist helps you return to doing things you enjoy. This may include the arts, hobbies, sports, or leisure activities. Follow-up care is a key part of your treatment and safety. Be sure to make and go to all appointments, and call your doctor if you are having problems. It's also a good idea to know your test results and keep a list of the medicines you take. Where can you learn more? Go to http://bc-lexi.info/. Enter W365 in the search box to learn more about \"Learning About Stroke Rehabilitation. \"  Current as of: November 21, 2017  Content Version: 11.8  © 0341-9668 [employer]. Care instructions adapted under license by [employer] (which disclaims liability or warranty for this information). If you have questions about a medical condition or this instruction, always ask your healthcare professional. [place] [address] any warranty or liability for your use of this information. Stroke: Care Instructions  Your Care Instructions    You have had a stroke. This means that the blood flow to a part of your brain was blocked for some time, which damages the nerve cells in that part of the brain. The part of your body controlled by that part of your brain may not function properly now. The brain is an amazing organ that can heal itself to some degree. The stroke you had damaged part of your brain. But other parts of your brain may take over in some way for the damaged areas. You have already started this process. Your doctor will talk with you about what you can do to prevent another stroke. High blood pressure, high cholesterol, and diabetes are all risk factors for stroke. If you have any of these conditions, work with your doctor to make sure they are under control.  Other risk factors for stroke include being overweight, smoking, and not getting regular exercise. Going home may be hard for you and your family. The more you can try to do for yourself, the better. Remember to take each day one at a time. Follow-up care is a key part of your treatment and safety. Be sure to make and go to all appointments, and call your doctor if you are having problems. It's also a good idea to know your test results and keep a list of the medicines you take. How can you care for yourself at home?    · Enter a stroke rehabilitation (rehab) program, if your doctor recommends it. Physical, speech, and occupational therapies can help you manage bathing, dressing, eating, and other basics of daily living.     · Do not drive until your doctor says it is okay.     · It is normal to feel sad or depressed after a stroke. If these feelings last, talk to your doctor.     · If you are having problems with urine leakage, go to the bathroom at regular times, including when you first wake up and at bedtime. Also, limit fluids after dinner.     · If you are constipated, drink plenty of fluids, enough so that your urine is light yellow or clear like water. If you have kidney, heart, or liver disease and have to limit fluids, talk with your doctor before you increase the amount of fluids you drink. Set up a regular time for using the toilet. If you continue to have constipation, your doctor may suggest using a bulking agent, such as Metamucil, or a stool softener, laxative, or enema. Medicines    · Take your medicines exactly as prescribed. Call your doctor if you think you are having a problem with your medicine. You may be taking several medicines. ACE (angiotensin-converting enzyme) inhibitors, angiotensin II receptor blockers (ARBs), beta-blockers, diuretics (water pills), and calcium channel blockers control your blood pressure. Statins help lower cholesterol.  Your doctor may also prescribe medicines for depression, pain, sleep problems, anxiety, or agitation.     · If your doctor has given you a blood thinner to prevent another stroke, be sure you get instructions about how to take your medicine safely. Blood thinners can cause serious bleeding problems.     · Do not take any over-the-counter medicines or herbal products without talking to your doctor first.     · If you take birth control pills or hormone therapy, talk to your doctor about whether they are right for you.    For family members and caregivers    · Make the home safe. Set up a room so that your loved one does not have to climb stairs. Be sure the bathroom is on the same floor. Move throw rugs and furniture that could cause falls. Make sure that the lighting is good. Put grab bars and seats in tubs and showers.     · Find out what your loved one can do and what he or she needs help with. Try not to do things for your loved one that your loved one can do on his or her own. Help him or her learn and practice new skills.     · Visit and talk with your loved one often. Try doing activities together that you both enjoy, such as playing cards or board games. Keep in touch with your loved one's friends as much as you can. Encourage them to visit.     · Take care of yourself. Do not try to do everything yourself. Ask other family members to help. Eat well, get enough rest, and take time to do things that you enjoy. Keep up with your own doctor visits, and make sure to take your medicines regularly. Get out of the house as much as you can. Join a local support group. Find out if you qualify for home health care visits to help with rehab or for adult day care. When should you call for help? Call 911 anytime you think you may need emergency care. For example, call if:    · You have signs of another stroke.  These may include:  ? Sudden numbness, tingling, weakness, or loss of movement in your face, arm, or leg, especially on only one side of your body.  ? Sudden vision changes. ? Sudden trouble speaking. ? Sudden confusion or trouble understanding simple statements. ? Sudden problems with walking or balance. ? A sudden, severe headache that is different from past headaches. Call 911 even if these symptoms go away in a few minutes.    Call your doctor now or seek immediate medical care if:    · You have new symptoms that may be related to your stroke, such as falls or trouble swallowing.    Watch closely for changes in your health, and be sure to contact your doctor if you have any problems. Where can you learn more? Go to http://bc-lexi.info/. Enter P473 in the search box to learn more about \"Stroke: Care Instructions. \"  Current as of: November 21, 2017  Content Version: 11.8  © 6920-2930 [employer]. Care instructions adapted under license by [employer] (which disclaims liability or warranty for this information). If you have questions about a medical condition or this instruction, always ask your healthcare professional. [place] [address] any warranty or liability for your use of this information.

## 2018-11-23 NOTE — PROGRESS NOTES
Problem: Self Care Deficits Care Plan (Adult) Goal: *Acute Goals and Plan of Care (Insert Text) Occupational Therapy Goals Initiated 11/23/2018 within 7 day(s). 1.  Patient will perform upper body dressing with supervision/set-up. 2.  Patient will perform lower body dressing with moderate assistance . 3. Patient will perform toilet transfers with minimal assistance/contact guard assist. 
4.  Patient will participate in upper extremity therapeutic exercise/activities with supervision/set-up for 8 minutes to increase strength/endurance for ADLs. Outcome: Progressing Towards Goal 
Occupational Therapy EVALUATION Patient: Sharon Young (80 y.o. female) Date: 11/23/2018 Primary Diagnosis: Phlegmon Sialoadenitis UTI (urinary tract infection) Precautions:   Fall ASSESSMENT : 
Based on the objective data described below, the patient presents with decreased ADLs, decreased functional mobility and muscle weakness, complicated by pain in her RLE with movement. Patient pleasant but mildly confused. Encouragement needed to sit on EOB and she is unable to reach her feet for LB self care. Patient given mod to max assist for supine to sit and sit to stand. Viola care given as she had soiled the bed. She was returned to supine in bed with assist from nursing for positioning. Recommend return to nursing home with skilled care when medically appropriate. Patient will benefit from skilled intervention to address the above impairments. Patients rehabilitation potential is considered to be Good Factors which may influence rehabilitation potential include:  
[]             None noted []             Mental ability/status [x]             Medical condition []             Home/family situation and support systems []             Safety awareness []             Pain tolerance/management 
[]             Other: PLAN : 
Recommendations and Planned Interventions: [x]               Self Care Training                  [x]        Therapeutic Activities [x]               Functional Mobility Training    []        Cognitive Retraining 
[x]               Therapeutic Exercises           [x]        Endurance Activities [x]               Balance Training                   []        Neuromuscular Re-Education []               Visual/Perceptual Training     [x]   Home Safety Training 
[x]               Patient Education                 [x]        Family Training/Education []               Other (comment): Frequency/Duration: Patient will be followed by occupational therapy 1-2 times per day/4-7 days per week to address goals. Discharge Recommendations: Hemant Winston Further Equipment Recommendations for Discharge: N/A Barriers to Learning/Limitations: yes;  altered mental status (i.e. Confusion) Compensate with: visual, verbal, tactile, kinesthetic cues/model PATIENT COMPLEXITY Eval Complexity: History: MEDIUM Complexity : Expanded review of history including physical, cognitive and psychosocial  history ; Examination: MEDIUM Complexity : 3-5 performance deficits relating to physical, cognitive , or psychosocial skils that result in activity limitations and / or participation restrictions; Decision Making:MEDIUM Complexity : Patient may present with comorbidities that affect occupational performnce. Miniml to moderate modification of tasks or assistance (eg, physical or verbal ) with assesment(s) is necessary to enable patient to complete evaluation  Assessment: Moderate Complexity G-CODES:  
 
Self Care  Current  CL= 60-79%  Goal  CK= 40-59%. The severity rating is based on the Level of Assistance required for Functional Mobility and ADLs. SUBJECTIVE:  
Patient stated I'm not sure what I can do.  OBJECTIVE DATA SUMMARY:  
 
Past Medical History:  
Diagnosis Date  [facility] 1/17/14  
 right breast  
  Cardiac echocardiogram 02/17/2017 EF 50%. No WMA. Mod conc LVH. Indeterminate diastolic fx. Mod LAE. Mild MR. Mild AI. Mild PI. No signifciant valvular heart disease.  Cardiovascular lower extremity arterial testing 12/19/2014 Mod tibio-peroneal arterial disease bilaterally. R YANCY 1.24.  L YANCY 0.74. Bilateral sm vessel disease.  Carotid duplex 11/19/2014 Severe 70% or greater bilateral ICA stenosis. LICA dissection suggested.  Chronic renal insufficiency  CVA (cerebral vascular accident) ([facility] 2003  
 with slight Right sided weakness  Edema  Glaucoma  Gout flare  Hyperlipidemia  Hypertension  Lump of right breast   
 URI (upper respiratory infection) Past Surgical History:  
Procedure Laterality Date  HX APPENDECTOMY  HX CYST INCISION AND DRAINAGE    
 PT DOES NOT REMEMBER EXACT DATES, TUCKER BREAST DONE MORE THAN 20 YEARS AGO. BENIGN FINDINGS PER PATIENT.  HX GYN    
 JUSTIN/BSO  HX HYSTERECTOMY Prior Level of Function/Home Situation: Pt unable to state her PLOF but says she received assist from the nursing facility. Home Situation Home Environment: Skilled nursing facility Care Facility Name: [facility] One/Two Story Residence: One story Living Alone: No 
Support Systems: /, Skilled nursing facility Patient Expects to be Discharged to[de-identified] Skilled nursing facility Current DME Used/Available at Home: [address] chair, Hospital bed, Raised toilet seat, Walker, Wheelchair Tub or Shower Type: (Pt receives a sponge bath at the nursing home.) [x]  Right hand dominant   []  Left hand dominantCognitive/Behavioral Status: 
Neurologic State: Alert Orientation Level: Oriented to person;Oriented to place Cognition: Follows commands Safety/Judgement: Fall prevention Skin: Intact on UEs Edema: None noted in UEs Vision/Perceptual:   
Acuity: Able to read clock/calendar on wall without difficulty Corrective Lenses: Glasses Coordination: 
Fine Motor Skills-Upper: Left Intact; Right Intact Gross Motor Skills-Upper: Left Intact; Right Intact Balance: 
Sitting: Intact Standing: Impaired Strength: 
Strength: Generally decreased, functional(UEs; 4/5 throughout) Tone & Sensation: 
Tone: Normal(UEs) Sensation: Intact(UEs) Range of Motion: 
AROM: Within functional limits(UEs; except for decreased active R shoulder flexion) Functional Mobility and Transfers for ADLs: 
Bed Mobility: 
Supine to Sit: Moderate assistance Sit to Supine: Maximum assistance Transfers: 
Sit to Stand: Moderate assistance Toilet Transfer : Moderate assistance ADL Assessment: 
Feeding: Setup Oral Facial Hygiene/Grooming: Minimum assistance Bathing: Moderate assistance Upper Body Dressing: Minimum assistance Lower Body Dressing: Maximum assistance Toileting: Maximum assistance ADL Intervention: 
Patient with bowel/bladder accident in bed. She was able to stand with mod assist from EOB for hygiene. Total assist given for hygiene and patient able to continue standing while bedding changed. Cognitive Retraining Safety/Judgement: Fall prevention Pain: 
Pt reports 0/10 pain or discomfort prior to treatment.   
Pt reports 0/10 pain or discomfort post treatment. Activity Tolerance:  
Good Please refer to the flowsheet for vital signs taken during this treatment. After treatment:  
[] Patient left in no apparent distress sitting up in chair 
[x] Patient left in no apparent distress in bed 
[x] Call bell left within reach 
[] Nursing notified 
[x] Caregiver (granddaughter) present 
[] Bed alarm activated COMMUNICATION/EDUCATION:  
[x] Home safety education was provided and the patient/caregiver indicated understanding. [x] Patient/family have participated as able in goal setting and plan of care. [x] Patient/family agree to work toward stated goals and plan of care. [] Patient understands intent and goals of therapy, but is neutral about his/her participation. [] Patient is unable to participate in goal setting and plan of care. Thank you for this referral. 
Elliot Panchal MS OTR/L Time Calculation: 30 mins

## 2018-11-23 NOTE — PROGRESS NOTES
SLP Note: SLP eval/tx order received and attempted. Pt found in bed asleep stating \"go ahead on. I don't want any breakfast\". Educated on current NPO status until swallow evaluation completed with pt continuing to refuse. Will attempt later this date. Thank you for this referral, Ubaldo Kendall M.S., CCC-SLP Speech-Language Pathologist

## 2018-11-23 NOTE — PROGRESS NOTES
MEWS score 3 due to b/p 204/75. Code S was called on the patient with MD at bedside. MD noticed patient had right arm weakness during his assessment upon patients arrival from [facility] ER.  Patient was taken down to CT and upon return to room was seen by teleneurologist.

## 2018-11-23 NOTE — PROGRESS NOTES
Code Stroke Note [facility], [place] Patient: Stephan Acosta 80 y.o. female 
[id number] 7/17/1928 Admit Date: 11/22/2018 Admission Diagnosis: Phlegmon Sialoadenitis UTI (urinary tract infection) Code Stroke called for patient at approximately 1. Patient presented to [facility] ED after nursing home had concerns for sore throat, neck mass, facial droop, and speech difficulty. Patient was evaluated at [facility] for sialadenitis and was transferred to [address] [place] [address]. On admission patient presented with right sided weakness in upper extremity. Medications Reviewed OBJECTIVE Patient Vitals for the past 12 hrs: 
 Temp Pulse Resp BP SpO2  
11/22/18 2300  76 15 (!) 206/70 98 % 11/22/18 2252 98.4 °F (36.9 °C) 76 16 (!) 202/63 99 % 11/22/18 2200  76 17 198/67 99 % 11/22/18 2100  72 14 175/61 98 % 11/22/18 1900     98 % 11/22/18 1721 98.8 °F (37.1 °C) 76 16 193/88 99 % PHYSICAL: 
General:  Awake but drowsy, oriented to name, place, but states its 1978 HEENT: pupils, equal, round, reactive to light, patient not able to follow exam for EOM 
CV:  RRR, no murmurs, rubs, or gallops. No carotid bruits. RESP:  Unlabored breathing. CTAB. ABD:  Soft, nontender, nondistended. Normoactive bowel sounds. No hepatosplenomegaly. No suprapubic tenderness. Neuro: weakness in upper extremities R>L, sensation intact throughout, CN excluding extraocular movements intact, finger to nose intact ASSESSMENT, PLAN & DISPOSITION Stephan Acosta is a 80y.o. year old female admitted for Phlegmon Sialoadenitis UTI (urinary tract infection). Code stroke called for right sided weakness. Patient presented with weakness in right more than left upper extremity. She was taken for CT scan of the head. Study positive for left sided remote basal ganglia lacunar infarct. Patient condition currently: stable. Patient had received aspirin prior to admission.  Due to unclear timing of symptoms patient not candidate for TPA. Patient not candidate for CTA or endovascular intervention due to end stage renal disease. Medications Administered: none Labs ordered/Pending: Head CT, Blood Glucose [address] teleneurology consultation completed. Disposition: [facility] Attending Dr. Annelise Hogue notified of code stroke. In agreement with plan. Primary team resuming care. Senior resident Dr. Muñoz Likes present during RRT and evaluation. Agustina Jo MD  
[facility] Medicine PGY-1 
2:15 AM 11/23/18

## 2018-11-23 NOTE — CONSULTS
Consult Patient: Orlin Carmen MRN: [id number]  SSN: [id number] YOB: 1928  Age: 80 y.o. Sex: female Subjective: Orlin Carmen is a 80 y.o. female who is being seen for possible stroke. History is from her--confused--her god daughter, Flako Cisneros, and her grand daughter, Pina Ingram ([phone].)  (A different person is noted to be her POA in the resident's note, but I have not spoken to her.) She is a NH resident. She came to the ED c/o facial pain and at some point a concern about right sided weakness appeared. She had a stroke at least 20 years ago and was in rehab, but no one knows how she was affected, but she apparently recovered. She is known to be \"confused. \" Neither of the two informants can say whether her strength is different from her usual.  A cranial CT showed a lacunar infarct in the \"basal ganglia. \" A stroke w/u was recommended by tele-neurology, including MRI and echo, but these orders have not been written. She was given ASA at the NH, but it's unclear if she was taking ASA before being admitted. There is no order for ASA or any imaging currently. An ENT consultant diagnosed sialoadenitis. She has a UTI. Antibiotics have been started for both of these conditions. Past Medical History:  
Diagnosis Date  Breast cancer ([facility] 1/17/14  
 right breast  
 Cardiac echocardiogram 02/17/2017 EF 50%. No WMA. Mod conc LVH. Indeterminate diastolic fx. Mod LAE. Mild MR. Mild AI. Mild PI. No signifciant valvular heart disease.  Cardiovascular lower extremity arterial testing 12/19/2014 Mod tibio-peroneal arterial disease bilaterally. R YANCY 1.24.  L YANCY 0.74. Bilateral sm vessel disease.  Carotid duplex 11/19/2014 Severe 70% or greater bilateral ICA stenosis. LICA dissection suggested.  Chronic renal insufficiency  CVA (cerebral vascular accident) ([facility] 2003  
 with slight Right sided weakness  Edema  Glaucoma  Gout flare  Hyperlipidemia  Hypertension  Lump of right breast   
 URI (upper respiratory infection) Past Surgical History:  
Procedure Laterality Date  HX APPENDECTOMY  HX CYST INCISION AND DRAINAGE    
 PT DOES NOT REMEMBER EXACT DATES, TUCKER BREAST DONE MORE THAN 20 YEARS AGO. BENIGN FINDINGS PER PATIENT.  HX GYN    
 JUSTIN/BSO  HX HYSTERECTOMY Family History Problem Relation Age of Onset  Hypertension Mother  Hypertension Brother  Diabetes Brother Social History Tobacco Use  Smoking status: Never Smoker  Smokeless tobacco: Never Used Substance Use Topics  Alcohol use: No  
  
Current Facility-Administered Medications Medication Dose Route Frequency Provider Last Rate Last Dose  sodium chloride (NS) flush 5-10 mL  5-10 mL IntraVENous Q8H Holly GUERRA MD   10 mL at 11/23/18 0522  sodium chloride (NS) flush 5-10 mL  5-10 mL IntraVENous PRN Fide Rojas MD      
 dextrose 5 % - 0.45% NaCl infusion  75 mL/hr IntraVENous CONTINUOUS Dieudonne Matias MD 75 mL/hr at 11/23/18 1014 75 mL/hr at 11/23/18 1014  labetalol (NORMODYNE;TRANDATE) 20 mg/4 mL (5 mg/mL) injection 10 mg  10 mg IntraVENous Q15MIN PRN Fide Rojas MD      
 cefTRIAXone (ROCEPHIN) 1 g in sterile water (preservative free) 10 mL IV syringe  1 g IntraVENous Q24H Holly GUERRA MD   1 g at 11/23/18 7696  clindamycin phosphate (CLEOCIN) 600 mg in 0.9% sodium chloride 100 mL IVPB  600 mg IntraVENous Q8H Joellen Parada  mL/hr at 11/23/18 0956 600 mg at 11/23/18 8732 No Known Allergies Review of Systems: 
Review of systems not obtained due to patient factors. She is not oriented. Objective:  
 
Vitals:  
 11/23/18 0513 11/23/18 0534 11/23/18 0613 11/23/18 1000 BP: (!) 210/78 185/69 179/71 183/67 Pulse:   74 65 Resp:   16 16 Temp:   98.5 °F (36.9 °C) 98.4 °F (36.9 °C) SpO2:   100% 99% Weight: Cranial CT shows an old-appearing area of decreased attenuation adjacent to the left caudate head; posterior limb of IC appears normal.  Atrophy. She has old MRIs but the images do not load. Physical Exam:  This is a cachectic elderly AA woman who is alert and pleasant. Her god daughter was in the room. Her right foot is extremely tender. MSE: No paraphasias or dysarthria. Oriented to the month but not the year. She knows she is in [place] but she can't say how long. She identifies her god daughter. CNs: No facial weakness. The right palpebral fissure is wider than the left. VF TFC OS, but OD she has a nasal field deficit. EOMs are full, tongue midline. Motor: Better movement of the LUE than the right. She could not fully extend her right arm at the elbow, but her  was = B. She can lift each foot and leg off the bed briefly. Sensation was grossly normal. 
 
Plantar responses, especially on the right were difficult to assess b/o pain. Assessment:  
 
Hospital Problems  Date Reviewed: 11/23/2018 Codes Class Noted POA * (Principal) Ischemic stroke ([facility] ICD-10-CM: I63.9 ICD-9-CM: 434.91  11/23/2018 Unknown Hyperkalemia ICD-10-CM: E87.5 ICD-9-CM: 276.7  11/23/2018 Unknown Sialoadenitis ICD-10-CM: K11.20 ICD-9-CM: 527.2  11/22/2018 Unknown Phlegmon ICD-10-CM: L02.91 
ICD-9-CM: 682.9  11/22/2018 Unknown UTI (urinary tract infection) ICD-10-CM: N39.0 ICD-9-CM: 599.0  11/22/2018 Unknown Stage 4 chronic kidney disease ([facility] ICD-10-CM: N18.4 ICD-9-CM: 585.4  5/12/2017 Yes  
   
  
 
UTI, submandibular gland abnormal 
 
Stroke/TIA: The history and the exam are not very helpful. The abnormal area on CT is probably not recent. Plan: I spoke to [facility] about the MRI when I was under the impression that she was the POA.   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 Rima Adlers would have difficulty staying still enough and that the images might be non-diagnostic. We decided that we would let the order stand. My recommendation is to do the cranial MRI w/o contrast and then make a decision about other testing after getting the results. If no acute stroke is identified, then no further stroke w/u seems necessary. I'll follow. Tej Walker MD  
 
Signed By: Modesto Hines MD   
 November 23, 2018

## 2018-11-23 NOTE — ROUTINE PROCESS
Primary Nurse Laurie Kaur RN and Jael Davies RN performed a dual skin assessment on this patient Impairment noted- see wound doc flow sheet King score is 13

## 2018-11-23 NOTE — PROGRESS NOTES
Problem: Dysphagia (Adult) Goal: *Acute Goals and Plan of Care (Insert Text) Patient will: 1. Tolerate PO trials with 0 s/s overt distress in 4/5 trials 2. Utilize compensatory swallow strategies/maneuvers (decrease bite/sip, size/rate, alt. liq/sol) with min cues in 4/5 trials Recommend:  
Full liquids Meds as tolerated Aspiration precautions HOB >45 degrees during all intake and for at least 30 min after po Small bites/sips, slow rate of intake, alternating bites/sips Speech LAnguage Pathology bedside swallow evaluation Patient: Ruben Williamson (80 y.o. female) Date: 11/23/2018 Primary Diagnosis: Phlegmon Sialoadenitis UTI (urinary tract infection) Precautions: Aspiration ASSESSMENT : 
Based on the objective data described below, the patient presents with mild oral and suspected mild pharyngeal dysphagia. Pt alert, following commands and reporting painful swallowing due to infected salivary glands. Oral mech exam revealed upper dentures; strength/coordination of lingual/labial structures within functional limits. Pt tolerating consecutive swallows of thin liquid + straw with timely swallow initiation, adequate laryngeal elevation to palpation and no overt s/sx aspiration. Pt demo increased mastication time with regular and mech soft solid presentations and increased pain compared to liquids. Recommend initiate full liquid diet with strict use of the above mentioned compensatory strategies/aspiration precautions. Discussed with pt, RN and pt's goddaughter who was present at b/s. Will follow for further dysphagia management. Patient will benefit from skilled intervention to address the above impairments. Patients rehabilitation potential is considered to be Good Factors which may influence rehabilitation potential include:  
[]            None noted [x]            Mental ability/status [x]            Medical condition []            Home/family situation and support systems 
[]            Safety awareness 
[]            Pain tolerance/management []            Other: PLAN : 
Recommendations and Planned Interventions: As above Frequency/Duration: Patient will be followed by speech-language pathology 1-2 times per day/4-7 days per week to address goals. Discharge Recommendations: To Be Determined SUBJECTIVE:  
Patient stated You all are so sweet. OBJECTIVE:  
 
Past Medical History:  
Diagnosis Date  Breast cancer ([facility] 1/17/14  
 right breast  
 Cardiac echocardiogram 02/17/2017 EF 50%. No WMA. Mod conc LVH. Indeterminate diastolic fx. Mod LAE. Mild MR. Mild AI. Mild PI. No signifciant valvular heart disease.  Cardiovascular lower extremity arterial testing 12/19/2014 Mod tibio-peroneal arterial disease bilaterally. R YANCY 1.24.  L YANCY 0.74. Bilateral sm vessel disease.  Carotid duplex 11/19/2014 Severe 70% or greater bilateral ICA stenosis. LICA dissection suggested.  Chronic renal insufficiency  CVA (cerebral vascular accident) ([facility] 2003  
 with slight Right sided weakness  Edema  Glaucoma  Gout flare  Hyperlipidemia  Hypertension  Lump of right breast   
 URI (upper respiratory infection) Past Surgical History:  
Procedure Laterality Date  HX APPENDECTOMY  HX CYST INCISION AND DRAINAGE    
 PT DOES NOT REMEMBER EXACT DATES, TUCKER BREAST DONE MORE THAN 20 YEARS AGO. BENIGN FINDINGS PER PATIENT.  HX GYN    
 JUSTIN/BSO  HX HYSTERECTOMY Prior Level of Function/Home Situation: 
Home Situation Home Environment: Skilled nursing facility Care Facility Name: [facility] One/Two Story Residence: One story Living Alone: No 
Support Systems: /, Skilled nursing facility Patient Expects to be Discharged to[de-identified] Skilled nursing facility Current DME Used/Available at Home: [address] chair, Hospital bed, Raised toilet seat, Frantz Boor, Wheelchair Diet prior to admission: Unknown; pt unable to recall and goddaughter unsure Current Diet:  NPO; recommend advance to full liquids  Cognitive and Communication Status: 
Neurologic State: Alert Orientation Level: Oriented to person, Oriented to place Cognition: Follows commands Oral Assessment: 
Oral Assessment Labial: No impairment Dentition: Upper dentures Oral Hygiene: Good Lingual: No impairment Velum: No impairment Mandible: No impairment P.O. Trials: 
Patient Position: 45 at [facility] Vocal quality prior to P.O.: Low volume Consistency Presented: Thin liquid; Solid;Puree How Presented: SLP-fed/presented;Self-fed/presented;Straw;Successive swallows;Cup/sip Bolus Acceptance: No impairment Bolus Formation/Control: Impaired Type of Impairment: Mastication;Delayed Propulsion: No impairment Oral Residue: None Initiation of Swallow: No impairment Laryngeal Elevation: Functional 
Aspiration Signs/Symptoms: None Pharyngeal Phase Characteristics: Feeling of discomfort;Painful swallow Effective Modifications: None Cues for Modifications: Minimal 
Oral Phase Severity: Mild Pharyngeal Phase Severity : Mild GCODESwallowing:  Swallow Current Status CI= 1-19%  Swallow Goal Status CI= 1-19% The severity rating is based on the following outcomes:   
Clinical Judgment Pain: 
Pt c/o 0/10 pain prior to evaluation. Pt c/o 0/10 pain post evaluation. 
(pain only noted during swallowing) After treatment:  
[]            Patient left in no apparent distress sitting up in chair 
[x]            Patient left in no apparent distress in bed 
[x]            Call bell left within reach [x]            Nursing notified 
[x]            Caregiver present 
[]            Bed alarm activated COMMUNICATION/EDUCATION:  
[x]            SLP educated pt with regard to compensatory swallow strategies and 
     aspiration/reflux precautions including: small bites/sips, 
 alternate liquids/solids, decrease feeding rate, HOB > 45 with all po, and upright in bed at 30 degrees after po for at least 45 minutes. []            Patient/family have participated as able in goal setting and plan of care. [x]            Patient/family agree to work toward stated goals and plan of care. []            Patient understands intent and goals of therapy; neutral about participation. []            Patient is unable to participate in goal setting and plan of care. Thank you for this referral. 
Allison Guerra M.S., CCC-SLP Speech-Language Pathologist

## 2018-11-23 NOTE — ED NOTES
TRANSFER - OUT REPORT: 
 
Verbal report given to [facility]) on Phillip Almaraz  being transferred to [facility](unit) for routine progression of care Report consisted of patients Situation, Background, Assessment and  
Recommendations(SBAR). Information from the following report(s) SBAR was reviewed with the receiving nurse. Lines:  
Triple Lumen 11/22/18 Right;Mid Femoral (Active) Central Line Being Utilized Yes 11/22/2018  8:20 PM  
Site Assessment Clean, dry, & intact 11/22/2018  8:20 PM  
Infiltration Assessment 0 11/22/2018  8:20 PM  
Dressing Status Clean, dry, & intact 11/22/2018  8:20 PM  
Dressing Type Transparent 11/22/2018  8:20 PM  
  
 
Opportunity for questions and clarification was provided. Patient transported with: 
 Monitor

## 2018-11-27 NOTE — PROGRESS NOTES
[facility] Team Documentation, Patient in [facility] Initial   
 
Date/Time:  2018 3:53 PM 
 
Patient was admitted to [facility] on 18 and discharged on 18 for ischemic stroke. The physician discharge summary was available at the time of outreach. SNF was contacted within 4 business days of discharge. Patient was discharged back to [facility], on 18. Inpatient RRAT score: 39 Was this a readmission? no 
Nurse Navigator (NN) contacted Dharmesh Kaminski LPN by telephone to perform post hospital discharge assessment. Verified name and  with Dharmesh Kaminski as identifiers. Provided introduction to self, and explanation of the Nurse Navigator role. SNF Attending Provider:  Dr. Keith Hciks Anticipated discharge date from SNF:  NA 
SNF discharge plan:  NA 
Patient resides in SNF under LTC per Dharmesh Kaminski. Advance Care Planning:  
Does patient have an Advance Directive:  on file Medication(s):  
 
New Medications at Discharge: yes Changed Medications at Discharge: yes: Vit C Discontinued Medications at Discharge: no 
 
Medication reconciliation was performed with Dharmesh Kaminski .  
 
PCP/Specialist follow up: NA

## 2018-12-05 NOTE — PROGRESS NOTES
[facility]: [phone]  [facility]: [phone]   [facility]: [phone]    Patient Name: Venkat Del Toro  YOB: 1928    Date of Follow up: 12/5/2018  Reason for Consult: care decisions   Requesting Provider: Dr. Jason Cohen      SUMMARY:   Venkat Del Toro is a 80y.o. year old with a past history of NSTEMI, CHF, CKD, CVA, breast ca, glaucoma, gout who is a long term resident at [facility]. Current medical issues leading to Palliative Medicine involvement include: 79 y/o long term care resident with multiple chronic medical conditions, including CHF and CKD. Palliative medicine is consulted to discuss goals of care and care decisions for this chronically ill woman. 12/5/2018 follow up on resident at [facility] in bed but alert, complains of knee pain, and is awaiting her medications, nurse aware and coming. Noted recent return to the hospital despite comfort measures, nurse unsure of events which led to re hospitalization. Has POST on file. Rufina Butcher did not remember, which is baseline for her. Other then knee pain she has no complaints today. Continues to require assistance with all ADL's. PALLIATIVE DIAGNOSES:   1. ACP/goals of care discussion  2. CHF  3. CKD  4. debility       PLAN:   12/5/2018 follow up for patient who is on comfort measures. She is resting in bed at time of my exam. Alert conversant. Only complaint is her knee pain and is awaiting her medicines from the nurse. Noted recent hospitalization, nursing staff unsure of the events, she has a POST on file and family had elected comfort measures. She continues to require assistance with all adl's and is bed bound to w/c bound. Will continue to follow PRN. She appears as if has lost weight, which is expected with expected health decline. 9/10/2018:  Goals of care remain unchanged, see below. Patient seen in follow up. Continues to c/o leg pain. 8/10/2018 patient seen as follow up today. She is in bed, alert a little confused but pleasantly so, not unusual for [facility]. Her daughter in law Nahum Hollis return the POST form signed ( please see decision below shared with Yaneli Cheema who is the family representative) Family wishes to focus on a more comfort directed approach allowing [facility] to have the best quality of life for as long as possible. They would also like to change her goals of care to DNR/DNI. POST form has been signed by Kristopher Wolff who is Cherelle's grand daughter and Josette's daughter. Goals of care DNR/DNI; Care decisions comfort measures but with continuation of current medications and treatments in the facility, no escalation to the hospital unless a fall dictates or symptoms can not be relieved at facility. [facility] remains chronically ill and debilitated requiring assistance with ADL's but is stable and enjoys sitting in her w/c and watching TV. Along with the family's care decisions, understanding [facility] has CKD stage IV with advanced age they do not wish for dialysis if the need would arise. 7/26/2018  1. ACP/goals of care discussions: Met with patient at bedside along with Benjy Hopkins NP. She was seated in the wheelchair at bedside. She is alert and eating a bag of chips. Her daughter in law Yaneli Cheema is at bedside. Although the patient is alert and able to hold a simple conversation she does not seem to have insight into her illness and cannot process complex medical information in order to make medical decisions for herself. Her daughter in law Yaneli Cheema is very involved in her care. We discussed with Yaneli Cheema today and reviewed the benefits and burdens of continued aggressive treatment versus comfort measures. Yaneli Cheema feels that the patient would not benefit from aggressive treatments including dialysis and CPR.  She believes that the patient would want to focus on symptom management and quality of life at this time. We reviewed the patient's previously completed AMD with Lorena Vesta. The AMD names Michelle Sahu as primary MPOA and Romana Sahu as secondary MPOA. Michelle and Romana are the patient's granddaughters and Josette's daughters. Lorena Lemons confirms that she and her daughters make medical decisions together. We discussed the POST form and Lorena Lemons will bring it home to discuss with her daughters and to have Michelle sign. We instructed her to either bring the form back to the facility or to email it once complete. Initial consult note routed to primary continuity provider    2. Symptoms she had no complaints of pain, leg / knee pain has been a concern but tells us she does have relief  with topical creams. Have encouraged staff to assess of pain and use topicals when needed and per direction. Orders reviewed: She has scheduled tylenol and PRN Ultram. Gabapentin 300mg at hs.  3. CHF: Stable; medically managed. 4. CKD: Creat was 5.04 on 9/4/18. Previously decided by family members to not pursue dialysis. 5. Debility: Bed to wheelchair bound, non-ambulatory. 6. Communicated plan of care with: Palliative IDT         TREATMENT PREFERENCES:   Code Status: DNR/DNI   Advance Care Planning:  Advance Care Planning 11/23/2018   Patient's Healthcare Decision Maker is: -   Primary Decision Maker Name -   Primary Decision Maker Phone Number -   Primary Decision Maker Relationship to Patient -   Secondary Decision Maker Name -   Secondary Decision [address] Phone Number -   Secondary Decision Maker Relationship to Patient -   Confirm Advance Directive Yes, on file   Patient Would Like to Complete Advance Directive -   Does the patient have other document types Do Not Resuscitate                   Other:  As far as possible, the palliative care team has discussed with patient / health care proxy about goals of care / treatment preferences for patient.      HISTORY:     History obtained from:  chart    CHIEF COMPLAINT: goals of care discussion    HPI/SUBJECTIVE:    The patient is:   [] Verbal and participatory   [x] Non-participatory due to: confusion       Clinical Pain Assessment (nonverbal scale for nonverbal patients):            Duration: for how long has pt been experiencing pain (e.g., 2 days, 1 month, years)  Frequency: how often pain is an issue (e.g., several times per day, once every few days, constant)     FUNCTIONAL ASSESSMENT:     Palliative Performance Scale (PPS): 30               PSYCHOSOCIAL/SPIRITUAL SCREENING:      Any spiritual / Restorationism concerns:  [] Yes /  [x] No    Caregiver Burnout:  [] Yes /  [x] No /  [] No Caregiver Present      Anticipatory grief assessment:   [x] Normal  / [] Maladaptive        REVIEW OF SYSTEMS:     Positive and pertinent negative findings in ROS are noted above in HPI. The following systems were [x] reviewed / [] unable to be reviewed as noted in HPI  Other findings are noted below. Systems: constitutional, ears/nose/mouth/throat, respiratory, gastrointestinal, genitourinary, musculoskeletal, integumentary, neurologic, psychiatric, endocrine. Positive findings noted below. Modified ESAS Completed by: provider    fatigue 7      pain 2 on knee      appetite 3       dyspnea 0       drowiness 0                PHYSICAL EXAM:     Wt Readings from Last 3 Encounters:   11/23/18 55.8 kg (123 lb)   05/17/17 62.6 kg (137 lb 14.4 oz)   03/07/17 61.2 kg (135 lb)                         Constitutional: elderly fatigued and frail appearing female who is in bed alert in NAD  ENMT: no nasal discharge, moist mucous membranes  Cardiovascular: extremities warm  Respiratory: breathing not labored, symmetric  Skin: warm, dry  Neurologic: alert, confused, verbal oriented to herself         HISTORY:       Past Medical History:   Diagnosis Date    Breast cancer ([facility] [address] 1/17/14    right breast    Cardiac echocardiogram 02/17/2017    EF 50%. No WMA. Mod conc LVH. Indeterminate diastolic fx. Mod LAE. Mild MR. Mild AI. Mild PI. No signifciant valvular heart disease.  Cardiovascular lower extremity arterial testing 12/19/2014    Mod tibio-peroneal arterial disease bilaterally. R YANCY 1.24.  L YANCY 0.74. Bilateral sm vessel disease.  Carotid duplex 11/19/2014    Severe 70% or greater bilateral ICA stenosis. LICA dissection suggested.  Chronic renal insufficiency     CVA (cerebral vascular accident) ([address] 2003    with slight Right sided weakness    Edema     Glaucoma     Gout flare     Hyperlipidemia     Hypertension     Lump of right breast     URI (upper respiratory infection)       Past Surgical History:   Procedure Laterality Date    HX APPENDECTOMY      HX CYST INCISION AND DRAINAGE      PT DOES NOT REMEMBER EXACT DATES, TUCKER BREAST DONE MORE THAN 20 YEARS AGO. BENIGN FINDINGS PER PATIENT.  HX GYN      JUSTIN/BSO    HX HYSTERECTOMY      HX MASTECTOMY  1/17/2014    RIGHT PARTIAL MASTECTOMY performed by Jake Jeffries MD at [facility] MAIN OR      Family History   Problem Relation Age of Onset    Hypertension Mother     Hypertension Brother     Diabetes Brother      History reviewed, no pertinent family history.   Social History     Tobacco Use    Smoking status: Never Smoker    Smokeless tobacco: Never Used   Substance Use Topics    Alcohol use: No     No Known Allergies        LAB AND IMAGING FINDINGS:     Lab Results   Component Value Date/Time    WBC 9.1 11/22/2018 07:04 PM    HGB 9.1 (L) 11/22/2018 07:04 PM    PLATELET 688 12/21/2851 07:04 PM     Lab Results   Component Value Date/Time    Sodium 138 11/23/2018 03:22 AM    Potassium 5.6 (H) 11/23/2018 03:22 AM    Chloride 111 (H) 11/23/2018 03:22 AM    CO2 18 (L) 11/23/2018 03:22 AM    BUN 91 (H) 11/23/2018 03:22 AM    Creatinine 5.61 (H) 11/23/2018 03:22 AM    Calcium 9.8 11/23/2018 03:22 AM    Magnesium 2.5 06/27/2017 03:00 AM    Phosphorus 4.0 02/27/2017 03:36 AM      Lab Results   Component Value Date/Time    AST (SGOT) 15 11/22/2018 07:04 PM Alk. phosphatase 57 11/22/2018 07:04 PM    Protein, total 6.8 11/22/2018 07:04 PM    Albumin 3.4 11/22/2018 07:04 PM    Globulin 3.4 11/22/2018 07:04 PM     Lab Results   Component Value Date/Time    INR 1.3 (H) 02/24/2017 11:54 PM    Prothrombin time 15.8 (H) 02/24/2017 11:54 PM    aPTT 122.4 (H) 02/25/2017 09:40 AM      Lab Results   Component Value Date/Time    Iron 60 04/24/2017 11:05 AM    TIBC 161 (L) 04/24/2017 11:05 AM    Iron % saturation 37 04/24/2017 11:05 AM    Ferritin 99 04/24/2017 11:05 AM      No results found for: PH, PCO2, PO2  No components found for: [place]   Lab Results   Component Value Date/Time    CK 41 02/23/2017 11:55 PM    CK - MB <1.0 02/23/2017 11:55 PM              Total time: 15 minutes  Counseling / coordination time, spent as noted above: 10 minutes  > 50% counseling / coordination: yes with patient and nurse. Prolonged service was provided for  []30 min   []75 min in face to face time in the presence of the patient, spent as noted above. Time Start:   Time End:   Note: this can only be billed with 60800 (initial) or 83172 (follow up). If multiple start / stop times, list each separately.

## 2018-12-13 NOTE — PROGRESS NOTES
[facility]: [phone])  [facility]: [phone]   [facility]: [phone]    Patient Name: Jj Miner  YOB: 1928    Date of Follow up: 12/5/2018  Reason for Consult: care decisions   Requesting Provider: Dr. Naz Holden      SUMMARY:   Jj Miner is a 80y.o. year old with a past history of NSTEMI, CHF, CKD, CVA, breast ca, glaucoma, gout who is a long term resident at [facility]. Current medical issues leading to Palliative Medicine involvement include: 79 y/o long term care resident with multiple chronic medical conditions, including CHF and CKD. Palliative medicine is consulted to discuss goals of care and care decisions for this chronically ill woman. 12/5/2018 follow up on resident at [facility] in bed but alert, complains of knee pain, and is awaiting her medications, nurse aware and coming. Noted recent return to the hospital despite comfort measures, nurse unsure of events which led to re hospitalization. Has POST on file. Evelin Delarosa did not remember, which is baseline for her. Other then knee pain she has no complaints today. Continues to require assistance with all ADL's.    12/13/2018 f/u on patient today. She is alert in bed, smiling only complaint is knee pain. She remains confused which is her baseline. Participated in care plan with , unit manager, dietary and wound care nurse. Bennettmarco antonio Bryn who is daughter in law and primary contact expressed concern Ms. Jocelyne Ashby was on comfort measures. She shared family would like to continue her DNR/DNI but in the event of health decline she wishes for Evelin Delarosa to be rehospitalized. I also called Haylie Gaviria who is the listed MPOA and she concurred with this and shared with me that she would be agreeable to what ever decisions Merlene Srivastava makes. PALLIATIVE DIAGNOSES:   1.  ACP/goals of care discussion  2. CHF  3. CKD  4. debility       PLAN:   12/13/2018 follow up on patient and participated in care planning conference with facility , unit manager, dietary and wound care RN. Call to Ms. Milla Paris who is daughter in law, her only question concerning Cherelle's care centered around re hospitalization ( please note our team and attending team has had long discussions with Cherelle's family, Milla Paris and listed [address] concerning medical decline, advanced age, and care decisions. In the past they were agreeable to a comfort directed approach and POST was signed by Michelle to this effect. Today Hollister shares they wish to have Herlinda Christian hospitalized if condition warrants. Discussed again her overall poor condition. Call was made to [facility] who is listed MPOA named in AMD signed by Herlinda Christian in 2016. She shared she agrees with Hollister and really defers all decisions to Hollister ( attempted to discuss further but Michelle was not able to further discuss due to time constraints). Goals of care affirmed DNR/DNI now limited interventions with re hospitalization if medically appropriate. Have asked nursing to remove current POST form from chart as care decisions have changed. Message left for attending with care decision changes. ( below is previous conversations)    12/5/2018 follow up for patient who is on comfort measures. She is resting in bed at time of my exam. Alert conversant. Only complaint is her knee pain and is awaiting her medicines from the nurse. Noted recent hospitalization, nursing staff unsure of the events, she has a POST on file and family had elected comfort measures. She continues to require assistance with all adl's and is bed bound to w/c bound. Will continue to follow PRN. She appears as if has lost weight, which is expected with expected health decline. 9/10/2018:  Goals of care remain unchanged, see below. Patient seen in follow up.  Continues to c/o leg pain.   8/10/2018 patient seen as follow up today. She is in bed, alert a little confused but pleasantly so, not unusual for Caden Navas. Her daughter in [facility] Maikol Hector return the POST form signed ( please see decision below shared with [facility] who is the family representative) Family wishes to focus on a more comfort directed approach allowing Caden Navas to have the best quality of life for as long as possible. They would also like to change her goals of care to DNR/DNI. POST form has been signed by Kiko Del Toro who is Cherelle's grand daughter and Josette's daughter. Goals of care DNR/DNI; Care decisions comfort measures but with continuation of current medications and treatments in the facility, no escalation to the hospital unless a fall dictates or symptoms can not be relieved at facility. Caden Navas remains chronically ill and debilitated requiring assistance with ADL's but is stable and enjoys sitting in her w/c and watching TV. Along with the family's care decisions, understanding Caden Navas has CKD stage IV with advanced age they do not wish for dialysis if the need would arise. 7/26/2018  1. ACP/goals of care discussions: Met with patient at bedside along with Valentina Mcneill NP. She was seated in the wheelchair at bedside. She is alert and eating a bag of chips. Her [place] in [facility] [place] at bedside. Although the patient is alert and able to hold a simple conversation she does not seem to have insight into her illness and cannot process complex medical information in order to make medical decisions for herself. Her daughter in [facility] is very involved in her care. We discussed with [facility] today and reviewed the benefits and burdens of continued aggressive treatment versus comfort measures. [facility] feels that the patient would not benefit from aggressive treatments including dialysis and CPR. She believes that the patient would want to focus on symptom management and quality of life at this time.  We reviewed the patient's previously completed AMD with Yoshi Weinberg. The AMD names Michelle Sahu as primary MPOA and Romana Sahu as secondary MPOA. Michelle and Romana are the patient's granddaughters and Josette's daughters. Yoshi Weinberg confirms that she and her daughters make medical decisions together. We discussed the POST form and Yoshi Weinberg will bring it home to discuss with her daughters and to have Michelle sign. We instructed her to either bring the form back to the facility or to email it once complete. Initial consult note routed to primary continuity provider    2. Symptoms she had no complaints of pain, leg / knee pain has been a concern but tells us she does have relief  with topical creams. Have encouraged staff to assess of pain and use topicals when needed and per direction. Orders reviewed: She has scheduled tylenol and PRN Ultram. Gabapentin 300mg at hs.  3. CHF: Stable; medically managed. 4. CKD: Creat was 5.04 on 9/4/18. Previously decided by family members to not pursue dialysis. 5. Debility: Bed to wheelchair bound, non-ambulatory. 6. Communicated plan of care with: Palliative IDT         TREATMENT PREFERENCES:   Code Status: DNR/DNI   Advance Care Planning:  Advance Care Planning 11/23/2018   Patient's Healthcare Decision Maker is: -   Primary Decision Maker Name -   Primary Decision Maker Phone Number -   Primary Decision Maker Relationship to Patient -   Secondary Decision Maker Name -   Secondary Decision [address] Phone Number -   Secondary Decision Maker Relationship to Patient -   Confirm Advance Directive Yes, on file   Patient Would Like to Complete Advance Directive -   Does the patient have other document types Do Not Resuscitate       Medical Interventions: Limited additional interventions           Other:  As far as possible, the palliative care team has discussed with patient / health care proxy about goals of care / treatment preferences for patient.      HISTORY:     History obtained from: chart    CHIEF COMPLAINT: goals of care discussion    HPI/SUBJECTIVE:    The patient is:   [] Verbal and participatory   [x] Non-participatory due to: confusion       Clinical Pain Assessment (nonverbal scale for nonverbal patients): Clinical Pain Assessment  Severity: 2  Location: bilateral knees   Character: aching  Duration: constant  Effect: uncomfortable   Factors: OA  Frequency: occ          Duration: for how long has pt been experiencing pain (e.g., 2 days, 1 month, years)  Frequency: how often pain is an issue (e.g., several times per day, once every few days, constant)     FUNCTIONAL ASSESSMENT:     Palliative Performance Scale (PPS): 30  PPS: 30      ECOG Status : Completely disabled     PSYCHOSOCIAL/SPIRITUAL SCREENING:      Any spiritual / Confucianist concerns:  [] Yes /  [x] No    Caregiver Burnout:  [] Yes /  [x] No /  [] No Caregiver Present      Anticipatory grief assessment:   [x] Normal  / [] Maladaptive        REVIEW OF SYSTEMS:     Positive and pertinent negative findings in ROS are noted above in HPI. The following systems were [] reviewed / [x] unable to be reviewed as noted in HPI  Other findings are noted below. Systems: constitutional, ears/nose/mouth/throat, respiratory, gastrointestinal, genitourinary, musculoskeletal, integumentary, neurologic, psychiatric, endocrine. Positive findings noted below.   Modified ESAS Completed by: provider    fatigue 7      pain 2 on knee Pain: 2    appetite 3      Anorexia: 3dyspnea 0  Dyspnea: 0    drowsiness 0                PHYSICAL EXAM:     Wt Readings from Last 3 Encounters:   11/23/18 55.8 kg (123 lb)   05/17/17 62.6 kg (137 lb 14.4 oz)   03/07/17 61.2 kg (135 lb)                         Constitutional: frail elderly female who was lying in bed alert and in NAD  ENMT: no nasal discharge, moist mucous membranes  Cardiovascular: extremities warm  Respiratory: breathing not labored,   Skin: warm, dry  Neurologic: alert, confused, verbal oriented to herself         HISTORY:       Past Medical History:   Diagnosis Date    Breast cancer ([facility] [address] 1/17/14    right breast    Cardiac echocardiogram 02/17/2017    EF 50%. No WMA. Mod conc LVH. Indeterminate diastolic fx. Mod LAE. Mild MR. Mild AI. Mild PI. No signifciant valvular heart disease.  Cardiovascular lower extremity arterial testing 12/19/2014    Mod tibio-peroneal arterial disease bilaterally. R YANCY 1.24.  L YANCY 0.74. Bilateral sm vessel disease.  Carotid duplex 11/19/2014    Severe 70% or greater bilateral ICA stenosis. LICA dissection suggested.  Chronic renal insufficiency     CVA (cerebral vascular accident) ([facility] [address] 2003    with slight Right sided weakness    Edema     Glaucoma     Gout flare     Hyperlipidemia     Hypertension     Lump of right breast     URI (upper respiratory infection)       Past Surgical History:   Procedure Laterality Date    HX APPENDECTOMY      HX CYST INCISION AND DRAINAGE      PT DOES NOT REMEMBER EXACT DATES, TUCKER BREAST DONE MORE THAN 20 YEARS AGO. BENIGN FINDINGS PER PATIENT.  HX GYN      JUSTIN/BSO    HX HYSTERECTOMY      HX MASTECTOMY  1/17/2014    RIGHT PARTIAL MASTECTOMY performed by Tereza Flowers MD at [facility] MAIN OR      Family History   Problem Relation Age of Onset    Hypertension Mother     Hypertension Brother     Diabetes Brother      History reviewed, no pertinent family history.   Social History     Tobacco Use    Smoking status: Never Smoker    Smokeless tobacco: Never Used   Substance Use Topics    Alcohol use: No     No Known Allergies        LAB AND IMAGING FINDINGS:     Lab Results   Component Value Date/Time    WBC 9.1 11/22/2018 07:04 PM    HGB 9.1 (L) 11/22/2018 07:04 PM    PLATELET 188 44/71/8000 07:04 PM     Lab Results   Component Value Date/Time    Sodium 138 11/23/2018 03:22 AM    Potassium 5.6 (H) 11/23/2018 03:22 AM    Chloride 111 (H) 11/23/2018 03:22 AM    CO2 18 (L) 11/23/2018 03:22 AM    BUN 91 (H) 11/23/2018 03:22 AM    Creatinine 5.61 (H) 11/23/2018 03:22 AM    Calcium 9.8 11/23/2018 03:22 AM    Magnesium 2.5 06/27/2017 03:00 AM    Phosphorus 4.0 02/27/2017 03:36 AM      Lab Results   Component Value Date/Time    AST (SGOT) 15 11/22/2018 07:04 PM    Alk. phosphatase 57 11/22/2018 07:04 PM    Protein, total 6.8 11/22/2018 07:04 PM    Albumin 3.4 11/22/2018 07:04 PM    Globulin 3.4 11/22/2018 07:04 PM     Lab Results   Component Value Date/Time    INR 1.3 (H) 02/24/2017 11:54 PM    Prothrombin time 15.8 (H) 02/24/2017 11:54 PM    aPTT 122.4 (H) 02/25/2017 09:40 AM      Lab Results   Component Value Date/Time    Iron 60 04/24/2017 11:05 AM    TIBC 161 (L) 04/24/2017 11:05 AM    Iron % saturation 37 04/24/2017 11:05 AM    Ferritin 99 04/24/2017 11:05 AM      No results found for: PH, PCO2, PO2  No components found for: [place]   Lab Results   Component Value Date/Time    CK 41 02/23/2017 11:55 PM    CK - MB <1.0 02/23/2017 11:55 PM              Total time: 35 minutes  Counseling / coordination time, spent as noted above: 25 minutes  > 50% counseling / coordination: yes with daughter in law, MSW, RN  Prolonged service was provided for  []30 min   []75 min in face to face time in the presence of the patient, spent as noted above. Time Start:   Time End:   Note: this can only be billed with 46486 (initial) or 13118 (follow up). If multiple start / stop times, list each separately.

## 2019-01-01 ENCOUNTER — HOME CARE VISIT (OUTPATIENT)
Dept: HOSPICE | Facility: HOSPICE | Age: 84
End: 2019-01-01
Payer: MEDICARE

## 2019-01-01 ENCOUNTER — HOSPITAL ENCOUNTER (OUTPATIENT)
Dept: LAB | Age: 84
Discharge: HOME | End: 2019-03-14

## 2019-01-01 ENCOUNTER — HOSPITAL ENCOUNTER (OUTPATIENT)
Dept: LAB | Age: 84
Discharge: HOME | End: 2019-02-11
Payer: MEDICARE

## 2019-01-01 ENCOUNTER — HOSPITAL ENCOUNTER (OUTPATIENT)
Dept: LAB | Age: 84
Discharge: HOME | End: 2019-02-21
Payer: MEDICARE

## 2019-01-01 ENCOUNTER — HOSPICE ADMISSION (OUTPATIENT)
Dept: HOSPICE | Facility: HOSPICE | Age: 84
End: 2019-01-01
Payer: MEDICARE

## 2019-01-01 ENCOUNTER — HOSPITAL ENCOUNTER (OUTPATIENT)
Dept: LAB | Age: 84
Discharge: HOME | End: 2019-02-28
Payer: MEDICARE

## 2019-01-01 ENCOUNTER — HOSPITAL ENCOUNTER (OUTPATIENT)
Dept: LAB | Age: 84
Discharge: HOME | End: 2019-02-04
Payer: MEDICARE

## 2019-01-01 ENCOUNTER — HOSPITAL ENCOUNTER (OUTPATIENT)
Dept: LAB | Age: 84
Discharge: HOME | End: 2019-03-07
Payer: MEDICARE

## 2019-01-01 ENCOUNTER — HOSPITAL ENCOUNTER (OUTPATIENT)
Dept: LAB | Age: 84
Discharge: HOME | End: 2019-03-07

## 2019-01-01 ENCOUNTER — HOSPITAL ENCOUNTER (OUTPATIENT)
Dept: LAB | Age: 84
Discharge: HOME | End: 2019-01-23
Payer: MEDICARE

## 2019-01-01 ENCOUNTER — HOSPITAL ENCOUNTER (OUTPATIENT)
Dept: LAB | Age: 84
Discharge: HOME | End: 2019-01-25
Payer: MEDICARE

## 2019-01-01 ENCOUNTER — HOSPITAL ENCOUNTER (OUTPATIENT)
Dept: LAB | Age: 84
Discharge: HOME | End: 2019-01-22
Payer: MEDICARE

## 2019-01-01 ENCOUNTER — HOSPITAL ENCOUNTER (OUTPATIENT)
Dept: LAB | Age: 84
Discharge: HOME | End: 2019-02-21
Payer: MEDICAID

## 2019-01-01 ENCOUNTER — HOSPITAL ENCOUNTER (OUTPATIENT)
Dept: LAB | Age: 84
Discharge: HOME | End: 2019-03-21
Payer: OTHER MISCELLANEOUS

## 2019-01-01 ENCOUNTER — HOSPITAL ENCOUNTER (OUTPATIENT)
Dept: LAB | Age: 84
Discharge: HOME | End: 2019-02-05
Payer: MEDICARE

## 2019-01-01 ENCOUNTER — HOSPITAL ENCOUNTER (OUTPATIENT)
Dept: LAB | Age: 84
Discharge: HOME | End: 2019-01-05

## 2019-01-01 ENCOUNTER — HOSPITAL ENCOUNTER (OUTPATIENT)
Dept: LAB | Age: 84
Discharge: HOME | End: 2019-03-14
Payer: MEDICARE

## 2019-01-01 ENCOUNTER — HOSPITAL ENCOUNTER (OUTPATIENT)
Dept: GENERAL RADIOLOGY | Age: 84
Discharge: HOME | End: 2019-03-21
Attending: EMERGENCY MEDICINE

## 2019-01-01 ENCOUNTER — HOSPITAL ENCOUNTER (OUTPATIENT)
Dept: LAB | Age: 84
Discharge: HOME | End: 2019-01-04
Payer: MEDICARE

## 2019-01-01 ENCOUNTER — HOSPITAL ENCOUNTER (OUTPATIENT)
Dept: LAB | Age: 84
Discharge: HOME | End: 2019-03-15
Payer: MEDICARE

## 2019-01-01 ENCOUNTER — HOSPITAL ENCOUNTER (OUTPATIENT)
Dept: LAB | Age: 84
Discharge: HOME | End: 2019-02-18
Payer: MEDICARE

## 2019-01-01 ENCOUNTER — HOSPITAL ENCOUNTER (EMERGENCY)
Age: 84
Discharge: NURSING, REHAB OR LONG-TERM CARE | End: 2019-01-05
Attending: EMERGENCY MEDICINE
Payer: MEDICARE

## 2019-01-01 ENCOUNTER — HOSPITAL ENCOUNTER (INPATIENT)
Age: 84
LOS: 1 days | DRG: 291 | End: 2019-03-22
Attending: EMERGENCY MEDICINE | Admitting: INTERNAL MEDICINE
Payer: OTHER MISCELLANEOUS

## 2019-01-01 ENCOUNTER — HOSPITAL ENCOUNTER (OUTPATIENT)
Dept: LAB | Age: 84
Discharge: HOME | End: 2019-02-25
Payer: MEDICARE

## 2019-01-01 ENCOUNTER — HOSPITAL ENCOUNTER (OUTPATIENT)
Dept: LAB | Age: 84
Discharge: HOME | End: 2019-02-14
Payer: MEDICARE

## 2019-01-01 ENCOUNTER — HOSPITAL ENCOUNTER (OUTPATIENT)
Dept: LAB | Age: 84
Discharge: HOME | End: 2019-03-21

## 2019-01-01 VITALS
SYSTOLIC BLOOD PRESSURE: 105 MMHG | WEIGHT: 120 LBS | OXYGEN SATURATION: 95 % | HEIGHT: 60 IN | BODY MASS INDEX: 23.56 KG/M2 | RESPIRATION RATE: 20 BRPM | TEMPERATURE: 97.2 F | DIASTOLIC BLOOD PRESSURE: 55 MMHG | HEART RATE: 58 BPM

## 2019-01-01 VITALS
DIASTOLIC BLOOD PRESSURE: 42 MMHG | WEIGHT: 120 LBS | TEMPERATURE: 89.2 F | BODY MASS INDEX: 22.08 KG/M2 | OXYGEN SATURATION: 98 % | SYSTOLIC BLOOD PRESSURE: 94 MMHG | RESPIRATION RATE: 20 BRPM | HEIGHT: 62 IN | HEART RATE: 41 BPM

## 2019-01-01 VITALS — DIASTOLIC BLOOD PRESSURE: 42 MMHG | SYSTOLIC BLOOD PRESSURE: 189 MMHG | OXYGEN SATURATION: 99 % | TEMPERATURE: 98.3 F

## 2019-01-01 DIAGNOSIS — D50.9 IRON DEFICIENCY ANEMIA, UNSPECIFIED IRON DEFICIENCY ANEMIA TYPE: ICD-10-CM

## 2019-01-01 DIAGNOSIS — N39.0 URINARY TRACT INFECTION WITHOUT HEMATURIA, SITE UNSPECIFIED: Primary | ICD-10-CM

## 2019-01-01 DIAGNOSIS — N17.9 ACUTE RENAL FAILURE, UNSPECIFIED ACUTE RENAL FAILURE TYPE (HCC): ICD-10-CM

## 2019-01-01 DIAGNOSIS — R00.1 BRADYCARDIA: ICD-10-CM

## 2019-01-01 DIAGNOSIS — E87.5 ACUTE HYPERKALEMIA: Primary | ICD-10-CM

## 2019-01-01 DIAGNOSIS — R69 TERMINAL ILLNESS: ICD-10-CM

## 2019-01-01 DIAGNOSIS — E87.5 ACUTE HYPERKALEMIA: ICD-10-CM

## 2019-01-01 LAB
ALBUMIN SERPL-MCNC: 2.8 G/DL (ref 3.4–5)
ALBUMIN SERPL-MCNC: 2.9 G/DL (ref 3.4–5)
ALBUMIN SERPL-MCNC: 3 G/DL (ref 3.4–5)
ALBUMIN SERPL-MCNC: 3.5 G/DL (ref 3.4–5)
ALBUMIN/GLOB SERPL: 1.1 {RATIO} (ref 0.8–1.7)
ALBUMIN/GLOB SERPL: 1.4 {RATIO} (ref 0.8–1.7)
ALBUMIN/GLOB SERPL: 1.5 {RATIO} (ref 0.8–1.7)
ALBUMIN/GLOB SERPL: 1.5 {RATIO} (ref 0.8–1.7)
ALP SERPL-CCNC: 39 U/L (ref 45–117)
ALP SERPL-CCNC: 42 U/L (ref 45–117)
ALP SERPL-CCNC: 43 U/L (ref 45–117)
ALP SERPL-CCNC: 52 U/L (ref 45–117)
ALT SERPL-CCNC: 18 U/L (ref 13–56)
ALT SERPL-CCNC: 18 U/L (ref 13–56)
ALT SERPL-CCNC: 19 U/L (ref 13–56)
ALT SERPL-CCNC: 42 U/L (ref 13–56)
AMORPH CRY URNS QL MICRO: ABNORMAL
ANION GAP SERPL CALC-SCNC: 10 MMOL/L (ref 3–18)
ANION GAP SERPL CALC-SCNC: 11 MMOL/L (ref 3–18)
ANION GAP SERPL CALC-SCNC: 12 MMOL/L (ref 3–18)
ANION GAP SERPL CALC-SCNC: 13 MMOL/L (ref 3–18)
ANION GAP SERPL CALC-SCNC: 7 MMOL/L (ref 3–18)
ANION GAP SERPL CALC-SCNC: 8 MMOL/L (ref 3–18)
ANION GAP SERPL CALC-SCNC: 8 MMOL/L (ref 3–18)
ANION GAP SERPL CALC-SCNC: 9 MMOL/L (ref 3–18)
ANION GAP SERPL CALC-SCNC: 9 MMOL/L (ref 3–18)
APPEARANCE UR: ABNORMAL
APPEARANCE UR: ABNORMAL
APTT PPP: 30.8 SEC (ref 23–36.4)
AST SERPL-CCNC: 10 U/L (ref 15–37)
AST SERPL-CCNC: 11 U/L (ref 15–37)
AST SERPL-CCNC: 12 U/L (ref 15–37)
AST SERPL-CCNC: 19 U/L (ref 15–37)
ATRIAL RATE: 39 BPM
ATRIAL RATE: 63 BPM
BACTERIA URNS QL MICRO: ABNORMAL /HPF
BACTERIA URNS QL MICRO: ABNORMAL /HPF
BASOPHILS # BLD: 0 K/UL (ref 0–0.1)
BASOPHILS # BLD: 0.1 K/UL (ref 0–0.1)
BASOPHILS NFR BLD: 0 % (ref 0–2)
BASOPHILS NFR BLD: 1 % (ref 0–2)
BILIRUB SERPL-MCNC: 0.2 MG/DL (ref 0.2–1)
BILIRUB SERPL-MCNC: 0.4 MG/DL (ref 0.2–1)
BILIRUB UR QL: NEGATIVE
BILIRUB UR QL: NEGATIVE
BUN SERPL-MCNC: 112 MG/DL (ref 7–18)
BUN SERPL-MCNC: 138 MG/DL (ref 7–18)
BUN SERPL-MCNC: 142 MG/DL (ref 7–18)
BUN SERPL-MCNC: 79 MG/DL (ref 7–18)
BUN SERPL-MCNC: 82 MG/DL (ref 7–18)
BUN SERPL-MCNC: 82 MG/DL (ref 7–18)
BUN SERPL-MCNC: 84 MG/DL (ref 7–18)
BUN SERPL-MCNC: 85 MG/DL (ref 7–18)
BUN SERPL-MCNC: 86 MG/DL (ref 7–18)
BUN SERPL-MCNC: 86 MG/DL (ref 7–18)
BUN SERPL-MCNC: 87 MG/DL (ref 7–18)
BUN SERPL-MCNC: 87 MG/DL (ref 7–18)
BUN SERPL-MCNC: 88 MG/DL (ref 7–18)
BUN SERPL-MCNC: 90 MG/DL (ref 7–18)
BUN SERPL-MCNC: 97 MG/DL (ref 7–18)
BUN SERPL-MCNC: 99 MG/DL (ref 7–18)
BUN SERPL-MCNC: 99 MG/DL (ref 7–18)
BUN/CREAT SERPL: 15 (ref 12–20)
BUN/CREAT SERPL: 16 (ref 12–20)
BUN/CREAT SERPL: 17 (ref 12–20)
CALCIUM SERPL-MCNC: 7.8 MG/DL (ref 8.5–10.1)
CALCIUM SERPL-MCNC: 8.4 MG/DL (ref 8.5–10.1)
CALCIUM SERPL-MCNC: 8.5 MG/DL (ref 8.5–10.1)
CALCIUM SERPL-MCNC: 8.6 MG/DL (ref 8.5–10.1)
CALCIUM SERPL-MCNC: 8.6 MG/DL (ref 8.5–10.1)
CALCIUM SERPL-MCNC: 8.7 MG/DL (ref 8.5–10.1)
CALCIUM SERPL-MCNC: 8.8 MG/DL (ref 8.5–10.1)
CALCIUM SERPL-MCNC: 8.9 MG/DL (ref 8.5–10.1)
CALCIUM SERPL-MCNC: 9 MG/DL (ref 8.5–10.1)
CALCIUM SERPL-MCNC: 9.3 MG/DL (ref 8.5–10.1)
CALCIUM SERPL-MCNC: 9.3 MG/DL (ref 8.5–10.1)
CALCIUM SERPL-MCNC: 9.7 MG/DL (ref 8.5–10.1)
CALCULATED P AXIS, ECG09: 72 DEGREES
CALCULATED P AXIS, ECG09: 74 DEGREES
CALCULATED R AXIS, ECG10: -49 DEGREES
CALCULATED R AXIS, ECG10: -53 DEGREES
CALCULATED T AXIS, ECG11: 103 DEGREES
CALCULATED T AXIS, ECG11: 109 DEGREES
CHLORIDE SERPL-SCNC: 106 MMOL/L (ref 100–108)
CHLORIDE SERPL-SCNC: 110 MMOL/L (ref 100–108)
CHLORIDE SERPL-SCNC: 111 MMOL/L (ref 100–108)
CHLORIDE SERPL-SCNC: 112 MMOL/L (ref 100–108)
CHLORIDE SERPL-SCNC: 112 MMOL/L (ref 100–108)
CHLORIDE SERPL-SCNC: 113 MMOL/L (ref 100–108)
CHLORIDE SERPL-SCNC: 114 MMOL/L (ref 100–108)
CHLORIDE SERPL-SCNC: 115 MMOL/L (ref 100–108)
CHLORIDE SERPL-SCNC: 116 MMOL/L (ref 100–108)
CO2 SERPL-SCNC: 13 MMOL/L (ref 21–32)
CO2 SERPL-SCNC: 14 MMOL/L (ref 21–32)
CO2 SERPL-SCNC: 15 MMOL/L (ref 21–32)
CO2 SERPL-SCNC: 16 MMOL/L (ref 21–32)
CO2 SERPL-SCNC: 17 MMOL/L (ref 21–32)
CO2 SERPL-SCNC: 18 MMOL/L (ref 21–32)
CO2 SERPL-SCNC: 18 MMOL/L (ref 21–32)
CO2 SERPL-SCNC: 19 MMOL/L (ref 21–32)
CO2 SERPL-SCNC: 21 MMOL/L (ref 21–32)
COLOR UR: YELLOW
COLOR UR: YELLOW
CREAT SERPL-MCNC: 4.64 MG/DL (ref 0.6–1.3)
CREAT SERPL-MCNC: 4.92 MG/DL (ref 0.6–1.3)
CREAT SERPL-MCNC: 5.07 MG/DL (ref 0.6–1.3)
CREAT SERPL-MCNC: 5.37 MG/DL (ref 0.6–1.3)
CREAT SERPL-MCNC: 5.46 MG/DL (ref 0.6–1.3)
CREAT SERPL-MCNC: 5.54 MG/DL (ref 0.6–1.3)
CREAT SERPL-MCNC: 5.56 MG/DL (ref 0.6–1.3)
CREAT SERPL-MCNC: 5.65 MG/DL (ref 0.6–1.3)
CREAT SERPL-MCNC: 5.68 MG/DL (ref 0.6–1.3)
CREAT SERPL-MCNC: 5.68 MG/DL (ref 0.6–1.3)
CREAT SERPL-MCNC: 5.69 MG/DL (ref 0.6–1.3)
CREAT SERPL-MCNC: 5.7 MG/DL (ref 0.6–1.3)
CREAT SERPL-MCNC: 5.78 MG/DL (ref 0.6–1.3)
CREAT SERPL-MCNC: 5.95 MG/DL (ref 0.6–1.3)
CREAT SERPL-MCNC: 6.1 MG/DL (ref 0.6–1.3)
CREAT SERPL-MCNC: 6.33 MG/DL (ref 0.6–1.3)
CREAT SERPL-MCNC: 6.98 MG/DL (ref 0.6–1.3)
CREAT SERPL-MCNC: 8.56 MG/DL (ref 0.6–1.3)
CREAT SERPL-MCNC: 8.6 MG/DL (ref 0.6–1.3)
DIAGNOSIS, 93000: NORMAL
DIAGNOSIS, 93000: NORMAL
DIFFERENTIAL METHOD BLD: ABNORMAL
DIFFERENTIAL METHOD BLD: ABNORMAL
EOSINOPHIL # BLD: 0 K/UL (ref 0–0.4)
EOSINOPHIL # BLD: 0.3 K/UL (ref 0–0.4)
EOSINOPHIL NFR BLD: 0 % (ref 0–5)
EOSINOPHIL NFR BLD: 5 % (ref 0–5)
EPITH CASTS URNS QL MICRO: ABNORMAL /LPF (ref 0–5)
EPITH CASTS URNS QL MICRO: ABNORMAL /LPF (ref 0–5)
ERYTHROCYTE [DISTWIDTH] IN BLOOD BY AUTOMATED COUNT: 14.4 % (ref 11.6–14.5)
ERYTHROCYTE [DISTWIDTH] IN BLOOD BY AUTOMATED COUNT: 16.2 % (ref 11.6–14.5)
GLOBULIN SER CALC-MCNC: 2 G/DL (ref 2–4)
GLOBULIN SER CALC-MCNC: 2.1 G/DL (ref 2–4)
GLOBULIN SER CALC-MCNC: 2.4 G/DL (ref 2–4)
GLOBULIN SER CALC-MCNC: 2.5 G/DL (ref 2–4)
GLUCOSE SERPL-MCNC: 101 MG/DL (ref 74–99)
GLUCOSE SERPL-MCNC: 102 MG/DL (ref 74–99)
GLUCOSE SERPL-MCNC: 108 MG/DL (ref 74–99)
GLUCOSE SERPL-MCNC: 112 MG/DL (ref 74–99)
GLUCOSE SERPL-MCNC: 74 MG/DL (ref 74–99)
GLUCOSE SERPL-MCNC: 83 MG/DL (ref 74–99)
GLUCOSE SERPL-MCNC: 84 MG/DL (ref 74–99)
GLUCOSE SERPL-MCNC: 86 MG/DL (ref 74–99)
GLUCOSE SERPL-MCNC: 87 MG/DL (ref 74–99)
GLUCOSE SERPL-MCNC: 89 MG/DL (ref 74–99)
GLUCOSE SERPL-MCNC: 93 MG/DL (ref 74–99)
GLUCOSE SERPL-MCNC: 94 MG/DL (ref 74–99)
GLUCOSE SERPL-MCNC: 96 MG/DL (ref 74–99)
GLUCOSE SERPL-MCNC: 99 MG/DL (ref 74–99)
GLUCOSE UR STRIP.AUTO-MCNC: NEGATIVE MG/DL
GLUCOSE UR STRIP.AUTO-MCNC: NEGATIVE MG/DL
HCT VFR BLD AUTO: 19.4 % (ref 35–45)
HCT VFR BLD AUTO: 23.9 % (ref 35–45)
HEMOCCULT STL QL: NEGATIVE
HGB BLD-MCNC: 6.1 G/DL (ref 12–16)
HGB BLD-MCNC: 7.6 G/DL (ref 12–16)
HGB UR QL STRIP: ABNORMAL
HGB UR QL STRIP: NEGATIVE
INR PPP: 1.1 (ref 0.8–1.2)
KETONES UR QL STRIP.AUTO: NEGATIVE MG/DL
KETONES UR QL STRIP.AUTO: NEGATIVE MG/DL
LACTATE BLD-SCNC: 0.69 MMOL/L (ref 0.4–2)
LEUKOCYTE ESTERASE UR QL STRIP.AUTO: ABNORMAL
LEUKOCYTE ESTERASE UR QL STRIP.AUTO: ABNORMAL
LYMPHOCYTES # BLD: 0.7 K/UL (ref 0.9–3.6)
LYMPHOCYTES # BLD: 1.7 K/UL (ref 0.9–3.6)
LYMPHOCYTES NFR BLD: 19 % (ref 21–52)
LYMPHOCYTES NFR BLD: 32 % (ref 21–52)
MAGNESIUM SERPL-MCNC: 2.8 MG/DL (ref 1.6–2.6)
MCH RBC QN AUTO: 32.1 PG (ref 24–34)
MCH RBC QN AUTO: 32.9 PG (ref 24–34)
MCHC RBC AUTO-ENTMCNC: 31.4 G/DL (ref 31–37)
MCHC RBC AUTO-ENTMCNC: 31.8 G/DL (ref 31–37)
MCV RBC AUTO: 102.1 FL (ref 74–97)
MCV RBC AUTO: 103.5 FL (ref 74–97)
MONOCYTES # BLD: 0.2 K/UL (ref 0.05–1.2)
MONOCYTES # BLD: 0.5 K/UL (ref 0.05–1.2)
MONOCYTES NFR BLD: 10 % (ref 3–10)
MONOCYTES NFR BLD: 7 % (ref 3–10)
MUCOUS THREADS URNS QL MICRO: ABNORMAL /LPF
NEUTS SEG # BLD: 2.6 K/UL (ref 1.8–8)
NEUTS SEG # BLD: 2.7 K/UL (ref 1.8–8)
NEUTS SEG NFR BLD: 52 % (ref 40–73)
NEUTS SEG NFR BLD: 74 % (ref 40–73)
NITRITE UR QL STRIP.AUTO: NEGATIVE
NITRITE UR QL STRIP.AUTO: POSITIVE
P-R INTERVAL, ECG05: 168 MS
P-R INTERVAL, ECG05: 216 MS
PH UR STRIP: 7.5 [PH] (ref 5–8)
PH UR STRIP: 8 [PH] (ref 5–8)
PLATELET # BLD AUTO: 197 K/UL (ref 135–420)
PLATELET # BLD AUTO: 205 K/UL (ref 135–420)
PMV BLD AUTO: 9.3 FL (ref 9.2–11.8)
PMV BLD AUTO: 9.8 FL (ref 9.2–11.8)
POTASSIUM SERPL-SCNC: 4.8 MMOL/L (ref 3.5–5.5)
POTASSIUM SERPL-SCNC: 4.9 MMOL/L (ref 3.5–5.5)
POTASSIUM SERPL-SCNC: 5.1 MMOL/L (ref 3.5–5.5)
POTASSIUM SERPL-SCNC: 5.1 MMOL/L (ref 3.5–5.5)
POTASSIUM SERPL-SCNC: 5.5 MMOL/L (ref 3.5–5.5)
POTASSIUM SERPL-SCNC: 5.6 MMOL/L (ref 3.5–5.5)
POTASSIUM SERPL-SCNC: 5.7 MMOL/L (ref 3.5–5.5)
POTASSIUM SERPL-SCNC: 5.7 MMOL/L (ref 3.5–5.5)
POTASSIUM SERPL-SCNC: 5.9 MMOL/L (ref 3.5–5.5)
POTASSIUM SERPL-SCNC: 6 MMOL/L (ref 3.5–5.5)
POTASSIUM SERPL-SCNC: 6.2 MMOL/L (ref 3.5–5.5)
POTASSIUM SERPL-SCNC: 6.2 MMOL/L (ref 3.5–5.5)
POTASSIUM SERPL-SCNC: 6.6 MMOL/L (ref 3.5–5.5)
POTASSIUM SERPL-SCNC: 6.6 MMOL/L (ref 3.5–5.5)
POTASSIUM SERPL-SCNC: 6.9 MMOL/L (ref 3.5–5.5)
POTASSIUM SERPL-SCNC: 7.4 MMOL/L (ref 3.5–5.5)
POTASSIUM SERPL-SCNC: 7.5 MMOL/L (ref 3.5–5.5)
PROT SERPL-MCNC: 4.9 G/DL (ref 6.4–8.2)
PROT SERPL-MCNC: 5.1 G/DL (ref 6.4–8.2)
PROT SERPL-MCNC: 5.3 G/DL (ref 6.4–8.2)
PROT SERPL-MCNC: 5.9 G/DL (ref 6.4–8.2)
PROT UR STRIP-MCNC: 300 MG/DL
PROT UR STRIP-MCNC: >1000 MG/DL
PROTHROMBIN TIME: 14.4 SEC (ref 11.5–15.2)
Q-T INTERVAL, ECG07: 382 MS
Q-T INTERVAL, ECG07: 574 MS
QRS DURATION, ECG06: 118 MS
QRS DURATION, ECG06: 146 MS
QTC CALCULATION (BEZET), ECG08: 390 MS
QTC CALCULATION (BEZET), ECG08: 462 MS
RBC # BLD AUTO: 1.9 M/UL (ref 4.2–5.3)
RBC # BLD AUTO: 2.31 M/UL (ref 4.2–5.3)
RBC #/AREA URNS HPF: ABNORMAL /HPF (ref 0–5)
RBC #/AREA URNS HPF: ABNORMAL /HPF (ref 0–5)
RBC MORPH BLD: ABNORMAL
SODIUM SERPL-SCNC: 136 MMOL/L (ref 136–145)
SODIUM SERPL-SCNC: 137 MMOL/L (ref 136–145)
SODIUM SERPL-SCNC: 137 MMOL/L (ref 136–145)
SODIUM SERPL-SCNC: 138 MMOL/L (ref 136–145)
SODIUM SERPL-SCNC: 139 MMOL/L (ref 136–145)
SODIUM SERPL-SCNC: 139 MMOL/L (ref 136–145)
SODIUM SERPL-SCNC: 140 MMOL/L (ref 136–145)
SODIUM SERPL-SCNC: 141 MMOL/L (ref 136–145)
SODIUM SERPL-SCNC: 142 MMOL/L (ref 136–145)
SODIUM SERPL-SCNC: 142 MMOL/L (ref 136–145)
SODIUM SERPL-SCNC: 143 MMOL/L (ref 136–145)
SP GR UR REFRACTOMETRY: 1.01 (ref 1–1.03)
SP GR UR REFRACTOMETRY: 1.03 (ref 1–1.03)
TROPONIN I SERPL-MCNC: 0.02 NG/ML (ref 0–0.04)
TSH SERPL DL<=0.05 MIU/L-ACNC: 11.9 UIU/ML (ref 0.36–3.74)
UROBILINOGEN UR QL STRIP.AUTO: 0.2 EU/DL (ref 0.2–1)
UROBILINOGEN UR QL STRIP.AUTO: 0.2 EU/DL (ref 0.2–1)
VENTRICULAR RATE, ECG03: 39 BPM
VENTRICULAR RATE, ECG03: 63 BPM
WBC # BLD AUTO: 3.5 K/UL (ref 4.6–13.2)
WBC # BLD AUTO: 5.3 K/UL (ref 4.6–13.2)
WBC URNS QL MICRO: ABNORMAL /HPF (ref 0–4)
WBC URNS QL MICRO: ABNORMAL /HPF (ref 0–4)

## 2019-01-01 PROCEDURE — 77030037878 HC DRSG MEPILEX >48IN BORD MOLN -B

## 2019-01-01 PROCEDURE — 74011000258 HC RX REV CODE- 258: Performed by: EMERGENCY MEDICINE

## 2019-01-01 PROCEDURE — 81001 URINALYSIS AUTO W/SCOPE: CPT

## 2019-01-01 PROCEDURE — 80048 BASIC METABOLIC PNL TOTAL CA: CPT

## 2019-01-01 PROCEDURE — 84132 ASSAY OF SERUM POTASSIUM: CPT

## 2019-01-01 PROCEDURE — 36415 COLL VENOUS BLD VENIPUNCTURE: CPT

## 2019-01-01 PROCEDURE — 3336590001 HSPC ROOM AND BOARD

## 2019-01-01 PROCEDURE — 93005 ELECTROCARDIOGRAM TRACING: CPT

## 2019-01-01 PROCEDURE — 82270 OCCULT BLOOD FECES: CPT

## 2019-01-01 PROCEDURE — 96375 TX/PRO/DX INJ NEW DRUG ADDON: CPT

## 2019-01-01 PROCEDURE — 83605 ASSAY OF LACTIC ACID: CPT

## 2019-01-01 PROCEDURE — 80053 COMPREHEN METABOLIC PANEL: CPT

## 2019-01-01 PROCEDURE — 74011000250 HC RX REV CODE- 250: Performed by: EMERGENCY MEDICINE

## 2019-01-01 PROCEDURE — 0656 HSPC GENERAL INPATIENT

## 2019-01-01 PROCEDURE — 84443 ASSAY THYROID STIM HORMONE: CPT

## 2019-01-01 PROCEDURE — 85730 THROMBOPLASTIN TIME PARTIAL: CPT

## 2019-01-01 PROCEDURE — 71045 X-RAY EXAM CHEST 1 VIEW: CPT

## 2019-01-01 PROCEDURE — 74011250637 HC RX REV CODE- 250/637: Performed by: EMERGENCY MEDICINE

## 2019-01-01 PROCEDURE — 96360 HYDRATION IV INFUSION INIT: CPT

## 2019-01-01 PROCEDURE — 77030038269 HC DRN EXT URIN PURWCK BARD -A

## 2019-01-01 PROCEDURE — 83735 ASSAY OF MAGNESIUM: CPT

## 2019-01-01 PROCEDURE — 74011250637 HC RX REV CODE- 250/637: Performed by: INTERNAL MEDICINE

## 2019-01-01 PROCEDURE — 85610 PROTHROMBIN TIME: CPT

## 2019-01-01 PROCEDURE — 77030005563 HC CATH URETH INT MMGH -A

## 2019-01-01 PROCEDURE — 51701 INSERT BLADDER CATHETER: CPT

## 2019-01-01 PROCEDURE — 85025 COMPLETE CBC W/AUTO DIFF WBC: CPT

## 2019-01-01 PROCEDURE — 74011636637 HC RX REV CODE- 636/637: Performed by: EMERGENCY MEDICINE

## 2019-01-01 PROCEDURE — 96361 HYDRATE IV INFUSION ADD-ON: CPT

## 2019-01-01 PROCEDURE — 99285 EMERGENCY DEPT VISIT HI MDM: CPT

## 2019-01-01 PROCEDURE — 3336500001 HSPC ELECTION

## 2019-01-01 PROCEDURE — G0299 HHS/HOSPICE OF RN EA 15 MIN: HCPCS

## 2019-01-01 PROCEDURE — 74011250636 HC RX REV CODE- 250/636: Performed by: EMERGENCY MEDICINE

## 2019-01-01 PROCEDURE — 96365 THER/PROPH/DIAG IV INF INIT: CPT

## 2019-01-01 PROCEDURE — 87040 BLOOD CULTURE FOR BACTERIA: CPT

## 2019-01-01 PROCEDURE — 84484 ASSAY OF TROPONIN QUANT: CPT

## 2019-01-01 PROCEDURE — 65270000029 HC RM PRIVATE

## 2019-01-01 RX ORDER — SULFAMETHOXAZOLE AND TRIMETHOPRIM 800; 160 MG/1; MG/1
1 TABLET ORAL 2 TIMES DAILY
Qty: 14 TAB | Refills: 0 | Status: SHIPPED | OUTPATIENT
Start: 2019-01-01 | End: 2019-01-01

## 2019-01-01 RX ORDER — MORPHINE SULFATE 100 MG/5ML
10 SOLUTION ORAL EVERY 6 HOURS
Status: DISCONTINUED | OUTPATIENT
Start: 2019-01-01 | End: 2019-01-01 | Stop reason: HOSPADM

## 2019-01-01 RX ORDER — DEXTROSE 50 % IN WATER (D50W) INTRAVENOUS SYRINGE
25
Status: COMPLETED | OUTPATIENT
Start: 2019-01-01 | End: 2019-01-01

## 2019-01-01 RX ORDER — FACIAL-BODY WIPES
10 EACH TOPICAL DAILY PRN
Status: DISCONTINUED | OUTPATIENT
Start: 2019-01-01 | End: 2019-01-01 | Stop reason: HOSPADM

## 2019-01-01 RX ORDER — ACETAMINOPHEN 650 MG/1
650 SUPPOSITORY RECTAL
Status: DISCONTINUED | OUTPATIENT
Start: 2019-01-01 | End: 2019-01-01 | Stop reason: HOSPADM

## 2019-01-01 RX ORDER — ATORVASTATIN CALCIUM 10 MG/1
TABLET, FILM COATED ORAL DAILY
COMMUNITY

## 2019-01-01 RX ORDER — LORAZEPAM 2 MG/ML
1 CONCENTRATE ORAL
Status: DISCONTINUED | OUTPATIENT
Start: 2019-01-01 | End: 2019-01-01 | Stop reason: HOSPADM

## 2019-01-01 RX ORDER — MORPHINE SULFATE 100 MG/5ML
10 SOLUTION ORAL
Status: DISCONTINUED | OUTPATIENT
Start: 2019-01-01 | End: 2019-01-01 | Stop reason: HOSPADM

## 2019-01-01 RX ORDER — MORPHINE SULFATE 100 MG/5ML
10 SOLUTION ORAL
Status: DISCONTINUED | OUTPATIENT
Start: 2019-01-01 | End: 2019-01-01

## 2019-01-01 RX ORDER — SODIUM CHLORIDE 9 MG/ML
500 INJECTION, SOLUTION INTRAVENOUS ONCE
Status: COMPLETED | OUTPATIENT
Start: 2019-01-01 | End: 2019-01-01

## 2019-01-01 RX ORDER — SODIUM BICARBONATE 1 MEQ/ML
50 SYRINGE (ML) INTRAVENOUS
Status: COMPLETED | OUTPATIENT
Start: 2019-01-01 | End: 2019-01-01

## 2019-01-01 RX ORDER — SULFAMETHOXAZOLE AND TRIMETHOPRIM 800; 160 MG/1; MG/1
1 TABLET ORAL
Status: COMPLETED | OUTPATIENT
Start: 2019-01-01 | End: 2019-01-01

## 2019-01-01 RX ORDER — MENTHOL AND ZINC OXIDE .44; 20.625 G/100G; G/100G
OINTMENT TOPICAL
COMMUNITY

## 2019-01-01 RX ORDER — SODIUM CHLORIDE 0.9 % (FLUSH) 0.9 %
5-10 SYRINGE (ML) INJECTION AS NEEDED
Status: DISCONTINUED | OUTPATIENT
Start: 2019-01-01 | End: 2019-01-01 | Stop reason: HOSPADM

## 2019-01-01 RX ORDER — SODIUM POLYSTYRENE SULFONATE 15 G/60ML
15 SUSPENSION ORAL; RECTAL
COMMUNITY

## 2019-01-01 RX ORDER — SCOLOPAMINE TRANSDERMAL SYSTEM 1 MG/1
1 PATCH, EXTENDED RELEASE TRANSDERMAL
Status: DISCONTINUED | OUTPATIENT
Start: 2019-01-01 | End: 2019-01-01 | Stop reason: HOSPADM

## 2019-01-01 RX ADMIN — MORPHINE SULFATE 10 MG: 100 SOLUTION ORAL at 20:08

## 2019-01-01 RX ADMIN — SULFAMETHOXAZOLE AND TRIMETHOPRIM 1 TABLET: 800; 160 TABLET ORAL at 14:39

## 2019-01-01 RX ADMIN — INSULIN HUMAN 5 UNITS: 100 INJECTION, SOLUTION PARENTERAL at 11:12

## 2019-01-01 RX ADMIN — WATER 1 G: 1 INJECTION INTRAMUSCULAR; INTRAVENOUS; SUBCUTANEOUS at 10:41

## 2019-01-01 RX ADMIN — SODIUM BICARBONATE 50 MEQ: 84 INJECTION, SOLUTION INTRAVENOUS at 11:16

## 2019-01-01 RX ADMIN — MORPHINE SULFATE 10 MG: 100 SOLUTION ORAL at 19:00

## 2019-01-01 RX ADMIN — CALCIUM GLUCONATE 1 G: 94 INJECTION, SOLUTION INTRAVENOUS at 11:19

## 2019-01-01 RX ADMIN — DEXTROSE MONOHYDRATE 25 G: 25 INJECTION, SOLUTION INTRAVENOUS at 11:13

## 2019-01-01 RX ADMIN — SODIUM CHLORIDE 1000 ML: 900 INJECTION, SOLUTION INTRAVENOUS at 10:38

## 2019-01-01 RX ADMIN — MORPHINE SULFATE 10 MG: 100 SOLUTION ORAL at 00:10

## 2019-01-01 RX ADMIN — SODIUM CHLORIDE 632 ML: 900 INJECTION, SOLUTION INTRAVENOUS at 11:15

## 2019-01-01 RX ADMIN — SODIUM CHLORIDE 500 ML: 900 INJECTION, SOLUTION INTRAVENOUS at 11:38

## 2019-01-05 NOTE — ED PROVIDER NOTES
EMERGENCY DEPARTMENT HISTORY AND PHYSICAL EXAM 
 
11:25 AM 
 
 
Date: 1/5/2019 Patient Name: Carina Stuart History of Presenting Illness Chief Complaint Patient presents with  Abnormal Lab Results History Provided By: EMS Chief Complaint: Abnormal Lab Results Duration:  Hours Timing:  Acute Location: N/A Quality: Potassium of 6.9 Severity: N/A Modifying Factors: None Associated Symptoms: denies any other associated signs or symptoms Additional History (Context): Carina Stuart is a 80 y.o. female with PMHx of HTN, HLD, CVA, and chronic renal insufficiency presenting to the ED via EMS from [facility] for evaluation of acute abnormal potassium level pta. Per EMS, pt had a routine blood draw and the nursing home staff then received a call that the pt had a potassium level of 6.9. No modifying factors for pt's symptoms. No other complaints at this time. No further history could be obtained due to dementia. PCP: Patricia Barone MD 
 
Current Facility-Administered Medications Medication Dose Route Frequency Provider Last Rate Last Dose  trimethoprim-sulfamethoxazole (BACTRIM DS, SEPTRA DS) 160-800 mg per tablet 1 Tab  1 Tab Oral NOW Bri Lester MD      
 
Current Outpatient Medications Medication Sig Dispense Refill  atorvastatin (LIPITOR) 10 mg tablet Take  by mouth daily.  menthol-zinc oxide (RISAMINE) 0.44-20.6 % oint Apply  to affected area.  sodium polystyrene (KAYEXALATE) 15 gram/60 mL suspension Take 15 g by mouth now.  trimethoprim-sulfamethoxazole (BACTRIM DS) 160-800 mg per tablet Take 1 Tab by mouth two (2) times a day for 7 days. 14 Tab 0  
 amLODIPine (NORVASC) 10 mg tablet Take 10 mg by mouth daily.  albuterol (VENTOLIN HFA) 90 mcg/actuation inhaler Take  by inhalation.  amino acids-protein hydrolys (PRO-STAT AWC)  gram-kcal/30 mL liqd Take 30 mL by mouth two (2) times a day.  docusate sodium (COLACE) 100 mg capsule Take 100 mg by mouth two (2) times a day.  diclofenac (VOLTAREN) 1 % gel Apply 2 g to affected area four (4) times daily.  gabapentin (NEURONTIN) 300 mg capsule Take 300 mg by mouth nightly.  hydrALAZINE (APRESOLINE) 25 mg tablet Take 25 mg by mouth three (3) times daily.  hydrALAZINE (APRESOLINE) 10 mg tablet Take 10 mg by mouth three (3) times daily. PRN for SBP>180  Lactobacillus acidophilus (BACID) cap Take 2 Caps by mouth two (2) times a day.  lidocaine (XYLOCAINE) 4 % topical cream Apply 1 Each to affected area.  LORazepam (ATIVAN) 0.5 mg tablet Take 0.5 mg by mouth every eight (8) hours as needed for Anxiety.  oxyCODONE (OXYIR) 5 mg capsule Take 2.5 mg by mouth every eight (8) hours as needed for Pain.  polyethylene glycol (MIRALAX) 17 gram/dose powder Take 17 g by mouth daily.  multivitamin (ONE A DAY) tablet Take 1 Tab by mouth daily.  ascorbic acid, vitamin C, (VITAMIN C) 250 mg tablet Take 2 Tabs by mouth daily. 30 Tab 1  
 allopurinol (ZYLOPRIM) 100 mg tablet Take 0.5 Tabs by mouth daily. 30 Tab 2  carvedilol (COREG) 6.25 mg tablet Take 1 Tab by mouth two (2) times daily (with meals). 30 Tab 2  
 isosorbide dinitrate (ISORDIL) 30 mg tablet Take 1 Tab by mouth three (3) times daily. 30 Tab 2  
 furosemide (LASIX) 40 mg tablet Take 1 Tab by mouth daily. 30 Tab 0  
 bisacodyl (DULCOLAX) 10 mg suppository Insert 10 mg into rectum as needed. 30 Suppository 0  
 inhalational spacing device 1 Each by Does Not Apply route as needed. 1 Device 0  
 aspirin delayed-release 81 mg tablet Take 1 Tab by mouth daily. 100 Tab prn  ferrous sulfate 325 mg (65 mg iron) tablet Take 325 mg by mouth Daily (before breakfast).  cyanocobalamin (VITAMIN B-12) 1,000 mcg tablet Take 1,000 mcg by mouth daily.  acetaminophen (TYLENOL) 325 mg tablet Take 2 Tabs by mouth every four (4) hours as needed.  30 Tab 0  
  cholecalciferol, VITAMIN D3, (VITAMIN D3) 5,000 unit tab tablet Take 1 Tab by mouth daily. 100 Tab 0 Past History Past Medical History: 
Past Medical History:  
Diagnosis Date  Breast cancer ([facility].) 1/17/14  
 right breast  
 Cardiac echocardiogram 02/17/2017 EF 50%. No WMA. Mod conc LVH. Indeterminate diastolic fx. Mod LAE. Mild MR. Mild AI. Mild PI. No signifciant valvular heart disease.  Cardiovascular lower extremity arterial testing 12/19/2014 Mod tibio-peroneal arterial disease bilaterally. R YANCY 1.24.  L YANCY 0.74. Bilateral sm vessel disease.  Carotid duplex 11/19/2014 Severe 70% or greater bilateral ICA stenosis. LICA dissection suggested.  Chronic renal insufficiency  CV[facility]re[facility] vascular [facility] ([facility] 2003  
 with slight Right sided weakness  Edema  Glaucoma  Gout flare  Hyperlipidemia  Hypertension  Lump of right breast   
 URI (upper respiratory infection) Past Surgical History: 
Past Surgical History:  
Procedure Laterality Date  HX APPENDECTOMY  HX CYST INCISION AND DRAINAGE    
 PT DOES NOT REMEMBER EXACT DATES, TUCKER BREAST DONE MORE THAN 20 YEARS AGO. BENIGN FINDINGS PER PATIENT.  HX GYN    
 JUSTIN/BSO  HX HYSTERECTOMY  HX MASTECTOMY  1/17/2014 RIGHT PARTIAL MASTECTOMY performed by Meghana Marie MD at [address] [place] [address] Family History: 
Family History Problem Relation Age of Onset  Hypertension Mother  Hypertension Brother  Diabetes Brother Social History: 
Social History Tobacco Use  Smoking status: Never Smoker  Smokeless tobacco: Never Used Substance Use Topics  Alcohol use: No  
 Drug use: No  
 
 
Allergies: 
No Known Allergies Review of Systems Review of Systems Unable to perform ROS: Dementia Physical Exam  
 
Visit Vitals /42 Temp 98.3 °F (36.8 °C) SpO2 99% Physical Exam  
 Constitutional: She appears well-developed and well-nourished. No distress. Frail. Alert and responds to verbal stimuli. HENT:  
Head: Normocephalic. Right Ear: External ear normal.  
Left Ear: External ear normal.  
Mouth/Throat: No oropharyngeal exudate. Eyes: Conjunctivae and EOM are normal. Pupils are equal, round, and reactive to light. Right eye exhibits no discharge. Left eye exhibits no discharge. No scleral icterus. Neck: Normal range of motion. Neck supple. No JVD present. No tracheal deviation present. No thyromegaly present. Cardiovascular: Normal rate, regular rhythm, normal heart sounds and intact distal pulses. Exam reveals no gallop and no friction rub. No murmur heard. Pulmonary/Chest: Effort normal and breath sounds normal. No stridor. No respiratory distress. She has no wheezes. She has no rales. She exhibits no tenderness. Abdominal: Soft. Bowel sounds are normal. She exhibits no distension and no mass. There is no tenderness. There is no rebound and no guarding. Musculoskeletal: Normal range of motion. She exhibits no edema or tenderness. Lymphadenopathy:  
  She has no cervical adenopathy. Neurological: She is alert. She displays normal reflexes. No cranial nerve deficit. She exhibits normal muscle tone. Coordination normal.  
Confused. Moving all extremities. Skin: Skin is warm and dry. No rash noted. She is not diaphoretic. No erythema. No pallor. Nursing note and vitals reviewed. Diagnostic Study Results Labs - Recent Results (from the past 12 hour(s)) METABOLIC PANEL, BASIC Collection Time: 01/05/19  5:30 AM  
Result Value Ref Range Sodium 139 136 - 145 mmol/L Potassium 6.2 (HH) 3.5 - 5.5 mmol/L Chloride 113 (H) 100 - 108 mmol/L  
 CO2 19 (L) 21 - 32 mmol/L Anion gap 7 3.0 - 18 mmol/L Glucose 96 74 - 99 mg/dL BUN 90 (H) 7.0 - 18 MG/DL  Creatinine 5.70 (H) 0.6 - 1.3 MG/DL  
 BUN/Creatinine ratio 16 12 - 20    
 GFR est AA 8 (L) >60 ml/min/1.73m2 GFR est non-AA 7 (L) >60 ml/min/1.73m2 Calcium 8.5 8.5 - 21.1 MG/DL  
METABOLIC PANEL, BASIC Collection Time: 01/05/19 11:20 AM  
Result Value Ref Range Sodium 139 136 - 145 mmol/L Potassium 5.7 (H) 3.5 - 5.5 mmol/L Chloride 106 100 - 108 mmol/L  
 CO2 21 21 - 32 mmol/L Anion gap 12 3.0 - 18 mmol/L Glucose 102 (H) 74 - 99 mg/dL BUN 87 (H) 7.0 - 18 MG/DL Creatinine 5.68 (H) 0.6 - 1.3 MG/DL  
 BUN/Creatinine ratio 15 12 - 20 GFR est AA 9 (L) >60 ml/min/1.73m2 GFR est non-AA 7 (L) >60 ml/min/1.73m2 Calcium 9.7 8.5 - 10.1 MG/DL  
CBC WITH AUTOMATED DIFF Collection Time: 01/05/19 11:20 AM  
Result Value Ref Range WBC 5.3 4.6 - 13.2 K/uL  
 RBC 2.31 (L) 4.20 - 5.30 M/uL HGB 7.6 (L) 12.0 - 16.0 g/dL HCT 23.9 (L) 35.0 - 45.0 % .5 (H) 74.0 - 97.0 FL  
 MCH 32.9 24.0 - 34.0 PG  
 MCHC 31.8 31.0 - 37.0 g/dL  
 RDW 14.4 11.6 - 14.5 % PLATELET 202 914 - 294 K/uL MPV 9.3 9.2 - 11.8 FL  
 NEUTROPHILS 52 40 - 73 % LYMPHOCYTES 32 21 - 52 % MONOCYTES 10 3 - 10 % EOSINOPHILS 5 0 - 5 % BASOPHILS 1 0 - 2 %  
 ABS. NEUTROPHILS 2.7 1.8 - 8.0 K/UL  
 ABS. LYMPHOCYTES 1.7 0.9 - 3.6 K/UL  
 ABS. MONOCYTES 0.5 0.05 - 1.2 K/UL  
 ABS. EOSINOPHILS 0.3 0.0 - 0.4 K/UL  
 ABS. BASOPHILS 0.1 0.0 - 0.1 K/UL  
 DF AUTOMATED    
 RBC COMMENTS ANISOCYTOSIS 1+ 
    
 RBC COMMENTS MACROCYTOSIS 1+ 
    
 RBC COMMENTS SCHISTOCYTES 1+ 
    
EKG, 12 LEAD, INITIAL Collection Time: 01/05/19 11:36 AM  
Result Value Ref Range Ventricular Rate 63 BPM  
 Atrial Rate 63 BPM  
 P-R Interval 168 ms QRS Duration 118 ms Q-T Interval 382 ms QTC Calculation (Bezet) 390 ms Calculated P Axis 72 degrees Calculated R Axis -53 degrees Calculated T Axis 109 degrees Diagnosis Normal sinus rhythm Left anterior fascicular block Left ventricular hypertrophy with QRS widening and repolarization abnormality Cannot rule out Septal infarct (cited on or before 23-NOV-2018) Abnormal ECG When compared with ECG of 23-NOV-2018 02:42, 
Borderline criteria for Lateral infarct are no longer present Questionable change in initial forces of Septal leads Confirmed by Zack Manuel ([phone]) on 1/5/2019 03:61:61 PM 
  
METABOLIC PANEL, BASIC Collection Time: 01/05/19 12:30 PM  
Result Value Ref Range Sodium 141 136 - 145 mmol/L Potassium 5.1 3.5 - 5.5 mmol/L Chloride 113 (H) 100 - 108 mmol/L  
 CO2 16 (L) 21 - 32 mmol/L Anion gap 12 3.0 - 18 mmol/L Glucose 74 74 - 99 mg/dL BUN 79 (H) 7.0 - 18 MG/DL Creatinine 4.64 (H) 0.6 - 1.3 MG/DL  
 BUN/Creatinine ratio 17 12 - 20 GFR est AA 11 (L) >60 ml/min/1.73m2 GFR est non-AA 9 (L) >60 ml/min/1.73m2 Calcium 7.8 (L) 8.5 - 10.1 MG/DL URINALYSIS W/ RFLX MICROSCOPIC Collection Time: 01/05/19  1:30 PM  
Result Value Ref Range Color YELLOW Appearance TURBID Specific gravity 1.010 1.005 - 1.030    
 pH (UA) 8.0 5.0 - 8.0 Protein 300 (A) NEG mg/dL Glucose NEGATIVE  NEG mg/dL Ketone NEGATIVE  NEG mg/dL Bilirubin NEGATIVE  NEG Blood SMALL (A) NEG Urobilinogen 0.2 0.2 - 1.0 EU/dL Nitrites NEGATIVE  NEG Leukocyte Esterase LARGE (A) NEG URINE MICROSCOPIC ONLY Collection Time: 01/05/19  1:30 PM  
Result Value Ref Range WBC 36 to 50 0 - 4 /hpf  
 RBC 0 to 3 0 - 5 /hpf Epithelial cells FEW 0 - 5 /lpf Bacteria FEW (A) NEG /hpf Mucus 1+ (A) NEG /lpf Amorphous Crystals 3+ (A) NEG  
POC FECAL OCCULT BLOOD Collection Time: 01/05/19  2:08 PM  
Result Value Ref Range Occult blood, stool (POC) NEGATIVE  NEG Radiologic Studies - No orders to display Medical Decision Making I am the first provider for this patient. I reviewed the vital signs, available nursing notes, past medical history, past surgical history, family history and social history. Vital Signs-Reviewed the patient's vital signs. Pulse Oximetry Analysis -  100% on room air, normal 
 
EKG: Interpreted by the EP. Time Interpreted: 11:36 AM 
 Rate: 63 Rhythm: Normal Sinus Rhythm Interpretation: Lateral ischemia. Records Reviewed: Nursing Notes and Old Medical Records (Time of Review: 11:25 AM) ED Course: Progress Notes, Reevaluation, and Consults: 
2:13 PM Reviewed pt's labs. Pt has UTI, treated with antibiotic. Acute hyperkalemia was treated with IV fluids. Provider Notes (Medical Decision Making): DDx hemolyzed specimen, hyperkalemia, hyperglycemia, dehydration. Diagnosis Clinical Impression: 1. Urinary tract infection without hematuria, site unspecified 2. Iron deficiency anemia, unspecified iron deficiency anemia type 3. Acute hyperkalemia Disposition: Discharged Follow-up Information Follow up With Specialties Details Why Contact Info Gaby Ferraro MD Family Practice Schedule an appointment as soon as possible for a visit in 2 days  [address] [place] [address] [address] [place] [address] 
[phone] [id number] [facility] EMERGENCY DEPT Emergency Medicine  As needed, If symptoms worsen Clarence Norman [facility] [id number] [phone] Medication List  
  
START taking these medications   
trimethoprim-sulfamethoxazole 160-800 mg per tablet Commonly known as:  BACTRIM DS Take 1 Tab by mouth two (2) times a day for 7 days. ASK your doctor about these medications   
acetaminophen 325 mg tablet Commonly known as:  TYLENOL Take 2 Tabs by mouth every four (4) hours as needed. allopurinol 100 mg tablet Commonly known as:  Vena Ditch Take 0.5 Tabs by mouth daily. ascorbic acid (vitamin C) 250 mg tablet Commonly known as:  VITAMIN C Take 2 Tabs by mouth daily. aspirin delayed-release 81 mg tablet Take 1 Tab by mouth daily. atorvastatin 10 mg tablet Commonly known as:  LIPITOR bisacodyl 10 mg suppository Commonly known as:  DULCOLAX Insert 10 mg into rectum as needed. carvedilol 6.25 mg tablet Commonly known as:  Alfred Beets Take 1 Tab by mouth two (2) times daily (with meals). cholecalciferol (VITAMIN D3) 5,000 unit Tab tablet Commonly known as:  VITAMIN D3 Take 1 Tab by mouth daily. COLACE 100 mg capsule Generic drug:  docusate sodium 
  
diclofenac 1 % Gel Commonly known as:  VOLTAREN 
  
ferrous sulfate 325 mg (65 mg iron) tablet 
  
furosemide 40 mg tablet Commonly known as:  LASIX Take 1 Tab by mouth daily. gabapentin 300 mg capsule Commonly known as:  NEURONTIN 
  
* hydrALAZINE 25 mg tablet Commonly known as:  APRESOLINE * hydrALAZINE 10 mg tablet Commonly known as:  APRESOLINE 
  
inhalational spacing device 1 Each by Does Not Apply route as needed. isosorbide dinitrate 30 mg tablet Commonly known as:  ISORDIL Take 1 Tab by mouth three (3) times daily. Lactobacillus acidophilus Cap Commonly known as:  BACID 
  
lidocaine 4 % topical cream 
Commonly known as:  XYLOCAINE LORazepam 0.5 mg tablet Commonly known as:  ATIVAN 
  
MIRALAX 17 gram/dose powder Generic drug:  polyethylene glycol 
  
multivitamin tablet Commonly known as:  ONE A DAY 
  
NORVASC 10 mg tablet Generic drug:  amLODIPine 
  
oxyCODONE 5 mg capsule Commonly known as:  OXYIR 
  
PRO-STAT AWC  gram-kcal/30 mL Liqd Generic drug:  amino acids-protein hydrolys RISAMINE 0.44-20.6 % Oint Generic drug:  menthol-zinc oxide 
  
sodium polystyrene 15 gram/60 mL suspension Commonly known as:  KAYEXALATE 
  
VENTOLIN HFA 90 mcg/actuation inhaler Generic drug:  albuterol VITAMIN B-12 1,000 mcg tablet Generic drug:  cyanocobalamin * This list has 2 medication(s) that are the same as other medications prescribed for you. Read the directions carefully, and ask your doctor or other care provider to review them with you. Where to Get Your Medications Information about where to get these medications is not yet available Ask your nurse or doctor about these medications · trimethoprim-sulfamethoxazole 160-800 mg per tablet 
  
 
_______________________________ Scribe Attestation Demarcus Book acting as a scribe for and in the presence of Abi Partida MD     
January 05, 2019 at 11:25 AM 
    
Provider Attestation:     
I personally performed the services described in the documentation, reviewed the documentation, as recorded by the scribe in my presence, and it accurately and completely records my words and actions. January 05, 2019 at 11:25 AM - Abi Partida MD   
 
_______________________________

## 2019-01-05 NOTE — DISCHARGE INSTRUCTIONS
Urinary Tract Infection in Women: Care Instructions  Your Care Instructions    A urinary tract infection, or UTI, is a general term for an infection anywhere between the kidneys and the urethra (where urine comes out). Most UTIs are bladder infections. They often cause pain or burning when you urinate. UTIs are caused by bacteria and can be cured with antibiotics. Be sure to complete your treatment so that the infection goes away. Follow-up care is a key part of your treatment and safety. Be sure to make and go to all appointments, and call your doctor if you are having problems. It's also a good idea to know your test results and keep a list of the medicines you take. How can you care for yourself at home? · Take your antibiotics as directed. Do not stop taking them just because you feel better. You need to take the full course of antibiotics. · Drink extra water and other fluids for the next day or two. This may help wash out the bacteria that are causing the infection. (If you have kidney, heart, or liver disease and have to limit fluids, talk with your doctor before you increase your fluid intake.)  · Avoid drinks that are carbonated or have caffeine. They can irritate the bladder. · Urinate often. Try to empty your bladder each time. · To relieve pain, take a hot bath or lay a heating pad set on low over your lower belly or genital area. Never go to sleep with a heating pad in place. To prevent UTIs  · Drink plenty of water each day. This helps you urinate often, which clears bacteria from your system. (If you have kidney, heart, or liver disease and have to limit fluids, talk with your doctor before you increase your fluid intake.)  · Urinate when you need to. · Urinate right after you have sex. · Change sanitary pads often. · Avoid douches, bubble baths, feminine hygiene sprays, and other feminine hygiene products that have deodorants.   · After going to the bathroom, wipe from front to back.  When should you call for help? Call your doctor now or seek immediate medical care if:    · Symptoms such as fever, chills, nausea, or vomiting get worse or appear for the first time.     · You have new pain in your back just below your rib cage. This is called flank pain.     · There is new blood or pus in your urine.     · You have any problems with your antibiotic medicine.    Watch closely for changes in your health, and be sure to contact your doctor if:    · You are not getting better after taking an antibiotic for 2 days.     · Your symptoms go away but then come back. Where can you learn more? Go to http://bc-lexi.info/. Enter B094 in the search box to learn more about \"Urinary Tract Infection in Women: Care Instructions. \"  Current as of: March 21, 2018  Content Version: 11.8  © 7522-0500 [employer]. Care instructions adapted under license by [employer] (which disclaims liability or warranty for this information). If you have questions about a medical condition or this instruction, always ask your healthcare professional. [address] any warranty or liability for your use of this information. Hyperkalemia: Care Instructions  Your Care Instructions    Hyperkalemia is too much potassium in the blood. Potassium helps keep the right mix of fluids in your body. It also helps your nerves and muscles work as they should. And it keeps your heartbeat in a normal rhythm. Some things can raise potassium levels. These include some health problems, medicines, and kidney problems. (Normally, your kidneys remove extra potassium.)  Too much potassium can cause nausea. It also can cause a heartbeat that isn't normal. But you may not have any symptoms. Too much potassium can be dangerous. That's why it's important to treat it. If you are taking any of the medicines that can raise your levels, your doctor will ask you to stop.  You may get medicines to lower your levels. And you may have to limit or not eat foods that have a lot of potassium. Follow-up care is a key part of your treatment and safety. Be sure to make and go to all appointments, and call your doctor if you are having problems. It's also a good idea to know your test results and keep a list of the medicines you take. How can you care for yourself at home? · Take your medicines exactly as prescribed. Call your doctor if you think you are having a problem with your medicine. · Stop taking certain medicines if your doctor asks you to. They may be causing your high potassium levels. If you have concerns about stopping medicine, talk with your doctor. · If you have kidney, heart, or liver disease and have to limit fluids, talk with your doctor before you increase the amount of fluids you drink. If the doctor says it's okay, drink plenty of fluids. This means drinking enough so that your urine is light yellow or clear like water. · Avoid strenuous exercise until your doctor tells you it is okay. · Potassium is in many foods, including vegetables, fruits, and milk products. Foods high in potassium include bananas, cantaloupe, broccoli, milk, potatoes, and tomatoes. · Low potassium foods include blueberries, raspberries, cucumber, white or brown rice, spaghetti, and macaroni. · Do not use a salt substitute without talking to your doctor first. Most of these are very high in potassium. · Be sure to tell your doctor about any prescription, over-the-counter, or herbal medicines you take. Some of these can raise potassium. When should you call for help? Call 911 anytime you think you may need emergency care. For example, call if:    · You passed out (lost consciousness).     · You have an unusual heartbeat.  Your heart may beat fast or skip beats.    Call your doctor now or seek immediate medical care if:    · You have muscle aches.     · You feel very weak.    Watch closely for changes in your health, and be sure to contact your doctor if:    · You do not get better as expected. Where can you learn more? Go to http://bc-lexi.info/. Enter O983 in the search box to learn more about \"Hyperkalemia: Care Instructions. \"  Current as of: March 15, 2018  Content Version: 11.8  © 3176-7702 [employer]. Care instructions adapted under license by [employer] (which disclaims liability or warranty for this information). If you have questions about a medical condition or this instruction, always ask your healthcare professional. [address] any warranty or liability for your use of this information. Iron Deficiency Anemia: Care Instructions  Your Care Instructions    Anemia means that you do not have enough red blood cells. Red blood cells carry oxygen around your body. When you have anemia, it can make you pale, weak, and tired. Many things can cause anemia. The most common cause is loss of blood. This can happen if you have heavy menstrual periods. It can also happen if you have bleeding in your stomach or bowel. You can also get anemia if you don't have enough iron in your diet or if it's hard for your body to absorb iron. In some cases, pregnancy causes anemia. That's because a pregnant woman needs more iron. Your doctor may do more tests to find the cause of your anemia. If a disease or other health problem is causing it, your doctor will treat that problem. It's important to follow up with your doctor to make sure that your iron level returns to normal.  Follow-up care is a key part of your treatment and safety. Be sure to make and go to all appointments, and call your doctor if you are having problems. It's also a good idea to know your test results and keep a list of the medicines you take. How can you care for yourself at home? · If your doctor recommended iron pills, take them as directed.   ? Try to take the pills on an empty stomach. You can do this about 1 hour before or 2 hours after meals. But you may need to take iron with food to avoid an upset stomach. ? Do not take antacids or drink milk or anything with caffeine within 2 hours of when you take your iron. They can keep your body from absorbing the iron well. ? Vitamin C helps your body absorb iron. You may want to take iron pills with a glass of orange juice or some other food high in vitamin C.  ? Iron pills may cause stomach problems. These include heartburn, nausea, diarrhea, constipation, and cramps. It can help to drink plenty of fluids and include fruits, vegetables, and fiber in your diet. ? It's normal for iron pills to make your stool a greenish or grayish black. But internal bleeding can also cause dark stool. So it's important to tell your doctor about any color changes. ? Call your doctor if you think you are having a problem with your iron pills. Even after you start to feel better, it will take several months for your body to build up its supply of iron. ? If you miss a pill, don't take a double dose. ? Keep iron pills out of the reach of small children. Too much iron can be very dangerous. · Eat foods with a lot of iron. These include red meat, shellfish, poultry, and eggs. They also include beans, raisins, whole-grain bread, and leafy green vegetables. · Steam your vegetables. This is the best way to prepare them if you want to get as much iron as possible. · Be safe with medicines. Do not take nonsteroidal anti-inflammatory pain relievers unless your doctor tells you to. These include aspirin, naproxen (Aleve), and ibuprofen (Advil, Motrin). · Liquid iron can stain your teeth. But you can mix it with water or juice and drink it with a straw. Then it won't get on your teeth. When should you call for help? Call 911 anytime you think you may need emergency care.  For example, call if:    · You passed out (lost consciousness).    Call your doctor now or seek immediate medical care if:    · You are short of breath.     · You are dizzy or light-headed, or you feel like you may faint.     · You have new or worse bleeding.    Watch closely for changes in your health, and be sure to contact your doctor if:    · You feel weaker or more tired than usual.     · You do not get better as expected. Where can you learn more? Go to http://bc-lexi.info/. Enter I936 in the search box to learn more about \"Iron Deficiency Anemia: Care Instructions. \"  Current as of: May 7, 2018  Content Version: 11.8  © 2891-4230 [employer], [employer]. Care instructions adapted under license by [employer] (which disclaims liability or warranty for this information). If you have questions about a medical condition or this instruction, always ask your healthcare professional. [address] any warranty or liability for your use of this information.

## 2019-01-05 NOTE — ED NOTES
Received call from [facility] to check status of patient. Informed her we were awaiting repeat BMP results. She also asked if we had done a UA because patient had been c/o burning with urination. This information was not given in report nor did the patient verbalize that complaint to us. I told her I would speak with our Attending physician. Per Dr. Christiano Cisneros, do straight cath to obtain urine for UA.

## 2019-01-05 NOTE — ED NOTES
Called [facility] for transport back to [address] [place]; spoke with [employer]. Transport on site and will be in shortly.

## 2019-01-05 NOTE — ROUTINE PROCESS
TRANSFER - OUT REPORT: 
 
Verbal report given to Franko Lux (name) on Brandon Miller  being transferred to [facility] (unit) for routine progression of care Report consisted of patients Situation, Background, Assessment and  
Recommendations(SBAR). Information from the following report(s) ED Summary was reviewed with the receiving nurse. Lines:  
Peripheral IV 01/05/19 Right Antecubital (Active) Site Assessment Clean, dry, & intact 1/5/2019 11:21 AM  
Dressing Status Clean, dry, & intact 1/5/2019 11:21 AM  
Dressing Type Transparent 1/5/2019 11:21 AM  
Hub Color/Line Status Flushed 1/5/2019 11:21 AM  
  
 
Opportunity for questions and clarification was provided.

## 2019-03-21 PROBLEM — N18.6 END STAGE RENAL DISEASE (HCC): Status: ACTIVE | Noted: 2019-01-01

## 2019-03-21 NOTE — PROGRESS NOTES
Received from [facility] to room 458. Dx. AMS. Vitals obtained. Aware of name. Disoriented to time, place, situation. Fall precautions. Side rails X3. Bed alarm on. Call bell phone within reach. Family at bedside. Pain assessment (nonverbal). 02 2 liters via nasal cannula. Lung sound diminished bilaterally. 4 eye skin assessment complete. Applied Mepilex dressing to sacral/coccya area, pink in colo, no open wounds.

## 2019-03-21 NOTE — PROGRESS NOTES
Bedside shift change report given to Kat RN (oncoming nurse) by Natanael Dillard RN (offgoing nurse). Report included the following information SBAR, Kardex and MAR.

## 2019-03-21 NOTE — ED TRIAGE NOTES
Per nursing home staff, patient noted to be \"disoriented\" since yesterday. Normally oriented to person.

## 2019-03-21 NOTE — ROUTINE PROCESS
TRANSFER - OUT REPORT: 
 
Verbal report given to Serena Connors, RN(name) on Toney Reynoso  being transferred to [facility] room 458(unit) for routine progression of care Report consisted of patients Situation, Background, Assessment and  
Recommendations(SBAR). Information from the following report(s) SBAR, ED Summary, [facility] and Recent Results was reviewed with the receiving nurse. Opportunity for questions and clarification was provided.

## 2019-03-21 NOTE — ED NOTES
Per EMS, nursing home staff states that pt has not been acting quite herself since yesterday. Pt alert to person and situation. See ED notes.

## 2019-03-21 NOTE — ED PROVIDER NOTES
EMERGENCY DEPARTMENT HISTORY AND PHYSICAL EXAM 
 
10:30 AM 
 
 
Date: 3/21/2019 Patient Name: Qi Méndez History of Presenting Illness Chief Complaint Patient presents with  Altered mental status History Provided By: EMS Patient is a 51-year-old female that comes from skilled nursing facility with a history of hypertension chronic kidney disease breast cancer weakness heart failure and elevated cholesterol with a complaint to being more altered than her baseline according to reports. In the ED the patient has no complaints. The patient per reports is a DNR and has a post form that describes no escalation of care. The patient in the ED does note that she has been feeling tired that she may want to go to sleep. There is no family currently at the bedside. EMS notes that her heart rate was low in transport. PCP: Refugio Hull MD 
 
Current Facility-Administered Medications Medication Dose Route Frequency Provider Last Rate Last Dose  sodium chloride (NS) flush 5-10 mL  5-10 mL IntraVENous PRN Autumn Mckenzie MD      
 cefepime (MAXIPIME) 1 g in sterile water (preservative free) 10 mL IV syringe  1 g IntraVENous Q24H Autumn Mckenzie MD   1 g at 03/21/19 1041 Current Outpatient Medications Medication Sig Dispense Refill  atorvastatin (LIPITOR) 10 mg tablet Take  by mouth daily.  menthol-zinc oxide (RISAMINE) 0.44-20.6 % oint Apply  to affected area.  sodium polystyrene (KAYEXALATE) 15 gram/60 mL suspension Take 15 g by mouth now.  amLODIPine (NORVASC) 10 mg tablet Take 10 mg by mouth daily.  albuterol (VENTOLIN HFA) 90 mcg/actuation inhaler Take  by inhalation.  amino acids-protein hydrolys (PRO-STAT AWC)  gram-kcal/30 mL liqd Take 30 mL by mouth two (2) times a day.  docusate sodium (COLACE) 100 mg capsule Take 100 mg by mouth two (2) times a day.  diclofenac (VOLTAREN) 1 % gel Apply 2 g to affected area four (4) times daily.  gabapentin (NEURONTIN) 300 mg capsule Take 300 mg by mouth nightly.  hydrALAZINE (APRESOLINE) 25 mg tablet Take 25 mg by mouth three (3) times daily.  hydrALAZINE (APRESOLINE) 10 mg tablet Take 10 mg by mouth three (3) times daily. PRN for SBP>180  Lactobacillus acidophilus (BACID) cap Take 2 Caps by mouth two (2) times a day.  lidocaine (XYLOCAINE) 4 % topical cream Apply 1 Each to affected area.  LORazepam (ATIVAN) 0.5 mg tablet Take 0.5 mg by mouth every eight (8) hours as needed for Anxiety.  oxyCODONE (OXYIR) 5 mg capsule Take 2.5 mg by mouth every eight (8) hours as needed for Pain.  polyethylene glycol (MIRALAX) 17 gram/dose powder Take 17 g by mouth daily.  multivitamin (ONE A DAY) tablet Take 1 Tab by mouth daily.  ascorbic acid, vitamin C, (VITAMIN C) 250 mg tablet Take 2 Tabs by mouth daily. 30 Tab 1  
 allopurinol (ZYLOPRIM) 100 mg tablet Take 0.5 Tabs by mouth daily. 30 Tab 2  carvedilol (COREG) 6.25 mg tablet Take 1 Tab by mouth two (2) times daily (with meals). 30 Tab 2  
 isosorbide dinitrate (ISORDIL) 30 mg tablet Take 1 Tab by mouth three (3) times daily. 30 Tab 2  
 furosemide (LASIX) 40 mg tablet Take 1 Tab by mouth daily. 30 Tab 0  
 bisacodyl (DULCOLAX) 10 mg suppository Insert 10 mg into rectum as needed. 30 Suppository 0  
 inhalational spacing device 1 Each by Does Not Apply route as needed. 1 Device 0  
 aspirin delayed-release 81 mg tablet Take 1 Tab by mouth daily. 100 Tab prn  ferrous sulfate 325 mg (65 mg iron) tablet Take 325 mg by mouth Daily (before breakfast).  cyanocobalamin (VITAMIN B-12) 1,000 mcg tablet Take 1,000 mcg by mouth daily.  acetaminophen (TYLENOL) 325 mg tablet Take 2 Tabs by mouth every four (4) hours as needed. 30 Tab 0  cholecalciferol, VITAMIN D3, (VITAMIN D3) 5,000 unit tab tablet Take 1 Tab by mouth daily. 100 Tab 0 Past History Past Medical History: 
Past Medical History:  
Diagnosis Date  Breast cancer ([facility] [address] 1/17/14  
 right breast  
 Cardiac echocardiogram 02/17/2017 EF 50%. No WMA. Mod conc LVH. Indeterminate diastolic fx. Mod LAE. Mild MR. Mild AI. Mild PI. No signifciant valvular heart disease.  Cardiovascular lower extremity arterial testing 12/19/2014 Mod tibio-peroneal arterial disease bilaterally. R YANCY 1.24.  L YANCY 0.74. Bilateral sm vessel disease.  Carotid duplex 11/19/2014 Severe 70% or greater bilateral ICA stenosis. LICA dissection suggested.  Chronic renal insufficiency  CVA (cerebral vascular accident) ([facility] [address] 2003  
 with slight Right sided weakness  Edema  Glaucoma  Gout flare  Hyperlipidemia  Hypertension  Lump of right breast   
 URI (upper respiratory infection) Past Surgical History: 
Past Surgical History:  
Procedure Laterality Date  HX APPENDECTOMY  HX CYST INCISION AND DRAINAGE    
 PT DOES NOT REMEMBER EXACT DATES, TUCKER BREAST DONE MORE THAN 20 YEARS AGO. BENIGN FINDINGS PER PATIENT.  HX GYN    
 JUSTIN/BSO  HX HYSTERECTOMY  HX MASTECTOMY  1/17/2014 RIGHT PARTIAL MASTECTOMY performed by Diamond Jade MD at [address] [facility] Family History: 
Family History Problem Relation Age of Onset  Hypertension Mother  Hypertension Brother  Diabetes Brother Social History: 
Social History Tobacco Use  Smoking status: Never Smoker  Smokeless tobacco: Never Used Substance Use Topics  Alcohol use: No  
 Drug use: No  
 
 
Allergies: 
No Known Allergies Review of Systems Review of Systems Unable to perform ROS: Mental status change Physical Exam  
 
Visit Vitals BP 94/42 Pulse (!) 45 Temp (!) 90.4 °F (32.4 °C) Resp 19 Ht 5' (1.524 m) Wt 54.4 kg (120 lb) SpO2 99% BMI 23.44 kg/m² Physical Exam  
 Constitutional: She appears well-developed and well-nourished. Lying flat in bed HENT:  
Head: Normocephalic and atraumatic. Right Ear: External ear normal.  
Left Ear: External ear normal.  
Nose: Nose normal.  
Dry oral mucosa Eyes: Pupils are equal, round, and reactive to light. Conjunctivae and EOM are normal. No scleral icterus. Neck: Normal range of motion. Neck supple. No JVD present. No tracheal deviation present. No thyromegaly present. Cardiovascular: Regular rhythm, normal heart sounds and intact distal pulses. Exam reveals no gallop and no friction rub. No murmur heard. Bradycardia Pulmonary/Chest: Effort normal and breath sounds normal. She exhibits no tenderness. Abdominal: Soft. Bowel sounds are normal. She exhibits distension. There is tenderness. There is no rebound and no guarding. Mild left lower quadrant pain Musculoskeletal: She exhibits no edema or tenderness. Lymphadenopathy:  
  She has no cervical adenopathy. Neurological: She is alert. No cranial nerve deficit. Coordination normal.  
Is her name and place, otherwise confused, globally weak Skin: Skin is dry. Cool Psychiatric:  
No family currently at the bedside, DNR and post form are in the chart Nursing note and vitals reviewed. Diagnostic Study Results Labs - Recent Results (from the past 12 hour(s)) METABOLIC PANEL, BASIC Collection Time: 03/21/19  6:58 AM  
Result Value Ref Range Sodium 142 136 - 145 mmol/L Potassium 7.4 (HH) 3.5 - 5.5 mmol/L Chloride 115 (H) 100 - 108 mmol/L  
 CO2 15 (L) 21 - 32 mmol/L Anion gap 12 3.0 - 18 mmol/L Glucose 94 74 - 99 mg/dL  (H) 7.0 - 18 MG/DL Creatinine 8.56 (H) 0.6 - 1.3 MG/DL  
 BUN/Creatinine ratio 17 12 - 20 GFR est AA 5 (L) >60 ml/min/1.73m2 GFR est non-AA 4 (L) >60 ml/min/1.73m2 Calcium 8.8 8.5 - 10.1 MG/DL  
EKG, 12 LEAD, INITIAL Collection Time: 03/21/19  9:52 AM  
Result Value Ref Range Ventricular Rate 39 BPM  
 Atrial Rate 39 BPM  
 P-R Interval 216 ms  
 QRS Duration 146 ms  
 Q-T Interval 574 ms QTC Calculation (Bezet) 462 ms Calculated P Axis 74 degrees Calculated R Axis -49 degrees Calculated T Axis 103 degrees Diagnosis Marked sinus bradycardia with sinus arrhythmia with 1st degree AV block Left bundle branch block Abnormal ECG When compared with ECG of 05-JAN-2019 11:36, 
OR interval has increased Vent. rate has decreased BY  24 BPM 
Left bundle branch block is now present Minimal criteria for Septal infarct are no longer present CBC WITH AUTOMATED DIFF Collection Time: 03/21/19 10:00 AM  
Result Value Ref Range WBC 3.5 (L) 4.6 - 13.2 K/uL  
 RBC 1.90 (L) 4.20 - 5.30 M/uL HGB 6.1 (L) 12.0 - 16.0 g/dL HCT 19.4 (L) 35.0 - 45.0 % .1 (H) 74.0 - 97.0 FL  
 MCH 32.1 24.0 - 34.0 PG  
 MCHC 31.4 31.0 - 37.0 g/dL  
 RDW 16.2 (H) 11.6 - 14.5 % PLATELET 941 973 - 748 K/uL MPV 9.8 9.2 - 11.8 FL  
 NEUTROPHILS 74 (H) 40 - 73 % LYMPHOCYTES 19 (L) 21 - 52 % MONOCYTES 7 3 - 10 % EOSINOPHILS 0 0 - 5 % BASOPHILS 0 0 - 2 %  
 ABS. NEUTROPHILS 2.6 1.8 - 8.0 K/UL  
 ABS. LYMPHOCYTES 0.7 (L) 0.9 - 3.6 K/UL  
 ABS. MONOCYTES 0.2 0.05 - 1.2 K/UL  
 ABS. EOSINOPHILS 0.0 0.0 - 0.4 K/UL  
 ABS. BASOPHILS 0.0 0.0 - 0.1 K/UL  
 DF AUTOMATED METABOLIC PANEL, COMPREHENSIVE Collection Time: 03/21/19 10:00 AM  
Result Value Ref Range Sodium 141 136 - 145 mmol/L Potassium 7.5 (HH) 3.5 - 5.5 mmol/L Chloride 115 (H) 100 - 108 mmol/L  
 CO2 14 (L) 21 - 32 mmol/L Anion gap 12 3.0 - 18 mmol/L Glucose 99 74 - 99 mg/dL  (H) 7.0 - 18 MG/DL Creatinine 8.60 (H) 0.6 - 1.3 MG/DL  
 BUN/Creatinine ratio 16 12 - 20 GFR est AA 5 (L) >60 ml/min/1.73m2 GFR est non-AA 4 (L) >60 ml/min/1.73m2 Calcium 8.6 8.5 - 10.1 MG/DL  Bilirubin, total 0.2 0.2 - 1.0 MG/DL  
 ALT (SGPT) 42 13 - 56 U/L  
 AST (SGOT) 19 15 - 37 U/L  
 Alk. phosphatase 52 45 - 117 U/L Protein, total 5.3 (L) 6.4 - 8.2 g/dL Albumin 2.8 (L) 3.4 - 5.0 g/dL Globulin 2.5 2.0 - 4.0 g/dL A-G Ratio 1.1 0.8 - 1.7 MAGNESIUM Collection Time: 03/21/19 10:00 AM  
Result Value Ref Range Magnesium 2.8 (H) 1.6 - 2.6 mg/dL TROPONIN I Collection Time: 03/21/19 10:00 AM  
Result Value Ref Range Troponin-I, QT 0.02 0.0 - 0.045 NG/ML  
TSH 3RD GENERATION Collection Time: 03/21/19 10:00 AM  
Result Value Ref Range TSH 11.90 (H) 0.36 - 3.74 uIU/mL PROTHROMBIN TIME + INR Collection Time: 03/21/19 10:00 AM  
Result Value Ref Range Prothrombin time 14.4 11.5 - 15.2 sec INR 1.1 0.8 - 1.2 PTT Collection Time: 03/21/19 10:00 AM  
Result Value Ref Range aPTT 30.8 23.0 - 36.4 SEC POC LACTIC ACID Collection Time: 03/21/19 10:06 AM  
Result Value Ref Range Lactic Acid (POC) 0.69 0.40 - 2.00 mmol/L  
URINALYSIS W/ RFLX MICROSCOPIC Collection Time: 03/21/19 10:16 AM  
Result Value Ref Range Color YELLOW Appearance CLOUDY Specific gravity 1.026 1.005 - 1.030    
 pH (UA) 7.5 5.0 - 8.0 Protein >1,000 (A) NEG mg/dL Glucose NEGATIVE  NEG mg/dL Ketone NEGATIVE  NEG mg/dL Bilirubin NEGATIVE  NEG Blood NEGATIVE  NEG Urobilinogen 0.2 0.2 - 1.0 EU/dL Nitrites POSITIVE (A) NEG Leukocyte Esterase MODERATE (A) NEG URINE MICROSCOPIC ONLY Collection Time: 03/21/19 10:16 AM  
Result Value Ref Range WBC 36 to 50 0 - 4 /hpf  
 RBC NONE 0 - 5 /hpf Epithelial cells 1+ 0 - 5 /lpf Bacteria 2+ (A) NEG /hpf Radiologic Studies -  
XR CHEST SNGL V    (Results Pending) Medical Decision Making I am the first provider for this patient. I reviewed the vital signs, available nursing notes, past medical history, past surgical history, family history and social history. Vital Signs-Reviewed the patient's vital signs. Pulse Oximetry Analysis -  98% room air Carley Jimenez DO 10:44 AM 
 
 
Cardiac Monitor: Sinus bradycardia Carley Jimenez,  10:44 AM 
 
EKG: Prehospital EKG sinus bradycardia widened QRS with a rate of 41 and 9:34 AM interpreted by Carley Jimenez EKG #1 sinus bradycardia at 39 left axis deviation widened QRS no STEMI read 9:52 AM by Carley Jimenez Records Reviewed: Nursing Notes and Old Medical Records (Time of Review: 10:30 AM) ED Course: Progress Notes, Reevaluation, and Consults: 
 
Patient's labs suggest she is in profound renal failure with hyperkalemia. Her chart clearly states that she does not want dialysis. I have placed a call to her first emergency contact Josette caitlyn and had to leave a message. We will continue supportive care at this time. Had an extensive conversation with the patient's emergency contact Josette caitlyn and we discussed her condition. She notes that her mother-in-law was not a candidate for dialysis. She was aware that her condition was worsening and her potassium was rising. Over the last 24 hours the patient has been not been able to feed herself and not get up and that concerned the family. They do realize that that she has a terminal illness however wanted her evaluated to see if there is anything that was reversible. I told her that I believe she is got a terminal condition and without emergent intervention will pass. She will be coming in to be with her family and I did tell her I would speak with her nephrologist and her primary doctor. She did want to try IV fluids and IV medications in the meantime so I will give her sodium bicarbonate, calcium gluconate, insulin, D50. Carley Jimenez DO 10:56 AM 
 
I discussed the case with Dr. Dustin Dodd and he will discussed the case with Dr. Savi Caldera in the resident's and call me back.  
 
Discussed case with Dr. Viktoriya Gandhi and he notes that the only treatment at this point is hemodialysis and agrees with comfort measures if that was not the plan made by the family and the primary team. 
 
Dr. Melvi Bear spoke with Mrs. Anne Larson and the family is considering hospice. The hospice nurse is now at the bedside. I have a cell phone number for Mrs. Anne Larson [phone]. Hospice evaluated the patient and according to the nurse has been accepted to comfort bed at [facility]. Patient remains DNR and will proceed with admission to [facility] under the hospice service. Gilberto Villalpando DO 2:01 PM 
 
 
 
Provider Notes (Medical Decision Making): MDM Number of Diagnoses or Management Options Diagnosis management comments: The patient is a 41-year-old female with a history of debility, chronic kidney disease with no plans for dialysis, breast cancer, heart failure, glaucoma, and hypertension the presents to the ED with altered mental status per reports patient is oriented to person and place and has no complaint with however states she feels tired in the ED she has marked bradycardia and is on beta-blocker patient's blood pressure is borderline and her core temperature is low according to the documentation from the skilled nursing facility, she is on comfort measures with a post form that suggests no escalation of care. On 3/14 patient had labs drawn that showed a potassium was 6.6 patient is on Kayexalate. I will call her family, trend her labs, hydrate her as tolerated, then reevaluate. Based on her wishes we will not aggressively manage her bradycardia at this time. Procedures Critical Care Time: Critical Care Time: The services I provided to this patient were to treat and/or prevent clinically significant deterioration that could result in the failure of one or more body systems and/or organ systems due to acute renal failure, hyperkalemia . Services included the following: 
-reviewing nursing notes and old charts 
-vital sign assessments 
-direct patient care -medication orders and management 
-interpreting and reviewing diagnostic studies/labs 
-re-evaluations 
-documentation time Aggregate critical care time was 45 minutes, which includes only time during which I was engaged in work directly related to the patient's care as described above, whether I was at bedside or elsewhere in the Emergency Department. It did not include time spent performing other reported procedures or the services of residents, students, nurses, or advance practice providers. Carlee Oden DO 2:03 PM 
 
 
 
Diagnosis Clinical Impression: 1. Acute hyperkalemia 2. Acute renal failure, unspecified acute renal failure type ([facility] [address] 3. Bradycardia 4. Terminal illness Disposition: Admit Follow-up Information None Patient's Medications Start Taking No medications on file Continue Taking ACETAMINOPHEN (TYLENOL) 325 MG TABLET    Take 2 Tabs by mouth every four (4) hours as needed. ALBUTEROL (VENTOLIN HFA) 90 MCG/ACTUATION INHALER    Take  by inhalation. ALLOPURINOL (ZYLOPRIM) 100 MG TABLET    Take 0.5 Tabs by mouth daily. AMINO ACIDS-PROTEIN HYDROLYS (PRO-STAT AWC)  GRAM-KCAL/30 ML LIQD    Take 30 mL by mouth two (2) times a day. AMLODIPINE (NORVASC) 10 MG TABLET    Take 10 mg by mouth daily. ASCORBIC ACID, VITAMIN C, (VITAMIN C) 250 MG TABLET    Take 2 Tabs by mouth daily. ASPIRIN DELAYED-RELEASE 81 MG TABLET    Take 1 Tab by mouth daily. ATORVASTATIN (LIPITOR) 10 MG TABLET    Take  by mouth daily. BISACODYL (DULCOLAX) 10 MG SUPPOSITORY    Insert 10 mg into rectum as needed. CARVEDILOL (COREG) 6.25 MG TABLET    Take 1 Tab by mouth two (2) times daily (with meals). CHOLECALCIFEROL, VITAMIN D3, (VITAMIN D3) 5,000 UNIT TAB TABLET    Take 1 Tab by mouth daily. CYANOCOBALAMIN (VITAMIN B-12) 1,000 MCG TABLET    Take 1,000 mcg by mouth daily.   
 DICLOFENAC (VOLTAREN) 1 % GEL    Apply 2 g to affected area four (4) times daily. DOCUSATE SODIUM (COLACE) 100 MG CAPSULE    Take 100 mg by mouth two (2) times a day. FERROUS SULFATE 325 MG (65 MG IRON) TABLET    Take 325 mg by mouth Daily (before breakfast). FUROSEMIDE (LASIX) 40 MG TABLET    Take 1 Tab by mouth daily. GABAPENTIN (NEURONTIN) 300 MG CAPSULE    Take 300 mg by mouth nightly. HYDRALAZINE (APRESOLINE) 10 MG TABLET    Take 10 mg by mouth three (3) times daily. PRN for SBP>180 HYDRALAZINE (APRESOLINE) 25 MG TABLET    Take 25 mg by mouth three (3) times daily. INHALATIONAL SPACING DEVICE    1 Each by Does Not Apply route as needed. ISOSORBIDE DINITRATE (ISORDIL) 30 MG TABLET    Take 1 Tab by mouth three (3) times daily. LACTOBACILLUS ACIDOPHILUS (BACID) CAP    Take 2 Caps by mouth two (2) times a day. LIDOCAINE (XYLOCAINE) 4 % TOPICAL CREAM    Apply 1 Each to affected area. LORAZEPAM (ATIVAN) 0.5 MG TABLET    Take 0.5 mg by mouth every eight (8) hours as needed for Anxiety. MENTHOL-ZINC OXIDE (RISAMINE) 0.44-20.6 % OINT    Apply  to affected area. MULTIVITAMIN (ONE A DAY) TABLET    Take 1 Tab by mouth daily. OXYCODONE (OXYIR) 5 MG CAPSULE    Take 2.5 mg by mouth every eight (8) hours as needed for Pain. POLYETHYLENE GLYCOL (MIRALAX) 17 GRAM/DOSE POWDER    Take 17 g by mouth daily. SODIUM POLYSTYRENE (KAYEXALATE) 15 GRAM/60 ML SUSPENSION    Take 15 g by mouth now. These Medications have changed No medications on file Stop Taking No medications on file

## 2019-03-21 NOTE — HOSPICE
Thank you for the opportunity to care for this patient and family. The patient has been evaluated by Sukh Mcgrath RN BS [facility]. Pt was discussed with Dr. Alaina Daniels Director who approved the patient for a comfort bed admission at [facility]. Pt's RULA Hollingsworth agreed to hospice care and signed consent forms for hospice care which were witnessed by A Karlee MARCH. Hospice care will resume at [facility]. If you have any questions or concerns regarding this patient please contact [address] [place] [address] at [phone]. Thank you.

## 2019-03-22 NOTE — PROGRESS NOTES
Assumed care; Pt resting in bed; no distress noted; Pt responds to pain, see mar; Family at bedside; bed in low position; Side rails up x 3; Call light and personal belonging within reach. Comfort measures continued; Will continue to monitor.

## 2019-03-22 NOTE — PROGRESS NOTES
responded to Death of  Alena oRoney, who was a 80 y.o.,female, The  provided the following Interventions: 
Provided ministry of presence. No family member present at the time of response. Offered silent prayer on behalf of the patient. Chart reviewed.  arrangement with [facility]. : Brandyn Ngo (daughter-in-law) at [phone]. Plan: 
Chaplains will continue to follow and will provide pastoral care for family on an as needed/requested basis and grief support for the family. Chaplain Resident Veronica [facility]  
[phone]

## 2019-03-22 NOTE — DISCHARGE SUMMARY
Death Summary Patient ID: Qi Méndez 
80 y.o. 
7/17/1928 
[id number] Admit Date: 3/21/2019 Attending Physician:  No att. providers found Chief Complaint:   
Chief Complaint Patient presents with  Altered mental status Problem List:   
Patient Active Problem List  
 Diagnosis Date Noted  End stage renal disease ([facility] [address] 03/21/2019  Ischemic stroke ([address] 11/23/2018  Hyperkalemia 11/23/2018  Sialoadenitis 11/22/2018  Phlegmon 11/22/2018  UTI (urinary tract infection) 11/22/2018  Non-STEMI (non-ST elevated myocardial infarction) ([facility] [address] 05/12/2017  CHF exacerbation ([address] 05/12/2017  Acute on chronic diastolic congestive heart failure ([address] 05/12/2017  Stage 4 chronic kidney disease ([address] 05/12/2017  CHF (congestive heart failure) ([facility] [address] 05/12/2017  Acute diastolic heart failure ([address] 02/24/2017  Altered mental state 02/23/2017  Acute coronary syndromes ([address] 02/23/2017  Chest pain 02/15/2017  Diastolic CHF, acute on chronic ([address] 10/24/2016  
 NSTEMI (non-ST elevated myocardial infarction) ([address] 10/22/2016  ACS (acute coronary syndrome) ([facility] [address] 02/21/2016  Primary osteoarthritis involving multiple joints 01/18/2016  Hypertensive renal sclerosis with hypertension 05/04/2015  Fatigue 03/27/2015  Carotid stenosis 11/20/2014  CRF (chronic renal failure) 08/05/2014  Cellulitis 03/12/2014  Psoriasis 03/12/2014  Breast cancer ([address] 02/18/2014  Breast mass in female 06/25/2013  Lump of right breast   
 Chronic renal failure 07/18/2012  Leg pain 07/18/2012  Gout attack 10/28/2011  Cerebral thrombosis with cerebral infarction ([address] 09/22/2011  Long term (current) use of anticoagulants 09/22/2011  Hyperlipidemia  Glaucoma  CVA (cerebral vascular accident) ([address] Death Diagnosis: 1. End-stage renal disease 2. Congestive heart failure 3. Failure to thrive Hospital Course: 59-year-old female with multiple medical issues including chronic renal failure progressed to end-stage renal disease, heart failure. Patient was brought ER with altered mental status. Patient was found in CHF and progressive renal failure. Given patient's poor prognosis and overall decline in general health, family decided to elect for hospice benefit. 
  
 
Death was officially pronounced at [facility], 3/22/2019 by Dr. Lg Nolasco Condition at discharge:  Dmitry Sullivan MD  
Medical director [facility] If any questions please call on call hospice nurse at [phone]

## 2019-03-22 NOTE — PROGRESS NOTES
Death Pronouncement [address] [place] Patient lying in bed, mouth open, eyes closed. Pupils fixed and equal bilaterally. No response to verbal or painful stimuli. No heart or lung sounds auscultated. No carotid or peripheral pulses. Death officially pronounced at 0530, 3/22/2019 Chelsea Washington DO, PGY I 
[facility] 
03/22/19 
6:09 AM

## 2019-03-22 NOTE — H&P
Mulu Rebolledo is a 80y.o. year old who was admitted to [facility]. Hospice diagnosis-ESRD Medical conditions related to hospice diagnosis/prognosis- 
  
59-year-old female with multiple medical issues including chronic renal failure progressed to end-stage renal disease, heart failure. Patient was brought ER with altered mental status. Patient was found in CHF and progressive renal failure. Given patient's poor prognosis and overall decline in general health, family decided to elect for hospice benefit. 
  
  
PPS: 10% 
  
Ht: 52     Wt:  120 lbs 
  
  
Patient symptoms include weakness, lethargy. Patient is incontinent of bowel and bladder. Patient also has dysphagia and has poor oral intake. Patient remains bed-bound and total care. Patient is dependent for all ADLs. Prognosis extremely poor. 
  
I certify that this patient is terminally ill with a life expectancy of six months or less if the terminal illness runs its normal course.

## 2019-03-22 NOTE — PROGRESS NOTES
Rounding. Pt unresponsive. Does not respond to any stimuli. No Heart and Lung ausculted. Unable to detect pulse or BP.  
0535: 2nd nurse verfification with Desiree Montes De Oca RN.  
6596: Hospitalist Paged. 36: Post mouth Family medicine MD paged, and notified. 36:  paged and notified. 8070: Dr Shana Irby at bedside. Elva at bedside 
0600: Jairon Teresa RN(Hospice) notified of change in status. 6197: Daughter Indy Downey at bedside. Updated on time of death. 
[facility]: Per Elva, Daughter has chosen [facility]. [facility](Daughter)  to be contacted for  arrangement. 1365: [employer] called. Not a candidate due to advanced age. [phone]: Return call from [facility]. PerDen patient can be released from all donation. 0700: Nursing Supervisor notified. Per Hipolito Cook [facility] has contacted her.  
1218: Report given to Itzel Christy.

## 2019-03-22 NOTE — PROGRESS NOTES
Problem: Falls - Risk of 
Goal: *Absence of Falls Description Document Clide Failing Fall Risk and appropriate interventions in the flowsheet. Outcome: Progressing Towards Goal 
  
Problem: Patient Education: Go to Patient Education Activity Goal: Patient/Family Education Outcome: Progressing Towards Goal 
  
Problem: Pain Goal: *Control of Pain Outcome: Progressing Towards Goal 
Goal: *PALLIATIVE CARE:  Alleviation of Pain Outcome: Progressing Towards Goal 
  
Problem: Patient Education: Go to Patient Education Activity Goal: Patient/Family Education Outcome: Progressing Towards Goal 
  
Problem: Breathing Pattern - Ineffective Goal: *Use of effective breathing techniques Outcome: Progressing Towards Goal 
  
Problem: Pressure Injury - Risk of 
Goal: *Prevention of pressure injury Outcome: Progressing Towards Goal 
  
Problem: Grieving Goal: *Able to express feelings of grief Outcome: Progressing Towards Goal 
Goal: *Able to identify stages of grieving process Outcome: Progressing Towards Goal 
  
Problem: Mood - Altered Goal: *Alleviation of anxiety and depressive symptoms Outcome: Progressing Towards Goal 
  
Problem: Discharge Planning Goal: *Participates in discharge planning Outcome: Progressing Towards Goal 
  
Problem: Patient Education: Go to Patient Education Activity Goal: Patient/Family Education Outcome: Progressing Towards Goal

## 2019-03-22 NOTE — PROGRESS NOTES
Jalyn Mcfarlane is a 80y.o. year old who was admitted to [address] [place] [address]. Hospice diagnosis-ESRD Medical conditions related to hospice diagnosis/prognosis- 
 
78-year-old female with multiple medical issues including chronic renal failure progressed to end-stage renal disease, heart failure. Patient was brought ER with altered mental status. Patient was found in CHF and progressive renal failure. Given patient's poor prognosis and overall decline in general health, family decided to elect for hospice benefit. PPS: 10% Ht: 52     Wt:  120 lbs Patient symptoms include weakness, lethargy. Patient is incontinent of bowel and bladder. Patient also has dysphagia and has poor oral intake. Patient remains bed-bound and total care. Patient is dependent for all ADLs. Prognosis extremely poor. I certify that this patient is terminally ill with a life expectancy of six months or less if the terminal illness runs its normal course.

## 2019-03-22 NOTE — HOSPICE
Hospice phone call to the bereaved Bakari Orellana to extend condolences. She stated that the family was doing well under the circumstances. Provided her information regarding bereavement services and she was appreciative.

## 2019-03-22 NOTE — HOSPICE
Patient Name: Alejandra Young Date of Admission: 3/21/19 QM: Pt is not able to self-report  Service: Branch (if known): n/a 
: 1928 Allergies: NKA Caregiver name/Next of Kin: daughter in [address] Attending MD for Hospice: Dr. Giselle Villarreal Verbal CTI Received by Attending: yes Date Verbal CTI received: 3/21/19 POC & admission to Hospice status called to attending MD (if attending not MDR): n/a Code Status: DNR/POST Hospice Diagnosis: ESRD Co-morbidities (diagnosis that will impact EOL care): heart failure Onset Date of Hospice DX: hx of CKD; at ER visit 3/21 was noted to have progressed to ESRD [facility] Phone number: [phone] Oriental orthodox: unknown Patient Chantel Vidal of Rights reviewed and discussed with patient/caregiver: Yes Post mortem/ arrangements identified by family: No  
Vitals: BP 94/42, HR 41, RR 20, pulse ox 98% room air, temp 89.2 CLINICAL STATUS MAC-     Ht: 52     Wt:  120 lbs Not responding to verbal stimuli, responded to tactile stimuli by opening eyes briefly, no eye contact, occasionally opening eyes spontaneously, not following commands; appears frail and imminent. Jesi hugger on d/t hypothermia. RR even and unlabored on room air. Bradycardic. Per family pt not eating and more altered; newly non-verbal but baseline is verbal with confusion. Bedbound, incontinent, total care in all ADLs, no PO intake in 1-2 days. Skin dry, decreased turgor, normal color for race. ER visits/Hospitalizations in past year(provide dates): , 3/20 DISEASE SPECIFIC History Pt is a 80year old female, resident at [facility] with hx heart failure, chronic renal insufficiency, CKD, seen at [facility] 3/21/19 d/t family reporting pt unable to get up and not eating. Pt found to be hypothermic, hyperkalemic, and in renal failure. Pt has POST form indicating no aggressive measures including no dialysis.  Family reported that ER physician discussed with pts family (including POA/daughter in law [employer]) that pt is expected to  without aggressive intervention and dialysis which she would not tolerate in her condition. Family has stated they wanted pt transferred from [facility] ER to [facility] for comfort bed admission rather than to residence at [facility] with hospice as they \"hope for a miracle\" she will improve and \"that the hospital can help her get better at that point. \" Hospice medical director accepted pt for comfort bed admission and pt transferred to [facility]. Pt to d/c back to [facility] if she does not  in 48 hours. RULA Sahu in agreement with plan. Progression to DEPENDENCE WITH ADLs: years with acute decline over days LAB VALUES (when available):  GFR 5, K+ 7.5, CO2 14, creatinine 8.60, H/h 6.1/19.4, wbc 3.5 PPS: 10% Care Coordination ? Pt education provided this visit to include: on hospice philosophy, services available, IDG, FOC, eligibility criteria, comfort bed; answered questions on symptom management and disease process. Pt/caregiver degree of understanding: fair/good. ? The following education was provided regarding medications, medication interactions, and look a-like medications: uses, administration, possible side effects. Medications are effective at this time. Medications reviewed and discussed with Dr. Ja Cummings. Pt/CG notified of any discrepancies/medication interactions. ? Is patient prescribed controlled substances? Yes. Discussion occurred with pt/caregiver regarding the safe management and disposal of controlled substances. Controlled substance education reviewed in admission packet: yes. Pt/caregiver verbalized understanding of importance of the use of controlled substances. ? If patient is prescribed a controlled substance, was education provided to patient/caregiver on bowel regimen:  Yes. If patient/caregiver refuses bowel regimen if on controlled substance, document reason (n/a). ? Family agrees to POC/Medication regimen. ? Personal care supplies orders- n/a 
 
? DME: n/a? Medications ordered: comfort med ordered placed Hospice Team Orders 1. SN  daily 2. MSW  1w1, 1mo3 and prn-for assessment/evaluation for family support 3.  - 1w1 and prn for assessment/evaluation for spiritual support. 4. Hospice Aide  
5. Volunteer Clinician/Title:  Jacobo Garcia RN                                             Date:  3/21/19

## 2019-03-27 LAB
BACTERIA SPEC CULT: NORMAL
BACTERIA SPEC CULT: NORMAL
SERVICE CMNT-IMP: NORMAL
SERVICE CMNT-IMP: NORMAL

## 2022-05-31 NOTE — CDMP QUERY
Wound care Nurses have assessed this patient and noted the following to be  POA.       moisture associated skin damage to  gluteal fold /  cleft posterior -   POA      If you concur please document  as written        Thank you,   Preston Bland   RN ,   BSN,  CCDS Valtrex Pregnancy And Lactation Text: this medication is Pregnancy Category B and is considered safe during pregnancy. This medication is not directly found in breast milk but it's metabolite acyclovir is present.